# Patient Record
Sex: MALE | Race: WHITE | NOT HISPANIC OR LATINO | Employment: OTHER | URBAN - NONMETROPOLITAN AREA
[De-identification: names, ages, dates, MRNs, and addresses within clinical notes are randomized per-mention and may not be internally consistent; named-entity substitution may affect disease eponyms.]

---

## 2023-04-17 DIAGNOSIS — J45.902 MILD ASTHMA WITH STATUS ASTHMATICUS, UNSPECIFIED WHETHER PERSISTENT (HHS-HCC): ICD-10-CM

## 2023-04-17 RX ORDER — TOPIRAMATE 50 MG/1
50 TABLET, FILM COATED ORAL 2 TIMES DAILY
COMMUNITY

## 2023-04-17 RX ORDER — NYSTATIN 100000 [USP'U]/ML
SUSPENSION ORAL
COMMUNITY
Start: 2022-06-16 | End: 2023-11-09 | Stop reason: ALTCHOICE

## 2023-04-17 RX ORDER — ASPIRIN 81 MG
1 TABLET, DELAYED RELEASE (ENTERIC COATED) ORAL DAILY
COMMUNITY
Start: 2019-10-30 | End: 2023-11-09 | Stop reason: ALTCHOICE

## 2023-04-17 RX ORDER — OMEPRAZOLE 40 MG/1
40 CAPSULE, DELAYED RELEASE ORAL DAILY
COMMUNITY
End: 2023-05-03 | Stop reason: SDUPTHER

## 2023-04-17 RX ORDER — TIOTROPIUM BROMIDE 18 UG/1
CAPSULE ORAL; RESPIRATORY (INHALATION)
COMMUNITY
Start: 2022-02-22 | End: 2023-11-09 | Stop reason: ALTCHOICE

## 2023-04-17 RX ORDER — TADALAFIL 5 MG/1
1 TABLET ORAL DAILY
COMMUNITY
Start: 2023-02-25 | End: 2024-03-13 | Stop reason: ALTCHOICE

## 2023-04-17 RX ORDER — TRAZODONE HYDROCHLORIDE 50 MG/1
1 TABLET ORAL NIGHTLY PRN
COMMUNITY
Start: 2021-03-25 | End: 2023-05-03 | Stop reason: SINTOL

## 2023-04-17 RX ORDER — BUDESONIDE AND FORMOTEROL FUMARATE DIHYDRATE 160; 4.5 UG/1; UG/1
2 AEROSOL RESPIRATORY (INHALATION)
Qty: 3 EACH | Refills: 0 | Status: SHIPPED | OUTPATIENT
Start: 2023-04-17 | End: 2023-07-14 | Stop reason: SDUPTHER

## 2023-04-17 RX ORDER — LOSARTAN POTASSIUM 50 MG/1
1 TABLET ORAL DAILY
COMMUNITY
Start: 2023-02-01 | End: 2023-08-09 | Stop reason: SDUPTHER

## 2023-04-17 RX ORDER — TAMSULOSIN HYDROCHLORIDE 0.4 MG/1
0.4 CAPSULE ORAL NIGHTLY
COMMUNITY

## 2023-04-17 RX ORDER — PRIMIDONE 50 MG/1
50 TABLET ORAL NIGHTLY
COMMUNITY
Start: 2023-02-26 | End: 2023-08-24 | Stop reason: SDUPTHER

## 2023-04-17 RX ORDER — BUDESONIDE AND FORMOTEROL FUMARATE DIHYDRATE 160; 4.5 UG/1; UG/1
2 AEROSOL RESPIRATORY (INHALATION)
COMMUNITY
End: 2023-04-17 | Stop reason: SDUPTHER

## 2023-04-25 DIAGNOSIS — Z00.00 HEALTHCARE MAINTENANCE: ICD-10-CM

## 2023-04-25 RX ORDER — FOLIC ACID 1 MG/1
1 TABLET ORAL DAILY
Qty: 7 TABLET | Refills: 0 | Status: SHIPPED | OUTPATIENT
Start: 2023-04-25 | End: 2023-05-08 | Stop reason: SDUPTHER

## 2023-04-25 RX ORDER — FOLIC ACID 1 MG/1
1 TABLET ORAL DAILY
COMMUNITY
End: 2023-04-25 | Stop reason: SDUPTHER

## 2023-05-02 PROBLEM — E78.00 HYPERCHOLESTEREMIA: Status: ACTIVE | Noted: 2023-05-02

## 2023-05-02 PROBLEM — N40.1 BPH WITH OBSTRUCTION/LOWER URINARY TRACT SYMPTOMS: Status: ACTIVE | Noted: 2023-05-02

## 2023-05-02 PROBLEM — K58.9 IBS (IRRITABLE BOWEL SYNDROME): Status: ACTIVE | Noted: 2023-05-02

## 2023-05-02 PROBLEM — N13.8 BPH WITH OBSTRUCTION/LOWER URINARY TRACT SYMPTOMS: Status: ACTIVE | Noted: 2023-05-02

## 2023-05-02 PROBLEM — I25.811 ATHEROSCLEROSIS OF NATIVE CORONARY ARTERY OF TRANSPLANTED HEART WITHOUT ANGINA PECTORIS: Status: ACTIVE | Noted: 2023-05-02

## 2023-05-02 PROBLEM — K21.9 GERD (GASTROESOPHAGEAL REFLUX DISEASE): Status: ACTIVE | Noted: 2023-05-02

## 2023-05-02 PROBLEM — F41.9 ANXIETY: Status: ACTIVE | Noted: 2023-05-02

## 2023-05-02 PROBLEM — E78.5 DYSLIPIDEMIA: Status: ACTIVE | Noted: 2023-05-02

## 2023-05-02 PROBLEM — R29.818 SUSPECTED SLEEP APNEA: Status: ACTIVE | Noted: 2023-05-02

## 2023-05-02 PROBLEM — G47.00 INSOMNIA: Status: ACTIVE | Noted: 2023-05-02

## 2023-05-02 PROBLEM — J44.9 COPD (CHRONIC OBSTRUCTIVE PULMONARY DISEASE) (MULTI): Status: ACTIVE | Noted: 2023-05-02

## 2023-05-02 PROBLEM — I25.10 ASCVD (ARTERIOSCLEROTIC CARDIOVASCULAR DISEASE): Status: ACTIVE | Noted: 2023-05-02

## 2023-05-02 RX ORDER — ACETAMINOPHEN 325 MG/1
TABLET ORAL EVERY 6 HOURS PRN
COMMUNITY
Start: 2020-12-18 | End: 2024-04-06 | Stop reason: HOSPADM

## 2023-05-02 RX ORDER — BUDESONIDE AND FORMOTEROL FUMARATE DIHYDRATE 160; 4.5 UG/1; UG/1
AEROSOL RESPIRATORY (INHALATION) 2 TIMES DAILY PRN
COMMUNITY
End: 2023-07-14 | Stop reason: SDUPTHER

## 2023-05-02 RX ORDER — IBUPROFEN 800 MG/1
1 TABLET ORAL
COMMUNITY
Start: 2022-08-02 | End: 2024-03-13 | Stop reason: ALTCHOICE

## 2023-05-02 RX ORDER — ATORVASTATIN CALCIUM 40 MG/1
40 TABLET, FILM COATED ORAL DAILY
COMMUNITY
End: 2023-05-03 | Stop reason: SDUPTHER

## 2023-05-02 RX ORDER — PNV NO.95/FERROUS FUM/FOLIC AC 28MG-0.8MG
1 TABLET ORAL DAILY
COMMUNITY
Start: 2021-03-11

## 2023-05-02 RX ORDER — GABAPENTIN 100 MG/1
1 CAPSULE ORAL
COMMUNITY
Start: 2022-11-16 | End: 2023-11-09 | Stop reason: ALTCHOICE

## 2023-05-02 RX ORDER — ALBUTEROL SULFATE 90 UG/1
AEROSOL, METERED RESPIRATORY (INHALATION)
COMMUNITY
End: 2023-05-03 | Stop reason: SDUPTHER

## 2023-05-02 RX ORDER — ASPIRIN 81 MG/1
1 TABLET ORAL DAILY
COMMUNITY
Start: 2020-12-01 | End: 2023-11-09 | Stop reason: ALTCHOICE

## 2023-05-02 RX ORDER — AMITRIPTYLINE HYDROCHLORIDE 25 MG/1
TABLET, FILM COATED ORAL
COMMUNITY
End: 2023-05-03 | Stop reason: SINTOL

## 2023-05-03 ENCOUNTER — OFFICE VISIT (OUTPATIENT)
Dept: PRIMARY CARE | Facility: CLINIC | Age: 63
End: 2023-05-03
Payer: MEDICAID

## 2023-05-03 VITALS
OXYGEN SATURATION: 99 % | WEIGHT: 230.8 LBS | HEART RATE: 72 BPM | SYSTOLIC BLOOD PRESSURE: 120 MMHG | BODY MASS INDEX: 29.63 KG/M2 | DIASTOLIC BLOOD PRESSURE: 88 MMHG | TEMPERATURE: 97.9 F

## 2023-05-03 DIAGNOSIS — F17.219 CIGARETTE NICOTINE DEPENDENCE WITH NICOTINE-INDUCED DISORDER: ICD-10-CM

## 2023-05-03 DIAGNOSIS — E78.5 DYSLIPIDEMIA: ICD-10-CM

## 2023-05-03 DIAGNOSIS — I25.811 ATHEROSCLEROSIS OF NATIVE CORONARY ARTERY OF TRANSPLANTED HEART WITHOUT ANGINA PECTORIS: Primary | ICD-10-CM

## 2023-05-03 DIAGNOSIS — K21.9 GASTROESOPHAGEAL REFLUX DISEASE WITHOUT ESOPHAGITIS: ICD-10-CM

## 2023-05-03 DIAGNOSIS — B02.8 HERPES ZOSTER WITH OTHER COMPLICATION: ICD-10-CM

## 2023-05-03 DIAGNOSIS — F41.9 ANXIETY: ICD-10-CM

## 2023-05-03 DIAGNOSIS — J43.9 PULMONARY EMPHYSEMA, UNSPECIFIED EMPHYSEMA TYPE (MULTI): ICD-10-CM

## 2023-05-03 PROCEDURE — 99406 BEHAV CHNG SMOKING 3-10 MIN: CPT | Performed by: INTERNAL MEDICINE

## 2023-05-03 PROCEDURE — 1160F RVW MEDS BY RX/DR IN RCRD: CPT | Performed by: INTERNAL MEDICINE

## 2023-05-03 PROCEDURE — 1159F MED LIST DOCD IN RCRD: CPT | Performed by: INTERNAL MEDICINE

## 2023-05-03 PROCEDURE — 99214 OFFICE O/P EST MOD 30 MIN: CPT | Performed by: INTERNAL MEDICINE

## 2023-05-03 RX ORDER — OMEPRAZOLE 40 MG/1
40 CAPSULE, DELAYED RELEASE ORAL DAILY
Qty: 90 CAPSULE | Refills: 1 | Status: SHIPPED | OUTPATIENT
Start: 2023-05-03 | End: 2023-10-26 | Stop reason: SDUPTHER

## 2023-05-03 RX ORDER — LOSARTAN POTASSIUM 50 MG/1
50 TABLET ORAL DAILY
Qty: 90 TABLET | Refills: 1 | Status: CANCELLED | OUTPATIENT
Start: 2023-05-03

## 2023-05-03 RX ORDER — ALBUTEROL SULFATE 90 UG/1
AEROSOL, METERED RESPIRATORY (INHALATION)
Qty: 18 G | Refills: 1 | Status: SHIPPED | OUTPATIENT
Start: 2023-05-03 | End: 2023-05-30 | Stop reason: SDUPTHER

## 2023-05-03 RX ORDER — ATORVASTATIN CALCIUM 40 MG/1
40 TABLET, FILM COATED ORAL DAILY
Qty: 90 TABLET | Refills: 1 | Status: SHIPPED | OUTPATIENT
Start: 2023-05-03 | End: 2023-10-26 | Stop reason: SDUPTHER

## 2023-05-03 RX ORDER — VALACYCLOVIR HYDROCHLORIDE 1 G/1
1000 TABLET, FILM COATED ORAL 3 TIMES DAILY
Qty: 21 TABLET | Refills: 0 | Status: SHIPPED | OUTPATIENT
Start: 2023-05-03 | End: 2023-05-10

## 2023-05-03 RX ORDER — TOPIRAMATE 50 MG/1
50 TABLET, FILM COATED ORAL 2 TIMES DAILY
Qty: 90 TABLET | Refills: 1 | Status: CANCELLED | OUTPATIENT
Start: 2023-05-03

## 2023-05-03 ASSESSMENT — ENCOUNTER SYMPTOMS
WHEEZING: 0
HEADACHES: 0
LOSS OF SENSATION IN FEET: 0
DIFFICULTY URINATING: 0
BLOOD IN STOOL: 0
ARTHRALGIAS: 0
DEPRESSION: 0
UNEXPECTED WEIGHT CHANGE: 0
DIZZINESS: 0
FATIGUE: 0
OCCASIONAL FEELINGS OF UNSTEADINESS: 0
SINUS PAIN: 0
ABDOMINAL PAIN: 0
SORE THROAT: 0
BACK PAIN: 1
PALPITATIONS: 0
DIARRHEA: 0
BRUISES/BLEEDS EASILY: 0
FEVER: 0
COUGH: 0

## 2023-05-03 ASSESSMENT — PATIENT HEALTH QUESTIONNAIRE - PHQ9
2. FEELING DOWN, DEPRESSED OR HOPELESS: NOT AT ALL
SUM OF ALL RESPONSES TO PHQ9 QUESTIONS 1 AND 2: 0
1. LITTLE INTEREST OR PLEASURE IN DOING THINGS: NOT AT ALL

## 2023-05-03 NOTE — PROGRESS NOTES
"Subjective   Patient ID: Julio Carranza is a 63 y.o. male who presents for Annual Exam (Dizzy light headed this morning. Left arm feels like cramps under arm started last night/Stumbling left leg).    - Left shoulder rash and pain for the last 3 days physical exam underlying new onset shingles  Patient counseled about multivitamins daily dressing with Neosporin cream  - Add valacyclovir 3 times a day for 1 week  - For neuropathy continue with gabapentin  - Dizziness patient need to discontinue amitriptyline and trazodone  - Depression and anxiety continue all other medications  - COPD continue with current inhaler counseled on smoking cessation  - Nicotine dependency  \"I spent 3 minutes counseling patient about need for smoking/tobacco cessation and how I can support efforts when patient is ready to quit.  Discussed nicotine replacement therapy, Varenicline, Bupropion, hypnosis, support groups, and accupunture as potential options.  Patient currently has no signs or symptoms of tobacco related disease.\".  - Dyslipidemia continue low-carb diet  - Coronary artery disease compensated  - Hypertension continue with losartan  Follow-up 3 months           Review of Systems   Constitutional:  Negative for fatigue, fever and unexpected weight change.   HENT:  Negative for congestion, ear discharge, ear pain, mouth sores, sinus pain and sore throat.    Eyes:  Negative for visual disturbance.   Respiratory:  Negative for cough and wheezing.    Cardiovascular:  Negative for chest pain, palpitations and leg swelling.   Gastrointestinal:  Negative for abdominal pain, blood in stool and diarrhea.   Genitourinary:  Negative for difficulty urinating.   Musculoskeletal:  Positive for back pain. Negative for arthralgias.   Skin:  Negative for rash.   Neurological:  Negative for dizziness and headaches.   Hematological:  Does not bruise/bleed easily.   Psychiatric/Behavioral:  Negative for behavioral problems.    All other systems " reviewed and are negative.      Objective   Lab Results   Component Value Date    HGBA1C 5.2 06/18/2022      /88   Pulse 72   Temp 36.6 °C (97.9 °F)   Wt 105 kg (230 lb 12.8 oz)   SpO2 99%   BMI 29.63 kg/m²   This SmartLink has not been configured with any valid records.     Lab Results   Component Value Date    WBC 7.9 06/29/2022    HGB 15.2 06/29/2022    HCT 44.7 06/29/2022     06/29/2022    CHOL 136 06/30/2022    TRIG 79 06/30/2022    HDL 40.3 06/30/2022    ALT 16 06/29/2022    AST 21 06/29/2022     06/30/2022    K 3.8 06/30/2022     (H) 06/30/2022    CREATININE 0.92 06/30/2022    BUN 13 06/30/2022    CO2 25 06/30/2022    TSH 1.04 06/18/2022    INR 1.0 06/29/2022    HGBA1C 5.2 06/18/2022     par   Physical Exam  Vitals and nursing note reviewed.   Constitutional:       Appearance: Normal appearance.   HENT:      Head: Normocephalic.      Nose: Nose normal.   Eyes:      Conjunctiva/sclera: Conjunctivae normal.      Pupils: Pupils are equal, round, and reactive to light.   Cardiovascular:      Rate and Rhythm: Regular rhythm.   Pulmonary:      Effort: Pulmonary effort is normal.      Breath sounds: Normal breath sounds.   Abdominal:      General: Abdomen is flat.      Palpations: Abdomen is soft.   Musculoskeletal:      Cervical back: Neck supple.   Skin:     General: Skin is warm.      Findings: Lesion (and see rash on the left upper back consistent with shingles) present.   Neurological:      General: No focal deficit present.      Mental Status: He is oriented to person, place, and time.   Psychiatric:         Mood and Affect: Mood normal.         Assessment/Plan   Julio was seen today for annual exam.  Diagnoses and all orders for this visit:  Atherosclerosis of native coronary artery of transplanted heart without angina pectoris (Primary)  Anxiety  Pulmonary emphysema, unspecified emphysema type (CMS/HCC)  -     albuterol 90 mcg/actuation inhaler; inhale 1 to 2 puffs by mouth  "every 4 to 6 hours as needed  Herpes zoster with other complication  -     valACYclovir (Valtrex) 1 gram tablet; Take 1 tablet (1,000 mg) by mouth 3 times a day for 7 days.  Gastroesophageal reflux disease without esophagitis  -     omeprazole (PriLOSEC) 40 mg DR capsule; Take 1 capsule (40 mg) by mouth once daily.  Dyslipidemia  -     atorvastatin (Lipitor) 40 mg tablet; Take 1 tablet (40 mg) by mouth once daily.  Other orders  -     Follow Up In Primary Care; Future   - Left shoulder rash and pain for the last 3 days physical exam underlying new onset shingles  Patient counseled about multivitamins daily dressing with Neosporin cream  - Add valacyclovir 3 times a day for 1 week  - For neuropathy continue with gabapentin  - Dizziness patient need to discontinue amitriptyline and trazodone  - Depression and anxiety continue all other medications  - COPD continue with current inhaler counseled on smoking cessation  - Nicotine dependency  \"I spent 3 minutes counseling patient about need for smoking/tobacco cessation and how I can support efforts when patient is ready to quit.  Discussed nicotine replacement therapy, Varenicline, Bupropion, hypnosis, support groups, and accupunture as potential options.  Patient currently has no signs or symptoms of tobacco related disease.\".  - Dyslipidemia continue low-carb diet  - Coronary artery disease compensated  - Hypertension continue with losartan  Follow-up 3 months  "

## 2023-05-08 DIAGNOSIS — Z00.00 HEALTHCARE MAINTENANCE: ICD-10-CM

## 2023-05-08 RX ORDER — FOLIC ACID 1 MG/1
1 TABLET ORAL DAILY
Qty: 30 TABLET | Refills: 2 | Status: SHIPPED | OUTPATIENT
Start: 2023-05-08 | End: 2023-11-09 | Stop reason: SDUPTHER

## 2023-05-30 DIAGNOSIS — J43.9 PULMONARY EMPHYSEMA, UNSPECIFIED EMPHYSEMA TYPE (MULTI): ICD-10-CM

## 2023-05-30 RX ORDER — ALBUTEROL SULFATE 90 UG/1
AEROSOL, METERED RESPIRATORY (INHALATION)
Qty: 18 G | Refills: 1 | Status: SHIPPED | OUTPATIENT
Start: 2023-05-30 | End: 2023-06-30 | Stop reason: SDUPTHER

## 2023-06-30 DIAGNOSIS — J43.9 PULMONARY EMPHYSEMA, UNSPECIFIED EMPHYSEMA TYPE (MULTI): ICD-10-CM

## 2023-07-03 RX ORDER — ALBUTEROL SULFATE 90 UG/1
AEROSOL, METERED RESPIRATORY (INHALATION)
Qty: 18 G | Refills: 1 | Status: SHIPPED | OUTPATIENT
Start: 2023-07-03 | End: 2023-07-31 | Stop reason: SDUPTHER

## 2023-07-14 DIAGNOSIS — J45.902 MILD ASTHMA WITH STATUS ASTHMATICUS, UNSPECIFIED WHETHER PERSISTENT (HHS-HCC): Primary | ICD-10-CM

## 2023-07-15 RX ORDER — BUDESONIDE AND FORMOTEROL FUMARATE DIHYDRATE 160; 4.5 UG/1; UG/1
2 AEROSOL RESPIRATORY (INHALATION)
Qty: 3 EACH | Refills: 0 | Status: SHIPPED | OUTPATIENT
Start: 2023-07-15 | End: 2023-10-11 | Stop reason: SDUPTHER

## 2023-07-24 ENCOUNTER — HOSPITAL ENCOUNTER (OUTPATIENT)
Dept: DATA CONVERSION | Facility: HOSPITAL | Age: 63
End: 2023-07-24
Attending: RADIOLOGY
Payer: MEDICARE

## 2023-07-24 DIAGNOSIS — M25.561 PAIN IN RIGHT KNEE: ICD-10-CM

## 2023-07-24 DIAGNOSIS — M71.21 SYNOVIAL CYST OF POPLITEAL SPACE (BAKER), RIGHT KNEE: ICD-10-CM

## 2023-07-31 DIAGNOSIS — J43.9 PULMONARY EMPHYSEMA, UNSPECIFIED EMPHYSEMA TYPE (MULTI): ICD-10-CM

## 2023-07-31 RX ORDER — ALBUTEROL SULFATE 90 UG/1
AEROSOL, METERED RESPIRATORY (INHALATION)
Qty: 18 G | Refills: 1 | Status: SHIPPED | OUTPATIENT
Start: 2023-07-31 | End: 2023-08-30 | Stop reason: SDUPTHER

## 2023-08-09 ENCOUNTER — OFFICE VISIT (OUTPATIENT)
Dept: PRIMARY CARE | Facility: CLINIC | Age: 63
End: 2023-08-09
Payer: MEDICARE

## 2023-08-09 VITALS
TEMPERATURE: 97.1 F | SYSTOLIC BLOOD PRESSURE: 166 MMHG | HEIGHT: 71 IN | DIASTOLIC BLOOD PRESSURE: 100 MMHG | HEART RATE: 68 BPM | OXYGEN SATURATION: 96 % | BODY MASS INDEX: 32.14 KG/M2 | WEIGHT: 229.6 LBS

## 2023-08-09 DIAGNOSIS — R53.83 OTHER FATIGUE: ICD-10-CM

## 2023-08-09 DIAGNOSIS — Z13.89 SCREENING FOR MULTIPLE CONDITIONS: ICD-10-CM

## 2023-08-09 DIAGNOSIS — Z00.00 ROUTINE GENERAL MEDICAL EXAMINATION AT HEALTH CARE FACILITY: Primary | ICD-10-CM

## 2023-08-09 DIAGNOSIS — F17.219 CIGARETTE NICOTINE DEPENDENCE WITH NICOTINE-INDUCED DISORDER: ICD-10-CM

## 2023-08-09 DIAGNOSIS — E53.8 VITAMIN B12 DEFICIENCY: ICD-10-CM

## 2023-08-09 DIAGNOSIS — M71.21 SYNOVIAL CYST OF RIGHT POPLITEAL SPACE: ICD-10-CM

## 2023-08-09 DIAGNOSIS — E55.9 VITAMIN D DEFICIENCY, UNSPECIFIED: ICD-10-CM

## 2023-08-09 DIAGNOSIS — Z12.5 SCREENING FOR PROSTATE CANCER: ICD-10-CM

## 2023-08-09 DIAGNOSIS — I10 HYPERTENSION, UNSPECIFIED TYPE: ICD-10-CM

## 2023-08-09 DIAGNOSIS — Z79.899 HIGH RISK MEDICATION USE: ICD-10-CM

## 2023-08-09 DIAGNOSIS — I25.10 ASCVD (ARTERIOSCLEROTIC CARDIOVASCULAR DISEASE): ICD-10-CM

## 2023-08-09 DIAGNOSIS — Z13.6 SCREENING FOR CARDIOVASCULAR CONDITION: ICD-10-CM

## 2023-08-09 DIAGNOSIS — E78.00 HYPERCHOLESTEREMIA: ICD-10-CM

## 2023-08-09 DIAGNOSIS — L73.9 FOLLICULITIS: ICD-10-CM

## 2023-08-09 DIAGNOSIS — E78.5 DYSLIPIDEMIA: ICD-10-CM

## 2023-08-09 DIAGNOSIS — Z11.59 NEED FOR HEPATITIS C SCREENING TEST: ICD-10-CM

## 2023-08-09 PROCEDURE — 1170F FXNL STATUS ASSESSED: CPT | Performed by: INTERNAL MEDICINE

## 2023-08-09 PROCEDURE — 3077F SYST BP >= 140 MM HG: CPT | Performed by: INTERNAL MEDICINE

## 2023-08-09 PROCEDURE — 99214 OFFICE O/P EST MOD 30 MIN: CPT | Performed by: INTERNAL MEDICINE

## 2023-08-09 PROCEDURE — 3080F DIAST BP >= 90 MM HG: CPT | Performed by: INTERNAL MEDICINE

## 2023-08-09 PROCEDURE — G0402 INITIAL PREVENTIVE EXAM: HCPCS | Performed by: INTERNAL MEDICINE

## 2023-08-09 PROCEDURE — 1160F RVW MEDS BY RX/DR IN RCRD: CPT | Performed by: INTERNAL MEDICINE

## 2023-08-09 PROCEDURE — G0296 VISIT TO DETERM LDCT ELIG: HCPCS | Performed by: INTERNAL MEDICINE

## 2023-08-09 PROCEDURE — 99406 BEHAV CHNG SMOKING 3-10 MIN: CPT | Performed by: INTERNAL MEDICINE

## 2023-08-09 PROCEDURE — 1159F MED LIST DOCD IN RCRD: CPT | Performed by: INTERNAL MEDICINE

## 2023-08-09 PROCEDURE — G0403 EKG FOR INITIAL PREVENT EXAM: HCPCS | Performed by: INTERNAL MEDICINE

## 2023-08-09 RX ORDER — PREDNISONE 50 MG/1
50 TABLET ORAL NIGHTLY
COMMUNITY
Start: 2023-07-14 | End: 2024-03-13 | Stop reason: ALTCHOICE

## 2023-08-09 RX ORDER — DOXYCYCLINE 100 MG/1
100 CAPSULE ORAL 2 TIMES DAILY
Qty: 14 CAPSULE | Refills: 0 | Status: SHIPPED | OUTPATIENT
Start: 2023-08-09 | End: 2023-08-23

## 2023-08-09 RX ORDER — LOSARTAN POTASSIUM 50 MG/1
50 TABLET ORAL DAILY
Qty: 90 TABLET | Refills: 0 | Status: SHIPPED | OUTPATIENT
Start: 2023-08-09 | End: 2023-08-09

## 2023-08-09 RX ORDER — HYDROCODONE BITARTRATE AND ACETAMINOPHEN 5; 325 MG/1; MG/1
TABLET ORAL
COMMUNITY
Start: 2023-07-14 | End: 2024-03-13 | Stop reason: ALTCHOICE

## 2023-08-09 RX ORDER — LOSARTAN POTASSIUM 100 MG/1
100 TABLET ORAL DAILY
Qty: 90 TABLET | Refills: 3 | Status: SHIPPED | OUTPATIENT
Start: 2023-08-09 | End: 2023-11-09 | Stop reason: SDUPTHER

## 2023-08-09 ASSESSMENT — ACTIVITIES OF DAILY LIVING (ADL)
BATHING: INDEPENDENT
TAKING_MEDICATION: INDEPENDENT
DOING_HOUSEWORK: INDEPENDENT
GROCERY_SHOPPING: INDEPENDENT
DRESSING: INDEPENDENT
MANAGING_FINANCES: INDEPENDENT

## 2023-08-09 ASSESSMENT — ENCOUNTER SYMPTOMS
LOSS OF SENSATION IN FEET: 1
PALPITATIONS: 0
HEADACHES: 0
SINUS PAIN: 0
DIARRHEA: 0
FEVER: 0
OCCASIONAL FEELINGS OF UNSTEADINESS: 1
WHEEZING: 0
ARTHRALGIAS: 0
DIFFICULTY URINATING: 0
BLOOD IN STOOL: 0
UNEXPECTED WEIGHT CHANGE: 0
SORE THROAT: 0
JOINT SWELLING: 1
DIZZINESS: 0
FATIGUE: 0
COUGH: 0
ABDOMINAL PAIN: 0
BRUISES/BLEEDS EASILY: 0

## 2023-08-09 ASSESSMENT — PATIENT HEALTH QUESTIONNAIRE - PHQ9: 1. LITTLE INTEREST OR PLEASURE IN DOING THINGS: NEARLY EVERY DAY

## 2023-08-09 NOTE — PROGRESS NOTES
"Subjective   Reason for Visit: Julio Carranza is an 63 y.o. male here for a Medicare Wellness visit.     Past Medical, Surgical, and Family History reviewed and updated in chart.    Reviewed all medications by prescribing practitioner or clinical pharmacist (such as prescriptions, OTCs, herbal therapies and supplements) and documented in the medical record.     Annual preventive visit, welcome to Medicare  - Vaccinations  - Patient needs Shingrix vaccine patient had shingles about 3 months ago healed and now patient agreed  -Screening colonoscopy will be obtained in 2025  -Nicotine dependency  \"I spent more 10 minutes counseling patient about need for smoking/tobacco cessation and how I can support efforts when patient is ready to quit.  Discussed nicotine replacement therapy, Varenicline, Bupropion, hypnosis, support groups, and accupunture as potential options.  Patient currently has no signs or symptoms of tobacco related disease.\".  -Screening for lung cardiac cancer  I discussed smoking history, patient meets criteria for lung cancer screening with low-dose CT scan. By using shared decision making we determined that patient would benefit from that screening including a discussion of benefits and harms of screening, follow-up diagnostic testing, over diagnosis, false positive  rate and total radiation exposure  I counseled the patient about the  importance of adhering to annual lung cancer low-dose CT screening, the impact of comorbidities and his  ability or willingness to undergo diagnosis and treatment if abnormality discovered. I provided patient an order for low-dose CT lung cancer screening.    Medicare screening reviewed with patient    Follow-up  -Recurrent axillary folliculitis not improving counseled about prevention need to start on doxycycline twice a day for 14 days follow-up if no improvement  -Right knee swelling diagnosed with Baker's cyst received steroid injection without improvement refer " patient to orthopedic surgery Dr. Rouse follow-up results closely  - Hypertension uncontrolled needs a goal blood pressure 120/80  Need to change losartan to 100 mg daily new prescription provided  - Dyslipidemia continue low-carb diet  - Coronary artery disease compensated continue with aspirin daily counseled about nicotine cessation  - Hypertension continue with losartan  Follow-up 3 months      ER Follow-up  Associated symptoms include joint swelling (Right knee swelling) and a rash (Axillary recurrent boils). Pertinent negatives include no abdominal pain, arthralgias, chest pain, congestion, coughing, fatigue, fever, headaches or sore throat.   Med Refill  Associated symptoms include joint swelling (Right knee swelling) and a rash (Axillary recurrent boils). Pertinent negatives include no abdominal pain, arthralgias, chest pain, congestion, coughing, fatigue, fever, headaches or sore throat.       Patient Care Team:  Mendez Coronel MD as PCP - General  Mendez Coronel MD as PCP - [facility] Medicaid PCP     Review of Systems   Constitutional:  Negative for fatigue, fever and unexpected weight change.   HENT:  Negative for congestion, ear discharge, ear pain, mouth sores, sinus pain and sore throat.    Eyes:  Negative for visual disturbance.   Respiratory:  Negative for cough and wheezing.    Cardiovascular:  Negative for chest pain, palpitations and leg swelling.   Gastrointestinal:  Negative for abdominal pain, blood in stool and diarrhea.   Genitourinary:  Negative for difficulty urinating.   Musculoskeletal:  Positive for joint swelling (Right knee swelling). Negative for arthralgias.   Skin:  Positive for rash (Axillary recurrent boils).   Neurological:  Negative for dizziness and headaches.   Hematological:  Does not bruise/bleed easily.   Psychiatric/Behavioral:  Negative for behavioral problems.    All other systems reviewed and are negative.      Objective   Vitals:  BP (!) 166/100   Pulse 68   Temp  "36.2 °C (97.1 °F)   Ht 1.803 m (5' 11\")   Wt 104 kg (229 lb 9.6 oz)   SpO2 96%   BMI 32.02 kg/m²     Lab Results   Component Value Date    WBC 7.9 06/29/2022    HGB 15.2 06/29/2022    HCT 44.7 06/29/2022     06/29/2022    CHOL 136 06/30/2022    TRIG 79 06/30/2022    HDL 40.3 06/30/2022    ALT 16 06/29/2022    AST 21 06/29/2022     06/30/2022    K 3.8 06/30/2022     (H) 06/30/2022    CREATININE 0.92 06/30/2022    BUN 13 06/30/2022    CO2 25 06/30/2022    TSH 1.04 06/18/2022    INR 1.0 06/29/2022    HGBA1C 5.2 06/18/2022     par   Physical Exam  Vitals and nursing note reviewed.   Constitutional:       Appearance: Normal appearance.   HENT:      Head: Normocephalic.      Nose: Nose normal.   Eyes:      Conjunctiva/sclera: Conjunctivae normal.      Pupils: Pupils are equal, round, and reactive to light.   Cardiovascular:      Rate and Rhythm: Regular rhythm.   Pulmonary:      Effort: Pulmonary effort is normal.      Breath sounds: Normal breath sounds. No rales.   Chest:      Chest wall: Tenderness present.   Abdominal:      General: Abdomen is flat.      Palpations: Abdomen is soft.   Musculoskeletal:         General: Deformity (Right knee swelling and Baker's cyst) present.      Cervical back: Neck supple.   Skin:     General: Skin is warm.      Findings: Rash (Recurrent axillary folliculitis) present.   Neurological:      General: No focal deficit present.      Mental Status: He is oriented to person, place, and time.      Gait: Gait normal.      Deep Tendon Reflexes: Reflexes normal.   Psychiatric:         Mood and Affect: Mood normal.       Assessment/Plan   Problem List Items Addressed This Visit       ASCVD (arteriosclerotic cardiovascular disease)    Dyslipidemia    Hypercholesteremia    Relevant Orders    Lipid Panel     Other Visit Diagnoses       Routine general medical examination at health care facility    -  Primary    Relevant Medications    zoster vaccine-recombinant adjuvanted " "(Shingrix) 50 mcg/0.5 mL vaccine    Screening for multiple conditions        Screening for cardiovascular condition        Relevant Orders    ECG 12 lead    Need for hepatitis C screening test        Relevant Orders    Hepatitis C antibody    High risk medication use        Relevant Orders    CBC and Auto Differential    Hypertension, unspecified type        Relevant Medications    losartan (Cozaar) 100 mg tablet    Other Relevant Orders    Comprehensive Metabolic Panel    Other fatigue        Relevant Orders    TSH with reflex to Free T4 if abnormal    Screening for prostate cancer        Relevant Orders    Prostate Specific Antigen, Screen    Vitamin B12 deficiency        Relevant Orders    Vitamin B12    Vitamin D deficiency, unspecified        Relevant Orders    Vitamin D, Total    Synovial cyst of right popliteal space        Relevant Orders    Referral to Orthopaedic Surgery    Folliculitis        Relevant Medications    doxycycline (Vibramycin) 100 mg capsule    Cigarette nicotine dependence with nicotine-induced disorder        Relevant Orders    CT lung screening low dose        Annual preventive visit, welcome to Medicare  - Vaccinations  - Patient needs Shingrix vaccine patient had shingles about 3 months ago healed and now patient agreed  -Screening colonoscopy will be obtained in 2025  -Nicotine dependency  \"I spent more 10 minutes counseling patient about need for smoking/tobacco cessation and how I can support efforts when patient is ready to quit.  Discussed nicotine replacement therapy, Varenicline, Bupropion, hypnosis, support groups, and accupunture as potential options.  Patient currently has no signs or symptoms of tobacco related disease.\".  -Screening for lung cardiac cancer  I discussed smoking history, patient meets criteria for lung cancer screening with low-dose CT scan. By using shared decision making we determined that patient would benefit from that screening including a discussion of " benefits and harms of screening, follow-up diagnostic testing, over diagnosis, false positive  rate and total radiation exposure  I counseled the patient about the  importance of adhering to annual lung cancer low-dose CT screening, the impact of comorbidities and his  ability or willingness to undergo diagnosis and treatment if abnormality discovered. I provided patient an order for low-dose CT lung cancer screening.    Medicare screening reviewed with patient    Follow-up  -Recurrent axillary folliculitis not improving counseled about prevention need to start on doxycycline twice a day for 14 days follow-up if no improvement  -Right knee swelling diagnosed with Baker's cyst received steroid injection without improvement refer patient to orthopedic surgery Dr. Rouse follow-up results closely  - Hypertension uncontrolled needs a goal blood pressure 120/80  Need to change losartan to 100 mg daily new prescription provided  - Dyslipidemia continue low-carb diet  - Coronary artery disease compensated continue with aspirin daily counseled about nicotine cessation  - Hypertension continue with losartan  Follow-up 3 months

## 2023-08-16 ENCOUNTER — LAB (OUTPATIENT)
Dept: LAB | Facility: LAB | Age: 63
End: 2023-08-16
Payer: MEDICARE

## 2023-08-16 DIAGNOSIS — E78.00 HYPERCHOLESTEREMIA: ICD-10-CM

## 2023-08-16 DIAGNOSIS — I10 HYPERTENSION, UNSPECIFIED TYPE: ICD-10-CM

## 2023-08-16 DIAGNOSIS — E55.9 VITAMIN D DEFICIENCY, UNSPECIFIED: ICD-10-CM

## 2023-08-16 DIAGNOSIS — Z11.59 NEED FOR HEPATITIS C SCREENING TEST: ICD-10-CM

## 2023-08-16 DIAGNOSIS — Z12.5 SCREENING FOR PROSTATE CANCER: ICD-10-CM

## 2023-08-16 DIAGNOSIS — E53.8 VITAMIN B12 DEFICIENCY: ICD-10-CM

## 2023-08-16 DIAGNOSIS — R53.83 OTHER FATIGUE: ICD-10-CM

## 2023-08-16 DIAGNOSIS — Z79.899 HIGH RISK MEDICATION USE: ICD-10-CM

## 2023-08-16 LAB
ALANINE AMINOTRANSFERASE (SGPT) (U/L) IN SER/PLAS: 12 U/L (ref 10–52)
ALBUMIN (G/DL) IN SER/PLAS: 4 G/DL (ref 3.4–5)
ALKALINE PHOSPHATASE (U/L) IN SER/PLAS: 121 U/L (ref 33–136)
ANION GAP IN SER/PLAS: 14 MMOL/L (ref 10–20)
ASPARTATE AMINOTRANSFERASE (SGOT) (U/L) IN SER/PLAS: 17 U/L (ref 9–39)
BASOPHILS (10*3/UL) IN BLOOD BY AUTOMATED COUNT: 0.04 X10E9/L (ref 0–0.1)
BASOPHILS/100 LEUKOCYTES IN BLOOD BY AUTOMATED COUNT: 0.8 % (ref 0–2)
BILIRUBIN TOTAL (MG/DL) IN SER/PLAS: 0.4 MG/DL (ref 0–1.2)
CALCIDIOL (25 OH VITAMIN D3) (NG/ML) IN SER/PLAS: 35 NG/ML
CALCIUM (MG/DL) IN SER/PLAS: 8.7 MG/DL (ref 8.6–10.3)
CARBON DIOXIDE, TOTAL (MMOL/L) IN SER/PLAS: 26 MMOL/L (ref 21–32)
CHLORIDE (MMOL/L) IN SER/PLAS: 105 MMOL/L (ref 98–107)
CHOLESTEROL (MG/DL) IN SER/PLAS: 158 MG/DL (ref 0–199)
CHOLESTEROL IN HDL (MG/DL) IN SER/PLAS: 39.2 MG/DL
CHOLESTEROL/HDL RATIO: 4
COBALAMIN (VITAMIN B12) (PG/ML) IN SER/PLAS: 304 PG/ML (ref 211–911)
CREATININE (MG/DL) IN SER/PLAS: 1.06 MG/DL (ref 0.5–1.3)
EOSINOPHILS (10*3/UL) IN BLOOD BY AUTOMATED COUNT: 0.39 X10E9/L (ref 0–0.7)
EOSINOPHILS/100 LEUKOCYTES IN BLOOD BY AUTOMATED COUNT: 8.1 % (ref 0–6)
ERYTHROCYTE DISTRIBUTION WIDTH (RATIO) BY AUTOMATED COUNT: 12.8 % (ref 11.5–14.5)
ERYTHROCYTE MEAN CORPUSCULAR HEMOGLOBIN CONCENTRATION (G/DL) BY AUTOMATED: 33 G/DL (ref 32–36)
ERYTHROCYTE MEAN CORPUSCULAR VOLUME (FL) BY AUTOMATED COUNT: 90 FL (ref 80–100)
ERYTHROCYTES (10*6/UL) IN BLOOD BY AUTOMATED COUNT: 4.49 X10E12/L (ref 4.5–5.9)
GFR MALE: 78 ML/MIN/1.73M2
GLUCOSE (MG/DL) IN SER/PLAS: 91 MG/DL (ref 74–99)
HEMATOCRIT (%) IN BLOOD BY AUTOMATED COUNT: 40.3 % (ref 41–52)
HEMOGLOBIN (G/DL) IN BLOOD: 13.3 G/DL (ref 13.5–17.5)
HEPATITIS C VIRUS AB PRESENCE IN SERUM: NONREACTIVE
IMMATURE GRANULOCYTES/100 LEUKOCYTES IN BLOOD BY AUTOMATED COUNT: 0.4 % (ref 0–0.9)
LDL: 99 MG/DL (ref 0–99)
LEUKOCYTES (10*3/UL) IN BLOOD BY AUTOMATED COUNT: 4.8 X10E9/L (ref 4.4–11.3)
LYMPHOCYTES (10*3/UL) IN BLOOD BY AUTOMATED COUNT: 0.99 X10E9/L (ref 1.2–4.8)
LYMPHOCYTES/100 LEUKOCYTES IN BLOOD BY AUTOMATED COUNT: 20.7 % (ref 13–44)
MONOCYTES (10*3/UL) IN BLOOD BY AUTOMATED COUNT: 0.57 X10E9/L (ref 0.1–1)
MONOCYTES/100 LEUKOCYTES IN BLOOD BY AUTOMATED COUNT: 11.9 % (ref 2–10)
NEUTROPHILS (10*3/UL) IN BLOOD BY AUTOMATED COUNT: 2.78 X10E9/L (ref 1.2–7.7)
NEUTROPHILS/100 LEUKOCYTES IN BLOOD BY AUTOMATED COUNT: 58.1 % (ref 40–80)
PLATELETS (10*3/UL) IN BLOOD AUTOMATED COUNT: 236 X10E9/L (ref 150–450)
POTASSIUM (MMOL/L) IN SER/PLAS: 4 MMOL/L (ref 3.5–5.3)
PROSTATE SPECIFIC ANTIGEN,SCREEN: 2.14 NG/ML (ref 0–4)
PROTEIN TOTAL: 6.4 G/DL (ref 6.4–8.2)
SODIUM (MMOL/L) IN SER/PLAS: 141 MMOL/L (ref 136–145)
THYROTROPIN (MIU/L) IN SER/PLAS BY DETECTION LIMIT <= 0.05 MIU/L: 0.73 MIU/L (ref 0.44–3.98)
TRIGLYCERIDE (MG/DL) IN SER/PLAS: 97 MG/DL (ref 0–149)
UREA NITROGEN (MG/DL) IN SER/PLAS: 9 MG/DL (ref 6–23)
VLDL: 19 MG/DL (ref 0–40)

## 2023-08-16 PROCEDURE — 82306 VITAMIN D 25 HYDROXY: CPT

## 2023-08-16 PROCEDURE — 36415 COLL VENOUS BLD VENIPUNCTURE: CPT

## 2023-08-16 PROCEDURE — 84443 ASSAY THYROID STIM HORMONE: CPT

## 2023-08-16 PROCEDURE — 80053 COMPREHEN METABOLIC PANEL: CPT

## 2023-08-16 PROCEDURE — 85025 COMPLETE CBC W/AUTO DIFF WBC: CPT

## 2023-08-16 PROCEDURE — G0103 PSA SCREENING: HCPCS

## 2023-08-16 PROCEDURE — 82607 VITAMIN B-12: CPT

## 2023-08-16 PROCEDURE — 80061 LIPID PANEL: CPT

## 2023-08-16 PROCEDURE — 86803 HEPATITIS C AB TEST: CPT

## 2023-08-24 DIAGNOSIS — G47.00 INSOMNIA, UNSPECIFIED TYPE: ICD-10-CM

## 2023-08-24 RX ORDER — PRIMIDONE 50 MG/1
50 TABLET ORAL NIGHTLY
Qty: 90 TABLET | Refills: 1 | Status: SHIPPED | OUTPATIENT
Start: 2023-08-24 | End: 2023-11-09 | Stop reason: SDUPTHER

## 2023-08-30 DIAGNOSIS — J43.9 PULMONARY EMPHYSEMA, UNSPECIFIED EMPHYSEMA TYPE (MULTI): ICD-10-CM

## 2023-08-30 RX ORDER — ALBUTEROL SULFATE 90 UG/1
AEROSOL, METERED RESPIRATORY (INHALATION)
Qty: 18 G | Refills: 1 | Status: SHIPPED | OUTPATIENT
Start: 2023-08-30 | End: 2023-09-27 | Stop reason: SDUPTHER

## 2023-09-27 DIAGNOSIS — J43.9 PULMONARY EMPHYSEMA, UNSPECIFIED EMPHYSEMA TYPE (MULTI): ICD-10-CM

## 2023-09-27 RX ORDER — ALBUTEROL SULFATE 90 UG/1
AEROSOL, METERED RESPIRATORY (INHALATION)
Qty: 18 G | Refills: 0 | Status: SHIPPED | OUTPATIENT
Start: 2023-09-27 | End: 2023-10-12 | Stop reason: SDUPTHER

## 2023-10-04 RX ORDER — HYALURONATE SODIUM 20 MG/2 ML
20 SYRINGE (ML) INTRAARTICULAR ONCE
Qty: 2 ML | Refills: 0 | Status: CANCELLED | OUTPATIENT
Start: 2023-10-04 | End: 2023-10-04

## 2023-10-05 ENCOUNTER — ANCILLARY PROCEDURE (OUTPATIENT)
Dept: RADIOLOGY | Facility: CLINIC | Age: 63
End: 2023-10-05
Payer: MEDICARE

## 2023-10-05 ENCOUNTER — CLINICAL SUPPORT (OUTPATIENT)
Dept: ORTHOPEDIC SURGERY | Facility: CLINIC | Age: 63
End: 2023-10-05
Payer: MEDICARE

## 2023-10-05 VITALS — BODY MASS INDEX: 30.13 KG/M2 | WEIGHT: 216 LBS

## 2023-10-05 DIAGNOSIS — M25.552 LEFT HIP PAIN: ICD-10-CM

## 2023-10-05 DIAGNOSIS — M17.11 OSTEOARTHRITIS OF RIGHT KNEE, UNSPECIFIED OSTEOARTHRITIS TYPE: ICD-10-CM

## 2023-10-05 DIAGNOSIS — M17.11 ARTHRITIS OF RIGHT KNEE: Primary | ICD-10-CM

## 2023-10-05 PROCEDURE — 73502 X-RAY EXAM HIP UNI 2-3 VIEWS: CPT | Mod: LEFT SIDE | Performed by: RADIOLOGY

## 2023-10-05 PROCEDURE — 73502 X-RAY EXAM HIP UNI 2-3 VIEWS: CPT | Mod: LT,FY

## 2023-10-05 PROCEDURE — 99024 POSTOP FOLLOW-UP VISIT: CPT | Performed by: ORTHOPAEDIC SURGERY

## 2023-10-05 PROCEDURE — 20610 DRAIN/INJ JOINT/BURSA W/O US: CPT | Performed by: ORTHOPAEDIC SURGERY

## 2023-10-05 NOTE — PROGRESS NOTES
Subjective    Patient ID: Julio Carranza is a 63 y.o. male.    Chief Complaint: Second Euflexxa shot right knee Pain of the Right Knee (RT KNEE EUFLEXXA )     Last Surgery: No surgery found  Last Surgery Date: No surgery found    HPI for shot did not help    Objective   Ortho Exam unchanged    Image Results:      Assessment/Plan second shot was given we will continue ice at I will see him next week for the next he also discussed his hip which was hurting him we will get x-rays on the way out we will get this left hip next visit  Injection was performed for osteoarthritis of right knee. The risks were discussed with the patient. The area over the anteriolateral knee was prepped with betadine solution and sprayed with ethyl chloride topically. A 22-gauge was used to inject 2ml of Euflexxa into the joint space. Bandaid was applied to the area. Patient tolerated the procedure well with no complications. Instructions were given for post procedure care.    Encounter Diagnoses:  Arthritis of right knee    Osteoarthritis of right knee, unspecified osteoarthritis type    Left hip pain    Orders Placed This Encounter    XR hip left 2 or 3 views    DISCONTD: sodium hyaluronate (Hyalgan) injection 20 mg    sodium hyaluronate (Euflexxa) injection 20 mg     No follow-ups on file.

## 2023-10-11 DIAGNOSIS — J45.902 MILD ASTHMA WITH STATUS ASTHMATICUS, UNSPECIFIED WHETHER PERSISTENT (HHS-HCC): ICD-10-CM

## 2023-10-11 RX ORDER — BUDESONIDE AND FORMOTEROL FUMARATE DIHYDRATE 160; 4.5 UG/1; UG/1
2 AEROSOL RESPIRATORY (INHALATION)
Qty: 30.6 EACH | Refills: 0 | Status: SHIPPED | OUTPATIENT
Start: 2023-10-11 | End: 2023-11-09 | Stop reason: SDUPTHER

## 2023-10-12 ENCOUNTER — OFFICE VISIT (OUTPATIENT)
Dept: ORTHOPEDIC SURGERY | Facility: CLINIC | Age: 63
End: 2023-10-12
Payer: MEDICARE

## 2023-10-12 VITALS — BODY MASS INDEX: 29.29 KG/M2 | HEIGHT: 73 IN | WEIGHT: 221 LBS

## 2023-10-12 DIAGNOSIS — M71.21 SYNOVIAL CYST OF RIGHT POPLITEAL SPACE: ICD-10-CM

## 2023-10-12 DIAGNOSIS — J43.9 PULMONARY EMPHYSEMA, UNSPECIFIED EMPHYSEMA TYPE (MULTI): ICD-10-CM

## 2023-10-12 DIAGNOSIS — M17.11 OSTEOARTHRITIS OF RIGHT KNEE, UNSPECIFIED OSTEOARTHRITIS TYPE: Primary | ICD-10-CM

## 2023-10-12 DIAGNOSIS — M25.552 LEFT HIP PAIN: ICD-10-CM

## 2023-10-12 PROCEDURE — 1159F MED LIST DOCD IN RCRD: CPT | Performed by: ORTHOPAEDIC SURGERY

## 2023-10-12 PROCEDURE — 99213 OFFICE O/P EST LOW 20 MIN: CPT | Performed by: ORTHOPAEDIC SURGERY

## 2023-10-12 PROCEDURE — 1160F RVW MEDS BY RX/DR IN RCRD: CPT | Performed by: ORTHOPAEDIC SURGERY

## 2023-10-12 PROCEDURE — 20610 DRAIN/INJ JOINT/BURSA W/O US: CPT | Performed by: ORTHOPAEDIC SURGERY

## 2023-10-12 PROCEDURE — 1125F AMNT PAIN NOTED PAIN PRSNT: CPT | Performed by: ORTHOPAEDIC SURGERY

## 2023-10-12 RX ORDER — ALBUTEROL SULFATE 90 UG/1
AEROSOL, METERED RESPIRATORY (INHALATION)
Qty: 18 G | Refills: 2 | Status: SHIPPED | OUTPATIENT
Start: 2023-10-12 | End: 2023-11-09 | Stop reason: SDUPTHER

## 2023-10-12 ASSESSMENT — PAIN - FUNCTIONAL ASSESSMENT: PAIN_FUNCTIONAL_ASSESSMENT: 0-10

## 2023-10-12 ASSESSMENT — PAIN SCALES - GENERAL: PAINLEVEL_OUTOF10: 5 - MODERATE PAIN

## 2023-10-12 ASSESSMENT — ENCOUNTER SYMPTOMS: KNEE SWELLING: 1

## 2023-10-12 NOTE — PROGRESS NOTES
Subjective    Patient ID: Julio Carranza is a 63 y.o. male.    Chief Complaint: He is in for his third Euflexxa injection of his right knee and left hip pain Injections of the Right Knee (Euflexxa #1)     Last Surgery: No surgery found  Last Surgery Date: No surgery found    Right Knee      injections may be helping in the the left hip however it is still causing him significant amount of pain keeps him awake at night pain is mainly in the groin getting up and down out of chairs makes it worse    Objective   Ortho Exam the knee is unchanged the hip shows limited range of motion with pain on internal rotation and flexion neurovascular intact skins intact no bursal pain pain is mainly in the groin    Image Results:  XR hip left 2 or 3 views  Narrative: Interpreted By:  Harshad Gtz,   STUDY:  XR HIP LEFT 2 OR 3 VIEWS; ;  10/5/2023 1:40 pm      INDICATION:  Signs/Symptoms:LT HIP PAIN.      COMPARISON:  None.      ACCESSION NUMBER(S):  [id number]      ORDERING CLINICIAN:  TAN MONSALVE      FINDINGS:  Left hip, three views      There is focal sclerosis in the femoral head bilaterally, left  greater than right. There is mild collapse of the left femoral head  as well. There is mild degenerative changes in the hips with  osteophytosis. Postsurgical change in the lumbar spine. No fracture  seen.      Impression: Evidence of avascular necrosis with mild collapse of the left femoral  head. Probable avascular necrosis of the right femoral head as well.  Mild secondary osteoarthritis          MACRO:  None      Signed by: Harshad Gtz 10/6/2023 5:57 PM  Dictation workstation:   [id number]      Assessment/Plan we will see how the knee does over the next 6 weeks from the Euflexxa injections for the left hip the x-rays we got last time does show some sclerosis will be some avascular necrosis we will Loretta do is when he tried getting him an injection into the hip to see what it does but does not help we have to discuss  hip replacements  L Inj/Asp: R knee on 10/12/2023 2:03 PM  Indications: pain  Details: 22 G needle, anterolateral approach  Medications: 20 mg sodium hyaluronate 10 mg/mL(mw 2.4 -3.6 million) (1 cc of Toradol 30mg/1mL was added)  Outcome: tolerated well, no immediate complications  Procedure, treatment alternatives, risks and benefits explained, specific risks discussed. Immediately prior to procedure a time out was called to verify the correct patient, procedure, equipment, support staff and site/side marked as required. Patient was prepped and draped in the usual sterile fashion.          Encounter Diagnoses:  Osteoarthritis of right knee, unspecified osteoarthritis type    Synovial cyst of right popliteal space    Left hip pain    No orders of the defined types were placed in this encounter.    No follow-ups on file.

## 2023-10-18 ENCOUNTER — APPOINTMENT (OUTPATIENT)
Dept: RADIOLOGY | Facility: HOSPITAL | Age: 63
End: 2023-10-18
Payer: MEDICARE

## 2023-10-18 ENCOUNTER — HOSPITAL ENCOUNTER (OUTPATIENT)
Dept: RADIOLOGY | Facility: HOSPITAL | Age: 63
Discharge: HOME | End: 2023-10-18
Payer: MEDICARE

## 2023-10-18 ENCOUNTER — HOSPITAL ENCOUNTER (EMERGENCY)
Facility: HOSPITAL | Age: 63
Discharge: HOME | End: 2023-10-18
Payer: MEDICARE

## 2023-10-18 VITALS
DIASTOLIC BLOOD PRESSURE: 87 MMHG | HEART RATE: 84 BPM | TEMPERATURE: 97 F | BODY MASS INDEX: 28.23 KG/M2 | WEIGHT: 220 LBS | OXYGEN SATURATION: 96 % | SYSTOLIC BLOOD PRESSURE: 147 MMHG | RESPIRATION RATE: 16 BRPM | HEIGHT: 74 IN

## 2023-10-18 DIAGNOSIS — L03.113 CELLULITIS OF HAND, RIGHT: Primary | ICD-10-CM

## 2023-10-18 DIAGNOSIS — M25.552 LEFT HIP PAIN: ICD-10-CM

## 2023-10-18 LAB
ALBUMIN SERPL BCP-MCNC: 4.3 G/DL (ref 3.4–5)
ALP SERPL-CCNC: 113 U/L (ref 33–136)
ALT SERPL W P-5'-P-CCNC: 12 U/L (ref 10–52)
ANION GAP SERPL CALC-SCNC: 16 MMOL/L (ref 10–20)
AST SERPL W P-5'-P-CCNC: 16 U/L (ref 9–39)
BASOPHILS # BLD AUTO: 0.01 X10*3/UL (ref 0–0.1)
BASOPHILS NFR BLD AUTO: 0.1 %
BILIRUB SERPL-MCNC: 0.6 MG/DL (ref 0–1.2)
BUN SERPL-MCNC: 7 MG/DL (ref 6–23)
CALCIUM SERPL-MCNC: 9.4 MG/DL (ref 8.6–10.3)
CHLORIDE SERPL-SCNC: 103 MMOL/L (ref 98–107)
CO2 SERPL-SCNC: 24 MMOL/L (ref 21–32)
CREAT SERPL-MCNC: 0.84 MG/DL (ref 0.5–1.3)
EOSINOPHIL # BLD AUTO: 0 X10*3/UL (ref 0–0.7)
EOSINOPHIL NFR BLD AUTO: 0 %
ERYTHROCYTE [DISTWIDTH] IN BLOOD BY AUTOMATED COUNT: 14.7 % (ref 11.5–14.5)
GFR SERPL CREATININE-BSD FRML MDRD: >90 ML/MIN/1.73M*2
GLUCOSE SERPL-MCNC: 152 MG/DL (ref 74–99)
HCT VFR BLD AUTO: 44.2 % (ref 41–52)
HGB BLD-MCNC: 14.8 G/DL (ref 13.5–17.5)
IMM GRANULOCYTES # BLD AUTO: 0.04 X10*3/UL (ref 0–0.7)
IMM GRANULOCYTES NFR BLD AUTO: 0.5 % (ref 0–0.9)
LYMPHOCYTES # BLD AUTO: 0.44 X10*3/UL (ref 1.2–4.8)
LYMPHOCYTES NFR BLD AUTO: 5.8 %
MCH RBC QN AUTO: 30 PG (ref 26–34)
MCHC RBC AUTO-ENTMCNC: 33.5 G/DL (ref 32–36)
MCV RBC AUTO: 90 FL (ref 80–100)
MONOCYTES # BLD AUTO: 0.07 X10*3/UL (ref 0.1–1)
MONOCYTES NFR BLD AUTO: 0.9 %
NEUTROPHILS # BLD AUTO: 7.07 X10*3/UL (ref 1.2–7.7)
NEUTROPHILS NFR BLD AUTO: 92.7 %
NRBC BLD-RTO: 0 /100 WBCS (ref 0–0)
PLATELET # BLD AUTO: 248 X10*3/UL (ref 150–450)
PMV BLD AUTO: 10.3 FL (ref 7.5–11.5)
POTASSIUM SERPL-SCNC: 4.7 MMOL/L (ref 3.5–5.3)
PROT SERPL-MCNC: 7.7 G/DL (ref 6.4–8.2)
RBC # BLD AUTO: 4.93 X10*6/UL (ref 4.5–5.9)
SODIUM SERPL-SCNC: 138 MMOL/L (ref 136–145)
WBC # BLD AUTO: 7.6 X10*3/UL (ref 4.4–11.3)

## 2023-10-18 PROCEDURE — 73130 X-RAY EXAM OF HAND: CPT | Mod: RT,FY

## 2023-10-18 PROCEDURE — 99283 EMERGENCY DEPT VISIT LOW MDM: CPT | Mod: 25

## 2023-10-18 PROCEDURE — 36415 COLL VENOUS BLD VENIPUNCTURE: CPT

## 2023-10-18 PROCEDURE — 2500000004 HC RX 250 GENERAL PHARMACY W/ HCPCS (ALT 636 FOR OP/ED): Mod: SE

## 2023-10-18 PROCEDURE — 77002 NEEDLE LOCALIZATION BY XRAY: CPT | Mod: LT

## 2023-10-18 PROCEDURE — 2550000001 HC RX 255 CONTRASTS: Performed by: ORTHOPAEDIC SURGERY

## 2023-10-18 PROCEDURE — 2500000001 HC RX 250 WO HCPCS SELF ADMINISTERED DRUGS (ALT 637 FOR MEDICARE OP): Mod: SE

## 2023-10-18 PROCEDURE — 85025 COMPLETE CBC W/AUTO DIFF WBC: CPT

## 2023-10-18 PROCEDURE — 2500000005 HC RX 250 GENERAL PHARMACY W/O HCPCS: Performed by: ORTHOPAEDIC SURGERY

## 2023-10-18 PROCEDURE — 73130 X-RAY EXAM OF HAND: CPT | Mod: RIGHT SIDE | Performed by: RADIOLOGY

## 2023-10-18 PROCEDURE — 2500000004 HC RX 250 GENERAL PHARMACY W/ HCPCS (ALT 636 FOR OP/ED): Performed by: ORTHOPAEDIC SURGERY

## 2023-10-18 PROCEDURE — 96372 THER/PROPH/DIAG INJ SC/IM: CPT | Performed by: ORTHOPAEDIC SURGERY

## 2023-10-18 PROCEDURE — 80053 COMPREHEN METABOLIC PANEL: CPT

## 2023-10-18 RX ORDER — METHYLPREDNISOLONE ACETATE 40 MG/ML
40 INJECTION, SUSPENSION INTRA-ARTICULAR; INTRALESIONAL; INTRAMUSCULAR; SOFT TISSUE ONCE
Status: COMPLETED | OUTPATIENT
Start: 2023-10-18 | End: 2023-10-18

## 2023-10-18 RX ORDER — IBUPROFEN 600 MG/1
600 TABLET ORAL ONCE
Status: COMPLETED | OUTPATIENT
Start: 2023-10-18 | End: 2023-10-18

## 2023-10-18 RX ORDER — SULFAMETHOXAZOLE AND TRIMETHOPRIM 800; 160 MG/1; MG/1
1 TABLET ORAL EVERY 12 HOURS
Qty: 14 TABLET | Refills: 0 | Status: SHIPPED | OUTPATIENT
Start: 2023-10-18 | End: 2023-10-25

## 2023-10-18 RX ORDER — LIDOCAINE HYDROCHLORIDE 10 MG/ML
5 INJECTION, SOLUTION EPIDURAL; INFILTRATION; INTRACAUDAL; PERINEURAL ONCE
Status: COMPLETED | OUTPATIENT
Start: 2023-10-18 | End: 2023-10-18

## 2023-10-18 RX ORDER — SULFAMETHOXAZOLE AND TRIMETHOPRIM 800; 160 MG/1; MG/1
1 TABLET ORAL ONCE
Status: COMPLETED | OUTPATIENT
Start: 2023-10-18 | End: 2023-10-18

## 2023-10-18 RX ADMIN — METHYLPREDNISOLONE ACETATE 40 MG: 40 INJECTION, SUSPENSION INTRA-ARTICULAR; INTRALESIONAL; INTRAMUSCULAR; INTRASYNOVIAL; SOFT TISSUE at 11:48

## 2023-10-18 RX ADMIN — SULFAMETHOXAZOLE AND TRIMETHOPRIM 1 TABLET: 800; 160 TABLET ORAL at 19:22

## 2023-10-18 RX ADMIN — LIDOCAINE HYDROCHLORIDE ANHYDROUS 50 MG: 10 INJECTION, SOLUTION INFILTRATION at 11:49

## 2023-10-18 RX ADMIN — IOHEXOL 10 ML: 240 INJECTION, SOLUTION INTRATHECAL; INTRAVASCULAR; INTRAVENOUS; ORAL at 11:46

## 2023-10-18 RX ADMIN — IBUPROFEN 600 MG: 600 TABLET, FILM COATED ORAL at 18:24

## 2023-10-18 ASSESSMENT — COLUMBIA-SUICIDE SEVERITY RATING SCALE - C-SSRS
6. HAVE YOU EVER DONE ANYTHING, STARTED TO DO ANYTHING, OR PREPARED TO DO ANYTHING TO END YOUR LIFE?: NO
2. HAVE YOU ACTUALLY HAD ANY THOUGHTS OF KILLING YOURSELF?: NO
1. IN THE PAST MONTH, HAVE YOU WISHED YOU WERE DEAD OR WISHED YOU COULD GO TO SLEEP AND NOT WAKE UP?: NO

## 2023-10-18 ASSESSMENT — PAIN SCALES - GENERAL
PAINLEVEL_OUTOF10: 6
PAINLEVEL_OUTOF10: 4

## 2023-10-18 ASSESSMENT — PAIN - FUNCTIONAL ASSESSMENT: PAIN_FUNCTIONAL_ASSESSMENT: 0-10

## 2023-10-18 ASSESSMENT — PAIN DESCRIPTION - LOCATION: LOCATION: HAND

## 2023-10-18 NOTE — ED PROVIDER NOTES
Limitations to history: None  Independent Historians: Family  External Records Reviewed: HIE, OARRS, outpatient notes, inpatient notes, paper charts if needed    History of Present Illness:  Patient is a 63-year-old male presents to ED chief complaint of a right hand wound.  Patient reports that he believes he may have been stung or bitten by an insect.  Patient reports he has been itching his hand, and believes he has irritated his wound.  Patient reports he noticed the past couple days.  Patient reports he is not a diabetic, he does not take blood thinners.  Patient also reports that he had a hip injection this morning, and was not sure if his injection would impact his wound.  Patient denies any fevers, chills, nausea, vomiting, diarrhea.  Patient is alert and oriented x3 upon examination, in no apparent distress. Patient reports he is right-hand dominant.  Denies HA, C/P, SOB, ABD pain, Nausea, Vomiting, Diarrhea, Weakness, Dizziness, Fever, Chills.    PMFSH:   As per HPI, otherwise nurses notes reviewed in EMR    Physical Exam:  Appearance: Alert, oriented x3, supine on exam table with head elevated, cooperative, in no acute distress. Well nourished & well hydrated.      Skin: Approximate 1 cm noted on the dorsal portion of patient's right hand, near the proximal end of the first digit.  Area is erythematous, no obvious visible center noted on wound.  Redness is localized to the dorsal portion of the right hand.  No linear streaking, no fluctuance within the wound observed.  Wound is slightly indurated, no drainage observed. Intact, dry skin, no rash, petechiae or purpura.     Eyes: PERRLA, EOMs intact, Conjunctiva pink with no redness or exudates. No scleral icterus.     Ears: Hearing grossly intact.      Nose: Nares patent, no epistaxis.     Mouth: Dentition without concerning abnormalities. no obstruction of posterior pharynx.     Neck: Supple, without meningismus. Trachea at midline.     Pulmonary: Clear  bilaterally with good chest wall excursion. No rales, rhonchi or wheezing. No accessory muscle use or stridor. Talking in full sentences.     Cardiac: Normal S1, S2 without murmur, rub, gallop or extrasystole.     Abdomen: Soft, nontender to light and deep palpation to all quadrants, normoactive bowel sounds.  No palpable organomegaly.  No rebound or guarding.     Genitourinary: Physical exam deferred.     Musculoskeletal: Normal range of motion observed of the right hand. Appropriate two-point discrimination noted. Radial Pulses present and equal bilaterally. Normal gait. Full range of motion to all extremities. Rest of the exam reveals no pain on palpation, instability, or deformity. Pulses full and equal. No cyanosis or clubbing. capillary refill <2 seconds to all examined digits.     Neurological:  Cranial nerves II through XII are grossly intact, normal sensation, no weakness, no focal findings identified.      Psychiatric: Appropriate mood and affect.          Labs Reviewed   CBC WITH AUTO DIFFERENTIAL - Abnormal       Result Value    WBC 7.6      nRBC 0.0      RBC 4.93      Hemoglobin 14.8      Hematocrit 44.2      MCV 90      MCH 30.0      MCHC 33.5      RDW 14.7 (*)     Platelets 248      MPV 10.3      Neutrophils % 92.7      Immature Granulocytes %, Automated 0.5      Lymphocytes % 5.8      Monocytes % 0.9      Eosinophils % 0.0      Basophils % 0.1      Neutrophils Absolute 7.07      Immature Granulocytes Absolute, Automated 0.04      Lymphocytes Absolute 0.44 (*)     Monocytes Absolute 0.07 (*)     Eosinophils Absolute 0.00      Basophils Absolute 0.01     COMPREHENSIVE METABOLIC PANEL - Abnormal    Glucose 152 (*)     Sodium 138      Potassium 4.7      Chloride 103      Bicarbonate 24      Anion Gap 16      Urea Nitrogen 7      Creatinine 0.84      eGFR >90      Calcium 9.4      Albumin 4.3      Alkaline Phosphatase 113      Total Protein 7.7      AST 16      Bilirubin, Total 0.6      ALT 12        XR  hand right 3+ views   Final Result   No acute fracture. Mild soft tissue swelling.        Degenerative changes as described above.        MACRO:   None        Signed by: Chiquita Marcos 10/18/2023 6:28 PM   Dictation workstation:   [id number]             Repeat Evaluation below    Summary:  Medical Decision Making:   Patient presented as described in HPI. Patient case including ROS, PE, and treatment and plan discussed with ED attending if attached as cosigner. Due to patients presentation orders completed include as documented. Patient evaluated for complaints of a wound check, possible insect bite.  Lab work was obtained which unremarkable.  X-ray imaging of the right hand revealed no acute fracture, mild soft tissue swelling noted.  Patient will be given first dosage of Bactrim while in ED, patient will be sent home with a prescription for Bactrim.  Patient given strict return precautions that if wound drains purulent drainage, pain worsens, or redness extends he needs to return to ED immediately.  Patient also aware that if he develops fever and/or chills he needs to return to the ED.  Patient reports he is scheduled appointment with Dr. Pelaez all next week, patient advised to keep his appointment as scheduled.  Patient aware of all Case findings, aware of plan of care.  Patient was advised to follow up with PCP or recommended provider in 2-3 days for another evaluation and exam. I advised patient/guardian to return or go to closest emergency room immediately if symptoms change, get worse, new symptoms develop prior to follow up. If there is no improvement in symptoms in the next 24 hours they are advised to return for further evaluation and exam. I also explained the plan and treatment course. Patient/guardian is in agreement with plan, treatment course, and follow up and states verbally that they will comply.    Tests/Medications/Escalations of Care considered but not given:    Patient care discussed with:  N/A  Social Determinants affecting care: N/A    Final diagnosis and disposition as documented in impression    Homegoing. I discussed the differential; results and discharge plan with the patient and/or family/friend/caregiver if present.  I emphasized the importance of follow-up with the physician I referred them to in the timeframe recommended.  I explained reasons for the patient to return to the Emergency Department. They agreed that if they feel their condition is worsening or if they have any other concern they should call 911 immediately for further assistance. I gave the patient an opportunity to ask all questions they had and answered all of them accordingly. They understand return precautions and discharge instructions. The patient and/or family/friend/caregiver expressed understanding verbally and that they would comply.       Disposition:  Discharge         This note has been transcribed using voice recognition and may contain grammatical errors, misplaced words, incorrect words, incorrect phrases or other errors.     Samantha Bain, APRN-CNP  10/18/23 1918

## 2023-10-19 ENCOUNTER — PATIENT OUTREACH (OUTPATIENT)
Dept: CARE COORDINATION | Facility: CLINIC | Age: 63
End: 2023-10-19
Payer: MEDICARE

## 2023-10-19 NOTE — PROGRESS NOTES
10/18/2023 ED Visit to [facility]  Dx: Right hand cellulitis  Discharged on Bactrim DS    Outreach call to patient to support a smooth transition of care from recent admission.  Left voicemail message for patient with my contact information.    Adia Selby RN/BENJAMIN  [phone]

## 2023-10-26 DIAGNOSIS — E78.5 DYSLIPIDEMIA: ICD-10-CM

## 2023-10-26 DIAGNOSIS — K21.9 GASTROESOPHAGEAL REFLUX DISEASE WITHOUT ESOPHAGITIS: ICD-10-CM

## 2023-10-26 RX ORDER — OMEPRAZOLE 40 MG/1
40 CAPSULE, DELAYED RELEASE ORAL DAILY
Qty: 30 CAPSULE | Refills: 0 | Status: SHIPPED | OUTPATIENT
Start: 2023-10-26 | End: 2023-11-09 | Stop reason: SDUPTHER

## 2023-10-26 RX ORDER — ATORVASTATIN CALCIUM 40 MG/1
40 TABLET, FILM COATED ORAL DAILY
Qty: 30 TABLET | Refills: 0 | Status: SHIPPED | OUTPATIENT
Start: 2023-10-26 | End: 2023-11-09 | Stop reason: SDUPTHER

## 2023-11-09 ENCOUNTER — OFFICE VISIT (OUTPATIENT)
Dept: PRIMARY CARE | Facility: CLINIC | Age: 63
End: 2023-11-09
Payer: MEDICARE

## 2023-11-09 VITALS
BODY MASS INDEX: 28.55 KG/M2 | SYSTOLIC BLOOD PRESSURE: 122 MMHG | OXYGEN SATURATION: 97 % | TEMPERATURE: 97 F | DIASTOLIC BLOOD PRESSURE: 80 MMHG | HEART RATE: 80 BPM | WEIGHT: 222.4 LBS

## 2023-11-09 DIAGNOSIS — F17.219 CIGARETTE NICOTINE DEPENDENCE WITH NICOTINE-INDUCED DISORDER: ICD-10-CM

## 2023-11-09 DIAGNOSIS — G47.00 INSOMNIA, UNSPECIFIED TYPE: ICD-10-CM

## 2023-11-09 DIAGNOSIS — Z00.00 HEALTHCARE MAINTENANCE: ICD-10-CM

## 2023-11-09 DIAGNOSIS — I10 HYPERTENSION, UNSPECIFIED TYPE: ICD-10-CM

## 2023-11-09 DIAGNOSIS — L73.2 HIDRADENITIS SUPPURATIVA OF LEFT AXILLA: ICD-10-CM

## 2023-11-09 DIAGNOSIS — K21.9 GASTROESOPHAGEAL REFLUX DISEASE WITHOUT ESOPHAGITIS: ICD-10-CM

## 2023-11-09 DIAGNOSIS — J45.902 MILD ASTHMA WITH STATUS ASTHMATICUS, UNSPECIFIED WHETHER PERSISTENT (HHS-HCC): ICD-10-CM

## 2023-11-09 DIAGNOSIS — K42.0 UMBILICAL HERNIA WITH OBSTRUCTION, WITHOUT GANGRENE: Primary | ICD-10-CM

## 2023-11-09 DIAGNOSIS — J43.9 PULMONARY EMPHYSEMA, UNSPECIFIED EMPHYSEMA TYPE (MULTI): ICD-10-CM

## 2023-11-09 DIAGNOSIS — E78.5 DYSLIPIDEMIA: ICD-10-CM

## 2023-11-09 PROCEDURE — 1159F MED LIST DOCD IN RCRD: CPT | Performed by: INTERNAL MEDICINE

## 2023-11-09 PROCEDURE — 1160F RVW MEDS BY RX/DR IN RCRD: CPT | Performed by: INTERNAL MEDICINE

## 2023-11-09 PROCEDURE — 99214 OFFICE O/P EST MOD 30 MIN: CPT | Performed by: INTERNAL MEDICINE

## 2023-11-09 PROCEDURE — 3074F SYST BP LT 130 MM HG: CPT | Performed by: INTERNAL MEDICINE

## 2023-11-09 PROCEDURE — 1125F AMNT PAIN NOTED PAIN PRSNT: CPT | Performed by: INTERNAL MEDICINE

## 2023-11-09 PROCEDURE — 99406 BEHAV CHNG SMOKING 3-10 MIN: CPT | Performed by: INTERNAL MEDICINE

## 2023-11-09 PROCEDURE — 3079F DIAST BP 80-89 MM HG: CPT | Performed by: INTERNAL MEDICINE

## 2023-11-09 RX ORDER — BUDESONIDE AND FORMOTEROL FUMARATE DIHYDRATE 160; 4.5 UG/1; UG/1
2 AEROSOL RESPIRATORY (INHALATION)
Qty: 30.6 G | Refills: 1 | Status: SHIPPED | OUTPATIENT
Start: 2023-11-09 | End: 2024-02-12 | Stop reason: ALTCHOICE

## 2023-11-09 RX ORDER — PRIMIDONE 50 MG/1
50 TABLET ORAL NIGHTLY
Qty: 90 TABLET | Refills: 1 | Status: SHIPPED | OUTPATIENT
Start: 2023-11-09 | End: 2024-02-12 | Stop reason: SDUPTHER

## 2023-11-09 RX ORDER — BUDESONIDE AND FORMOTEROL FUMARATE DIHYDRATE 160; 4.5 UG/1; UG/1
2 AEROSOL RESPIRATORY (INHALATION)
COMMUNITY
End: 2023-11-09 | Stop reason: SDUPTHER

## 2023-11-09 RX ORDER — ATORVASTATIN CALCIUM 40 MG/1
40 TABLET, FILM COATED ORAL DAILY
Qty: 90 TABLET | Refills: 1 | Status: SHIPPED | OUTPATIENT
Start: 2023-11-09 | End: 2023-11-27 | Stop reason: SDUPTHER

## 2023-11-09 RX ORDER — LOSARTAN POTASSIUM 100 MG/1
100 TABLET ORAL DAILY
Qty: 90 TABLET | Refills: 1 | Status: SHIPPED | OUTPATIENT
Start: 2023-11-09 | End: 2024-02-12 | Stop reason: SDUPTHER

## 2023-11-09 RX ORDER — DOXYCYCLINE 100 MG/1
100 CAPSULE ORAL 2 TIMES DAILY
Qty: 20 CAPSULE | Refills: 0 | Status: SHIPPED | OUTPATIENT
Start: 2023-11-09 | End: 2023-11-19

## 2023-11-09 RX ORDER — OMEPRAZOLE 40 MG/1
40 CAPSULE, DELAYED RELEASE ORAL DAILY
Qty: 90 CAPSULE | Refills: 1 | Status: SHIPPED | OUTPATIENT
Start: 2023-11-09 | End: 2023-11-27 | Stop reason: SDUPTHER

## 2023-11-09 RX ORDER — ALBUTEROL SULFATE 90 UG/1
AEROSOL, METERED RESPIRATORY (INHALATION)
Qty: 18 G | Refills: 5 | Status: SHIPPED | OUTPATIENT
Start: 2023-11-09 | End: 2024-02-12 | Stop reason: SDUPTHER

## 2023-11-09 RX ORDER — FOLIC ACID 1 MG/1
1 TABLET ORAL DAILY
Qty: 90 TABLET | Refills: 1 | Status: SHIPPED | OUTPATIENT
Start: 2023-11-09 | End: 2024-02-12 | Stop reason: SDUPTHER

## 2023-11-09 RX ORDER — BUDESONIDE AND FORMOTEROL FUMARATE DIHYDRATE 160; 4.5 UG/1; UG/1
2 AEROSOL RESPIRATORY (INHALATION)
Qty: 30.6 EACH | Refills: 1 | Status: SHIPPED | OUTPATIENT
Start: 2023-11-09 | End: 2023-11-09 | Stop reason: CLARIF

## 2023-11-09 ASSESSMENT — ENCOUNTER SYMPTOMS
ABDOMINAL DISTENTION: 1
DIARRHEA: 0
BLOOD IN STOOL: 0
HEADACHES: 0
BACK PAIN: 1
SINUS PAIN: 0
WHEEZING: 0
BRUISES/BLEEDS EASILY: 0
PALPITATIONS: 0
ARTHRALGIAS: 0
DIZZINESS: 0
UNEXPECTED WEIGHT CHANGE: 0
FATIGUE: 0
ABDOMINAL PAIN: 1
SORE THROAT: 0
COUGH: 0
FEVER: 0
DIFFICULTY URINATING: 0

## 2023-11-09 NOTE — PROGRESS NOTES
"Patient was identified as a fall risk. Risk prevention instructions provided.Subjective   Patient ID: Julio Carranza is a 63 y.o. male who presents for Hypothyroidism, GERD, COPD, ER Follow-up (Possible Spider bite on 10/18, still swollen), Med Refill, and Back Pain (Surgery referral, previous surgeon has left).    -Recurrent axillary folliculitis  Hidradenitis continue with doxycycline follow-up if no improvement  -Large umbilical hernia obstruction no gangrene patient counseled about smoking cessation weight loss  Refer patient to general surgery for further recommendation  -Lung CT low-dose showed lung nodule repeat in 1 year repeat in September 2024  - Nicotine dependency counseled about smoking cessation  \"I spent 3 minutes counseling patient about need for smoking/tobacco cessation and how I can support efforts when patient is ready to quit.  Discussed nicotine replacement therapy, Varenicline, Bupropion, hypnosis, support groups, and accupunture as potential options.  Patient currently has no signs or symptoms of tobacco related disease.\".  -Chronic right knee arthritis seen by Dr. Rouse continues injection as needed as recommended  - Hypertension, goal blood pressure 120/80, controlled continue with losartan 100 mg doing much better  - Dyslipidemia continue low-carb diet  - Coronary artery disease compensated continue with aspirin daily counseled about nicotine cessation  Follow-up 3 months      GERD  He complains of abdominal pain. He reports no chest pain, no coughing, no sore throat or no wheezing. Pertinent negatives include no fatigue.   ER Follow-up  Associated symptoms include abdominal pain. Pertinent negatives include no arthralgias, chest pain, congestion, coughing, fatigue, fever, headaches, rash or sore throat.   Med Refill  Associated symptoms include abdominal pain. Pertinent negatives include no arthralgias, chest pain, congestion, coughing, fatigue, fever, headaches, rash or sore throat. "   Back Pain  Associated symptoms include abdominal pain. Pertinent negatives include no chest pain, fever or headaches.          Review of Systems   Constitutional:  Negative for fatigue, fever and unexpected weight change.   HENT:  Negative for congestion, ear discharge, ear pain, mouth sores, sinus pain and sore throat.    Eyes:  Negative for visual disturbance.   Respiratory:  Negative for cough and wheezing.    Cardiovascular:  Negative for chest pain, palpitations and leg swelling.   Gastrointestinal:  Positive for abdominal distention and abdominal pain. Negative for blood in stool and diarrhea.   Genitourinary:  Negative for difficulty urinating.   Musculoskeletal:  Positive for back pain. Negative for arthralgias.   Skin:  Negative for rash.   Neurological:  Negative for dizziness and headaches.   Hematological:  Does not bruise/bleed easily.   Psychiatric/Behavioral:  Negative for behavioral problems.    All other systems reviewed and are negative.      Objective   Lab Results   Component Value Date    HGBA1C 5.2 06/18/2022      /80   Pulse 80   Temp 36.1 °C (97 °F)   Wt 101 kg (222 lb 6.4 oz)   SpO2 97%   BMI 28.55 kg/m²   Lab Results   Component Value Date    WBC 7.6 10/18/2023    HGB 14.8 10/18/2023    HCT 44.2 10/18/2023     10/18/2023    CHOL 158 08/16/2023    TRIG 97 08/16/2023    HDL 39.2 (A) 08/16/2023    ALT 12 10/18/2023    AST 16 10/18/2023     10/18/2023    K 4.7 10/18/2023     10/18/2023    CREATININE 0.84 10/18/2023    BUN 7 10/18/2023    CO2 24 10/18/2023    TSH 0.73 08/16/2023    PSA 2.1 12/09/2022    INR 1.0 01/18/2023    HGBA1C 5.2 06/18/2022     par   Physical Exam  Vitals and nursing note reviewed.   Constitutional:       Appearance: Normal appearance.   HENT:      Head: Normocephalic.      Nose: Nose normal.   Eyes:      Conjunctiva/sclera: Conjunctivae normal.      Pupils: Pupils are equal, round, and reactive to light.   Cardiovascular:      Rate and  Rhythm: Regular rhythm.   Pulmonary:      Effort: Pulmonary effort is normal.      Breath sounds: Normal breath sounds.   Abdominal:      General: Abdomen is flat.      Palpations: Abdomen is soft.      Hernia: A hernia (umbilical hernia not reducible) is present.   Musculoskeletal:      Cervical back: Neck supple.   Skin:     General: Skin is warm.   Neurological:      General: No focal deficit present.      Mental Status: He is oriented to person, place, and time.   Psychiatric:         Mood and Affect: Mood normal.         Assessment/Plan   Julio was seen today for hypothyroidism, gerd, copd, er follow-up, med refill and back pain.  Diagnoses and all orders for this visit:  Umbilical hernia with obstruction, without gangrene (Primary)  -     Referral to General Surgery; Future  Hidradenitis suppurativa of left axilla  -     doxycycline (Vibramycin) 100 mg capsule; Take 1 capsule (100 mg) by mouth 2 times a day for 10 days. Take with at least 8 ounces (large glass) of water, do not lie down for 30 minutes after  Pulmonary emphysema, unspecified emphysema type (CMS/HCC)  -     albuterol 90 mcg/actuation inhaler; inhale 1 to 2 puffs by mouth every 4 to 6 hours as needed  Dyslipidemia  -     atorvastatin (Lipitor) 40 mg tablet; Take 1 tablet (40 mg) by mouth once daily.  Mild asthma with status asthmaticus, unspecified whether persistent  -     budesonide-formoteroL (Symbicort) 160-4.5 mcg/actuation inhaler; Inhale 2 puffs 2 times a day. Rinse mouth with water after use to reduce aftertaste and incidence of candidiasis. Do not swallow.  Healthcare maintenance  -     folic acid (Folvite) 1 mg tablet; Take 1 tablet (1 mg) by mouth once daily.  Hypertension, unspecified type  -     losartan (Cozaar) 100 mg tablet; Take 1 tablet (100 mg) by mouth once daily.  Gastroesophageal reflux disease without esophagitis  -     omeprazole (PriLOSEC) 40 mg DR capsule; Take 1 capsule (40 mg) by mouth once daily.  Insomnia,  "unspecified type  -     primidone (Mysoline) 50 mg tablet; Take 1 tablet (50 mg) by mouth once daily at bedtime.  Cigarette nicotine dependence with nicotine-induced disorder  Other orders  -     Follow Up In Primary Care - Established; Future   -Recurrent axillary folliculitis  Hidradenitis continue with doxycycline follow-up if no improvement  -Large umbilical hernia obstruction no gangrene patient counseled about smoking cessation weight loss  Refer patient to general surgery for further recommendation  -Lung CT low-dose showed lung nodule repeat in 1 year repeat in September 2024  - Nicotine dependency counseled about smoking cessation  \"I spent 3 minutes counseling patient about need for smoking/tobacco cessation and how I can support efforts when patient is ready to quit.  Discussed nicotine replacement therapy, Varenicline, Bupropion, hypnosis, support groups, and accupunture as potential options.  Patient currently has no signs or symptoms of tobacco related disease.\".  -Chronic right knee arthritis seen by Dr. Rouse continues injection as needed as recommended  - Hypertension, goal blood pressure 120/80, controlled continue with losartan 100 mg doing much better  - Dyslipidemia continue low-carb diet  - Coronary artery disease compensated continue with aspirin daily counseled about nicotine cessation  Follow-up 3 months  -Recurrent axillary folliculitis  Hidradenitis continue with doxycycline follow-up if no improvement  -Large umbilical hernia obstruction no gangrene patient counseled about smoking cessation weight loss  Refer patient to general surgery for further recommendation  -Lung CT low-dose showed lung nodule repeat in 1 year repeat in September 2024  - Nicotine dependency counseled about smoking cessation  \"I spent 3 minutes counseling patient about need for smoking/tobacco cessation and how I can support efforts when patient is ready to quit.  Discussed nicotine replacement therapy, Varenicline, " "Bupropion, hypnosis, support groups, and accupunture as potential options.  Patient currently has no signs or symptoms of tobacco related disease.\".  -Chronic right knee arthritis seen by Dr. Rouse continues injection as needed as recommended  - Hypertension, goal blood pressure 120/80, controlled continue with losartan 100 mg doing much better  - Dyslipidemia continue low-carb diet  - Coronary artery disease compensated continue with aspirin daily counseled about nicotine cessation  Follow-up 3 months    "

## 2023-11-09 NOTE — PATIENT INSTRUCTIONS

## 2023-11-21 ENCOUNTER — APPOINTMENT (OUTPATIENT)
Dept: ORTHOPEDIC SURGERY | Facility: CLINIC | Age: 63
End: 2023-11-21
Payer: MEDICARE

## 2023-11-27 DIAGNOSIS — K21.9 GASTROESOPHAGEAL REFLUX DISEASE WITHOUT ESOPHAGITIS: ICD-10-CM

## 2023-11-27 DIAGNOSIS — E78.5 DYSLIPIDEMIA: ICD-10-CM

## 2023-11-28 RX ORDER — OMEPRAZOLE 40 MG/1
40 CAPSULE, DELAYED RELEASE ORAL DAILY
Qty: 90 CAPSULE | Refills: 0 | Status: SHIPPED | OUTPATIENT
Start: 2023-11-28 | End: 2024-02-12 | Stop reason: SDUPTHER

## 2023-11-28 RX ORDER — ATORVASTATIN CALCIUM 40 MG/1
40 TABLET, FILM COATED ORAL DAILY
Qty: 90 TABLET | Refills: 0 | Status: SHIPPED | OUTPATIENT
Start: 2023-11-28 | End: 2024-02-12 | Stop reason: SDUPTHER

## 2023-12-01 ENCOUNTER — OFFICE VISIT (OUTPATIENT)
Dept: ORTHOPEDIC SURGERY | Facility: CLINIC | Age: 63
End: 2023-12-01
Payer: MEDICARE

## 2023-12-01 DIAGNOSIS — M17.11 ARTHRITIS OF RIGHT KNEE: Primary | ICD-10-CM

## 2023-12-01 PROCEDURE — 1125F AMNT PAIN NOTED PAIN PRSNT: CPT | Performed by: ORTHOPAEDIC SURGERY

## 2023-12-01 PROCEDURE — 99213 OFFICE O/P EST LOW 20 MIN: CPT | Performed by: ORTHOPAEDIC SURGERY

## 2023-12-01 PROCEDURE — 20610 DRAIN/INJ JOINT/BURSA W/O US: CPT | Performed by: ORTHOPAEDIC SURGERY

## 2023-12-01 PROCEDURE — 1159F MED LIST DOCD IN RCRD: CPT | Performed by: ORTHOPAEDIC SURGERY

## 2023-12-01 PROCEDURE — 1160F RVW MEDS BY RX/DR IN RCRD: CPT | Performed by: ORTHOPAEDIC SURGERY

## 2023-12-01 RX ORDER — LIDOCAINE HYDROCHLORIDE 20 MG/ML
2 INJECTION, SOLUTION INFILTRATION; PERINEURAL
Status: COMPLETED | OUTPATIENT
Start: 2023-12-01 | End: 2023-12-01

## 2023-12-01 RX ORDER — KETOROLAC TROMETHAMINE 30 MG/ML
30 INJECTION, SOLUTION INTRAMUSCULAR; INTRAVENOUS
Status: COMPLETED | OUTPATIENT
Start: 2023-12-01 | End: 2023-12-01

## 2023-12-01 RX ORDER — TRIAMCINOLONE ACETONIDE 40 MG/ML
80 INJECTION, SUSPENSION INTRA-ARTICULAR; INTRAMUSCULAR
Status: COMPLETED | OUTPATIENT
Start: 2023-12-01 | End: 2023-12-01

## 2023-12-01 RX ADMIN — LIDOCAINE HYDROCHLORIDE 2 ML: 20 INJECTION, SOLUTION INFILTRATION; PERINEURAL at 10:22

## 2023-12-01 RX ADMIN — TRIAMCINOLONE ACETONIDE 80 MG: 40 INJECTION, SUSPENSION INTRA-ARTICULAR; INTRAMUSCULAR at 10:22

## 2023-12-01 RX ADMIN — KETOROLAC TROMETHAMINE 30 MG: 30 INJECTION, SOLUTION INTRAMUSCULAR; INTRAVENOUS at 10:22

## 2023-12-01 NOTE — PROGRESS NOTES
Subjective    Patient ID: Julio Carranza is a 63 y.o. male.    Chief Complaint: Follow-up of the Right Knee     Last Surgery: No surgery found  Last Surgery Date: No surgery found    HPI he is in for his right knee is 6 weeks status post Euflexxa it really has not helped so we will try another cortisone shot his left hip is acting up again in 6 weeks has had his hip injection for his AVN    Objective   Ortho Exam right knee shows range of motion of 0 to 110 degrees reasonable stability neurovascular intact skins intact mild effusion his hip shows significant pain to motion    Image Results:  FL guided aspiration or injection large joint left  Narrative: Interpreted By:  Ismael Cotton,   STUDY:  FL GUIDED ASPIRATION OR INJECTION LARGE JOINT LEFT; 10/18/2023 12:26  pm      INDICATION:  Signs/Symptoms:LEFT HIP PAIN.      COMPARISON:  None      ACCESSION NUMBER(S):  [id number]      ORDERING CLINICIAN:  TAN MONSALVE      FINDINGS:  Total fluoroscopy time: 18, images: 1, Reference Air Kerma: 3.1 mGy      Intraoperative fluoroscopy obtained for localization purposes. Please  refer to operative report for more details.      Impression: Please refer to operative report for more details as the images were  obtained for documentation purposes.      Signed by: Ismael Cotton 10/21/2023 9:01 AM  Dictation workstation:   [id number]      Assessment/Plan he has avascular necrosis throughout the whole femur the MRI of his knee show significant medial degeneration I do not think he is a scope candidate so we will get a go ahead and inject the right knee hopefully will act as a booster of the left hip we may have to reinject him about 6 weeks but I did discuss with him the fact that he is going to need replacements, he is going to think about this    L Inj/Asp: R knee on 12/1/2023 10:22 AM  Indications: pain  Details: 22 G needle, anterolateral approach  Medications: 80 mg triamcinolone acetonide 40 mg/mL; 2 mL lidocaine 20  mg/mL (2 %); 30 mg ketorolac 30 mg/mL (1 mL) (1 cc of Toradol 30mg/1mL was added)  Outcome: tolerated well, no immediate complications  Procedure, treatment alternatives, risks and benefits explained, specific risks discussed. Immediately prior to procedure a time out was called to verify the correct patient, procedure, equipment, support staff and site/side marked as required. Patient was prepped and draped in the usual sterile fashion.          Encounter Diagnoses:  Arthritis of right knee    No orders of the defined types were placed in this encounter.    No follow-ups on file.

## 2024-01-24 ENCOUNTER — HOSPITAL ENCOUNTER (OUTPATIENT)
Dept: RADIOLOGY | Facility: CLINIC | Age: 64
Discharge: HOME | End: 2024-01-24
Payer: MEDICARE

## 2024-01-24 ENCOUNTER — OFFICE VISIT (OUTPATIENT)
Dept: ORTHOPEDIC SURGERY | Facility: CLINIC | Age: 64
End: 2024-01-24
Payer: MEDICARE

## 2024-01-24 DIAGNOSIS — M25.552 PAIN OF LEFT HIP: ICD-10-CM

## 2024-01-24 DIAGNOSIS — M25.552 PAIN OF LEFT HIP: Primary | ICD-10-CM

## 2024-01-24 PROCEDURE — 1125F AMNT PAIN NOTED PAIN PRSNT: CPT | Performed by: ORTHOPAEDIC SURGERY

## 2024-01-24 PROCEDURE — 73502 X-RAY EXAM HIP UNI 2-3 VIEWS: CPT | Mod: LT

## 2024-01-24 PROCEDURE — 1157F ADVNC CARE PLAN IN RCRD: CPT | Performed by: ORTHOPAEDIC SURGERY

## 2024-01-24 PROCEDURE — 99215 OFFICE O/P EST HI 40 MIN: CPT | Performed by: ORTHOPAEDIC SURGERY

## 2024-01-24 PROCEDURE — 1159F MED LIST DOCD IN RCRD: CPT | Performed by: ORTHOPAEDIC SURGERY

## 2024-01-24 PROCEDURE — 1160F RVW MEDS BY RX/DR IN RCRD: CPT | Performed by: ORTHOPAEDIC SURGERY

## 2024-01-24 RX ORDER — ACETAMINOPHEN 325 MG/1
975 TABLET ORAL ONCE
Status: CANCELLED | OUTPATIENT
Start: 2024-01-24 | End: 2024-01-24

## 2024-01-24 RX ORDER — SODIUM CHLORIDE 9 MG/ML
100 INJECTION, SOLUTION INTRAVENOUS CONTINUOUS
Status: CANCELLED | OUTPATIENT
Start: 2024-01-24

## 2024-01-24 RX ORDER — CELECOXIB 50 MG/1
400 CAPSULE ORAL ONCE
Status: CANCELLED | OUTPATIENT
Start: 2024-01-24 | End: 2024-01-24

## 2024-01-24 NOTE — LETTER
January 24, 2024     Mendez Coronel MD  [address] [address]    Patient: Julio Carranza   YOB: 1960   Date of Visit: 1/24/2024       Dear Dr. Mendez Coronel MD:    Thank you for referring Julio Carranza to me for evaluation. Below are my notes for this consultation.  If you have questions, please do not hesitate to call me. I look forward to following your patient along with you.       Sincerely,     Danny Ricci MD      CC: Mauricio Rouse MD  ______________________________________________________________________________________    This is a consultation from Dr. Mendez Coronel MD for   Chief Complaint   Patient presents with   • Right Knee - Pain   • Left Hip - Pain       This is a 63 y.o. male who presents for for evaluation of right knee pain and left hip pain.  Patient states he had both issues for a long time, these are both chronic issues.  Regarding his right knee, planes of sharp pain over the medial aspect of the knee worse with walking proving with rest.  He had injections in the past but they have not been very helpful.  No numbness or tingling no fevers no chills no shooting pain down the leg.  Never had surgery on the knee.  Regarding his hip, the hip is much worse problem.  He complains of a deep sharp stabbing pain in the groin and the anterior hip radiating to the anterior thigh.  Makes it very difficult for him to get around.  He had extensive trial of nonsurgical management of the left hip including use of anti-inflammatories physical therapy activity modification cortisone injections.  Despite that he has severe and worsening pain with severe exacerbation of his pain in the last several weeks.  It significantly affecting his quality of life and activities of daily living    Physical Exam    There has been no interval change in this patient's past medical, surgical, medications, allergies, family history or social history since the most recent visit  to a provider within our department. 14 point review of systems was performed, reviewed, and negative except for pertinent positives documented in the history of present illness.     Constitutional: well developed, well nourished male in no acute distress  Psychiatric: normal mood, appropriate affect  Eyes: sclera anicteric  HENT: normocephalic/atraumatic  CV: regular rate and rhythm   Respiratory: non labored breathing  Integumentary: no rash  Neurological: moves all extremities    Right knee exam: skin intact no lacerations or abrations.  1+ effusion.  Tender medial joint line. negative log roll negative patellar grind. ROM 0-120. stable to varus and valgus stress at 0 and 30 degrees. negative lachman negative posterior drawer negative denny. 5/5 ehl/fhl/gs/ta. silt s/s/sp/dp/t. 2+ dp/pt    Left hip exam: skin normal, no abrasions, wounds, or lacerations. nttp over greater trochanter. negative log roll, negative mannie's test. flexion to 90 degrees without pain.  Severe pain with flexion abduction and external rotation, reproduction of severe hip and groin pain with flexion adduction and internal rotation. neurovascularly intact distally        Xrays were ordered by me, they were reviewed and independently interpreted by me today, they show on the right knee mild degenerative changes and MRI shows some insufficiency in the medial tibial plateau and degenerative changes.  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    Regarding his right knee, we will plan continue nonsurgical management.  I had an in depth discussion with the patient regarding treatment options for arthritis and their relative risks and benefits. We reviewed surgical and nonsurgical option for treatment. Treatments include anti  inflammatory medications, physical therapy, weight loss, activity modification, use of assistive devices, injection therapies. We discussed current prescriptions and risks and benefits of continuation of prescription medication as apporpriate. We discussed that arthritis is often progressive over time, an in end stage arthritis surgical interventions can be considered, including arthroplasty. All questions were answered and the patient voiced their understanding.    Regarding his left hip, patient elected to proceed with left total hip.    I had an extensive discussion with the patient regarding her condition and possible treatment options. Nonsurgical treatment for left hip arthritis includes activity modification, weight loss, use of a cane or other assistive device, pain medications and nonsteroidal anti-inflammatory medications, joint injections, and physical therapy. The patient has attempted non-surgical treatment for this condition for greater than 6 months and it has failed. We discussed the risks benefits and alternatives of total hip arthroplasty. Benefits of joint replacement include: Relief of pain, improvement of function, and improved quality of life. Alternatives include observation and watchful waiting, and the nonsurgical modalities noted above.         We discussed the risks of complications as well as the risks of morbidity and mortality related to surgical treatment with total joint replacement. We reviewed the fact that total joint replacement is major elective surgery with significant associated surgical and procedural risk factors as detailed below.   Risks include: Pain, blood loss, damage to nearby anatomical structures including but not limited to nerves or blood vessels muscles tendons and bone, failure surgery to ameliorate symptoms, persistence of pain surrounding the affected joint, mechanical failure of the prosthesis including but not limited to loosening of the prosthesis from bone  ,breakage of the prosthesis and dislocation of the prosthesis, change in length or appearance of a limb, infection possibly necessitating further surgery, removal of the prosthesis, or limb amputation, the need for additional surgery for other reasons, blood clots, amputation, and death. No guarantees were implied or given.  All questions were answered and the patient voiced their understanding.     One specific issue for this patient is smoking. Smoking increases the risk of complications including wound healing and infection. We discussed that reducing smoking will mitigate these risks and that smoking cessation is recommended. We discussed strategies for smoking cessation and that the patient should discuss with her primary care physician strategies for additional assistance with smoking cessation. The patient is aware of the risks and would still like to proceed.        A complete set of surgical instructions was given to the patient. The patient was educated regarding preoperative nutrition, preparation of their home for postoperative rehabilitation, choosing a care partner, medical and dental clearance, the cessation of medications that can cause bleeding or presenting to risk for infection, chlorhexidine bath, nasal swab and decontamination, post operative follow up, and need for medical equipment. Patient was also educated regarding the possibility of same day surgery and related criteria and protocols. The patient will attend our joint class further education, and thereafter they will return for a preoperative visit. A presurgery education booklet was also given to the patient.     surgical plan: Left total hip  implants: Tomeka  approach: Direct anterior  DVT ppx aspirin  drugs to stop none  allergy to abx none  post operative abx: ancef +/- vanc (per protocol)  special clearance needed none    BMI Readings from Last 1 Encounters:   11/09/23 28.55 kg/m²      Lab Results   Component Value Date     "CREATININE 0.84 10/18/2023     Tobacco Use: High Risk (12/1/2023)    Patient History    • Smoking Tobacco Use: Every Day    • Smokeless Tobacco Use: Never    • Passive Exposure: Not on file      Computed MELD 3.0 unavailable. Necessary lab results were not found in the last year.  Computed MELD-Na unavailable. Necessary lab results were not found in the last year.       Lab Results   Component Value Date    HGBA1C 5.2 06/18/2022     No results found for: \"STAPHMRSASCR\"  "

## 2024-01-24 NOTE — PROGRESS NOTES
This is a consultation from Dr. Mendez Coronel MD for   Chief Complaint   Patient presents with    Right Knee - Pain    Left Hip - Pain       This is a 63 y.o. male who presents for for evaluation of right knee pain and left hip pain.  Patient states he had both issues for a long time, these are both chronic issues.  Regarding his right knee, planes of sharp pain over the medial aspect of the knee worse with walking proving with rest.  He had injections in the past but they have not been very helpful.  No numbness or tingling no fevers no chills no shooting pain down the leg.  Never had surgery on the knee.  Regarding his hip, the hip is much worse problem.  He complains of a deep sharp stabbing pain in the groin and the anterior hip radiating to the anterior thigh.  Makes it very difficult for him to get around.  He had extensive trial of nonsurgical management of the left hip including use of anti-inflammatories physical therapy activity modification cortisone injections.  Despite that he has severe and worsening pain with severe exacerbation of his pain in the last several weeks.  It significantly affecting his quality of life and activities of daily living    Physical Exam    There has been no interval change in this patient's past medical, surgical, medications, allergies, family history or social history since the most recent visit to a provider within our department. 14 point review of systems was performed, reviewed, and negative except for pertinent positives documented in the history of present illness.     Constitutional: well developed, well nourished male in no acute distress  Psychiatric: normal mood, appropriate affect  Eyes: sclera anicteric  HENT: normocephalic/atraumatic  CV: regular rate and rhythm   Respiratory: non labored breathing  Integumentary: no rash  Neurological: moves all extremities    Right knee exam: skin intact no lacerations or abrations.  1+ effusion.  Tender medial joint line.  negative log roll negative patellar grind. ROM 0-120. stable to varus and valgus stress at 0 and 30 degrees. negative lachman negative posterior drawer negative denny. 5/5 ehl/fhl/gs/ta. silt s/s/sp/dp/t. 2+ dp/pt    Left hip exam: skin normal, no abrasions, wounds, or lacerations. nttp over greater trochanter. negative log roll, negative mannie's test. flexion to 90 degrees without pain.  Severe pain with flexion abduction and external rotation, reproduction of severe hip and groin pain with flexion adduction and internal rotation. neurovascularly intact distally        Xrays were ordered by me, they were reviewed and independently interpreted by me today, they show on the right knee mild degenerative changes and MRI shows some insufficiency in the medial tibial plateau and degenerative changes.  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    Regarding his right knee, we will plan continue nonsurgical management.  I had an in depth discussion with the patient regarding treatment options for arthritis and their relative risks and benefits. We reviewed surgical and nonsurgical option for treatment. Treatments include anti inflammatory medications, physical therapy, weight loss, activity modification, use of assistive devices, injection therapies. We discussed current prescriptions and risks and benefits of continuation of prescription medication as apporpriate. We discussed that arthritis is often progressive over time, an in end stage arthritis surgical interventions can be considered, including arthroplasty. All questions were answered and the patient voiced their understanding.    Regarding his left hip, patient elected to proceed with left total hip.    I had an extensive discussion with the patient  regarding her condition and possible treatment options. Nonsurgical treatment for left hip arthritis includes activity modification, weight loss, use of a cane or other assistive device, pain medications and nonsteroidal anti-inflammatory medications, joint injections, and physical therapy. The patient has attempted non-surgical treatment for this condition for greater than 6 months and it has failed. We discussed the risks benefits and alternatives of total hip arthroplasty. Benefits of joint replacement include: Relief of pain, improvement of function, and improved quality of life. Alternatives include observation and watchful waiting, and the nonsurgical modalities noted above.         We discussed the risks of complications as well as the risks of morbidity and mortality related to surgical treatment with total joint replacement. We reviewed the fact that total joint replacement is major elective surgery with significant associated surgical and procedural risk factors as detailed below.   Risks include: Pain, blood loss, damage to nearby anatomical structures including but not limited to nerves or blood vessels muscles tendons and bone, failure surgery to ameliorate symptoms, persistence of pain surrounding the affected joint, mechanical failure of the prosthesis including but not limited to loosening of the prosthesis from bone ,breakage of the prosthesis and dislocation of the prosthesis, change in length or appearance of a limb, infection possibly necessitating further surgery, removal of the prosthesis, or limb amputation, the need for additional surgery for other reasons, blood clots, amputation, and death. No guarantees were implied or given.  All questions were answered and the patient voiced their understanding.     One specific issue for this patient is smoking. Smoking increases the risk of complications including wound healing and infection. We discussed that reducing smoking will mitigate these risks  "and that smoking cessation is recommended. We discussed strategies for smoking cessation and that the patient should discuss with her primary care physician strategies for additional assistance with smoking cessation. The patient is aware of the risks and would still like to proceed.        A complete set of surgical instructions was given to the patient. The patient was educated regarding preoperative nutrition, preparation of their home for postoperative rehabilitation, choosing a care partner, medical and dental clearance, the cessation of medications that can cause bleeding or presenting to risk for infection, chlorhexidine bath, nasal swab and decontamination, post operative follow up, and need for medical equipment. Patient was also educated regarding the possibility of same day surgery and related criteria and protocols. The patient will attend our joint class further education, and thereafter they will return for a preoperative visit. A presurgery education booklet was also given to the patient.     surgical plan: Left total hip  implants: Tomeka  approach: Direct anterior  DVT ppx aspirin  drugs to stop none  allergy to abx none  post operative abx: ancef +/- vanc (per protocol)  special clearance needed none    BMI Readings from Last 1 Encounters:   11/09/23 28.55 kg/m²      Lab Results   Component Value Date    CREATININE 0.84 10/18/2023     Tobacco Use: High Risk (12/1/2023)    Patient History     Smoking Tobacco Use: Every Day     Smokeless Tobacco Use: Never     Passive Exposure: Not on file      Computed MELD 3.0 unavailable. Necessary lab results were not found in the last year.  Computed MELD-Na unavailable. Necessary lab results were not found in the last year.       Lab Results   Component Value Date    HGBA1C 5.2 06/18/2022     No results found for: \"STAPHMRSASCR\"  "

## 2024-01-25 ENCOUNTER — PREP FOR PROCEDURE (OUTPATIENT)
Dept: ORTHOPEDIC SURGERY | Facility: CLINIC | Age: 64
End: 2024-01-25
Payer: MEDICARE

## 2024-01-25 PROBLEM — M25.552 PAIN OF LEFT HIP: Status: ACTIVE | Noted: 2024-01-24

## 2024-01-25 NOTE — PATIENT INSTRUCTIONS

## 2024-02-12 ENCOUNTER — OFFICE VISIT (OUTPATIENT)
Dept: PRIMARY CARE | Facility: CLINIC | Age: 64
End: 2024-02-12
Payer: MEDICARE

## 2024-02-12 VITALS
SYSTOLIC BLOOD PRESSURE: 118 MMHG | HEART RATE: 83 BPM | BODY MASS INDEX: 29.4 KG/M2 | TEMPERATURE: 97 F | OXYGEN SATURATION: 98 % | WEIGHT: 229 LBS | DIASTOLIC BLOOD PRESSURE: 82 MMHG

## 2024-02-12 DIAGNOSIS — I10 HYPERTENSION, UNSPECIFIED TYPE: ICD-10-CM

## 2024-02-12 DIAGNOSIS — Z00.00 HEALTHCARE MAINTENANCE: ICD-10-CM

## 2024-02-12 DIAGNOSIS — F17.219 CIGARETTE NICOTINE DEPENDENCE WITH NICOTINE-INDUCED DISORDER: ICD-10-CM

## 2024-02-12 DIAGNOSIS — E78.00 HYPERCHOLESTEREMIA: ICD-10-CM

## 2024-02-12 DIAGNOSIS — E78.5 DYSLIPIDEMIA: ICD-10-CM

## 2024-02-12 DIAGNOSIS — K21.9 GASTROESOPHAGEAL REFLUX DISEASE WITHOUT ESOPHAGITIS: ICD-10-CM

## 2024-02-12 DIAGNOSIS — G47.00 INSOMNIA, UNSPECIFIED TYPE: ICD-10-CM

## 2024-02-12 DIAGNOSIS — I25.10 ASCVD (ARTERIOSCLEROTIC CARDIOVASCULAR DISEASE): Primary | ICD-10-CM

## 2024-02-12 DIAGNOSIS — J43.9 PULMONARY EMPHYSEMA, UNSPECIFIED EMPHYSEMA TYPE (MULTI): ICD-10-CM

## 2024-02-12 DIAGNOSIS — G83.10 PARESIS OF SINGLE LOWER EXTREMITY (MULTI): ICD-10-CM

## 2024-02-12 PROCEDURE — 1157F ADVNC CARE PLAN IN RCRD: CPT | Performed by: INTERNAL MEDICINE

## 2024-02-12 PROCEDURE — 3079F DIAST BP 80-89 MM HG: CPT | Performed by: INTERNAL MEDICINE

## 2024-02-12 PROCEDURE — 1125F AMNT PAIN NOTED PAIN PRSNT: CPT | Performed by: INTERNAL MEDICINE

## 2024-02-12 PROCEDURE — 3074F SYST BP LT 130 MM HG: CPT | Performed by: INTERNAL MEDICINE

## 2024-02-12 PROCEDURE — 1159F MED LIST DOCD IN RCRD: CPT | Performed by: INTERNAL MEDICINE

## 2024-02-12 PROCEDURE — 99214 OFFICE O/P EST MOD 30 MIN: CPT | Performed by: INTERNAL MEDICINE

## 2024-02-12 PROCEDURE — 1160F RVW MEDS BY RX/DR IN RCRD: CPT | Performed by: INTERNAL MEDICINE

## 2024-02-12 RX ORDER — ALBUTEROL SULFATE 90 UG/1
AEROSOL, METERED RESPIRATORY (INHALATION)
Qty: 18 G | Refills: 5 | Status: SHIPPED | OUTPATIENT
Start: 2024-02-12

## 2024-02-12 RX ORDER — ATORVASTATIN CALCIUM 40 MG/1
40 TABLET, FILM COATED ORAL DAILY
Qty: 90 TABLET | Refills: 1 | Status: SHIPPED | OUTPATIENT
Start: 2024-02-12 | End: 2024-05-13 | Stop reason: SDUPTHER

## 2024-02-12 RX ORDER — OMEPRAZOLE 40 MG/1
40 CAPSULE, DELAYED RELEASE ORAL DAILY
Qty: 90 CAPSULE | Refills: 1 | Status: SHIPPED | OUTPATIENT
Start: 2024-02-12 | End: 2024-05-13 | Stop reason: SDUPTHER

## 2024-02-12 RX ORDER — PRIMIDONE 50 MG/1
50 TABLET ORAL NIGHTLY
Qty: 90 TABLET | Refills: 1 | Status: SHIPPED | OUTPATIENT
Start: 2024-02-12

## 2024-02-12 RX ORDER — LOSARTAN POTASSIUM 100 MG/1
100 TABLET ORAL DAILY
Qty: 90 TABLET | Refills: 1 | Status: SHIPPED | OUTPATIENT
Start: 2024-02-12 | End: 2024-05-28 | Stop reason: SDUPTHER

## 2024-02-12 RX ORDER — FOLIC ACID 1 MG/1
1 TABLET ORAL DAILY
Qty: 90 TABLET | Refills: 1 | Status: SHIPPED | OUTPATIENT
Start: 2024-02-12

## 2024-02-12 ASSESSMENT — ENCOUNTER SYMPTOMS
BLOOD IN STOOL: 0
HYPERTENSION: 1
PALPITATIONS: 0
DIZZINESS: 0
BRUISES/BLEEDS EASILY: 0
ABDOMINAL PAIN: 0
DIARRHEA: 0
JOINT SWELLING: 1
INSOMNIA: 1
COUGH: 0
SINUS PAIN: 0
FEVER: 0
DIFFICULTY URINATING: 0
FATIGUE: 0
UNEXPECTED WEIGHT CHANGE: 0
ARTHRALGIAS: 1
SORE THROAT: 0
WHEEZING: 0
HEADACHES: 0

## 2024-02-12 NOTE — PATIENT INSTRUCTIONS

## 2024-02-12 NOTE — PROGRESS NOTES
"Subjective   Patient ID: Julio Carranza is a 63 y.o. male who presents for pulmonary emphysema, GERD, Hypertension, Hyperlipidemia, Insomnia, and Med Refill.    -Recently seen by orthopedic surgery patient scheduled for left hip arthroplasty on April 1 follow-up results closely  - Chronic knee pain continue monitoring follow-up orthopedic in 6 months as recommended  - Hidradenitis continue with doxycycline follow-up if no improvement  -Large umbilical hernia obstruction no gangrene patient counseled about smoking cessation weight loss  Patient will hold on any surgical eval until hip replacement  -Lung CT low-dose showed lung nodule repeat in 1 year repeat in September 2024  - Nicotine dependency counseled about smoking cessation  \"I spent 3 minutes counseling patient about need for smoking/tobacco cessation and how I can support efforts when patient is ready to quit.  Discussed nicotine replacement therapy, Varenicline, Bupropion, hypnosis, support groups, and accupunture as potential options.  Patient currently has no signs or symptoms of tobacco related disease.\".  -Hypertension improving continue with current medication- Dyslipidemia continue low-carb diet  - Coronary artery disease compensated continue with aspirin daily counseled about nicotine cessation  Follow-up 3 months            GERD  He reports no abdominal pain, no chest pain, no coughing, no sore throat or no wheezing. Pertinent negatives include no fatigue.   Hypertension  Pertinent negatives include no chest pain, headaches or palpitations.   Hyperlipidemia  Pertinent negatives include no chest pain.   Insomnia  Associated symptoms include arthralgias and joint swelling. Pertinent negatives include no abdominal pain, chest pain, congestion, coughing, fatigue, fever, headaches, rash or sore throat.   Med Refill  Associated symptoms include arthralgias and joint swelling. Pertinent negatives include no abdominal pain, chest pain, congestion, " coughing, fatigue, fever, headaches, rash or sore throat.          Review of Systems   Constitutional:  Negative for fatigue, fever and unexpected weight change.   HENT:  Negative for congestion, ear discharge, ear pain, mouth sores, sinus pain and sore throat.    Eyes:  Negative for visual disturbance.   Respiratory:  Negative for cough and wheezing.    Cardiovascular:  Negative for chest pain, palpitations and leg swelling.   Gastrointestinal:  Negative for abdominal pain, blood in stool and diarrhea.   Genitourinary:  Negative for difficulty urinating.   Musculoskeletal:  Positive for arthralgias and joint swelling.   Skin:  Negative for rash.   Neurological:  Negative for dizziness and headaches.   Hematological:  Does not bruise/bleed easily.   Psychiatric/Behavioral:  Negative for behavioral problems. The patient has insomnia.    All other systems reviewed and are negative.      Objective   Lab Results   Component Value Date    HGBA1C 5.2 06/18/2022      /82   Pulse 83   Temp 36.1 °C (97 °F)   Wt 104 kg (229 lb)   SpO2 98%   BMI 29.40 kg/m²     Physical Exam  Vitals and nursing note reviewed.   Constitutional:       Appearance: Normal appearance.   HENT:      Head: Normocephalic.      Nose: Nose normal.   Eyes:      Conjunctiva/sclera: Conjunctivae normal.      Pupils: Pupils are equal, round, and reactive to light.   Cardiovascular:      Rate and Rhythm: Regular rhythm.   Pulmonary:      Effort: Pulmonary effort is normal.      Breath sounds: Normal breath sounds.   Abdominal:      General: Abdomen is flat.      Palpations: Abdomen is soft.   Musculoskeletal:         General: Tenderness (Right knee swelling) present.      Cervical back: Neck supple.   Skin:     General: Skin is warm.   Neurological:      General: No focal deficit present.      Mental Status: He is oriented to person, place, and time.   Psychiatric:         Mood and Affect: Mood normal.         Assessment/Plan   Julio was seen  "today for pulmonary emphysema, gerd, hypertension, hyperlipidemia, insomnia and med refill.  Diagnoses and all orders for this visit:  ASCVD (arteriosclerotic cardiovascular disease) (Primary)  Pulmonary emphysema, unspecified emphysema type (CMS/HCC)  -     albuterol 90 mcg/actuation inhaler; inhale 1 to 2 puffs by mouth every 4 to 6 hours as needed  Dyslipidemia  -     atorvastatin (Lipitor) 40 mg tablet; Take 1 tablet (40 mg) by mouth once daily.  Healthcare maintenance  -     folic acid (Folvite) 1 mg tablet; Take 1 tablet (1 mg) by mouth once daily.  Hypertension, unspecified type  -     losartan (Cozaar) 100 mg tablet; Take 1 tablet (100 mg) by mouth once daily.  Gastroesophageal reflux disease without esophagitis  -     omeprazole (PriLOSEC) 40 mg DR capsule; Take 1 capsule (40 mg) by mouth once daily.  Insomnia, unspecified type  -     primidone (Mysoline) 50 mg tablet; Take 1 tablet (50 mg) by mouth once daily at bedtime.  Hypercholesteremia  Cigarette nicotine dependence with nicotine-induced disorder  Other orders  -     Follow Up In Primary Care - Established  -     Follow Up In Primary Care - Established; Future   Recently seen by orthopedic surgery patient scheduled for left hip arthroplasty on April 1 follow-up results closely  - Chronic knee pain continue monitoring follow-up orthopedic in 6 months as recommended  - Hidradenitis continue with doxycycline follow-up if no improvement  -Large umbilical hernia obstruction no gangrene patient counseled about smoking cessation weight loss  Patient will hold on any surgical eval until hip replacement  -Lung CT low-dose showed lung nodule repeat in 1 year repeat in September 2024  - Nicotine dependency counseled about smoking cessation  \"I spent 3 minutes counseling patient about need for smoking/tobacco cessation and how I can support efforts when patient is ready to quit.  Discussed nicotine replacement therapy, Varenicline, Bupropion, hypnosis, support " "groups, and accupunture as potential options.  Patient currently has no signs or symptoms of tobacco related disease.\".  -Hypertension improving continue with current medication- Dyslipidemia continue low-carb diet  - Coronary artery disease compensated continue with aspirin daily counseled about nicotine cessation  Follow-up 3 months        Patient was identified as a fall risk. Risk prevention instructions provided.Patient was identified as a fall risk. Risk prevention instructions provided.  "

## 2024-03-13 ENCOUNTER — PRE-ADMISSION TESTING (OUTPATIENT)
Dept: PREADMISSION TESTING | Facility: HOSPITAL | Age: 64
End: 2024-03-13
Payer: MEDICARE

## 2024-03-13 VITALS
HEIGHT: 74 IN | BODY MASS INDEX: 30.1 KG/M2 | WEIGHT: 234.57 LBS | RESPIRATION RATE: 18 BRPM | TEMPERATURE: 97.9 F | SYSTOLIC BLOOD PRESSURE: 131 MMHG | OXYGEN SATURATION: 94 % | DIASTOLIC BLOOD PRESSURE: 92 MMHG | HEART RATE: 74 BPM

## 2024-03-13 DIAGNOSIS — Z01.818 PREOP EXAMINATION: Primary | ICD-10-CM

## 2024-03-13 DIAGNOSIS — M25.552 PAIN OF LEFT HIP: ICD-10-CM

## 2024-03-13 LAB
ALBUMIN SERPL BCP-MCNC: 4.3 G/DL (ref 3.4–5)
ALP SERPL-CCNC: 95 U/L (ref 33–136)
ALT SERPL W P-5'-P-CCNC: 13 U/L (ref 10–52)
ANION GAP SERPL CALC-SCNC: 13 MMOL/L (ref 10–20)
APPEARANCE UR: CLEAR
AST SERPL W P-5'-P-CCNC: 16 U/L (ref 9–39)
BASOPHILS # BLD AUTO: 0.07 X10*3/UL (ref 0–0.1)
BASOPHILS NFR BLD AUTO: 1 %
BILIRUB SERPL-MCNC: 0.5 MG/DL (ref 0–1.2)
BILIRUB UR STRIP.AUTO-MCNC: NEGATIVE MG/DL
BUN SERPL-MCNC: 9 MG/DL (ref 6–23)
CALCIUM SERPL-MCNC: 8.7 MG/DL (ref 8.6–10.3)
CHLORIDE SERPL-SCNC: 105 MMOL/L (ref 98–107)
CO2 SERPL-SCNC: 25 MMOL/L (ref 21–32)
COLOR UR: YELLOW
CREAT SERPL-MCNC: 1 MG/DL (ref 0.5–1.3)
EGFRCR SERPLBLD CKD-EPI 2021: 84 ML/MIN/1.73M*2
EOSINOPHIL # BLD AUTO: 0 X10*3/UL (ref 0–0.7)
EOSINOPHIL NFR BLD AUTO: 0 %
ERYTHROCYTE [DISTWIDTH] IN BLOOD BY AUTOMATED COUNT: 12.7 % (ref 11.5–14.5)
GLUCOSE SERPL-MCNC: 91 MG/DL (ref 74–99)
GLUCOSE UR STRIP.AUTO-MCNC: NEGATIVE MG/DL
HCT VFR BLD AUTO: 44.7 % (ref 41–52)
HGB BLD-MCNC: 14.9 G/DL (ref 13.5–17.5)
HOLD SPECIMEN: NORMAL
IMM GRANULOCYTES # BLD AUTO: 0.04 X10*3/UL (ref 0–0.7)
IMM GRANULOCYTES NFR BLD AUTO: 0.6 % (ref 0–0.9)
KETONES UR STRIP.AUTO-MCNC: NEGATIVE MG/DL
LEUKOCYTE ESTERASE UR QL STRIP.AUTO: NEGATIVE
LYMPHOCYTES # BLD AUTO: 1.39 X10*3/UL (ref 1.2–4.8)
LYMPHOCYTES NFR BLD AUTO: 20 %
MCH RBC QN AUTO: 30.3 PG (ref 26–34)
MCHC RBC AUTO-ENTMCNC: 33.3 G/DL (ref 32–36)
MCV RBC AUTO: 91 FL (ref 80–100)
MONOCYTES # BLD AUTO: 0.67 X10*3/UL (ref 0.1–1)
MONOCYTES NFR BLD AUTO: 9.6 %
NEUTROPHILS # BLD AUTO: 4.78 X10*3/UL (ref 1.2–7.7)
NEUTROPHILS NFR BLD AUTO: 68.8 %
NITRITE UR QL STRIP.AUTO: NEGATIVE
NRBC BLD-RTO: 0 /100 WBCS (ref 0–0)
PH UR STRIP.AUTO: 6 [PH]
PLATELET # BLD AUTO: 238 X10*3/UL (ref 150–450)
POTASSIUM SERPL-SCNC: 4.2 MMOL/L (ref 3.5–5.3)
PROT SERPL-MCNC: 6.6 G/DL (ref 6.4–8.2)
PROT UR STRIP.AUTO-MCNC: NEGATIVE MG/DL
RBC # BLD AUTO: 4.92 X10*6/UL (ref 4.5–5.9)
RBC # UR STRIP.AUTO: NEGATIVE /UL
SODIUM SERPL-SCNC: 139 MMOL/L (ref 136–145)
SP GR UR STRIP.AUTO: 1.01
UROBILINOGEN UR STRIP.AUTO-MCNC: <2 MG/DL
WBC # BLD AUTO: 7 X10*3/UL (ref 4.4–11.3)

## 2024-03-13 PROCEDURE — 87081 CULTURE SCREEN ONLY: CPT | Mod: GEALAB

## 2024-03-13 PROCEDURE — 93005 ELECTROCARDIOGRAM TRACING: CPT

## 2024-03-13 PROCEDURE — 93010 ELECTROCARDIOGRAM REPORT: CPT | Performed by: INTERNAL MEDICINE

## 2024-03-13 PROCEDURE — 85025 COMPLETE CBC W/AUTO DIFF WBC: CPT

## 2024-03-13 PROCEDURE — 81003 URINALYSIS AUTO W/O SCOPE: CPT

## 2024-03-13 PROCEDURE — 80053 COMPREHEN METABOLIC PANEL: CPT

## 2024-03-13 PROCEDURE — 99214 OFFICE O/P EST MOD 30 MIN: CPT | Performed by: NURSE PRACTITIONER

## 2024-03-13 PROCEDURE — 36415 COLL VENOUS BLD VENIPUNCTURE: CPT

## 2024-03-13 RX ORDER — CHLORHEXIDINE GLUCONATE ORAL RINSE 1.2 MG/ML
15 SOLUTION DENTAL 2 TIMES DAILY
Qty: 120 ML | Refills: 0 | Status: SHIPPED | OUTPATIENT
Start: 2024-03-13 | End: 2024-03-14

## 2024-03-13 ASSESSMENT — DUKE ACTIVITY SCORE INDEX (DASI)
CAN YOU TAKE CARE OF YOURSELF (EAT, DRESS, BATHE, OR USE TOILET): YES
CAN YOU HAVE SEXUAL RELATIONS: YES
CAN YOU PARTICIPATE IN STRENOUS SPORTS LIKE SWIMMING, SINGLES TENNIS, FOOTBALL, BASKETBALL, OR SKIING: NO
CAN YOU RUN A SHORT DISTANCE: NO
CAN YOU PARTICIPATE IN MODERATE RECREATIONAL ACTIVITIES LIKE GOLF, BOWLING, DANCING, DOUBLES TENNIS OR THROWING A BASEBALL OR FOOTBALL: NO
CAN YOU DO HEAVY WORK AROUND THE HOUSE LIKE SCRUBBING FLOORS OR LIFTING AND MOVING HEAVY FURNITURE: NO
CAN YOU WALK INDOORS, SUCH AS AROUND YOUR HOUSE: YES
DASI METS SCORE: 5
CAN YOU CLIMB A FLIGHT OF STAIRS OR WALK UP A HILL: NO
CAN YOU WALK A BLOCK OR TWO ON LEVEL GROUND: YES
TOTAL_SCORE: 18.7
CAN YOU DO LIGHT WORK AROUND THE HOUSE LIKE DUSTING OR WASHING DISHES: YES
CAN YOU DO MODERATE WORK AROUND THE HOUSE LIKE VACUUMING, SWEEPING FLOORS OR CARRYING GROCERIES: YES
CAN YOU DO YARD WORK LIKE RAKING LEAVES, WEEDING OR PUSHING A MOWER: NO

## 2024-03-13 ASSESSMENT — ENCOUNTER SYMPTOMS
EYES NEGATIVE: 1
WHEEZING: 1
ARTHRALGIAS: 1
CARDIOVASCULAR NEGATIVE: 1
CONSTITUTIONAL NEGATIVE: 1
LIMITED RANGE OF MOTION: 1
NECK NEGATIVE: 1
GASTROINTESTINAL NEGATIVE: 1
NEUROLOGICAL NEGATIVE: 1
ENDOCRINE NEGATIVE: 1

## 2024-03-13 ASSESSMENT — CHADS2 SCORE
DIABETES: NO
CHF: NO
PRIOR STROKE OR TIA OR THROMBOEMBOLISM: NO
HYPERTENSION: YES
AGE GREATER THAN OR EQUAL TO 75: NO
CHADS2 SCORE: 1

## 2024-03-13 ASSESSMENT — LIFESTYLE VARIABLES: SMOKING_STATUS: SMOKER

## 2024-03-13 ASSESSMENT — PAIN - FUNCTIONAL ASSESSMENT: PAIN_FUNCTIONAL_ASSESSMENT: 0-10

## 2024-03-13 ASSESSMENT — PAIN SCALES - GENERAL: PAINLEVEL_OUTOF10: 6

## 2024-03-13 NOTE — PREPROCEDURE INSTRUCTIONS
Medication List            Accurate as of March 13, 2024 10:45 AM. Always use your most recent med list.                acetaminophen 325 mg tablet  Commonly known as: Tylenol  Medication Adjustments for Surgery: Take morning of surgery with sip of water, no other fluids  Notes to patient: If needed     albuterol 90 mcg/actuation inhaler  inhale 1 to 2 puffs by mouth every 4 to 6 hours as needed  Medication Adjustments for Surgery: Take morning of surgery with sip of water, no other fluids  Notes to patient: Bring to hospital day of surgery.     atorvastatin 40 mg tablet  Commonly known as: Lipitor  Take 1 tablet (40 mg) by mouth once daily.  Medication Adjustments for Surgery: Take morning of surgery with sip of water, no other fluids     chlorhexidine 0.12 % solution  Commonly known as: Peridex  Use 15 mL in the mouth or throat 2 times a day for 1 day.     cyanocobalamin 100 mcg tablet  Commonly known as: Vitamin B-12  Medication Adjustments for Surgery: Stop 7 days before surgery     folic acid 1 mg tablet  Commonly known as: Folvite  Take 1 tablet (1 mg) by mouth once daily.  Medication Adjustments for Surgery: Stop 7 days before surgery     losartan 100 mg tablet  Commonly known as: Cozaar  Take 1 tablet (100 mg) by mouth once daily.  Medication Adjustments for Surgery: Other (Comment)  Notes to patient: Do not take day of surgery     multivitamin capsule  Medication Adjustments for Surgery: Stop 7 days before surgery     omeprazole 40 mg DR capsule  Commonly known as: PriLOSEC  Take 1 capsule (40 mg) by mouth once daily.  Medication Adjustments for Surgery: Take morning of surgery with sip of water, no other fluids     primidone 50 mg tablet  Commonly known as: Mysoline  Take 1 tablet (50 mg) by mouth once daily at bedtime.     tamsulosin 0.4 mg 24 hr capsule  Commonly known as: Flomax  Medication Adjustments for Surgery: Take morning of surgery with sip of water, no other fluids     topiramate 50 mg  tablet  Commonly known as: Topamax  Medication Adjustments for Surgery: Take morning of surgery with sip of water, no other fluids            SURGERY PRE-OPERATIVE INSTRUCTIONS    *You will receive a phone call the day before your procedure  after 2pm, (or the Friday before your surgery if scheduled on a Monday.) Generally the hospital will be calling you with this information after that time.    *You are not to eat after midnight the night before the surgery. You may have 8oz of a clear liquid up until 2 hours prior to arriving to the hospital. The exception is with medications you were instructed to take day of surgery.    *You may take tylenol for pain/discomfort as needed.     *Stop taking all aspirin products, ibuprofen (motrin/advil), naproxen (aleve/naprosyn) for one week prior to surgery.    *Stop taking all vitamins and supplements one week prior to surgery.     *You should not have alcoholic beverages for 24 hours before surgery.     *You should not smoke 24 hours prior to surgery.     *To help prevent surgical infections bathe/shower with Dial soap the evening before surgery.    *You can wear deodorant but no lotion, powder, or perfume/cologne. You should remove all make-up and nail polish at home.    *If you wear glasses, please bring a case for the glasses with you.    *You will be asked to remove dentures and contacts.     *Please leave all valuables at home.    *You should wear loose, comfortable clothing that will accommodate bandages and/or casts.    *You should notify your doctor of any change in your condition (fever, cold, rash, etc). Surgery may need to be re-scheduled until a time you are in better health.    *A responsible adult is required to accompany you to and from the hospital if you are receiving anesthesia or a sedative. Patients are not permitted to drive for 24 hours after anesthesia.     *You can use the [facility] parking if you wish.     *If you have any further questions please call  [facility] [phone].    CHG BODY WASH INSTRUCTIONS    *Begin using your CHG soap five days prior to your scheduled surgery.  Allow the CHG soap to sit on skin for 3 minutes. Do not wash with regular soap after you have used the CHG soap. Pat yourself dry with a clean, fresh towel.    *Wash your face with normal soap and water. Apply the CHG solution to a clean, wet washcloth. Firmly lather your entire body from the neck down. Do not use on your face.     *Do not apply powders, deodorants, or lotions after using CHG wash.    *Dress in clean, freshly laundered night clothes.    *Be sure to sleep with clean, freshly laundered sheets.     *Be aware CHG wash may cause stains on fabrics. Rinse your washcloth and other linens that come in contact with CHG completely. Use non-chlorine detergents to launder items used.     *The morning of surgery is the fifth day, repeat the CHG wash and wear fresh laundered clothes.     *If you have any questions about the CHG soap, call [phone].       MOUTHRINSE INSTRUCTIONS    *CHG oral rinse is used to kill a bacteria in the mouth known as Staphylococcus aureus. This reduces the risks of surgical site infections.     *Using dental rinse: use the CHG oral rinse after you brush your teeth the night before and the morning of the surgery. Follow all directions on your prescription label.     *Use 1 capful (15ml), swish and gargle for at least 30 seconds. Do not swallow. Spit rinse out.     *Do not rinse mouth with water, eat or drink after using CHG mouth rinse.     *Possible side effects: CHG rinse will stick to plaque on teeth. Brush and floss just before use. Teeth brushing will help to avoid staining of plaque during use.    *Any questions, please call [phone].                      NPO Instructions:        Additional Instructions:

## 2024-03-13 NOTE — H&P (VIEW-ONLY)
CPM/PAT Evaluation       Name: Julio Carranza (Julio Carranza)  /Age: 1960/63 y.o.     In-Person       Chief Complaint: Left hip osteoarthritis     HPI  63 year old male with history of left hip osteoarthritis scheduled for left total hip arthroplasty on 24.  Rates pain 6/10.  Has failed conservative treatment including PT, medications, and injections.      Presents to CPM today for perioperative risk stratification.      No broken, loose, chipped, cracked teeth -partials uppers and lowers   No hearing aids, wears glasses  No issues with anesthesia in the past  No known bleeding or clotting disorders    Past Medical History:   Diagnosis Date    CAD (coronary artery disease)     Emphysema lung (CMS/HCC)     Fall     tripped in shower  no injury    GERD (gastroesophageal reflux disease)     Hidradenitis     History of blood transfusion     after back surgery  no reaction    HLD (hyperlipidemia)     HTN (hypertension)     Local infection of the skin and subcutaneous tissue, unspecified 2015    Skin infection, bacterial    Lung nodule     OA (osteoarthritis)     Other cervical disc displacement, unspecified cervical region 2019    Cervical disc herniation    Personal history of other diseases of the digestive system 2021    History of chronic constipation    Personal history of other diseases of the respiratory system 2014    History of chronic obstructive lung disease    Personal history of other diseases of the respiratory system 2017    History of acute bronchitis    Personal history of other diseases of the respiratory system 07/15/2016    History of acute bronchitis    Psychophysiologic insomnia 2021    Chronic insomnia    Umbilical hernia     Unspecified cataract     Cataract of left eye    Use of cane as ambulatory aid     Wears dentures     full upper and lower    Wears glasses        Past Surgical History:   Procedure Laterality Date    APPENDECTOMY  2014     Appendectomy    BACK SURGERY  12/03/2014    Lower Back Surgery    FL GUIDED ASPIRATION OR INJECTION LARGE JOINT LEFT Left 10/18/2023    FL GUIDED ASPIRATION OR INJECTION LARGE JOINT LEFT 10/18/2023 [facility]    OTHER SURGICAL HISTORY Left 11/06/2019    Cataract surgery       Patient Sexual activity questions deferred to the physician.    No family history on file.    Allergies   Allergen Reactions    Etodolac Palpitations    Penicillins Unknown     since a child    Escitalopram Oxalate Palpitations     long qt    Pollen Extracts Other       Prior to Admission medications    Medication Sig Start Date End Date Taking? Authorizing Provider   acetaminophen (Tylenol) 325 mg tablet Take by mouth every 6 hours if needed. 12/18/20  Yes Historical Provider, MD   albuterol 90 mcg/actuation inhaler inhale 1 to 2 puffs by mouth every 4 to 6 hours as needed 2/12/24  Yes Mendez Coronel MD   atorvastatin (Lipitor) 40 mg tablet Take 1 tablet (40 mg) by mouth once daily.  Patient taking differently: Take 1 tablet (40 mg) by mouth once daily at bedtime. 2/12/24  Yes Mendez Coronel MD   cyanocobalamin (Vitamin B-12) 100 mcg tablet Take 1 tablet (100 mcg) by mouth once daily. 3/11/21  Yes Historical Provider, MD   folic acid (Folvite) 1 mg tablet Take 1 tablet (1 mg) by mouth once daily. 2/12/24  Yes Mendez Coronel MD   losartan (Cozaar) 100 mg tablet Take 1 tablet (100 mg) by mouth once daily. 2/12/24  Yes Mendez Coronel MD   multivitamin capsule Take 1 capsule by mouth once daily.   Yes Historical Provider, MD   omeprazole (PriLOSEC) 40 mg DR capsule Take 1 capsule (40 mg) by mouth once daily.  Patient taking differently: Take 1 capsule (40 mg) by mouth once daily at bedtime. 2/12/24  Yes Mendez Coronel MD   primidone (Mysoline) 50 mg tablet Take 1 tablet (50 mg) by mouth once daily at bedtime. 2/12/24  Yes Mendez Coronel MD   tamsulosin (Flomax) 0.4 mg 24 hr capsule Take 1 capsule (0.4 mg) by mouth once daily  at bedtime.   Yes Historical Provider, MD   topiramate (Topamax) 50 mg tablet Take 1 tablet (50 mg) by mouth 2 times a day.   Yes Historical Provider, MD   chlorhexidine (Peridex) 0.12 % solution Use 15 mL in the mouth or throat 2 times a day for 1 day. 3/13/24 3/14/24  Nohemy Mendoza, APRN-CNP   HYDROcodone-acetaminophen (Norco) 5-325 mg tablet  7/14/23 3/13/24  Historical Provider, MD   ibuprofen 800 mg tablet Take 1 tablet (800 mg) by mouth every 6 hours during the day. 8/2/22 3/13/24  Historical Provider, MD   predniSONE (Deltasone) 50 mg tablet Take 1 tablet (50 mg) by mouth once daily at bedtime. 7/14/23 3/13/24  Historical Provider, MD   tadalafil (Cialis) 5 mg tablet Take 1 tablet (5 mg) by mouth once daily. 2/25/23 3/13/24  Historical Provider, MD LEÓN ROS:   Constitutional:   neg    Neuro/Psych:   neg    Eyes:   neg    Ears:   neg    Nose:   neg    Mouth:   neg    Throat:   neg    Neck:   neg    Cardio:   neg    Respiratory:    wheezing  Endocrine:   neg    GI:   neg    :   neg    Musculoskeletal:    arthralgias   decreased ROM  Hematologic:   neg    Skin:  neg        Physical Exam  Vitals reviewed.   Constitutional:       Appearance: Normal appearance. He is normal weight.   HENT:      Head: Normocephalic and atraumatic.      Mouth/Throat:      Mouth: Mucous membranes are moist.      Pharynx: Oropharynx is clear.   Eyes:      Extraocular Movements: Extraocular movements intact.      Conjunctiva/sclera: Conjunctivae normal.      Pupils: Pupils are equal, round, and reactive to light.      Comments: glasses   Neck:      Vascular: No carotid bruit.   Cardiovascular:      Rate and Rhythm: Normal rate and regular rhythm.      Pulses: Normal pulses.      Heart sounds: Normal heart sounds.   Pulmonary:      Effort: Pulmonary effort is normal.      Breath sounds: Wheezing present.   Abdominal:      General: Bowel sounds are normal.      Palpations: Abdomen is soft.   Musculoskeletal:         General:  Tenderness present.      Cervical back: Neck supple.      Right lower leg: No edema.      Left lower leg: No edema.   Skin:     General: Skin is warm and dry.      Capillary Refill: Capillary refill takes less than 2 seconds.   Neurological:      General: No focal deficit present.      Mental Status: He is alert and oriented to person, place, and time. Mental status is at baseline.   Psychiatric:         Mood and Affect: Mood normal.         Behavior: Behavior normal.          PAT AIRWAY:   Airway:     Mallampati::  II    Neck ROM::  Full      Visit Vitals  BP (!) 131/92   Pulse 74   Temp 36.6 °C (97.9 °F)   Resp 18       DASI Risk Score      Flowsheet Row Most Recent Value   DASI SCORE 18.7   METS Score (Will be calculated only when all the questions are answered) 5          Caprini DVT Assessment      Flowsheet Row Most Recent Value   DVT Score 13   Current Status COPD, Major surgery planned, including arthroscopic and laproscopic (1-2 hours), Elective major lower extremity arthroplasty   History COPD, Prior major surgery   Age 60-75 years   BMI 30 or less          Modified Frailty Index    No data to display       CHADS2 Stroke Risk  Current as of 14 minutes ago        N/A 3 - 100%: High Risk   2 - 3%: Medium Risk   0 - 2%: Low Risk     Last Change: N/A          This score determines the patient's risk of having a stroke if the patient has atrial fibrillation.        This score is not applicable to this patient. Components are not calculated.          Revised Cardiac Risk Index      Flowsheet Row Most Recent Value   Revised Cardiac Risk Calculator 0          Apfel Simplified Score      Flowsheet Row Most Recent Value   Apfel Simplified Score Calculator 1          Risk Analysis Index Results This Encounter    No data found in the last 1 encounters.       Stop Bang Score      Flowsheet Row Most Recent Value   Do you snore loudly? 1   Do you often feel tired or fatigued after your sleep? 0   Has anyone ever observed  you stop breathing in your sleep? 0   Do you have or are you being treated for high blood pressure? 1   Recent BMI (Calculated) 28.2   Is BMI greater than 35 kg/m2? 0=No   Age older than 50 years old? 1=Yes   Is your neck circumference greater than 17 inches (Male) or 16 inches (Female)? 0   Gender - Male 1=Yes   STOP-BANG Total Score 4            Assessment and Plan:     Musculoskeletal:   63 year old male with history of left hip osteoarthritis scheduled for left total hip arthroplasty on 4/1/24.      PMH:    Hypertension:  controlled on Losartan  Hyperlipidemia:  controlled on Lipitor, follows with PCP  Emphysema:  albuterol as needed, does not see pulmonology  GERD:  controlled on Omeprazole  BPH:  stable on Flomax, had urolift with Dr. Saxena   Insomnia/Tremor:  patient is on Primidone, verified that it is for insomnia     H/O DVT: NO    Sleep apnea: NO    H/O transfusions: YES after prior back surgery (2014)  Accepts blood products: YES    H/O issues with anesthesia:  NO    EKG: NSR, nonspecific ST (unchanged from previous EKG 2023), rate 76, no ectopy     Clearances:  none    Patient verbalized understanding of pre-op instructions given in ROMELIA Mendoza, APRN-CNP

## 2024-03-15 LAB — STAPHYLOCOCCUS SPEC CULT: NORMAL

## 2024-03-25 ENCOUNTER — ANESTHESIA EVENT (OUTPATIENT)
Dept: OPERATING ROOM | Facility: HOSPITAL | Age: 64
DRG: 981 | End: 2024-03-25
Payer: MEDICARE

## 2024-03-27 ENCOUNTER — OFFICE VISIT (OUTPATIENT)
Dept: ORTHOPEDIC SURGERY | Facility: CLINIC | Age: 64
End: 2024-03-27
Payer: MEDICARE

## 2024-03-27 ENCOUNTER — HOSPITAL ENCOUNTER (OUTPATIENT)
Dept: RADIOLOGY | Facility: HOSPITAL | Age: 64
Discharge: HOME | End: 2024-03-27
Payer: MEDICARE

## 2024-03-27 DIAGNOSIS — M17.11 ARTHRITIS OF RIGHT KNEE: ICD-10-CM

## 2024-03-27 DIAGNOSIS — M25.552 PAIN OF LEFT HIP: ICD-10-CM

## 2024-03-27 DIAGNOSIS — M25.552 PAIN OF LEFT HIP: Primary | ICD-10-CM

## 2024-03-27 PROCEDURE — 72100 X-RAY EXAM L-S SPINE 2/3 VWS: CPT

## 2024-03-27 PROCEDURE — 20610 DRAIN/INJ JOINT/BURSA W/O US: CPT | Performed by: ORTHOPAEDIC SURGERY

## 2024-03-27 PROCEDURE — 1157F ADVNC CARE PLAN IN RCRD: CPT | Performed by: ORTHOPAEDIC SURGERY

## 2024-03-27 PROCEDURE — 99214 OFFICE O/P EST MOD 30 MIN: CPT | Performed by: ORTHOPAEDIC SURGERY

## 2024-03-27 PROCEDURE — 1159F MED LIST DOCD IN RCRD: CPT | Performed by: ORTHOPAEDIC SURGERY

## 2024-03-27 PROCEDURE — 1160F RVW MEDS BY RX/DR IN RCRD: CPT | Performed by: ORTHOPAEDIC SURGERY

## 2024-03-27 RX ORDER — TRIAMCINOLONE ACETONIDE 40 MG/ML
1 INJECTION, SUSPENSION INTRA-ARTICULAR; INTRAMUSCULAR
Status: COMPLETED | OUTPATIENT
Start: 2024-03-27 | End: 2024-03-27

## 2024-03-27 RX ADMIN — TRIAMCINOLONE ACETONIDE 1 ML: 40 INJECTION, SUSPENSION INTRA-ARTICULAR; INTRAMUSCULAR at 12:45

## 2024-03-27 NOTE — H&P (VIEW-ONLY)
Chief Complaint   Patient presents with    Left Hip - Follow-up     PRE OP LT TRINITY   Right knee pain      This is a 63 y.o. year old male who presents for preoperative visit for his left total hip.  Patient has severe degenerative disease of the affected joint. The patient complains of severe pain in the area, the pain is gradually getting worse. She has failed extensive nonsurgical treatment including anti-inflammatories physical therapy use of assistive devices activity modification cortisone injections. Despite this interventions the patient is worsening pain which impacts her quality of life and activities of daily living and they would like to proceed with joint replacement.    Patient also complained of increased pain in his right knee, he has right knee arthritis and has had injections in the past.  Is been exacerbated last few weeks.  Sharp pain over the medial and lateral knee worse with walking    Physical Exam    There has been no interval change in this patient's past medical, surgical, medications, allergies, family history or social history since the most recent visit to a provider within our department. 14 point review of systems was performed, reviewed, and negative except for pertinent positives documented in the history of present illness.     Constitutional: well developed, well nourished male in no acute distress  Psychiatric: normal mood, appropriate affect  Eyes: sclera anicteric  HENT: normocephalic/atraumatic  CV: regular rate and rhythm   Respiratory: non labored breathing  Integumentary: no rash  Neurological: moves all extremities    Pre-Admission Testing on 03/13/2024   Component Date Value Ref Range Status    WBC 03/13/2024 7.0  4.4 - 11.3 x10*3/uL Final    nRBC 03/13/2024 0.0  0.0 - 0.0 /100 WBCs Final    RBC 03/13/2024 4.92  4.50 - 5.90 x10*6/uL Final    Hemoglobin 03/13/2024 14.9  13.5 - 17.5 g/dL Final    Hematocrit 03/13/2024 44.7  41.0 - 52.0 % Final    MCV 03/13/2024 91  80 - 100  fL Final    MCH 03/13/2024 30.3  26.0 - 34.0 pg Final    MCHC 03/13/2024 33.3  32.0 - 36.0 g/dL Final    RDW 03/13/2024 12.7  11.5 - 14.5 % Final    Platelets 03/13/2024 238  150 - 450 x10*3/uL Final    Neutrophils % 03/13/2024 68.8  40.0 - 80.0 % Final    Immature Granulocytes %, Automated 03/13/2024 0.6  0.0 - 0.9 % Final    Immature Granulocyte Count (IG) includes promyelocytes, myelocytes and metamyelocytes but does not include bands. Percent differential counts (%) should be interpreted in the context of the absolute cell counts (cells/UL).    Lymphocytes % 03/13/2024 20.0  13.0 - 44.0 % Final    Monocytes % 03/13/2024 9.6  2.0 - 10.0 % Final    Eosinophils % 03/13/2024 0.0  0.0 - 6.0 % Final    Basophils % 03/13/2024 1.0  0.0 - 2.0 % Final    Neutrophils Absolute 03/13/2024 4.78  1.20 - 7.70 x10*3/uL Final    Percent differential counts (%) should be interpreted in the context of the absolute cell counts (cells/uL).    Immature Granulocytes Absolute, Au* 03/13/2024 0.04  0.00 - 0.70 x10*3/uL Final    Lymphocytes Absolute 03/13/2024 1.39  1.20 - 4.80 x10*3/uL Final    Monocytes Absolute 03/13/2024 0.67  0.10 - 1.00 x10*3/uL Final    Eosinophils Absolute 03/13/2024 0.00  0.00 - 0.70 x10*3/uL Final    Basophils Absolute 03/13/2024 0.07  0.00 - 0.10 x10*3/uL Final    Glucose 03/13/2024 91  74 - 99 mg/dL Final    Sodium 03/13/2024 139  136 - 145 mmol/L Final    Potassium 03/13/2024 4.2  3.5 - 5.3 mmol/L Final    Chloride 03/13/2024 105  98 - 107 mmol/L Final    Bicarbonate 03/13/2024 25  21 - 32 mmol/L Final    Anion Gap 03/13/2024 13  10 - 20 mmol/L Final    Urea Nitrogen 03/13/2024 9  6 - 23 mg/dL Final    Creatinine 03/13/2024 1.00  0.50 - 1.30 mg/dL Final    eGFR 03/13/2024 84  >60 mL/min/1.73m*2 Final    Calculations of estimated GFR are performed using the 2021 CKD-EPI Study Refit equation without the race variable for the IDMS-Traceable creatinine  methods.  https://jasn.asnjournals.org/content/early/2021/09/22/ASN.7951417169    Calcium 03/13/2024 8.7  8.6 - 10.3 mg/dL Final    Albumin 03/13/2024 4.3  3.4 - 5.0 g/dL Final    Alkaline Phosphatase 03/13/2024 95  33 - 136 U/L Final    Total Protein 03/13/2024 6.6  6.4 - 8.2 g/dL Final    AST 03/13/2024 16  9 - 39 U/L Final    Bilirubin, Total 03/13/2024 0.5  0.0 - 1.2 mg/dL Final    ALT 03/13/2024 13  10 - 52 U/L Final    Patients treated with Sulfasalazine may generate falsely decreased results for ALT.    Staph/MRSA Screen Culture 03/13/2024 No Staphylococcus aureus isolated   Final    Color, Urine 03/13/2024 Yellow  Straw, Yellow Final    Appearance, Urine 03/13/2024 Clear  Clear Final    Specific Gravity, Urine 03/13/2024 1.008  1.005 - 1.035 Final    pH, Urine 03/13/2024 6.0  5.0, 5.5, 6.0, 6.5, 7.0, 7.5, 8.0 Final    Protein, Urine 03/13/2024 NEGATIVE  NEGATIVE mg/dL Final    Glucose, Urine 03/13/2024 NEGATIVE  NEGATIVE mg/dL Final    Blood, Urine 03/13/2024 NEGATIVE  NEGATIVE Final    Ketones, Urine 03/13/2024 NEGATIVE  NEGATIVE mg/dL Final    Bilirubin, Urine 03/13/2024 NEGATIVE  NEGATIVE Final    Urobilinogen, Urine 03/13/2024 <2.0  <2.0 mg/dL Final    Nitrite, Urine 03/13/2024 NEGATIVE  NEGATIVE Final    Leukocyte Esterase, Urine 03/13/2024 NEGATIVE  NEGATIVE Final    Extra Tube 03/13/2024 Hold for add-ons.   Final    Auto resulted.    Ventricular Rate 03/13/2024 76  BPM Preliminary    Atrial Rate 03/13/2024 76  BPM Preliminary    LA Interval 03/13/2024 178  ms Preliminary    QRS Duration 03/13/2024 92  ms Preliminary    QT Interval 03/13/2024 412  ms Preliminary    QTC Calculation(Bazett) 03/13/2024 463  ms Preliminary    P Axis 03/13/2024 29  degrees Preliminary    R Axis 03/13/2024 7  degrees Preliminary    T Axis 03/13/2024 28  degrees Preliminary    QRS Count 03/13/2024 12  beats Preliminary    Q Onset 03/13/2024 225  ms Preliminary    P Onset 03/13/2024 136  ms Preliminary    P Offset 03/13/2024  185  ms Preliminary    T Offset 03/13/2024 431  ms Preliminary    QTC Fredericia 03/13/2024 445  ms Preliminary         Left hip exam: skin normal, no abrasions, wounds, or lacerations. nttp over greater trochanter. negative log roll, negative mannie's test. flexion to 90 degrees without pain. no pain with flexion abduction and external rotation, severe hip pain pain with flexion adduction and internal rotation. neurovascularly intact distally    Right knee exam: skin intact no lacerations or abrations.  1+ effusion.  Tender medial and lateral joint line. negative log roll negative patellar grind. ROM 0-120. stable to varus and valgus stress at 0 and 30 degrees. negative lachman negative posterior drawer negative denny. 5/5 ehl/fhl/gs/ta. silt s/s/sp/dp/t. 2+ dp/pt    L Inj/Asp: R knee on 3/27/2024 12:45 PM  Indications: pain and joint swelling  Details: 22 G needle, anterolateral approach  Medications: 1 mL triamcinolone acetonide 40 mg/mL    Discussion:  I discussed the conservative treatment options for knee osteoarthritis including but not limited to physical therapy, oral NSAIDS, activity and lifestyle modification, and corticosteroid injections. Pt has elected to undergo a cortisone injection today. I have explained the risk and benefits of an injection including the possibility of joint infection, bleeding, damage to cartilage, allergic reaction. Patient verbalized understanding and gave verbal consent wishes to proceed with a intra-articular cortisone injection for their knee.    Procedure:  After discussing the risk and benefits of the procedure, we proceeded with an intra-articular right knee injection. We discussed the risks and benefits and potential morbidity related to the treatment, and to the prescription medication administered in the injection    With the patient's informed verbal consent, the right knee was prepped in standard sterile fashion with Chlorhexidine. The skin was then anesthetized  with ethyl chloride spray and cleaned again with Chlorhexidine. The knee was then apirated/injected with a prefilled 20-gauge syringe of 40 mg Kenalog + 4 ml Lidocaine using the lateral approach without complications.  The patient tolerated this well and felt immediate initial relief of symptoms. A bandaid was applied and the patient ambulated out of the clinic on ther own accord without difficulty. Patient was instructed to avoid physical activity for 24-48 hours to prevent the knees from swelling and may ice the knees as tolerated. Patient should contact the office if any signs of of infection appear: redness, fever, chills, drainage, swelling or warmth to the knees.  Pt understands that the injections can be repeated no sooner than 3 months.    Procedure, treatment alternatives, risks and benefits explained, specific risks discussed. Consent was given by the patient. Immediately prior to procedure a time out was called to verify the correct patient, procedure, equipment, support staff and site/side marked as required. Patient was prepped and draped in the usual sterile fashion.             Preoperative labs reviewed, no findings which would preclude surgery    Impression plan: This is a 63 y.o. yo malewith severe end-stage degenerative disease of the left hip that has failed nonoperative management.  Once again I discussed with the patient in detail the risks benefits and alternatives of total joint replacement. For the full details of that discussion see my previous note. The patient has obtained appropriate medical and dental clearance, and her labs have been reviewed. We will plan to proceed with surgery.    He also is right knee arthritis, injection given today will continue to treat nonsurgically for now.    BMI Readings from Last 1 Encounters:   03/13/24 30.12 kg/m²     Lab Results   Component Value Date    CREATININE 1.00 03/13/2024     Tobacco Use: High Risk (3/13/2024)    Patient History     Smoking  Tobacco Use: Every Day     Smokeless Tobacco Use: Never     Passive Exposure: Not on file      Computed MELD 3.0 unavailable. Necessary lab results were not found in the last year.  Computed MELD-Na unavailable. Necessary lab results were not found in the last year.       Lab Results   Component Value Date    HGBA1C 5.2 06/18/2022     Lab Results   Component Value Date    STAPHMRSASCR No Staphylococcus aureus isolated 03/13/2024

## 2024-03-27 NOTE — PROGRESS NOTES
Chief Complaint   Patient presents with    Left Hip - Follow-up     PRE OP LT TRINITY   Right knee pain      This is a 63 y.o. year old male who presents for preoperative visit for his left total hip.  Patient has severe degenerative disease of the affected joint. The patient complains of severe pain in the area, the pain is gradually getting worse. She has failed extensive nonsurgical treatment including anti-inflammatories physical therapy use of assistive devices activity modification cortisone injections. Despite this interventions the patient is worsening pain which impacts her quality of life and activities of daily living and they would like to proceed with joint replacement.    Patient also complained of increased pain in his right knee, he has right knee arthritis and has had injections in the past.  Is been exacerbated last few weeks.  Sharp pain over the medial and lateral knee worse with walking    Physical Exam    There has been no interval change in this patient's past medical, surgical, medications, allergies, family history or social history since the most recent visit to a provider within our department. 14 point review of systems was performed, reviewed, and negative except for pertinent positives documented in the history of present illness.     Constitutional: well developed, well nourished male in no acute distress  Psychiatric: normal mood, appropriate affect  Eyes: sclera anicteric  HENT: normocephalic/atraumatic  CV: regular rate and rhythm   Respiratory: non labored breathing  Integumentary: no rash  Neurological: moves all extremities    Pre-Admission Testing on 03/13/2024   Component Date Value Ref Range Status    WBC 03/13/2024 7.0  4.4 - 11.3 x10*3/uL Final    nRBC 03/13/2024 0.0  0.0 - 0.0 /100 WBCs Final    RBC 03/13/2024 4.92  4.50 - 5.90 x10*6/uL Final    Hemoglobin 03/13/2024 14.9  13.5 - 17.5 g/dL Final    Hematocrit 03/13/2024 44.7  41.0 - 52.0 % Final    MCV 03/13/2024 91  80 - 100  fL Final    MCH 03/13/2024 30.3  26.0 - 34.0 pg Final    MCHC 03/13/2024 33.3  32.0 - 36.0 g/dL Final    RDW 03/13/2024 12.7  11.5 - 14.5 % Final    Platelets 03/13/2024 238  150 - 450 x10*3/uL Final    Neutrophils % 03/13/2024 68.8  40.0 - 80.0 % Final    Immature Granulocytes %, Automated 03/13/2024 0.6  0.0 - 0.9 % Final    Immature Granulocyte Count (IG) includes promyelocytes, myelocytes and metamyelocytes but does not include bands. Percent differential counts (%) should be interpreted in the context of the absolute cell counts (cells/UL).    Lymphocytes % 03/13/2024 20.0  13.0 - 44.0 % Final    Monocytes % 03/13/2024 9.6  2.0 - 10.0 % Final    Eosinophils % 03/13/2024 0.0  0.0 - 6.0 % Final    Basophils % 03/13/2024 1.0  0.0 - 2.0 % Final    Neutrophils Absolute 03/13/2024 4.78  1.20 - 7.70 x10*3/uL Final    Percent differential counts (%) should be interpreted in the context of the absolute cell counts (cells/uL).    Immature Granulocytes Absolute, Au* 03/13/2024 0.04  0.00 - 0.70 x10*3/uL Final    Lymphocytes Absolute 03/13/2024 1.39  1.20 - 4.80 x10*3/uL Final    Monocytes Absolute 03/13/2024 0.67  0.10 - 1.00 x10*3/uL Final    Eosinophils Absolute 03/13/2024 0.00  0.00 - 0.70 x10*3/uL Final    Basophils Absolute 03/13/2024 0.07  0.00 - 0.10 x10*3/uL Final    Glucose 03/13/2024 91  74 - 99 mg/dL Final    Sodium 03/13/2024 139  136 - 145 mmol/L Final    Potassium 03/13/2024 4.2  3.5 - 5.3 mmol/L Final    Chloride 03/13/2024 105  98 - 107 mmol/L Final    Bicarbonate 03/13/2024 25  21 - 32 mmol/L Final    Anion Gap 03/13/2024 13  10 - 20 mmol/L Final    Urea Nitrogen 03/13/2024 9  6 - 23 mg/dL Final    Creatinine 03/13/2024 1.00  0.50 - 1.30 mg/dL Final    eGFR 03/13/2024 84  >60 mL/min/1.73m*2 Final    Calculations of estimated GFR are performed using the 2021 CKD-EPI Study Refit equation without the race variable for the IDMS-Traceable creatinine  methods.  https://jasn.asnjournals.org/content/early/2021/09/22/ASN.9460351696    Calcium 03/13/2024 8.7  8.6 - 10.3 mg/dL Final    Albumin 03/13/2024 4.3  3.4 - 5.0 g/dL Final    Alkaline Phosphatase 03/13/2024 95  33 - 136 U/L Final    Total Protein 03/13/2024 6.6  6.4 - 8.2 g/dL Final    AST 03/13/2024 16  9 - 39 U/L Final    Bilirubin, Total 03/13/2024 0.5  0.0 - 1.2 mg/dL Final    ALT 03/13/2024 13  10 - 52 U/L Final    Patients treated with Sulfasalazine may generate falsely decreased results for ALT.    Staph/MRSA Screen Culture 03/13/2024 No Staphylococcus aureus isolated   Final    Color, Urine 03/13/2024 Yellow  Straw, Yellow Final    Appearance, Urine 03/13/2024 Clear  Clear Final    Specific Gravity, Urine 03/13/2024 1.008  1.005 - 1.035 Final    pH, Urine 03/13/2024 6.0  5.0, 5.5, 6.0, 6.5, 7.0, 7.5, 8.0 Final    Protein, Urine 03/13/2024 NEGATIVE  NEGATIVE mg/dL Final    Glucose, Urine 03/13/2024 NEGATIVE  NEGATIVE mg/dL Final    Blood, Urine 03/13/2024 NEGATIVE  NEGATIVE Final    Ketones, Urine 03/13/2024 NEGATIVE  NEGATIVE mg/dL Final    Bilirubin, Urine 03/13/2024 NEGATIVE  NEGATIVE Final    Urobilinogen, Urine 03/13/2024 <2.0  <2.0 mg/dL Final    Nitrite, Urine 03/13/2024 NEGATIVE  NEGATIVE Final    Leukocyte Esterase, Urine 03/13/2024 NEGATIVE  NEGATIVE Final    Extra Tube 03/13/2024 Hold for add-ons.   Final    Auto resulted.    Ventricular Rate 03/13/2024 76  BPM Preliminary    Atrial Rate 03/13/2024 76  BPM Preliminary    W[place] Interval 03/13/2024 178  ms Preliminary    QRS Duration 03/13/2024 92  ms Preliminary    QT Interval 03/13/2024 412  ms Preliminary    QTC Calculation(Bazett) 03/13/2024 463  ms Preliminary    P Axis 03/13/2024 29  degrees Preliminary    R Axis 03/13/2024 7  degrees Preliminary    T Axis 03/13/2024 28  degrees Preliminary    QRS Count 03/13/2024 12  beats Preliminary    Q Onset 03/13/2024 225  ms Preliminary    P Onset 03/13/2024 136  ms Preliminary    P Offset 03/13/2024  185  ms Preliminary    T Offset 03/13/2024 431  ms Preliminary    QTC Fredericia 03/13/2024 445  ms Preliminary         Left hip exam: skin normal, no abrasions, wounds, or lacerations. nttp over greater trochanter. negative log roll, negative mannie's test. flexion to 90 degrees without pain. no pain with flexion abduction and external rotation, severe hip pain pain with flexion adduction and internal rotation. neurovascularly intact distally    Right knee exam: skin intact no lacerations or abrations.  1+ effusion.  Tender medial and lateral joint line. negative log roll negative patellar grind. ROM 0-120. stable to varus and valgus stress at 0 and 30 degrees. negative lachman negative posterior drawer negative denny. 5/5 ehl/fhl/gs/ta. silt s/s/sp/dp/t. 2+ dp/pt    L Inj/Asp: R knee on 3/27/2024 12:45 PM  Indications: pain and joint swelling  Details: 22 G needle, anterolateral approach  Medications: 1 mL triamcinolone acetonide 40 mg/mL    Discussion:  I discussed the conservative treatment options for knee osteoarthritis including but not limited to physical therapy, oral NSAIDS, activity and lifestyle modification, and corticosteroid injections. Pt has elected to undergo a cortisone injection today. I have explained the risk and benefits of an injection including the possibility of joint infection, bleeding, damage to cartilage, allergic reaction. Patient verbalized understanding and gave verbal consent wishes to proceed with a intra-articular cortisone injection for their knee.    Procedure:  After discussing the risk and benefits of the procedure, we proceeded with an intra-articular right knee injection. We discussed the risks and benefits and potential morbidity related to the treatment, and to the prescription medication administered in the injection    With the patient's informed verbal consent, the right knee was prepped in standard sterile fashion with Chlorhexidine. The skin was then anesthetized  with ethyl chloride spray and cleaned again with Chlorhexidine. The knee was then apirated/injected with a prefilled 20-gauge syringe of 40 mg Kenalog + 4 ml Lidocaine using the lateral approach without complications.  The patient tolerated this well and felt immediate initial relief of symptoms. A bandaid was applied and the patient ambulated out of the clinic on ther own accord without difficulty. Patient was instructed to avoid physical activity for 24-48 hours to prevent the knees from swelling and may ice the knees as tolerated. Patient should contact the office if any signs of of infection appear: redness, fever, chills, drainage, swelling or warmth to the knees.  Pt understands that the injections can be repeated no sooner than 3 months.    Procedure, treatment alternatives, risks and benefits explained, specific risks discussed. Consent was given by the patient. Immediately prior to procedure a time out was called to verify the correct patient, procedure, equipment, support staff and site/side marked as required. Patient was prepped and draped in the usual sterile fashion.             Preoperative labs reviewed, no findings which would preclude surgery    Impression plan: This is a 63 y.o. yo malewith severe end-stage degenerative disease of the left hip that has failed nonoperative management.  Once again I discussed with the patient in detail the risks benefits and alternatives of total joint replacement. For the full details of that discussion see my previous note. The patient has obtained appropriate medical and dental clearance, and her labs have been reviewed. We will plan to proceed with surgery.    He also is right knee arthritis, injection given today will continue to treat nonsurgically for now.    BMI Readings from Last 1 Encounters:   03/13/24 30.12 kg/m²     Lab Results   Component Value Date    CREATININE 1.00 03/13/2024     Tobacco Use: High Risk (3/13/2024)    Patient History     Smoking  Tobacco Use: Every Day     Smokeless Tobacco Use: Never     Passive Exposure: Not on file      Computed MELD 3.0 unavailable. Necessary lab results were not found in the last year.  Computed MELD-Na unavailable. Necessary lab results were not found in the last year.       Lab Results   Component Value Date    HGBA1C 5.2 06/18/2022     Lab Results   Component Value Date    STAPHMRSASCR No Staphylococcus aureus isolated 03/13/2024

## 2024-03-29 ENCOUNTER — TELEPHONE (OUTPATIENT)
Dept: INPATIENT UNIT | Facility: HOSPITAL | Age: 64
End: 2024-03-29
Payer: MEDICARE

## 2024-03-29 RX ORDER — TRANEXAMIC ACID 100 MG/ML
1000 INJECTION, SOLUTION INTRAVENOUS ONCE
Status: CANCELLED | OUTPATIENT
Start: 2024-03-29 | End: 2024-03-29

## 2024-03-29 RX ORDER — NAPROXEN SODIUM 220 MG/1
81 TABLET, FILM COATED ORAL 2 TIMES DAILY
Qty: 56 TABLET | Refills: 0 | Status: SHIPPED | OUTPATIENT
Start: 2024-03-29 | End: 2024-04-05 | Stop reason: HOSPADM

## 2024-03-29 RX ORDER — OXYCODONE AND ACETAMINOPHEN 5; 325 MG/1; MG/1
1 TABLET ORAL EVERY 4 HOURS PRN
Qty: 36 TABLET | Refills: 0 | Status: SHIPPED | OUTPATIENT
Start: 2024-03-29 | End: 2024-04-06 | Stop reason: SDUPTHER

## 2024-03-29 RX ORDER — DOCUSATE SODIUM 100 MG/1
100 CAPSULE, LIQUID FILLED ORAL 2 TIMES DAILY
Qty: 20 CAPSULE | Refills: 0 | Status: SHIPPED | OUTPATIENT
Start: 2024-03-29 | End: 2024-04-08

## 2024-03-29 RX ORDER — CEFADROXIL 500 MG/1
500 CAPSULE ORAL 2 TIMES DAILY
Qty: 14 CAPSULE | Refills: 0 | Status: SHIPPED | OUTPATIENT
Start: 2024-03-29 | End: 2024-04-05 | Stop reason: HOSPADM

## 2024-03-29 NOTE — DISCHARGE INSTRUCTIONS
TOTAL HIP AND KNEE REPLACEMENT DISCHARGE INSTRUCTIONS  Danny Ricci MD      DRIVING & TRAVEL AFTER SURGERY   Patients should anticipate waiting at least 4-6 weeks before traveling long distances after surgery.  You will need to stop to walk around ever 1 hour during your travel to help with blood clot prevention.  Please call the office or your joint nurse to discuss prior to post-surgical travel.  Patients may not drive until cleared by the joint nurse or the office.    DENTAL PROCEDURES & CLEANINGS  You must wait a minimum of 3 months for elective dental appointments, including routine cleanings or dental work including bridges, crowns, extractions, etc..  For any dental visit - cleaning or dental procedures - patients must take an antibiotic 1 hour before the appointment.  Antibiotics are a lifelong need before dental appointments.  The antibiotic prescribed will be based on each patient's allergies.    WOUND CARE  If you experience continued drainage or bleeding, you may cover with abdominal pads (purchase at local drug store).  Knee replacements should wrap with an ace wrap.  Do not remove surgical dressing, it will be removed when you arrive in clinic for follow up visit.   Mesh dressing under the bandage will remain in place until it peels off on its own.  If it is loose, you may gently remove.  Do not remove if pulling causes resistance against the incision.      PAIN, SWELLING, BRUISING & CLICKING  Pain and swelling are a natural part of your recovery which is considered normal fur up to a year after surgery.  Symptoms may be treated with movement, ice, compression stockings, elevating your leg, and by following the pain medication regimen as prescribed.  Bruising is normal for several weeks after surgery and will run down the leg over time.  You may ice areas that are tender to help with discomfort.  You are required to wear the provided compression stockings, every day, for 4 weeks following surgery.   Remove the stockings at night and place them back on in the morning.  Pain and swelling may temporarily increase with an increase in activity or exercise.  Use ice after activity.  Audible clicking with movement or exercises is considered normal following joint replacement.  You may also feel decreased sensation or numbness near the incision site.  These are usually normal and may or may not fade over time.     PERSONAL HYGIENE  You may shower upon discharging from the hospital.  Soap and water is permitted to run over the surgical dressing, steri-strips and incision.  Do not scrub directly over these items.  DO NOT soak your incision in a bath, hot tub, pool or pond/lake for a minimum of 8 weeks following your surgery.  DO NOT use lotions, creams, ointments on your wound for a minimum of 6 weeks following your surgery. At that time you may use vitamin E to assist with softening of your incision.      RESTARTING HOME ROUTINE - DIET & MEDICATIONS  Post-operative constipation can result due to a combination of inactivity, anesthesia and pain medication. To help prevent this, you should increase your water and fiber intake. Physical activity such as walking will also help stimulate the bowels.   You may resume your normal diet when you discharge home.  Choose foods that help promote good bowel habits and prevent constipation, such as foods high in fiber.  You may restart your home medications the following day after your surgery UNLESS you have been given alternate instructions.  Follow the instructions given to you on your hospital discharge instructions for more information regarding your home medications.      IN-HOME PHYSICAL THERAPY & OUTPATIENT PHYSICAL THERAPY  In-home physical therapy will start 1-2 days after you get home from the hospital.    The home care agency will call within the first 24-48 hours to set up their first visit.  Please do not call your care team to inquire during this timeframe.  Continue  the exercises you were given in the hospital until you have been seen by in-home therapy.  Make sure to provide a phone number with the ability for the home care staff to leave a message if you do not answer your phone.    Outpatient physical therapy following knee replacement surgery should begin 2-3 weeks after surgery.  You should call to schedule this appointment ASAP if not already scheduled before surgery.  Waiting until you are ready for outpatient physical therapy will cause a delay in your care.  You may choose any outpatient physical therapy location.  Call the office for an order if needed.    EMERGENCIES - WHEN TO CONTACT THE SURGEON'S OFFICE IMMEDIATELY  Fever >101 with chills that has been present for at least 48 hours.   Excessive bleeding from incision that will not slow down. A small amount of drainage is normal and expected.  Once pressure is applied and the area is covered, do not continue to check the area regularly.  This will remove pressure and bleeding will continue.  Leave in place for 4-6 hours.  Signs of infection of incision-excessive drainage that is soaking through your dressing (especially if it is pus-like), redness that is spreading out from the edges of your incision, or increased warmth around the area.  Excruciating pain for which the pain medication, taken as instructed, is not helping.  Severe calf pain.  Go directly to the emergency room or call 911, if you are experiencing chest pain or difficulty breathing.    ICE & COLD THERAPY INSTRUCTIONS    To assist with pain control and post-op swelling, you should be using ice regularly throughout recovery, especially for the first 6 weeks, regardless of the cold therapy method you use.      Always make sure there is a layer of protection between the cold pad and your skin.    If you are using ICE PACKS or GEL PACKS, you will need to alternate 20 minutes on, 20 minutes off twice per hour.    If you are using an ICE MACHINE, please  follow the provided ice machine instructions.  These devices differ from ice or ice packs whereas the mechanism circulates water through tubing and a pad to provide longer periods of cold therapy to the desired site.  You can use your cold devices around the clock for optimal comfort.  We recommend using cold therapy after working with therapy or completing exercises on your own.  There is no set schedule in which you must follow while using cold therapy.  Below are a few points to remember when using a cold therapy device:    You do not need to need to use the 20 on, 20 off method.  Detach the pad from the cooler and ambulate at least once every hour.  You can check your skin under the pad at this time.  You may wear the cold therapy device during periods of sleep including overnight.  If you wake up during the night, you can check the skin at this time.  You do not need to wake up specifically to perform skin checks.  Empty the cooler and pad when device is not in use.  Follow 's instructions for cleaning your cold therapy device.      DISCHARGE MEDICATIONS    PAIN MEDICATION    __X__ Oxycodone-acetaminophen  Oxycodone-acetaminophen has been prescribed for post-operative pain control.    These medications may only be refilled if neededONCE every 7 days for a period of up to 6 weeks following surgery.  After 6 weeks, you will transition to acetaminophen and over -the- counter anti-inflammatories such as Ibuprofen, Advil or Aleve in conjunction with ICE/COLD THERAPY.   Side effects may be constipation and nausea, vomiting, sleepiness, dizziness, lightheadedness, headache, blurred vision, dry mouth sweating, itching (if you have itching, over-the -counter Benadryl can be used as needed).  You may NOT operate a motor vehicle while taking these medications or have been cleared by your care team.    Please call the office for a refill request.  The office staff and orthopedic nurses cannot refill  medications; messages should be left directly through the office.  Please do not call multiple times or call other members of the care team for medication needs, this will cause the refill to take longer.    Per State and Institutional policy, pain medications can only be refilled every 7 days for up to six weeks following surgery.   .    ___X____ Naproxen has been prescribed as an adjunct anti-inflammatory to assist in pain control.    Take one 500 mg tablet twice a day for 4 weeks.  You will not receive refills on this medication.   Side effects may include nausea.  May not be prescribed if you are on a more potent blood thinner than aspirin or have chronic kidney disease.    BLOOD THINNER    __X__ Blood Thinner   A blood thinner has been prescribed to prevent blood clots in your leg or lungs. Take as prescribed on the bottle for 4 weeks. You will not receive a refill on this medication.      STOOL SOFTENERS    __X__ Colace (Docusate Sodium)   Take this medication to help with constipation while using the Oxycodone for pain control.  You will not receive a refill on this medication.    Antibiotics    __X__ Cefadroxil or doxycycline  May be prescribed if needed for prophylaxis against infection   Take 7 day course of antibiotics until they are all gone  May not be prescribed if you do not meet criteria for elevated infection risk after surgery          You will not receive refills on the following medications:    Naproxen  Colace  Blood Thinner

## 2024-03-31 ENCOUNTER — HOME HEALTH ADMISSION (OUTPATIENT)
Dept: HOME HEALTH SERVICES | Facility: HOME HEALTH | Age: 64
End: 2024-03-31
Payer: MEDICARE

## 2024-04-01 ENCOUNTER — APPOINTMENT (OUTPATIENT)
Dept: RADIOLOGY | Facility: HOSPITAL | Age: 64
DRG: 981 | End: 2024-04-01
Payer: MEDICARE

## 2024-04-01 ENCOUNTER — HOSPITAL ENCOUNTER (INPATIENT)
Facility: HOSPITAL | Age: 64
LOS: 3 days | Discharge: NURSING, REHAB OR LONG-TERM CARE | DRG: 981 | End: 2024-04-06
Attending: ORTHOPAEDIC SURGERY | Admitting: ORTHOPAEDIC SURGERY
Payer: MEDICARE

## 2024-04-01 ENCOUNTER — ANESTHESIA (OUTPATIENT)
Dept: OPERATING ROOM | Facility: HOSPITAL | Age: 64
DRG: 981 | End: 2024-04-01
Payer: MEDICARE

## 2024-04-01 DIAGNOSIS — M16.12 OSTEOARTHRITIS OF LEFT HIP, UNSPECIFIED OSTEOARTHRITIS TYPE: ICD-10-CM

## 2024-04-01 DIAGNOSIS — I26.99 ACUTE PULMONARY EMBOLISM, UNSPECIFIED PULMONARY EMBOLISM TYPE, UNSPECIFIED WHETHER ACUTE COR PULMONALE PRESENT (MULTI): ICD-10-CM

## 2024-04-01 DIAGNOSIS — M16.12 PRIMARY OSTEOARTHRITIS OF LEFT HIP: ICD-10-CM

## 2024-04-01 DIAGNOSIS — I25.10 ASCVD (ARTERIOSCLEROTIC CARDIOVASCULAR DISEASE): ICD-10-CM

## 2024-04-01 DIAGNOSIS — J43.9 PULMONARY EMPHYSEMA, UNSPECIFIED EMPHYSEMA TYPE (MULTI): ICD-10-CM

## 2024-04-01 DIAGNOSIS — I26.99 OTHER ACUTE PULMONARY EMBOLISM WITHOUT ACUTE COR PULMONALE (MULTI): ICD-10-CM

## 2024-04-01 DIAGNOSIS — M25.552 PAIN OF LEFT HIP: Primary | ICD-10-CM

## 2024-04-01 PROCEDURE — 2500000004 HC RX 250 GENERAL PHARMACY W/ HCPCS (ALT 636 FOR OP/ED): Performed by: NURSE PRACTITIONER

## 2024-04-01 PROCEDURE — A9999 DME SUPPLY OR ACCESSORY, NOS: HCPCS | Performed by: ORTHOPAEDIC SURGERY

## 2024-04-01 PROCEDURE — 2500000005 HC RX 250 GENERAL PHARMACY W/O HCPCS: Performed by: NURSE ANESTHETIST, CERTIFIED REGISTERED

## 2024-04-01 PROCEDURE — 2780000003 HC OR 278 NO HCPCS: Performed by: ORTHOPAEDIC SURGERY

## 2024-04-01 PROCEDURE — 2500000005 HC RX 250 GENERAL PHARMACY W/O HCPCS: Performed by: NURSE PRACTITIONER

## 2024-04-01 PROCEDURE — 76000 FLUOROSCOPY <1 HR PHYS/QHP: CPT

## 2024-04-01 PROCEDURE — C1776 JOINT DEVICE (IMPLANTABLE): HCPCS | Performed by: ORTHOPAEDIC SURGERY

## 2024-04-01 PROCEDURE — 2500000002 HC RX 250 W HCPCS SELF ADMINISTERED DRUGS (ALT 637 FOR MEDICARE OP, ALT 636 FOR OP/ED): Mod: MUE | Performed by: NURSE PRACTITIONER

## 2024-04-01 PROCEDURE — 7100000001 HC RECOVERY ROOM TIME - INITIAL BASE CHARGE: Performed by: ORTHOPAEDIC SURGERY

## 2024-04-01 PROCEDURE — 88311 DECALCIFY TISSUE: CPT | Performed by: STUDENT IN AN ORGANIZED HEALTH CARE EDUCATION/TRAINING PROGRAM

## 2024-04-01 PROCEDURE — 72170 X-RAY EXAM OF PELVIS: CPT

## 2024-04-01 PROCEDURE — 7100000002 HC RECOVERY ROOM TIME - EACH INCREMENTAL 1 MINUTE: Performed by: ORTHOPAEDIC SURGERY

## 2024-04-01 PROCEDURE — 2500000004 HC RX 250 GENERAL PHARMACY W/ HCPCS (ALT 636 FOR OP/ED): Performed by: ORTHOPAEDIC SURGERY

## 2024-04-01 PROCEDURE — 3700000002 HC GENERAL ANESTHESIA TIME - EACH INCREMENTAL 1 MINUTE: Performed by: ORTHOPAEDIC SURGERY

## 2024-04-01 PROCEDURE — A6213 FOAM DRG >16<=48 SQ IN W/BDR: HCPCS | Performed by: ORTHOPAEDIC SURGERY

## 2024-04-01 PROCEDURE — 2500000004 HC RX 250 GENERAL PHARMACY W/ HCPCS (ALT 636 FOR OP/ED): Performed by: NURSE ANESTHETIST, CERTIFIED REGISTERED

## 2024-04-01 PROCEDURE — 0SRB04Z REPLACEMENT OF LEFT HIP JOINT WITH CERAMIC ON POLYETHYLENE SYNTHETIC SUBSTITUTE, OPEN APPROACH: ICD-10-PCS | Performed by: ORTHOPAEDIC SURGERY

## 2024-04-01 PROCEDURE — 2500000001 HC RX 250 WO HCPCS SELF ADMINISTERED DRUGS (ALT 637 FOR MEDICARE OP): Performed by: NURSE PRACTITIONER

## 2024-04-01 PROCEDURE — 88304 TISSUE EXAM BY PATHOLOGIST: CPT | Performed by: STUDENT IN AN ORGANIZED HEALTH CARE EDUCATION/TRAINING PROGRAM

## 2024-04-01 PROCEDURE — 3600000017 HC OR TIME - EACH INCREMENTAL 1 MINUTE - PROCEDURE LEVEL SIX: Performed by: ORTHOPAEDIC SURGERY

## 2024-04-01 PROCEDURE — G0378 HOSPITAL OBSERVATION PER HR: HCPCS

## 2024-04-01 PROCEDURE — 99221 1ST HOSP IP/OBS SF/LOW 40: CPT | Performed by: FAMILY MEDICINE

## 2024-04-01 PROCEDURE — 72170 X-RAY EXAM OF PELVIS: CPT | Performed by: RADIOLOGY

## 2024-04-01 PROCEDURE — A27130 PR TOTAL HIP ARTHROPLASTY: Performed by: NURSE ANESTHETIST, CERTIFIED REGISTERED

## 2024-04-01 PROCEDURE — 3700000001 HC GENERAL ANESTHESIA TIME - INITIAL BASE CHARGE: Performed by: ORTHOPAEDIC SURGERY

## 2024-04-01 PROCEDURE — 3600000018 HC OR TIME - INITIAL BASE CHARGE - PROCEDURE LEVEL SIX: Performed by: ORTHOPAEDIC SURGERY

## 2024-04-01 PROCEDURE — 88304 TISSUE EXAM BY PATHOLOGIST: CPT | Mod: TC,GEALAB | Performed by: ORTHOPAEDIC SURGERY

## 2024-04-01 PROCEDURE — 2720000007 HC OR 272 NO HCPCS: Performed by: ORTHOPAEDIC SURGERY

## 2024-04-01 PROCEDURE — 2500000004 HC RX 250 GENERAL PHARMACY W/ HCPCS (ALT 636 FOR OP/ED): Performed by: ANESTHESIOLOGY

## 2024-04-01 PROCEDURE — 27130 TOTAL HIP ARTHROPLASTY: CPT | Performed by: ORTHOPAEDIC SURGERY

## 2024-04-01 PROCEDURE — 7100000011 HC EXTENDED STAY RECOVERY HOURLY - NURSING UNIT

## 2024-04-01 DEVICE — IMPLANTABLE DEVICE: Type: IMPLANTABLE DEVICE | Site: HIP | Status: FUNCTIONAL

## 2024-04-01 DEVICE — SHELL, TRIDENT II, CLUSTERHOLE, 56F: Type: IMPLANTABLE DEVICE | Site: HIP | Status: FUNCTIONAL

## 2024-04-01 RX ORDER — TOPIRAMATE 25 MG/1
50 TABLET ORAL 2 TIMES DAILY
Status: DISCONTINUED | OUTPATIENT
Start: 2024-04-02 | End: 2024-04-06 | Stop reason: HOSPADM

## 2024-04-01 RX ORDER — LIDOCAINE HYDROCHLORIDE 10 MG/ML
INJECTION, SOLUTION EPIDURAL; INFILTRATION; INTRACAUDAL; PERINEURAL AS NEEDED
Status: DISCONTINUED | OUTPATIENT
Start: 2024-04-01 | End: 2024-04-01

## 2024-04-01 RX ORDER — SODIUM CHLORIDE, SODIUM LACTATE, POTASSIUM CHLORIDE, CALCIUM CHLORIDE 600; 310; 30; 20 MG/100ML; MG/100ML; MG/100ML; MG/100ML
100 INJECTION, SOLUTION INTRAVENOUS CONTINUOUS
Status: DISCONTINUED | OUTPATIENT
Start: 2024-04-01 | End: 2024-04-01 | Stop reason: HOSPADM

## 2024-04-01 RX ORDER — HYDRALAZINE HYDROCHLORIDE 20 MG/ML
INJECTION INTRAMUSCULAR; INTRAVENOUS AS NEEDED
Status: DISCONTINUED | OUTPATIENT
Start: 2024-04-01 | End: 2024-04-01

## 2024-04-01 RX ORDER — OXYCODONE HYDROCHLORIDE 5 MG/1
5 TABLET ORAL EVERY 4 HOURS PRN
Status: DISCONTINUED | OUTPATIENT
Start: 2024-04-01 | End: 2024-04-02

## 2024-04-01 RX ORDER — POLYETHYLENE GLYCOL 3350 17 G/17G
17 POWDER, FOR SOLUTION ORAL DAILY
Status: DISCONTINUED | OUTPATIENT
Start: 2024-04-01 | End: 2024-04-05

## 2024-04-01 RX ORDER — ONDANSETRON HYDROCHLORIDE 2 MG/ML
4 INJECTION, SOLUTION INTRAVENOUS ONCE AS NEEDED
Status: COMPLETED | OUTPATIENT
Start: 2024-04-01 | End: 2024-04-01

## 2024-04-01 RX ORDER — OXYCODONE HYDROCHLORIDE 10 MG/1
10 TABLET ORAL EVERY 4 HOURS PRN
Status: DISCONTINUED | OUTPATIENT
Start: 2024-04-01 | End: 2024-04-02

## 2024-04-01 RX ORDER — ATORVASTATIN CALCIUM 40 MG/1
40 TABLET, FILM COATED ORAL NIGHTLY
Status: DISCONTINUED | OUTPATIENT
Start: 2024-04-01 | End: 2024-04-06 | Stop reason: HOSPADM

## 2024-04-01 RX ORDER — ROCURONIUM BROMIDE 10 MG/ML
INJECTION, SOLUTION INTRAVENOUS AS NEEDED
Status: DISCONTINUED | OUTPATIENT
Start: 2024-04-01 | End: 2024-04-01

## 2024-04-01 RX ORDER — SODIUM CHLORIDE, SODIUM LACTATE, POTASSIUM CHLORIDE, CALCIUM CHLORIDE 600; 310; 30; 20 MG/100ML; MG/100ML; MG/100ML; MG/100ML
100 INJECTION, SOLUTION INTRAVENOUS CONTINUOUS
Status: DISCONTINUED | OUTPATIENT
Start: 2024-04-01 | End: 2024-04-05

## 2024-04-01 RX ORDER — CEFAZOLIN SODIUM 2 G/100ML
2 INJECTION, SOLUTION INTRAVENOUS EVERY 8 HOURS
Status: COMPLETED | OUTPATIENT
Start: 2024-04-01 | End: 2024-04-02

## 2024-04-01 RX ORDER — TAMSULOSIN HYDROCHLORIDE 0.4 MG/1
0.4 CAPSULE ORAL NIGHTLY
Status: DISCONTINUED | OUTPATIENT
Start: 2024-04-01 | End: 2024-04-06 | Stop reason: HOSPADM

## 2024-04-01 RX ORDER — DROPERIDOL 2.5 MG/ML
0.62 INJECTION, SOLUTION INTRAMUSCULAR; INTRAVENOUS ONCE AS NEEDED
Status: DISCONTINUED | OUTPATIENT
Start: 2024-04-01 | End: 2024-04-01 | Stop reason: HOSPADM

## 2024-04-01 RX ORDER — MIDAZOLAM HYDROCHLORIDE 1 MG/ML
INJECTION INTRAMUSCULAR; INTRAVENOUS AS NEEDED
Status: DISCONTINUED | OUTPATIENT
Start: 2024-04-01 | End: 2024-04-01

## 2024-04-01 RX ORDER — LOSARTAN POTASSIUM 50 MG/1
100 TABLET ORAL DAILY
Status: DISCONTINUED | OUTPATIENT
Start: 2024-04-02 | End: 2024-04-06 | Stop reason: HOSPADM

## 2024-04-01 RX ORDER — TRANEXAMIC ACID 100 MG/ML
INJECTION, SOLUTION INTRAVENOUS AS NEEDED
Status: DISCONTINUED | OUTPATIENT
Start: 2024-04-01 | End: 2024-04-01

## 2024-04-01 RX ORDER — SODIUM CHLORIDE, SODIUM LACTATE, POTASSIUM CHLORIDE, CALCIUM CHLORIDE 600; 310; 30; 20 MG/100ML; MG/100ML; MG/100ML; MG/100ML
100 INJECTION, SOLUTION INTRAVENOUS CONTINUOUS
Status: ACTIVE | OUTPATIENT
Start: 2024-04-01 | End: 2024-04-02

## 2024-04-01 RX ORDER — PRIMIDONE 50 MG/1
50 TABLET ORAL NIGHTLY
Status: DISCONTINUED | OUTPATIENT
Start: 2024-04-01 | End: 2024-04-06 | Stop reason: HOSPADM

## 2024-04-01 RX ORDER — ACETAMINOPHEN 325 MG/1
975 TABLET ORAL ONCE
Status: COMPLETED | OUTPATIENT
Start: 2024-04-01 | End: 2024-04-01

## 2024-04-01 RX ORDER — CEFAZOLIN 1 G/1
INJECTION, POWDER, FOR SOLUTION INTRAVENOUS AS NEEDED
Status: DISCONTINUED | OUTPATIENT
Start: 2024-04-01 | End: 2024-04-01

## 2024-04-01 RX ORDER — OXYCODONE HYDROCHLORIDE 5 MG/1
5 TABLET ORAL EVERY 4 HOURS PRN
Status: DISCONTINUED | OUTPATIENT
Start: 2024-04-01 | End: 2024-04-01 | Stop reason: HOSPADM

## 2024-04-01 RX ORDER — ONDANSETRON HYDROCHLORIDE 2 MG/ML
INJECTION, SOLUTION INTRAVENOUS AS NEEDED
Status: DISCONTINUED | OUTPATIENT
Start: 2024-04-01 | End: 2024-04-01

## 2024-04-01 RX ORDER — ONDANSETRON 4 MG/1
4 TABLET, FILM COATED ORAL EVERY 8 HOURS PRN
Status: DISCONTINUED | OUTPATIENT
Start: 2024-04-01 | End: 2024-04-04

## 2024-04-01 RX ORDER — ASPIRIN 81 MG/1
81 TABLET ORAL 2 TIMES DAILY
Status: DISCONTINUED | OUTPATIENT
Start: 2024-04-02 | End: 2024-04-06 | Stop reason: HOSPADM

## 2024-04-01 RX ORDER — METOPROLOL TARTRATE 1 MG/ML
INJECTION, SOLUTION INTRAVENOUS AS NEEDED
Status: DISCONTINUED | OUTPATIENT
Start: 2024-04-01 | End: 2024-04-01

## 2024-04-01 RX ORDER — PROPOFOL 10 MG/ML
INJECTION, EMULSION INTRAVENOUS AS NEEDED
Status: DISCONTINUED | OUTPATIENT
Start: 2024-04-01 | End: 2024-04-01

## 2024-04-01 RX ORDER — FENTANYL CITRATE 50 UG/ML
INJECTION, SOLUTION INTRAMUSCULAR; INTRAVENOUS AS NEEDED
Status: DISCONTINUED | OUTPATIENT
Start: 2024-04-01 | End: 2024-04-01

## 2024-04-01 RX ORDER — DOCUSATE SODIUM 100 MG/1
100 CAPSULE, LIQUID FILLED ORAL 2 TIMES DAILY
Status: DISCONTINUED | OUTPATIENT
Start: 2024-04-01 | End: 2024-04-06 | Stop reason: HOSPADM

## 2024-04-01 RX ORDER — PROPOFOL 10 MG/ML
INJECTION, EMULSION INTRAVENOUS CONTINUOUS PRN
Status: DISCONTINUED | OUTPATIENT
Start: 2024-04-01 | End: 2024-04-01

## 2024-04-01 RX ORDER — SODIUM CHLORIDE 9 MG/ML
100 INJECTION, SOLUTION INTRAVENOUS CONTINUOUS
Status: DISCONTINUED | OUTPATIENT
Start: 2024-04-01 | End: 2024-04-03

## 2024-04-01 RX ORDER — PANTOPRAZOLE SODIUM 40 MG/1
40 TABLET, DELAYED RELEASE ORAL NIGHTLY
Status: DISCONTINUED | OUTPATIENT
Start: 2024-04-01 | End: 2024-04-06 | Stop reason: HOSPADM

## 2024-04-01 RX ORDER — ONDANSETRON HYDROCHLORIDE 2 MG/ML
4 INJECTION, SOLUTION INTRAVENOUS EVERY 8 HOURS PRN
Status: DISCONTINUED | OUTPATIENT
Start: 2024-04-01 | End: 2024-04-04

## 2024-04-01 RX ORDER — ACETAMINOPHEN 325 MG/1
650 TABLET ORAL EVERY 6 HOURS SCHEDULED
Status: DISCONTINUED | OUTPATIENT
Start: 2024-04-01 | End: 2024-04-06 | Stop reason: HOSPADM

## 2024-04-01 RX ADMIN — PANTOPRAZOLE SODIUM 40 MG: 40 TABLET, DELAYED RELEASE ORAL at 21:02

## 2024-04-01 RX ADMIN — TRANEXAMIC ACID 1000 MG: 100 INJECTION, SOLUTION INTRAVENOUS at 07:55

## 2024-04-01 RX ADMIN — SODIUM CHLORIDE, POTASSIUM CHLORIDE, SODIUM LACTATE AND CALCIUM CHLORIDE 100 ML/HR: 600; 310; 30; 20 INJECTION, SOLUTION INTRAVENOUS at 12:54

## 2024-04-01 RX ADMIN — CEFAZOLIN SODIUM 2 G: 2 INJECTION, SOLUTION INTRAVENOUS at 16:22

## 2024-04-01 RX ADMIN — LIDOCAINE HYDROCHLORIDE 5 ML: 10 INJECTION, SOLUTION EPIDURAL; INFILTRATION; INTRACAUDAL; PERINEURAL at 07:40

## 2024-04-01 RX ADMIN — MIDAZOLAM HYDROCHLORIDE 2 MG: 1 INJECTION, SOLUTION INTRAMUSCULAR; INTRAVENOUS at 07:27

## 2024-04-01 RX ADMIN — PROPOFOL 40 MCG/KG/MIN: 10 INJECTION, EMULSION INTRAVENOUS at 07:50

## 2024-04-01 RX ADMIN — FENTANYL CITRATE 100 MCG: 50 INJECTION, SOLUTION INTRAMUSCULAR; INTRAVENOUS at 09:31

## 2024-04-01 RX ADMIN — ACETAMINOPHEN 650 MG: 325 TABLET ORAL at 18:03

## 2024-04-01 RX ADMIN — OXYCODONE HYDROCHLORIDE 10 MG: 10 TABLET ORAL at 11:05

## 2024-04-01 RX ADMIN — OXYCODONE HYDROCHLORIDE 10 MG: 10 TABLET ORAL at 21:02

## 2024-04-01 RX ADMIN — HYDROMORPHONE HYDROCHLORIDE 0.5 MG: 1 INJECTION, SOLUTION INTRAMUSCULAR; INTRAVENOUS; SUBCUTANEOUS at 09:41

## 2024-04-01 RX ADMIN — PRIMIDONE 50 MG: 50 TABLET ORAL at 21:03

## 2024-04-01 RX ADMIN — POLYETHYLENE GLYCOL 3350 17 G: 17 POWDER, FOR SOLUTION ORAL at 21:02

## 2024-04-01 RX ADMIN — METOPROLOL TARTRATE 2.5 MG: 5 INJECTION INTRAVENOUS at 08:29

## 2024-04-01 RX ADMIN — HYDROMORPHONE HYDROCHLORIDE 0.5 MG: 1 INJECTION, SOLUTION INTRAMUSCULAR; INTRAVENOUS; SUBCUTANEOUS at 09:52

## 2024-04-01 RX ADMIN — ACETAMINOPHEN 975 MG: 325 TABLET ORAL at 07:05

## 2024-04-01 RX ADMIN — HYDRALAZINE HYDROCHLORIDE 5 MG: 20 INJECTION INTRAMUSCULAR; INTRAVENOUS at 09:01

## 2024-04-01 RX ADMIN — HYDRALAZINE HYDROCHLORIDE 5 MG: 20 INJECTION INTRAMUSCULAR; INTRAVENOUS at 08:29

## 2024-04-01 RX ADMIN — SODIUM CHLORIDE, POTASSIUM CHLORIDE, SODIUM LACTATE AND CALCIUM CHLORIDE 100 ML/HR: 600; 310; 30; 20 INJECTION, SOLUTION INTRAVENOUS at 21:03

## 2024-04-01 RX ADMIN — OXYCODONE HYDROCHLORIDE 10 MG: 10 TABLET ORAL at 15:36

## 2024-04-01 RX ADMIN — METHYLPREDNISOLONE SODIUM SUCCINATE 125 MG: 125 INJECTION, POWDER, FOR SOLUTION INTRAMUSCULAR; INTRAVENOUS at 08:27

## 2024-04-01 RX ADMIN — ROCURONIUM BROMIDE 30 MG: 10 INJECTION, SOLUTION INTRAVENOUS at 08:19

## 2024-04-01 RX ADMIN — FENTANYL CITRATE 50 MCG: 50 INJECTION, SOLUTION INTRAMUSCULAR; INTRAVENOUS at 08:11

## 2024-04-01 RX ADMIN — FENTANYL CITRATE 100 MCG: 50 INJECTION, SOLUTION INTRAMUSCULAR; INTRAVENOUS at 07:40

## 2024-04-01 RX ADMIN — CEFAZOLIN 2 G: 330 INJECTION, POWDER, FOR SOLUTION INTRAMUSCULAR; INTRAVENOUS at 07:50

## 2024-04-01 RX ADMIN — POVIDONE-IODINE 1 APPLICATION: 5 SOLUTION TOPICAL at 07:05

## 2024-04-01 RX ADMIN — DOCUSATE SODIUM 100 MG: 100 CAPSULE, LIQUID FILLED ORAL at 21:03

## 2024-04-01 RX ADMIN — ONDANSETRON 4 MG: 2 INJECTION INTRAMUSCULAR; INTRAVENOUS at 15:39

## 2024-04-01 RX ADMIN — METOPROLOL TARTRATE 2.5 MG: 5 INJECTION INTRAVENOUS at 08:21

## 2024-04-01 RX ADMIN — FENTANYL CITRATE 50 MCG: 50 INJECTION, SOLUTION INTRAMUSCULAR; INTRAVENOUS at 08:06

## 2024-04-01 RX ADMIN — SODIUM CHLORIDE, POTASSIUM CHLORIDE, SODIUM LACTATE AND CALCIUM CHLORIDE 100 ML/HR: 600; 310; 30; 20 INJECTION, SOLUTION INTRAVENOUS at 07:04

## 2024-04-01 RX ADMIN — HYDROMORPHONE HYDROCHLORIDE 0.2 MG: 1 INJECTION, SOLUTION INTRAMUSCULAR; INTRAVENOUS; SUBCUTANEOUS at 12:51

## 2024-04-01 RX ADMIN — ATORVASTATIN CALCIUM 40 MG: 40 TABLET, FILM COATED ORAL at 21:03

## 2024-04-01 RX ADMIN — SUGAMMADEX 200 MG: 100 INJECTION, SOLUTION INTRAVENOUS at 09:28

## 2024-04-01 RX ADMIN — TRANEXAMIC ACID 1000 MG: 100 INJECTION, SOLUTION INTRAVENOUS at 09:09

## 2024-04-01 RX ADMIN — ROCURONIUM BROMIDE 50 MG: 10 INJECTION, SOLUTION INTRAVENOUS at 07:40

## 2024-04-01 RX ADMIN — ONDANSETRON 4 MG: 2 INJECTION INTRAMUSCULAR; INTRAVENOUS at 09:41

## 2024-04-01 RX ADMIN — PROMETHAZINE HYDROCHLORIDE 6.25 MG: 25 INJECTION INTRAMUSCULAR; INTRAVENOUS at 10:10

## 2024-04-01 RX ADMIN — ONDANSETRON 4 MG: 2 INJECTION INTRAMUSCULAR; INTRAVENOUS at 08:42

## 2024-04-01 RX ADMIN — TAMSULOSIN HYDROCHLORIDE 0.4 MG: 0.4 CAPSULE ORAL at 21:03

## 2024-04-01 RX ADMIN — PROPOFOL 186.9 MG: 10 INJECTION, EMULSION INTRAVENOUS at 07:40

## 2024-04-01 SDOH — SOCIAL STABILITY: SOCIAL INSECURITY: WERE YOU ABLE TO COMPLETE ALL THE BEHAVIORAL HEALTH SCREENINGS?: YES

## 2024-04-01 SDOH — SOCIAL STABILITY: SOCIAL INSECURITY: DOES ANYONE TRY TO KEEP YOU FROM HAVING/CONTACTING OTHER FRIENDS OR DOING THINGS OUTSIDE YOUR HOME?: NO

## 2024-04-01 SDOH — SOCIAL STABILITY: SOCIAL INSECURITY: DO YOU FEEL ANYONE HAS EXPLOITED OR TAKEN ADVANTAGE OF YOU FINANCIALLY OR OF YOUR PERSONAL PROPERTY?: NO

## 2024-04-01 SDOH — HEALTH STABILITY: MENTAL HEALTH: CURRENT SMOKER: 1

## 2024-04-01 SDOH — SOCIAL STABILITY: SOCIAL INSECURITY: HAVE YOU HAD THOUGHTS OF HARMING ANYONE ELSE?: NO

## 2024-04-01 SDOH — SOCIAL STABILITY: SOCIAL INSECURITY: ABUSE: ADULT

## 2024-04-01 SDOH — SOCIAL STABILITY: SOCIAL INSECURITY: ARE THERE ANY APPARENT SIGNS OF INJURIES/BEHAVIORS THAT COULD BE RELATED TO ABUSE/NEGLECT?: NO

## 2024-04-01 SDOH — SOCIAL STABILITY: SOCIAL INSECURITY: HAS ANYONE EVER THREATENED TO HURT YOUR FAMILY OR YOUR PETS?: NO

## 2024-04-01 SDOH — SOCIAL STABILITY: SOCIAL INSECURITY: DO YOU FEEL UNSAFE GOING BACK TO THE PLACE WHERE YOU ARE LIVING?: NO

## 2024-04-01 SDOH — SOCIAL STABILITY: SOCIAL INSECURITY: ARE YOU OR HAVE YOU BEEN THREATENED OR ABUSED PHYSICALLY, EMOTIONALLY, OR SEXUALLY BY ANYONE?: NO

## 2024-04-01 ASSESSMENT — PAIN - FUNCTIONAL ASSESSMENT
PAIN_FUNCTIONAL_ASSESSMENT: 0-10

## 2024-04-01 ASSESSMENT — ACTIVITIES OF DAILY LIVING (ADL)
BATHING: INDEPENDENT
WALKS IN HOME: INDEPENDENT
HEARING - RIGHT EAR: FUNCTIONAL
TOILETING: INDEPENDENT
GROOMING: INDEPENDENT
HEARING - LEFT EAR: FUNCTIONAL
JUDGMENT_ADEQUATE_SAFELY_COMPLETE_DAILY_ACTIVITIES: YES
ADEQUATE_TO_COMPLETE_ADL: YES
FEEDING YOURSELF: INDEPENDENT
LACK_OF_TRANSPORTATION: NO
PATIENT'S MEMORY ADEQUATE TO SAFELY COMPLETE DAILY ACTIVITIES?: YES
DRESSING YOURSELF: INDEPENDENT

## 2024-04-01 ASSESSMENT — COGNITIVE AND FUNCTIONAL STATUS - GENERAL
MOVING FROM LYING ON BACK TO SITTING ON SIDE OF FLAT BED WITH BEDRAILS: A LITTLE
WALKING IN HOSPITAL ROOM: A LITTLE
STANDING UP FROM CHAIR USING ARMS: A LITTLE
MOBILITY SCORE: 24
MOVING TO AND FROM BED TO CHAIR: A LITTLE
TOILETING: A LITTLE
DAILY ACTIVITIY SCORE: 22
CLIMB 3 TO 5 STEPS WITH RAILING: A LOT
DAILY ACTIVITIY SCORE: 24
MOVING TO AND FROM BED TO CHAIR: A LITTLE
STANDING UP FROM CHAIR USING ARMS: A LITTLE
MOBILITY SCORE: 17
CLIMB 3 TO 5 STEPS WITH RAILING: A LOT
TURNING FROM BACK TO SIDE WHILE IN FLAT BAD: A LITTLE
WALKING IN HOSPITAL ROOM: A LITTLE
DRESSING REGULAR LOWER BODY CLOTHING: A LITTLE
DAILY ACTIVITIY SCORE: 22
PATIENT BASELINE BEDBOUND: NO
TOILETING: A LITTLE
DRESSING REGULAR LOWER BODY CLOTHING: A LITTLE
MOBILITY SCORE: 19

## 2024-04-01 ASSESSMENT — PAIN SCALES - GENERAL
PAINLEVEL_OUTOF10: 5 - MODERATE PAIN
PAINLEVEL_OUTOF10: 4
PAINLEVEL_OUTOF10: 8
PAINLEVEL_OUTOF10: 7
PAINLEVEL_OUTOF10: 7
PAINLEVEL_OUTOF10: 6
PAINLEVEL_OUTOF10: 7
PAINLEVEL_OUTOF10: 5 - MODERATE PAIN
PAINLEVEL_OUTOF10: 7
PAINLEVEL_OUTOF10: 10 - WORST POSSIBLE PAIN
PAINLEVEL_OUTOF10: 7

## 2024-04-01 ASSESSMENT — LIFESTYLE VARIABLES
HOW OFTEN DO YOU HAVE A DRINK CONTAINING ALCOHOL: NEVER
AUDIT-C TOTAL SCORE: 0
SKIP TO QUESTIONS 9-10: 1
HOW MANY STANDARD DRINKS CONTAINING ALCOHOL DO YOU HAVE ON A TYPICAL DAY: PATIENT DOES NOT DRINK
AUDIT-C TOTAL SCORE: 0
HOW OFTEN DO YOU HAVE 6 OR MORE DRINKS ON ONE OCCASION: NEVER

## 2024-04-01 ASSESSMENT — PATIENT HEALTH QUESTIONNAIRE - PHQ9
1. LITTLE INTEREST OR PLEASURE IN DOING THINGS: NOT AT ALL
2. FEELING DOWN, DEPRESSED OR HOPELESS: NOT AT ALL
SUM OF ALL RESPONSES TO PHQ9 QUESTIONS 1 & 2: 0

## 2024-04-01 ASSESSMENT — PAIN DESCRIPTION - LOCATION
LOCATION: HIP
LOCATION: HIP

## 2024-04-01 ASSESSMENT — COLUMBIA-SUICIDE SEVERITY RATING SCALE - C-SSRS
1. IN THE PAST MONTH, HAVE YOU WISHED YOU WERE DEAD OR WISHED YOU COULD GO TO SLEEP AND NOT WAKE UP?: NO
2. HAVE YOU ACTUALLY HAD ANY THOUGHTS OF KILLING YOURSELF?: NO
6. HAVE YOU EVER DONE ANYTHING, STARTED TO DO ANYTHING, OR PREPARED TO DO ANYTHING TO END YOUR LIFE?: NO

## 2024-04-01 ASSESSMENT — PAIN DESCRIPTION - ORIENTATION: ORIENTATION: LEFT

## 2024-04-01 NOTE — CARE PLAN
The patient's goals for the shift include  pain control.      The clinical goals for the shift include  pain control.

## 2024-04-01 NOTE — CONSULTS
Consults    Reason For Consult  Medical management    History Of Present Illness  Julio Carranza is a 63 y.o. male with history of coronary artery disease hypertension, hyperlipidemia and GERD is status post left TRINITY..  Patient has a longstanding history of left hip pain that failed conservative treatment.  During the s 3 only 50 cc of blood loss was noted and no complications were reported.  Is also reported the patient tolerated the procedure well.     Past Medical History  He has a past medical history of CAD (coronary artery disease), Emphysema lung (CMS/HCC), Fall, GERD (gastroesophageal reflux disease), Hidradenitis, History of blood transfusion, HLD (hyperlipidemia), HTN (hypertension), Local infection of the skin and subcutaneous tissue, unspecified (12/01/2015), Lung nodule, OA (osteoarthritis), Other cervical disc displacement, unspecified cervical region (02/28/2019), Personal history of other diseases of the digestive system (03/25/2021), Personal history of other diseases of the respiratory system (12/03/2014), Personal history of other diseases of the respiratory system (07/14/2017), Personal history of other diseases of the respiratory system (07/15/2016), Psychophysiologic insomnia (03/25/2021), Umbilical hernia, Unspecified cataract, Use of cane as ambulatory aid, Wears dentures, and Wears glasses.    Surgical History  He has a past surgical history that includes Back surgery (12/03/2014); Appendectomy (12/03/2014); Other surgical history (Left, 11/06/2019); and FL guided aspiration or injection large joint left (Left, 10/18/2023).     Social History  He reports that he has been smoking cigarettes. He has been smoking an average of .25 packs per day. He has never used smokeless tobacco. He reports current alcohol use. He reports current drug use. Drug: Marijuana.    Family History  No family history on file.     Allergies  Etodolac, Penicillins, Escitalopram oxalate, and Pollen extracts    Review  of Systems  Patient reports and left hip , review of systems otherwise is negative     Physical Exam  Gen: lying comfortably in bed, not in acute distress  HEENT: atraumatic, normocephalic  Pulm: normal respiratory effort, clear to auscultation b/l  Cardiac: RRR, no murmurs noted, normal S1/S2  GI: Soft, nontender, BS+  MSK: Surgical incision is dressed over left hip  Extremities: no LE edema, cyanosis  Neuro: AOX3, CN II-XII grossly intact, equal b/l strength, no loss in sensation   Psych: calm and appropriate for situation        Last Recorded Vitals  /86   Pulse 80   Temp 35.9 °C (96.6 °F)   Resp 16   Wt 103 kg (227 lb 1.2 oz)   SpO2 95%          Assessment/Plan   Julio Carranza is a 63 y.o. male with history of coronary artery disease hypertension, hyperlipidemia and GERD is status post left TRINITY..    Osteoarthritis of the left hip status post TRINITY  Continue to follow along with the primary team  50 cc of blood loss was recorded  Continue perioperative antibiotics  Home medications ordered  Continue Oxy 5/10 for pain and Dilaudid as needed for breakthrough pain  Has Zofran as needed for nausea    Coronary artery disease  Her medications of aspirin, Lipitor, losartan have been continued    Hypertension  Home medications continued  Monitor vitals    Essential tremor  Primidone continued    Headaches  Patient reports he takes Topamax as needed will hold    DVT prophylaxis  Aspirin 81 mg twice daily per primary team          Codey Saxena DO

## 2024-04-01 NOTE — BRIEF OP NOTE
Date: 2024  OR Location: [facility] OR    Name: Julio Carranza, : 1960, Age: 63 y.o., MRN: [id number], Sex: male    Diagnosis  Pre-op Diagnosis     * Pain of left hip [M25.552] Post-op Diagnosis     * Pain of left hip [M25.552]     Procedures  ARTHROPLASTY TOTAL HIP ANTERIOR APPROACH  79547 - VA ARTHRP ACETBLR/PROX FEM PROSTC AGRFT/ALGRFT      Surgeons      * Danny Ricci - Primary    Resident/Fellow/Other Assistant:  Surgeon(s) and Role:    Procedure Summary  Anesthesia: Spinal  ASA: III  Anesthesia Staff: CRNA: REBEKAH Gonzalez-CRNA  Estimated Blood Loss: 50mL  Intra-op Medications:   Administrations occurring from 0730 to 0930 on 24:   Medication Name Total Dose   EPINEPHrine (Adrenalin) 0.2 mL, ketorolac (Toradol) 30 mg, morphine PF (Duramorph) 5 mg, ropivacaine (Naropin) 30 mL in sodium chloride 0.9% 20 mL syringe 56.2 mL              Anesthesia Record               Intraprocedure I/O Totals          Intake    Tranexamic Acid 0.00 mL    The total shown is the total volume documented since Anesthesia Start was filed.    Propofol Drip 0.00 mL    The total shown is the total volume documented since Anesthesia Start was filed.    lactated Ringer's infusion 1000.00 mL    Total Intake 1000 mL       Output    Est. Blood Loss 250 mL    Total Output 250 mL       Net    Net Volume 750 mL          Specimen:   ID Type Source Tests Collected by Time   1 : LEFT FEMORAL HEAD Tissue FEMORAL HEAD LEFT SURGICAL PATHOLOGY EXAM Danny Ricci MD 2024 0838        Staff:   Circulator: Gilles Tabor RN  Relief Circulator: Tere Rea RN  Scrub Person: Joey Love; Hailey Smith RN  RNFA: Akhil Tejada RN          Findings: osteoarthritis    Complications:  None; patient tolerated the procedure well.     Disposition: PACU - hemodynamically stable.  Condition: stable  Specimens Collected:   ID Type Source Tests Collected by Time   1 : LEFT FEMORAL HEAD Tissue FEMORAL HEAD LEFT SURGICAL  PATHOLOGY EXAM Danny Ricci MD 4/1/2024 0838     Eric Diehl MD

## 2024-04-01 NOTE — PROGRESS NOTES
04/01/24 1419   Discharge Planning   Living Arrangements Alone   Support Systems Friends/neighbors   Assistance Needed A&Ox3, independent with ADLs, uses a cane or rollator; does not drive (has friends that drive him); room air at baseline   Type of Residence Private residence   Number of Stairs to Enter Residence 5   Number of Stairs Within Residence 0   Do you have animals or pets at home? No   Home or Post Acute Services Post acute facilities (Rehab/SNF/etc)   Type of Post Acute Facility Services Skilled nursing   Patient expects to be discharged to: TBD, PT/OT evals pending   Does the patient need discharge transport arranged? Yes   RoundTrip coordination needed? Yes   Has discharge transport been arranged? No        04/02/24 1332   Discharge Planning   Patient expects to be discharged to: PT evaluated the patient, AMPAC 20, recommending low, patient agreeable to [facility], [place]  MD NIRAV to send referral        04/02/24 1336   Discharge Planning   Patient expects to be discharged to: PT evaluated the patient, AMPAC 20, recommending low, patient agreeable to [facility], [place]  MD NIRAV sent referral

## 2024-04-01 NOTE — ANESTHESIA PREPROCEDURE EVALUATION
Patient: Julio Carranza    Procedure Information       Date/Time: 04/01/24 0730    Procedure: ARTHROPLASTY TOTAL HIP ANTERIOR APPROACH (Left: Hip)    Location: [facility] / [facility]    Surgeons: Danny Ricci MD     Vitals:    04/01/24 0616   BP: (!) 160/98   Pulse: 82   Resp: 16   Temp: 36.4 °C (97.5 °F)   SpO2: 97%       Past Surgical History:   Procedure Laterality Date   • APPENDECTOMY  12/03/2014    Appendectomy   • BACK SURGERY  12/03/2014    Lower Back Surgery   • FL GUIDED ASPIRATION OR INJECTION LARGE JOINT LEFT Left 10/18/2023    FL GUIDED ASPIRATION OR INJECTION LARGE JOINT LEFT 10/18/2023 [facility]   • OTHER SURGICAL HISTORY Left 11/06/2019    Cataract surgery     Past Medical History:   Diagnosis Date   • CAD (coronary artery disease)    • Emphysema lung (CMS/HCC)    • Fall     tripped in shower  no injury   • GERD (gastroesophageal reflux disease)    • Hidradenitis    • History of blood transfusion     after back surgery  no reaction   • HLD (hyperlipidemia)    • HTN (hypertension)    • Local infection of the skin and subcutaneous tissue, unspecified 12/01/2015    Skin infection, bacterial   • Lung nodule    • OA (osteoarthritis)    • Other cervical disc displacement, unspecified cervical region 02/28/2019    Cervical disc herniation   • Personal history of other diseases of the digestive system 03/25/2021    History of chronic constipation   • Personal history of other diseases of the respiratory system 12/03/2014    History of chronic obstructive lung disease   • Personal history of other diseases of the respiratory system 07/14/2017    History of acute bronchitis   • Personal history of other diseases of the respiratory system 07/15/2016    History of acute bronchitis   • Psychophysiologic insomnia 03/25/2021    Chronic insomnia   • Umbilical hernia    • Unspecified cataract     Cataract of left eye   • Use of cane as ambulatory aid    • Wears dentures     full upper and lower   • Wears  glasses        Current Facility-Administered Medications:   •  lactated Ringer's infusion, 100 mL/hr, intravenous, Continuous, Marshall Sood MD, Last Rate: 100 mL/hr at 04/01/24 0704, 100 mL/hr at 04/01/24 0704  •  sodium chloride 0.9% infusion, 100 mL/hr, intravenous, Continuous, Danny Ricci MD  Prior to Admission medications    Medication Sig Start Date End Date Taking? Authorizing Provider   acetaminophen (Tylenol) 325 mg tablet Take by mouth every 6 hours if needed. 12/18/20  Yes Historical Provider, MD   albuterol 90 mcg/actuation inhaler inhale 1 to 2 puffs by mouth every 4 to 6 hours as needed 2/12/24  Yes Mendez Coronel MD   atorvastatin (Lipitor) 40 mg tablet Take 1 tablet (40 mg) by mouth once daily.  Patient taking differently: Take 1 tablet (40 mg) by mouth once daily at bedtime. 2/12/24  Yes Mendez Coronel MD   cyanocobalamin (Vitamin B-12) 100 mcg tablet Take 1 tablet (100 mcg) by mouth once daily. 3/11/21  Yes Historical Provider, MD   folic acid (Folvite) 1 mg tablet Take 1 tablet (1 mg) by mouth once daily. 2/12/24  Yes Mendez Coronel MD   losartan (Cozaar) 100 mg tablet Take 1 tablet (100 mg) by mouth once daily. 2/12/24  Yes Mendez Coronel MD   multivitamin capsule Take 1 capsule by mouth once daily.   Yes Historical Provider, MD   omeprazole (PriLOSEC) 40 mg DR capsule Take 1 capsule (40 mg) by mouth once daily.  Patient taking differently: Take 1 capsule (40 mg) by mouth once daily at bedtime. 2/12/24  Yes Mendez Coronel MD   primidone (Mysoline) 50 mg tablet Take 1 tablet (50 mg) by mouth once daily at bedtime. 2/12/24  Yes Mendez Coronel MD   tamsulosin (Flomax) 0.4 mg 24 hr capsule Take 1 capsule (0.4 mg) by mouth once daily at bedtime.   Yes Historical Provider, MD   topiramate (Topamax) 50 mg tablet Take 1 tablet (50 mg) by mouth 2 times a day.   Yes Historical Provider, MD   aspirin 81 mg chewable tablet Chew 1 tablet (81 mg) 2 times a day for 28 days. 3/29/24  4/26/24  Nohemy CORONEL Reji APRN-CNP   cefadroxil (Duricef) 500 mg capsule Take 1 capsule (500 mg) by mouth 2 times a day for 7 days. 3/29/24 4/5/24  Nohemy CORONEL Reji, APRN-CNP   docusate sodium (Colace) 100 mg capsule Take 1 capsule (100 mg) by mouth 2 times a day for 10 days. 3/29/24 4/8/24  Nohemy CORONEL Reji APRN-CNP   oxyCODONE-acetaminophen (Percocet) 5-325 mg tablet Take 1 tablet by mouth every 4 hours if needed for severe pain (7 - 10) for up to 6 days. 3/29/24 4/4/24  Nohemy R Reji APRN-CNP     Allergies   Allergen Reactions   • Etodolac Palpitations   • Penicillins Unknown     since a child   • Escitalopram Oxalate Palpitations     long qt   • Pollen Extracts Other     Social History     Tobacco Use   • Smoking status: Every Day     Packs/day: .25     Types: Cigarettes   • Smokeless tobacco: Never   Substance Use Topics   • Alcohol use: Yes     Comment: occasionally         Chemistry    Lab Results   Component Value Date/Time     03/13/2024 1102    K 4.2 03/13/2024 1102     03/13/2024 1102    CO2 25 03/13/2024 1102    BUN 9 03/13/2024 1102    CREATININE 1.00 03/13/2024 1102    Lab Results   Component Value Date/Time    CALCIUM 8.7 03/13/2024 1102    ALKPHOS 95 03/13/2024 1102    AST 16 03/13/2024 1102    ALT 13 03/13/2024 1102    BILITOT 0.5 03/13/2024 1102          Lab Results   Component Value Date/Time    WBC 7.0 03/13/2024 1102    HGB 14.9 03/13/2024 1102    HCT 44.7 03/13/2024 1102     03/13/2024 1102     Lab Results   Component Value Date/Time    PROTIME 10.4 01/18/2023 1553    INR 1.0 01/18/2023 1553     Encounter Date: 03/13/24   ECG 12 lead   Result Value    Ventricular Rate 76    Atrial Rate 76    ND Interval 178    QRS Duration 92    QT Interval 412    QTC Calculation(Bazett) 463    P Axis 29    R Axis 7    T Axis 28    QRS Count 12    Q Onset 225    P Onset 136    P Offset 185    T Offset 431    QTC Fredericia 445    Narrative    Normal sinus rhythm  Nonspecific ST  abnormality  Abnormal ECG  When compared with ECG of 29-JUN-2022 06:23,  No significant change was found     No results found for this or any previous visit from the past 1095 days.        Relevant Problems   Cardiac   (+) Atherosclerosis of native coronary artery of transplanted heart without angina pectoris   (+) Hypercholesteremia      Pulmonary   (+) COPD (chronic obstructive pulmonary disease) (CMS/HCC)      Neuro   (+) Anxiety      GI   (+) GERD (gastroesophageal reflux disease)   (+) IBS (irritable bowel syndrome)      /Renal   (+) BPH with obstruction/lower urinary tract symptoms      Musculoskeletal   (+) Osteoarthritis of right knee       Clinical information reviewed:   Tobacco  Allergies  Meds   Med Hx  Surg Hx   Fam Hx  Soc Hx        NPO Detail:  NPO/Void Status  Carbohydrate Drink Given Prior to Surgery? : N  Date of Last Liquid: 03/31/24  Time of Last Liquid: 2300  Date of Last Solid: 03/31/24  Time of Last Solid: 2300  Last Intake Type: Clear fluids  Time of Last Void: 0600         Physical Exam    Airway  Mallampati: I  TM distance: >3 FB  Neck ROM: full     Cardiovascular - normal exam     Dental    Pulmonary   (+) wheezes     Abdominal - normal exam       Other findings: Marcus  wheezing > on left :  Duoneb      Anesthesia Plan    History of general anesthesia?: yes  History of complications of general anesthesia?: no    ASA 3     general     The patient is a current smoker.    intravenous induction   Postoperative administration of opioids is intended.  Trial extubation is planned.  Anesthetic plan and risks discussed with patient.  Use of blood products discussed with patient who.    Plan discussed with CRNA and attending.

## 2024-04-01 NOTE — ANESTHESIA PROCEDURE NOTES
Airway  Date/Time: 4/1/2024 7:43 AM  Urgency: elective    Airway not difficult    Staffing  Performed: CRNA   Authorized by: LESTER Gonzalez    Performed by: LESTER Gonzalez  Patient location during procedure: OR    Indications and Patient Condition  Indications for airway management: anesthesia  Spontaneous Ventilation: absent  Sedation level: deep  Preoxygenated: yes  Patient position: sniffing  Mask difficulty assessment: 1 - vent by mask    Final Airway Details  Final airway type: endotracheal airway      Successful airway: ETT  Cuffed: yes   Successful intubation technique: video laryngoscopy  Blade size: #4  ETT size (mm): 8.0  Cormack-Lehane Classification: grade IIa - partial view of glottis  Placement verified by: chest auscultation and capnometry   Measured from: lips  ETT to lips (cm): 22  Number of attempts at approach: 1

## 2024-04-01 NOTE — OP NOTE
ARTHROPLASTY TOTAL HIP ANTERIOR APPROACH (L) Operative Note     Date: 2024  OR Location: [facility] OR    Name: Julio Carranza, : 1960, Age: 63 y.o., MRN: [id number], Sex: male    Diagnosis  Pre-op Diagnosis     * Pain of left hip [M25.552] Post-op Diagnosis     * Pain of left hip [M25.552]     Procedures  ARTHROPLASTY TOTAL HIP ANTERIOR APPROACH  27706 - [place] ARTHRP ACETBLR/PROX FEM PROSTC AGRFT/ALGRFT      Surgeons      * Danny Ricci - Primary    Resident/Fellow/Other Assistant:  Surgeon(s) and Role:    Procedure Summary  Anesthesia: Spinal  ASA: III  Anesthesia Staff: CRNA: REBEKAH Gonzalez-CRNA  Estimated Blood Loss: 250 mL  Intra-op Medications:   Administrations occurring from 0730 to 0930 on 24:   Medication Name Total Dose   EPINEPHrine (Adrenalin) 0.2 mL, ketorolac (Toradol) 30 mg, morphine PF (Duramorph) 5 mg, ropivacaine (Naropin) 30 mL in sodium chloride 0.9% 20 mL syringe 56.2 mL              Anesthesia Record               Intraprocedure I/O Totals          Intake    Tranexamic Acid 0.00 mL    The total shown is the total volume documented since Anesthesia Start was filed.    Propofol Drip 0.00 mL    The total shown is the total volume documented since Anesthesia Start was filed.    lactated Ringer's infusion 1000.00 mL    Total Intake 1000 mL       Output    Est. Blood Loss 250 mL    Total Output 250 mL       Net    Net Volume 750 mL          Specimen:   ID Type Source Tests Collected by Time   1 : LEFT FEMORAL HEAD Tissue FEMORAL HEAD LEFT SURGICAL PATHOLOGY EXAM Danny Ricci MD 2024 0838        Staff:   Circulator: Gilles Tabor RN  Relief Circulator: Tere Rea RN  Scrub Person: Joey Love; Hailey Smith RN  RNFA: Akhil Tejada RN         Drains and/or Catheters: * None in log *    Tourniquet Times:         Implants:  Implants       Type Name Action Serial No.      Joint SHELL, TRIDENT II, CLUSTERHOLE, 56F - [id number] Implanted      Joint LINER,  MDM COCR, 46MM F - [id number] Implanted      Joint INSERT, MDM X3 RESTORATION, 46OD 28ID X 8.9MM - [id number] Implanted      Joint STEM, FEMUR 127D SZ 9 ACCOLADE II - [id number] Implanted      Joint HEAD, FEMUR V40 28MM 0MM BIOLOX DELTA - [id number] Implanted               Findings: Severe DJD    Indications: Julio Carranza is an 63 y.o. male who is having surgery for Pain of left hip [M25.552].     The patient was seen in the preoperative area. The risks, benefits, complications, treatment options, non-operative alternatives, expected recovery and outcomes were discussed with the patient. The possibilities of reaction to medication, pulmonary aspiration, injury to surrounding structures, bleeding, recurrent infection, the need for additional procedures, failure to diagnose a condition, and creating a complication requiring transfusion or operation were discussed with the patient. The patient concurred with the proposed plan, giving informed consent.  The site of surgery was properly noted/marked if necessary per policy. The patient has been actively warmed in preoperative area. Preoperative antibiotics have been ordered and given within 1 hours of incision. Venous thrombosis prophylaxis have been ordered including bilateral sequential compression devices and chemical prophylaxis    Procedure Details: Statement of medical necessity:    This is a 63 y.o. male with severe degenerative disease of the hip that has failed non operative management. the risks, benefits and alternatives of total hip arthroplasty were discussed with the patient and they signed informed consent.      DESCRIPTION OF PROCEDURE:  The patient was brought to the operative room, and anesthesia was initiated by the anesthesia team. Appropriate preoperative antibiotics were given. The patient was then secured to the [place] table in the standard fashion. The patient was prepped and draped in the usual sterile fashion, a time out was performed, the patient  was identified by name and medical record number and the laterality and site of surgery were confirmed by all present.  Standard direct anterior approach to the hip was made. Hemostasis was obtained with Bovie electrocautery. Anterior and superior capsulectomy was performed in the usual fashion. Femoral neck osteotomy was performed in accordance with the preoperative plan, and femoral head extracted from the acetabulum without difficulty. There was noted to be loss of articular cartilage from the femoral head and acetabulum as well as osteophyte formation on both the femoral and acetabular sides.     Periacetabular retractors were placed, with excellent exposure of the acetabulum being obtained. Remnants of the acetabular labrum were excised. The medial wall of the acetabulum was developed with a reamer 5 mm smaller than the preoperative plan. Reaming then continued circumferentially. The final reamer was line to line with the the definitive implant size. Good quality bone was noted. After irrigation, the acetabular component was inserted with the appropriate degree of abduction and anteversion based upon anatomic landmarks. Appropriate positioning was confirmed with an AP image of the pelvis and AP of the operative hip using C-arm. After terminal impaction, the highly cross-linked polyethylene liner was inserted into the acetabular component and locking mechanism engaged in the usual fashion.    Attention was then directed to the femoral side. The femoral elevating hook was inserted. Further soft tissue release was performed to allow anterior translation of the proximal femur. This included release of the obturator internus tendon. The obturator externus was preserved. After adequate mobilization of the femur, the medullary canal was developed with a curved curette followed by a reverse cutting rasp. The proximal femur is then prepared using sequentially larger broaches up to the definitive implant size. The final  stanislaw was both rotationally and longitudinally stable. The hip was reduced and flouro was used to assess the implant. There was excellent fit and fill of the implant and the leg lengths looked appropriate comparing the lesser tuberosities to the ischium on both sides. Stability was then tested; with 60 degrees of external rotation of the hip and full extension the implant was stable.     Trial implant was removed from the femur. The definitive implant was inserted. The trunnion of the femoral component was then cleaned and dried, followed by impaction of the definitive prosthetic femoral head. The hip was reduced with normal findings again noted.    The wound was irrigated with irrisept solution followed by normal saline. The pericapsular soft tissues were injected with ropivicaine, epinephrine, toradol,  and duramorph. The myofascia was closed using interrupted #1 polysorb, then #2 quill, followed by skin closure using inverted 2-0 poly, 3-0 v-lock in the subcuticular layer, and perineo dressing.  Mepilex AG silver impregnated dressing was then placed. Patient was awoken from anesthesia by the anesthesia team and brought to the pacu in stable condition    Post operative plan: patient may bear weight as tolerated, anterior hip precautions, antibiotics and dvt prophylaxis per protocol, standard post operative supportive care     Statement of staff presence: I was present during all key and critical portions of the procedure and immediately available during closure.        Complications:  None; patient tolerated the procedure well.    Disposition: PACU - hemodynamically stable.  Condition: stable         Additional Details: none    Attending Attestation: I was present and scrubbed for the key portions of the procedure.    Danny Ricci  Phone Number: [phone]

## 2024-04-02 ENCOUNTER — DOCUMENTATION (OUTPATIENT)
Dept: HOME HEALTH SERVICES | Facility: HOME HEALTH | Age: 64
End: 2024-04-02
Payer: MEDICARE

## 2024-04-02 LAB
ANION GAP SERPL CALC-SCNC: 10 MMOL/L (ref 10–20)
BUN SERPL-MCNC: 14 MG/DL (ref 6–23)
CALCIUM SERPL-MCNC: 8.1 MG/DL (ref 8.6–10.3)
CHLORIDE SERPL-SCNC: 105 MMOL/L (ref 98–107)
CO2 SERPL-SCNC: 26 MMOL/L (ref 21–32)
CREAT SERPL-MCNC: 1.08 MG/DL (ref 0.5–1.3)
EGFRCR SERPLBLD CKD-EPI 2021: 76 ML/MIN/1.73M*2
ERYTHROCYTE [DISTWIDTH] IN BLOOD BY AUTOMATED COUNT: 13 % (ref 11.5–14.5)
GLUCOSE SERPL-MCNC: 135 MG/DL (ref 74–99)
HCT VFR BLD AUTO: 37.9 % (ref 41–52)
HGB BLD-MCNC: 12.8 G/DL (ref 13.5–17.5)
MCH RBC QN AUTO: 30.8 PG (ref 26–34)
MCHC RBC AUTO-ENTMCNC: 33.8 G/DL (ref 32–36)
MCV RBC AUTO: 91 FL (ref 80–100)
NRBC BLD-RTO: 0 /100 WBCS (ref 0–0)
PLATELET # BLD AUTO: 213 X10*3/UL (ref 150–450)
POTASSIUM SERPL-SCNC: 3.8 MMOL/L (ref 3.5–5.3)
RBC # BLD AUTO: 4.15 X10*6/UL (ref 4.5–5.9)
SODIUM SERPL-SCNC: 137 MMOL/L (ref 136–145)
WBC # BLD AUTO: 12.2 X10*3/UL (ref 4.4–11.3)

## 2024-04-02 PROCEDURE — 36415 COLL VENOUS BLD VENIPUNCTURE: CPT | Performed by: NURSE PRACTITIONER

## 2024-04-02 PROCEDURE — 97116 GAIT TRAINING THERAPY: CPT | Mod: GP

## 2024-04-02 PROCEDURE — 85027 COMPLETE CBC AUTOMATED: CPT | Performed by: NURSE PRACTITIONER

## 2024-04-02 PROCEDURE — 97110 THERAPEUTIC EXERCISES: CPT | Mod: GP

## 2024-04-02 PROCEDURE — 7100000011 HC EXTENDED STAY RECOVERY HOURLY - NURSING UNIT

## 2024-04-02 PROCEDURE — 2500000001 HC RX 250 WO HCPCS SELF ADMINISTERED DRUGS (ALT 637 FOR MEDICARE OP): Performed by: NURSE PRACTITIONER

## 2024-04-02 PROCEDURE — 80048 BASIC METABOLIC PNL TOTAL CA: CPT | Performed by: NURSE PRACTITIONER

## 2024-04-02 PROCEDURE — 97161 PT EVAL LOW COMPLEX 20 MIN: CPT | Mod: GP

## 2024-04-02 PROCEDURE — 2500000002 HC RX 250 W HCPCS SELF ADMINISTERED DRUGS (ALT 637 FOR MEDICARE OP, ALT 636 FOR OP/ED): Mod: MUE | Performed by: NURSE PRACTITIONER

## 2024-04-02 PROCEDURE — 99231 SBSQ HOSP IP/OBS SF/LOW 25: CPT | Performed by: NURSE PRACTITIONER

## 2024-04-02 PROCEDURE — 2500000004 HC RX 250 GENERAL PHARMACY W/ HCPCS (ALT 636 FOR OP/ED): Performed by: NURSE PRACTITIONER

## 2024-04-02 RX ORDER — HYDROCODONE BITARTRATE AND ACETAMINOPHEN 5; 325 MG/1; MG/1
2 TABLET ORAL EVERY 6 HOURS PRN
Status: DISCONTINUED | OUTPATIENT
Start: 2024-04-02 | End: 2024-04-06 | Stop reason: HOSPADM

## 2024-04-02 RX ORDER — ONDANSETRON HYDROCHLORIDE 2 MG/ML
4 INJECTION, SOLUTION INTRAVENOUS ONCE
Status: COMPLETED | OUTPATIENT
Start: 2024-04-02 | End: 2024-04-02

## 2024-04-02 RX ORDER — SENNOSIDES 8.6 MG/1
1 TABLET ORAL NIGHTLY
Status: DISCONTINUED | OUTPATIENT
Start: 2024-04-02 | End: 2024-04-06 | Stop reason: HOSPADM

## 2024-04-02 RX ORDER — HYDROCODONE BITARTRATE AND ACETAMINOPHEN 5; 325 MG/1; MG/1
1 TABLET ORAL EVERY 6 HOURS PRN
Status: DISCONTINUED | OUTPATIENT
Start: 2024-04-02 | End: 2024-04-06 | Stop reason: HOSPADM

## 2024-04-02 RX ADMIN — DOCUSATE SODIUM 100 MG: 100 CAPSULE, LIQUID FILLED ORAL at 08:34

## 2024-04-02 RX ADMIN — ATORVASTATIN CALCIUM 40 MG: 40 TABLET, FILM COATED ORAL at 20:50

## 2024-04-02 RX ADMIN — ONDANSETRON 4 MG: 2 INJECTION INTRAMUSCULAR; INTRAVENOUS at 23:53

## 2024-04-02 RX ADMIN — CEFAZOLIN SODIUM 2 G: 2 INJECTION, SOLUTION INTRAVENOUS at 00:27

## 2024-04-02 RX ADMIN — HYDROCODONE BITARTRATE AND ACETAMINOPHEN 2 TABLET: 5; 325 TABLET ORAL at 12:40

## 2024-04-02 RX ADMIN — PANTOPRAZOLE SODIUM 40 MG: 40 TABLET, DELAYED RELEASE ORAL at 20:50

## 2024-04-02 RX ADMIN — OXYCODONE HYDROCHLORIDE 10 MG: 10 TABLET ORAL at 08:34

## 2024-04-02 RX ADMIN — TAMSULOSIN HYDROCHLORIDE 0.4 MG: 0.4 CAPSULE ORAL at 20:50

## 2024-04-02 RX ADMIN — ONDANSETRON 4 MG: 2 INJECTION INTRAMUSCULAR; INTRAVENOUS at 17:35

## 2024-04-02 RX ADMIN — ONDANSETRON 4 MG: 2 INJECTION INTRAMUSCULAR; INTRAVENOUS at 03:26

## 2024-04-02 RX ADMIN — ASPIRIN 81 MG: 81 TABLET, COATED ORAL at 08:34

## 2024-04-02 RX ADMIN — ACETAMINOPHEN 650 MG: 325 TABLET ORAL at 00:27

## 2024-04-02 RX ADMIN — ACETAMINOPHEN 650 MG: 325 TABLET ORAL at 05:33

## 2024-04-02 RX ADMIN — HYDROCODONE BITARTRATE AND ACETAMINOPHEN 2 TABLET: 5; 325 TABLET ORAL at 18:28

## 2024-04-02 RX ADMIN — ONDANSETRON 4 MG: 2 INJECTION, SOLUTION INTRAMUSCULAR; INTRAVENOUS at 11:15

## 2024-04-02 RX ADMIN — PROMETHAZINE HYDROCHLORIDE 12.5 MG: 25 INJECTION INTRAMUSCULAR; INTRAVENOUS at 11:49

## 2024-04-02 RX ADMIN — OXYCODONE HYDROCHLORIDE 10 MG: 10 TABLET ORAL at 03:21

## 2024-04-02 RX ADMIN — HYDROCODONE BITARTRATE AND ACETAMINOPHEN 1 TABLET: 5; 325 TABLET ORAL at 23:53

## 2024-04-02 RX ADMIN — ONDANSETRON 4 MG: 2 INJECTION INTRAMUSCULAR; INTRAVENOUS at 09:40

## 2024-04-02 RX ADMIN — ASPIRIN 81 MG: 81 TABLET, COATED ORAL at 20:50

## 2024-04-02 RX ADMIN — PRIMIDONE 50 MG: 50 TABLET ORAL at 20:50

## 2024-04-02 RX ADMIN — LOSARTAN POTASSIUM 100 MG: 50 TABLET, FILM COATED ORAL at 08:35

## 2024-04-02 RX ADMIN — SENNOSIDES 8.6 MG: 8.6 TABLET, FILM COATED ORAL at 20:50

## 2024-04-02 RX ADMIN — ONDANSETRON 4 MG: 2 INJECTION INTRAMUSCULAR; INTRAVENOUS at 11:04

## 2024-04-02 RX ADMIN — DOCUSATE SODIUM 100 MG: 100 CAPSULE, LIQUID FILLED ORAL at 20:50

## 2024-04-02 RX ADMIN — SODIUM CHLORIDE, POTASSIUM CHLORIDE, SODIUM LACTATE AND CALCIUM CHLORIDE 100 ML/HR: 600; 310; 30; 20 INJECTION, SOLUTION INTRAVENOUS at 05:33

## 2024-04-02 ASSESSMENT — COGNITIVE AND FUNCTIONAL STATUS - GENERAL
DAILY ACTIVITIY SCORE: 22
MOVING TO AND FROM BED TO CHAIR: A LITTLE
CLIMB 3 TO 5 STEPS WITH RAILING: A LITTLE
WALKING IN HOSPITAL ROOM: A LITTLE
MOVING TO AND FROM BED TO CHAIR: A LITTLE
MOBILITY SCORE: 20
STANDING UP FROM CHAIR USING ARMS: A LITTLE
WALKING IN HOSPITAL ROOM: A LITTLE
STANDING UP FROM CHAIR USING ARMS: A LITTLE
MOBILITY SCORE: 20
TOILETING: A LITTLE
DRESSING REGULAR LOWER BODY CLOTHING: A LITTLE
CLIMB 3 TO 5 STEPS WITH RAILING: A LITTLE

## 2024-04-02 ASSESSMENT — PAIN DESCRIPTION - ORIENTATION: ORIENTATION: LEFT

## 2024-04-02 ASSESSMENT — PAIN SCALES - GENERAL
PAINLEVEL_OUTOF10: 0 - NO PAIN
PAINLEVEL_OUTOF10: 6
PAINLEVEL_OUTOF10: 7
PAINLEVEL_OUTOF10: 8
PAINLEVEL_OUTOF10: 6

## 2024-04-02 ASSESSMENT — PAIN - FUNCTIONAL ASSESSMENT
PAIN_FUNCTIONAL_ASSESSMENT: 0-10

## 2024-04-02 ASSESSMENT — ACTIVITIES OF DAILY LIVING (ADL): ADL_ASSISTANCE: INDEPENDENT

## 2024-04-02 ASSESSMENT — PAIN DESCRIPTION - LOCATION
LOCATION: HIP
LOCATION: HIP

## 2024-04-02 NOTE — PROGRESS NOTES
04/02/24 1538   Discharge Planning   Patient expects to be discharged to: patient unable to tolerating working with PT due to nausea; plan for patient to discharge on 4/3 after PT eval; plan remains home with new [facility] (MD sent referral)

## 2024-04-02 NOTE — PROGRESS NOTES
Physical Therapy                 Therapy Communication Note    Patient Name: Julio Carranza  MRN: [id number]  Today's Date: 4/2/2024     Discipline: Physical Therapy    Missed Visit Reason: Missed Visit Reason: Parent refused (Pt supine in bed, declined session at this time d/t peristent nausea. Per Ortho coodinator, pt anticipating overnight stay. No tx completed at 1435.)    Missed Time: Attempt

## 2024-04-02 NOTE — CARE PLAN
The patient's goals for the shift include pain control and sleep    The clinical goals for the shift include pain will be controlled <5 this shift

## 2024-04-02 NOTE — CARE PLAN
Uneventful night. Pt rested comfortably. Pain controlled with current regimen. Pt was on room air at beginning of shift. But has since been placed on 3L O2 d/t SOB and mild hypoxia. Pt has known history of COPD and cigarette smoking and he uses an albuterol inhaler at home. Pt continues to progress towards meeting goals. VSS. Will continue to monitor.

## 2024-04-02 NOTE — HH CARE COORDINATION
Home Care received a Referral for Physical Therapy. We have processed the referral for a Start of Care on 04/03.     If you have any questions or concerns, please feel free to contact us at [phone]. Follow the prompts, enter your five digit zip code, and you will be directed to your care team on [facility] 1.

## 2024-04-02 NOTE — PROGRESS NOTES
Physical Therapy    Physical Therapy Evaluation & Treatment    Patient Name: Julio Carranza  MRN: [id number]  Today's Date: 4/2/2024   Time Calculation  Start Time: 0921  Stop Time: 0959  Time Calculation (min): 38 min    Assessment/Plan   PT Assessment  PT Assessment Results: Decreased strength, Decreased endurance, Impaired balance, Decreased mobility  Rehab Prognosis: Good  Evaluation/Treatment Tolerance: Patient limited by fatigue  Medical Staff Made Aware: Yes  Barriers to Participation: Support of Caregivers  End of Session Communication: Bedside nurse (Ortho coodinator)  Assessment Comment: pt demonstrates decreased functional mobility, transfers, standing balance, gait stability/tolerance. Recommend 1x PT follow up, as pt symptomatic with mobility as assessed and unable to complete all functional tasks necessary for safety upon DC.  End of Session Patient Position: Up in chair (alarm off, no alarm per staff)   IP OR SWING BED PT PLAN  Inpatient or Swing Bed: Inpatient  PT Plan  Treatment/Interventions: Gait training, Stair training, Transfer training, Balance training, Strengthening  PT Plan: Skilled PT  PT Frequency: One time follow up visit  PT Discharge Recommendations: Low intensity level of continued care  Equipment Recommended upon Discharge: Wheeled walker  PT Recommended Transfer Status: Stand by assist  PT - OK to Discharge: Yes (per PT POC)      Subjective     General Visit Information:  General  Reason for Referral: 63 YOM Admitted with L hip OA s/p anterior TRINITY  Referred By: RIMA Mendoza  Past Medical History Relevant to Rehab: PMH: COPD, CAD, HTN, emphysema, IBS, anxiety  Prior to Session Communication: Bedside nurse (NP, ortho coordinator)  Patient Position Received: Up in chair  General Comment: pt sitting in chair upon arrival, anterior TRINITY precautions. 3 L O2, tremor noted at rest and with activity. Pt c/o nausea, Zofran administered. Intermittent dizziness, pallor, diaphoretic.  Home Living:  Home  Living  Type of Home: Apartment  Lives With: Alone  Home Adaptive Equipment:  (cane, rollator, grab bar)  Home Layout: One level  Home Access: Stairs to enter with rails  Entrance Stairs-Rails: Both  Entrance Stairs-Number of Steps: 4  Prior Level of Function:  Prior Function Per Pt/Caregiver Report  Level of [place]: Independent with ADLs and functional transfers, Independent with homemaking with ambulation  ADL Assistance: Independent  Homemaking Assistance: Independent  Ambulatory Assistance:  (indep with cane vs rollator)  Prior Function Comments: pt reports use of rollator first thing in AM, otherwise use of cane  Precautions:  Precautions  Medical Precautions: Fall precautions  Precautions Comment: O2 sats 88-95% on room air. NP requested session completed on room air to pt tolerance, as pt does not use O2 at baseline  Vital Signs:       Objective   Pain:  Pain Assessment  Pain Assessment: 0-10  Pain Score: 6  Pain Type: Acute pain  Pain Location: Hip  Pain Orientation: Left  Pain Interventions: Repositioned  Cognition:  Cognition  Overall Cognitive Status: Within Functional Limits    General Assessments:                            Strength  Strength Comments: R LE 4/5 with tremor noted. L knee 3/5 with pain during AROM  Strength  Strength Comments: R LE 4/5 with tremor noted. L knee 3/5 with pain during AROM                          Dynamic Standing Balance  Dynamic Standing-Balance Support: Bilateral upper extremity supported  Dynamic Standing-Balance:  (lateral weight shift R/L as pre-gait task)  Dynamic Standing-Comments: SBA  Functional Assessments:            Transfers  Transfer: Yes  Transfer 1  Transfer From 1: Sit to  Transfer to 1: Stand  Technique 1: Sit to stand, Stand to sit  Transfer Device 1: Walker  Transfer Level of Assistance 1: Close supervision  Trials/Comments 1: multiple transfers completed from varying surface heights    Ambulation/Gait Training  Ambulation/Gait Training Performed:  Yes  Ambulation/Gait Training 1  Surface 1: Level tile  Device 1: Rolling walker  Assistance 1: Close supervision  Quality of Gait 1:  (cues to initiate swing through and L knee flexion. Antalgic)  Comments/Distance (ft) 1: 100 feet    Stairs  Stairs:  (unable to initiate, pt symptomatic in standing.)      Treatments:  Additional mobility noted within this visit has been completed with education/modification and appropriate pt demo/teachback and documented as treatment necessary to initiate functional improvement.     x5: ankle pumps, quad sets, glute sets, heel slides, hip abd, SAQs, LAQs    Outcome Measures:  [facility] Basic Mobility  Turning from your back to your side while in a flat bed without using bedrails: None  Moving from lying on your back to sitting on the side of a flat bed without using bedrails: None  Moving to and from bed to chair (including a wheelchair): A little  Standing up from a chair using your arms (e.g. wheelchair or bedside chair): A little  To walk in hospital room: A little  Climbing 3-5 steps with railing: A little  Basic Mobility - Total Score: 20    Encounter Problems       Encounter Problems (Active)       Mobility       Pt will demo at least supervision with all functional mobility and activity as assessed in preparation for homegoing        Start:  04/02/24    Expected End:  04/02/24                   Education Documentation  Handouts, taught by Sunshine Oliveros PT at 4/2/2024 10:51 AM.  Learner: Patient  Readiness: Acceptance  Method: Explanation, Demonstration, Handout  Response: Verbalizes Understanding    Precautions, taught by Sunshine Oliveros PT at 4/2/2024 10:51 AM.  Learner: Patient  Readiness: Acceptance  Method: Explanation, Demonstration, Handout  Response: Verbalizes Understanding    Body Mechanics, taught by Sunshine Oliveros PT at 4/2/2024 10:51 AM.  Learner: Patient  Readiness: Acceptance  Method: Explanation, Demonstration, Handout  Response: Verbalizes  Understanding    Home Exercise Program, taught by Sunshine Oliveros, PT at 4/2/2024 10:51 AM.  Learner: Patient  Readiness: Acceptance  Method: Explanation, Demonstration, Handout  Response: Verbalizes Understanding    Mobility Training, taught by Sunshine Oliveros, PT at 4/2/2024 10:51 AM.  Learner: Patient  Readiness: Acceptance  Method: Explanation, Demonstration, Handout  Response: Verbalizes Understanding    Education Comments  No comments found.

## 2024-04-02 NOTE — DISCHARGE SUMMARY
Discharge Diagnosis  Pain of left hip    Issues Requiring Follow-Up  Post surgical follow up    Test Results Pending At Discharge  Pending Labs       Order Current Status    Surgical Pathology Exam In process            Hospital Course  63 year old who underwent a left hip arthroplasty.  No intraoperative complications and he was discharged to PACU in stable condition.  He was admitted to the floor overnight for pain control, NV checks, and continued PT.  Patient having increased pain with ambulation, pending PT evaluation.      Pertinent Physical Exam At Time of Discharge  Physical Exam  Vitals reviewed.   HENT:      Head: Normocephalic and atraumatic.   Eyes:      Extraocular Movements: Extraocular movements intact.      Conjunctiva/sclera: Conjunctivae normal.      Pupils: Pupils are equal, round, and reactive to light.   Cardiovascular:      Rate and Rhythm: Normal rate and regular rhythm.      Pulses: Normal pulses.   Pulmonary:      Effort: Pulmonary effort is normal.   Abdominal:      General: Bowel sounds are normal.      Palpations: Abdomen is soft.   Musculoskeletal:      Comments: Left hip dressing C/D/I, surrounding tissues soft and compressible, leg and foot warm and well perfused, SILT S/S/SPN/DPN distributions   Skin:     General: Skin is warm and dry.      Capillary Refill: Capillary refill takes less than 2 seconds.   Neurological:      General: No focal deficit present.      Mental Status: He is alert and oriented to person, place, and time.       Home Medications     Medication List      START taking these medications     aspirin 81 mg chewable tablet; Chew 1 tablet (81 mg) 2 times a day for   28 days.   cefadroxil 500 mg capsule; Commonly known as: Duricef; Take 1 capsule   (500 mg) by mouth 2 times a day for 7 days.   docusate sodium 100 mg capsule; Commonly known as: Colace; Take 1   capsule (100 mg) by mouth 2 times a day for 10 days.   oxyCODONE-acetaminophen 5-325 mg tablet; Commonly known  as: Percocet;   Take 1 tablet by mouth every 4 hours if needed for severe pain (7 - 10)   for up to 6 days.     CHANGE how you take these medications     atorvastatin 40 mg tablet; Commonly known as: Lipitor; Take 1 tablet (40   mg) by mouth once daily.; What changed: when to take this   omeprazole 40 mg DR capsule; Commonly known as: PriLOSEC; Take 1 capsule   (40 mg) by mouth once daily.; What changed: when to take this     CONTINUE taking these medications     albuterol 90 mcg/actuation inhaler; inhale 1 to 2 puffs by mouth every 4   to 6 hours as needed   cyanocobalamin 100 mcg tablet; Commonly known as: Vitamin B-12   folic acid 1 mg tablet; Commonly known as: Folvite; Take 1 tablet (1 mg)   by mouth once daily.   losartan 100 mg tablet; Commonly known as: Cozaar; Take 1 tablet (100   mg) by mouth once daily.   multivitamin capsule   primidone 50 mg tablet; Commonly known as: Mysoline; Take 1 tablet (50   mg) by mouth once daily at bedtime.   tamsulosin 0.4 mg 24 hr capsule; Commonly known as: Flomax   topiramate 50 mg tablet; Commonly known as: Topamax     STOP taking these medications     acetaminophen 325 mg tablet; Commonly known as: Tylenol       Outpatient Follow-Up  Future Appointments   Date Time Provider Department [place]   4/18/2024  9:15 AM Danny Ricci MD [id number] [facility]   5/13/2024  1:45 PM Mendez Coronel MD [id number] [facility]       Nohemy Mendoza, APRN-CNP

## 2024-04-03 ENCOUNTER — APPOINTMENT (OUTPATIENT)
Dept: RADIOLOGY | Facility: HOSPITAL | Age: 64
DRG: 981 | End: 2024-04-03
Payer: MEDICARE

## 2024-04-03 PROBLEM — M25.552 PAIN OF LEFT HIP: Status: ACTIVE | Noted: 2024-04-03

## 2024-04-03 LAB
ANION GAP BLDA CALCULATED.4IONS-SCNC: 10 MMO/L (ref 10–25)
ARTERIAL PATENCY WRIST A: POSITIVE
BASE EXCESS BLDA CALC-SCNC: 1.3 MMOL/L (ref -2–3)
BNP SERPL-MCNC: 35 PG/ML (ref 0–99)
BODY TEMPERATURE: ABNORMAL
CA-I BLDA-SCNC: 1.14 MMOL/L (ref 1.1–1.33)
CARDIAC TROPONIN I PNL SERPL HS: 5 NG/L (ref 0–20)
CHLORIDE BLDA-SCNC: 103 MMOL/L (ref 98–107)
D DIMER PPP FEU-MCNC: 1060 NG/ML FEU
GLUCOSE BLDA-MCNC: 126 MG/DL (ref 74–99)
HCO3 BLDA-SCNC: 25.9 MMOL/L (ref 22–26)
HCT VFR BLD EST: 40 % (ref 41–52)
HGB BLDA-MCNC: 13.4 G/DL (ref 13.5–17.5)
INHALED O2 CONCENTRATION: 24 %
LACTATE BLDA-SCNC: 0.9 MMOL/L (ref 0.4–2)
OXYHGB MFR BLDA: 91.7 % (ref 94–98)
PCO2 BLDA: 40 MM HG (ref 38–42)
PH BLDA: 7.42 PH (ref 7.38–7.42)
PO2 BLDA: 68 MM HG (ref 85–95)
POTASSIUM BLDA-SCNC: 3.6 MMOL/L (ref 3.5–5.3)
SAO2 % BLDA: 95 % (ref 94–100)
SODIUM BLDA-SCNC: 135 MMOL/L (ref 136–145)
SPECIMEN DRAWN FROM PATIENT: ABNORMAL
UFH PPP CHRO-ACNC: 0.7 IU/ML
UFH PPP CHRO-ACNC: 1.3 IU/ML

## 2024-04-03 PROCEDURE — 2500000002 HC RX 250 W HCPCS SELF ADMINISTERED DRUGS (ALT 637 FOR MEDICARE OP, ALT 636 FOR OP/ED): Performed by: NURSE PRACTITIONER

## 2024-04-03 PROCEDURE — 2550000001 HC RX 255 CONTRASTS: Performed by: ORTHOPAEDIC SURGERY

## 2024-04-03 PROCEDURE — 94760 N-INVAS EAR/PLS OXIMETRY 1: CPT

## 2024-04-03 PROCEDURE — 2500000001 HC RX 250 WO HCPCS SELF ADMINISTERED DRUGS (ALT 637 FOR MEDICARE OP): Performed by: NURSE PRACTITIONER

## 2024-04-03 PROCEDURE — 99232 SBSQ HOSP IP/OBS MODERATE 35: CPT | Performed by: NURSE PRACTITIONER

## 2024-04-03 PROCEDURE — 85379 FIBRIN DEGRADATION QUANT: CPT | Performed by: NURSE PRACTITIONER

## 2024-04-03 PROCEDURE — 85520 HEPARIN ASSAY: CPT | Performed by: NURSE PRACTITIONER

## 2024-04-03 PROCEDURE — 36600 WITHDRAWAL OF ARTERIAL BLOOD: CPT

## 2024-04-03 PROCEDURE — 84132 ASSAY OF SERUM POTASSIUM: CPT | Performed by: NURSE PRACTITIONER

## 2024-04-03 PROCEDURE — 71275 CT ANGIOGRAPHY CHEST: CPT | Performed by: RADIOLOGY

## 2024-04-03 PROCEDURE — 36415 COLL VENOUS BLD VENIPUNCTURE: CPT | Performed by: NURSE PRACTITIONER

## 2024-04-03 PROCEDURE — 97110 THERAPEUTIC EXERCISES: CPT | Mod: GP

## 2024-04-03 PROCEDURE — 71275 CT ANGIOGRAPHY CHEST: CPT

## 2024-04-03 PROCEDURE — 97165 OT EVAL LOW COMPLEX 30 MIN: CPT | Mod: GO

## 2024-04-03 PROCEDURE — 94664 DEMO&/EVAL PT USE INHALER: CPT

## 2024-04-03 PROCEDURE — 71046 X-RAY EXAM CHEST 2 VIEWS: CPT

## 2024-04-03 PROCEDURE — 83880 ASSAY OF NATRIURETIC PEPTIDE: CPT | Performed by: NURSE PRACTITIONER

## 2024-04-03 PROCEDURE — 2500000004 HC RX 250 GENERAL PHARMACY W/ HCPCS (ALT 636 FOR OP/ED): Performed by: NURSE PRACTITIONER

## 2024-04-03 PROCEDURE — 84484 ASSAY OF TROPONIN QUANT: CPT | Performed by: NURSE PRACTITIONER

## 2024-04-03 PROCEDURE — 2500000005 HC RX 250 GENERAL PHARMACY W/O HCPCS: Performed by: NURSE PRACTITIONER

## 2024-04-03 PROCEDURE — 1100000001 HC PRIVATE ROOM DAILY

## 2024-04-03 PROCEDURE — 71046 X-RAY EXAM CHEST 2 VIEWS: CPT | Performed by: RADIOLOGY

## 2024-04-03 PROCEDURE — 2500000002 HC RX 250 W HCPCS SELF ADMINISTERED DRUGS (ALT 637 FOR MEDICARE OP, ALT 636 FOR OP/ED): Performed by: ORTHOPAEDIC SURGERY

## 2024-04-03 PROCEDURE — 94640 AIRWAY INHALATION TREATMENT: CPT

## 2024-04-03 PROCEDURE — 97116 GAIT TRAINING THERAPY: CPT | Mod: GP

## 2024-04-03 RX ORDER — HEPARIN SODIUM 5000 [USP'U]/ML
80 INJECTION, SOLUTION INTRAVENOUS; SUBCUTANEOUS ONCE
Status: COMPLETED | OUTPATIENT
Start: 2024-04-03 | End: 2024-04-03

## 2024-04-03 RX ORDER — CEFADROXIL 500 MG/1
500 CAPSULE ORAL EVERY 12 HOURS SCHEDULED
Status: DISCONTINUED | OUTPATIENT
Start: 2024-04-03 | End: 2024-04-06 | Stop reason: HOSPADM

## 2024-04-03 RX ORDER — HEPARIN SODIUM 5000 [USP'U]/ML
3000-6000 INJECTION, SOLUTION INTRAVENOUS; SUBCUTANEOUS EVERY 4 HOURS PRN
Status: DISCONTINUED | OUTPATIENT
Start: 2024-04-03 | End: 2024-04-05

## 2024-04-03 RX ORDER — FLUTICASONE PROPIONATE 50 MCG
2 SPRAY, SUSPENSION (ML) NASAL 2 TIMES DAILY
Status: DISCONTINUED | OUTPATIENT
Start: 2024-04-03 | End: 2024-04-06 | Stop reason: HOSPADM

## 2024-04-03 RX ORDER — HEPARIN SODIUM 10000 [USP'U]/100ML
0-4500 INJECTION, SOLUTION INTRAVENOUS CONTINUOUS
Status: DISCONTINUED | OUTPATIENT
Start: 2024-04-03 | End: 2024-04-05

## 2024-04-03 RX ORDER — IPRATROPIUM BROMIDE AND ALBUTEROL SULFATE 2.5; .5 MG/3ML; MG/3ML
3 SOLUTION RESPIRATORY (INHALATION) EVERY 2 HOUR PRN
Status: DISCONTINUED | OUTPATIENT
Start: 2024-04-03 | End: 2024-04-06 | Stop reason: HOSPADM

## 2024-04-03 RX ORDER — IPRATROPIUM BROMIDE AND ALBUTEROL SULFATE 2.5; .5 MG/3ML; MG/3ML
3 SOLUTION RESPIRATORY (INHALATION) EVERY 6 HOURS PRN
Status: DISCONTINUED | OUTPATIENT
Start: 2024-04-03 | End: 2024-04-03

## 2024-04-03 RX ADMIN — HYDROCODONE BITARTRATE AND ACETAMINOPHEN 2 TABLET: 5; 325 TABLET ORAL at 06:28

## 2024-04-03 RX ADMIN — ASPIRIN 81 MG: 81 TABLET, COATED ORAL at 08:36

## 2024-04-03 RX ADMIN — ONDANSETRON 4 MG: 2 INJECTION INTRAMUSCULAR; INTRAVENOUS at 08:42

## 2024-04-03 RX ADMIN — HEPARIN SODIUM 8250 UNITS: 5000 INJECTION INTRAVENOUS; SUBCUTANEOUS at 16:00

## 2024-04-03 RX ADMIN — FLUTICASONE PROPIONATE 2 SPRAY: 50 SPRAY, METERED NASAL at 20:00

## 2024-04-03 RX ADMIN — Medication 1 L/MIN: at 10:30

## 2024-04-03 RX ADMIN — HYDROCODONE BITARTRATE AND ACETAMINOPHEN 2 TABLET: 5; 325 TABLET ORAL at 23:33

## 2024-04-03 RX ADMIN — LOSARTAN POTASSIUM 100 MG: 50 TABLET, FILM COATED ORAL at 08:36

## 2024-04-03 RX ADMIN — CEFADROXIL 500 MG: 500 CAPSULE ORAL at 20:00

## 2024-04-03 RX ADMIN — HYDROMORPHONE HYDROCHLORIDE 0.2 MG: 1 INJECTION, SOLUTION INTRAMUSCULAR; INTRAVENOUS; SUBCUTANEOUS at 04:04

## 2024-04-03 RX ADMIN — HYDROMORPHONE HYDROCHLORIDE 0.2 MG: 1 INJECTION, SOLUTION INTRAMUSCULAR; INTRAVENOUS; SUBCUTANEOUS at 14:42

## 2024-04-03 RX ADMIN — TAMSULOSIN HYDROCHLORIDE 0.4 MG: 0.4 CAPSULE ORAL at 20:00

## 2024-04-03 RX ADMIN — ASPIRIN 81 MG: 81 TABLET, COATED ORAL at 20:07

## 2024-04-03 RX ADMIN — CEFADROXIL 500 MG: 500 CAPSULE ORAL at 10:20

## 2024-04-03 RX ADMIN — IOHEXOL 69 ML: 350 INJECTION, SOLUTION INTRAVENOUS at 14:05

## 2024-04-03 RX ADMIN — HEPARIN SODIUM 1900 UNITS/HR: 10000 INJECTION, SOLUTION INTRAVENOUS at 15:56

## 2024-04-03 RX ADMIN — HYDROCODONE BITARTRATE AND ACETAMINOPHEN 2 TABLET: 5; 325 TABLET ORAL at 11:26

## 2024-04-03 RX ADMIN — HYDROCODONE BITARTRATE AND ACETAMINOPHEN 2 TABLET: 5; 325 TABLET ORAL at 17:33

## 2024-04-03 RX ADMIN — PANTOPRAZOLE SODIUM 40 MG: 40 TABLET, DELAYED RELEASE ORAL at 20:07

## 2024-04-03 RX ADMIN — DOCUSATE SODIUM 100 MG: 100 CAPSULE, LIQUID FILLED ORAL at 08:36

## 2024-04-03 RX ADMIN — POLYETHYLENE GLYCOL 3350 17 G: 17 POWDER, FOR SOLUTION ORAL at 08:36

## 2024-04-03 RX ADMIN — PROMETHAZINE HYDROCHLORIDE 12.5 MG: 25 INJECTION INTRAMUSCULAR; INTRAVENOUS at 15:54

## 2024-04-03 RX ADMIN — PRIMIDONE 50 MG: 50 TABLET ORAL at 20:00

## 2024-04-03 RX ADMIN — IPRATROPIUM BROMIDE AND ALBUTEROL SULFATE 3 ML: 2.5; .5 SOLUTION RESPIRATORY (INHALATION) at 04:27

## 2024-04-03 RX ADMIN — ATORVASTATIN CALCIUM 40 MG: 40 TABLET, FILM COATED ORAL at 20:00

## 2024-04-03 RX ADMIN — HYDROMORPHONE HYDROCHLORIDE 0.2 MG: 1 INJECTION, SOLUTION INTRAMUSCULAR; INTRAVENOUS; SUBCUTANEOUS at 19:56

## 2024-04-03 ASSESSMENT — PAIN - FUNCTIONAL ASSESSMENT
PAIN_FUNCTIONAL_ASSESSMENT: 0-10

## 2024-04-03 ASSESSMENT — COGNITIVE AND FUNCTIONAL STATUS - GENERAL
DRESSING REGULAR LOWER BODY CLOTHING: A LITTLE
DAILY ACTIVITIY SCORE: 22
STANDING UP FROM CHAIR USING ARMS: A LITTLE
HELP NEEDED FOR BATHING: A LITTLE
MOBILITY SCORE: 20
DRESSING REGULAR LOWER BODY CLOTHING: A LITTLE
CLIMB 3 TO 5 STEPS WITH RAILING: A LITTLE
WALKING IN HOSPITAL ROOM: A LITTLE
DAILY ACTIVITIY SCORE: 22
TOILETING: A LITTLE
WALKING IN HOSPITAL ROOM: A LITTLE
DRESSING REGULAR LOWER BODY CLOTHING: A LITTLE
DAILY ACTIVITIY SCORE: 22
MOVING TO AND FROM BED TO CHAIR: A LITTLE
MOBILITY SCORE: 20
MOVING TO AND FROM BED TO CHAIR: A LITTLE
CLIMB 3 TO 5 STEPS WITH RAILING: A LITTLE
CLIMB 3 TO 5 STEPS WITH RAILING: A LITTLE
MOBILITY SCORE: 20
WALKING IN HOSPITAL ROOM: A LITTLE
HELP NEEDED FOR BATHING: A LITTLE
STANDING UP FROM CHAIR USING ARMS: A LITTLE
MOVING TO AND FROM BED TO CHAIR: A LITTLE
STANDING UP FROM CHAIR USING ARMS: A LITTLE

## 2024-04-03 ASSESSMENT — PAIN DESCRIPTION - LOCATION
LOCATION: HIP
LOCATION: LEG
LOCATION: HIP
LOCATION: HIP
LOCATION: LEG

## 2024-04-03 ASSESSMENT — PAIN SCALES - GENERAL
PAINLEVEL_OUTOF10: 5 - MODERATE PAIN
PAINLEVEL_OUTOF10: 7
PAINLEVEL_OUTOF10: 7
PAINLEVEL_OUTOF10: 6
PAINLEVEL_OUTOF10: 9
PAINLEVEL_OUTOF10: 7
PAINLEVEL_OUTOF10: 7

## 2024-04-03 ASSESSMENT — PAIN DESCRIPTION - ORIENTATION
ORIENTATION: LEFT

## 2024-04-03 ASSESSMENT — ACTIVITIES OF DAILY LIVING (ADL): ADL_ASSISTANCE: INDEPENDENT

## 2024-04-03 NOTE — PROGRESS NOTES
Occupational Therapy                 Therapy Communication Note    Patient Name: Julio Carranza  MRN: [id number]  Today's Date: 4/3/2024     Discipline: Occupational Therapy    Missed Visit Reason: Missed Visit Reason: Other (Comment) (Pt currently off floor for x-ray. No OT eval)    Missed Time: Attempt    Time: 10:34

## 2024-04-03 NOTE — PROGRESS NOTES
Occupational Therapy    Evaluation    Patient Name: Julio Carranza  MRN: [id number]  Today's Date: 4/3/2024  Time Calculation  Start Time: 1501  Stop Time: 1514  Time Calculation (min): 13 min    Assessment  IP OT Assessment  OT Assessment: Pt has no further skilled acute OT needs  Prognosis: Excellent  Barriers to Discharge: None  Evaluation/Treatment Tolerance: Patient tolerated treatment well  Medical Staff Made Aware: Yes  End of Session Communication: Bedside nurse  End of Session Patient Position: Bed, 3 rail up, Alarm on  Plan:  No Skilled OT: No acute OT goals identified  OT Frequency: OT eval only  OT Discharge Recommendations: Low intensity level of continued care  Equipment Recommended upon Discharge: Wheeled walker (pt was issued one)  OT Recommended Transfer Status: Stand by assist  OT - OK to Discharge: Yes (per OT POC)    Subjective   Current Problem:  1. Pain of left hip  Surgical Pathology Exam    Surgical Pathology Exam      2. Primary osteoarthritis of left hip  aspirin 81 mg chewable tablet    cefadroxil (Duricef) 500 mg capsule    docusate sodium (Colace) 100 mg capsule    oxyCODONE-acetaminophen (Percocet) 5-325 mg tablet    Referral to Home Health    FL less than 1 hour    FL less than 1 hour      3. Osteoarthritis of left hip, unspecified osteoarthritis type  Wheeled Folding Walker        General:  General  Reason for Referral: Pt is a 64 y/o male who is POD #2 from a L TRINITY with anterior approach  Past Medical History Relevant to Rehab: PMH: COPD, CAD, HTN, emphysema, IBS, anxiety  Missed Visit: Yes  Missed Visit Reason: Other (Comment) (Pt currently off floor for x-ray. No OT eval)  Family/Caregiver Present: No  Prior to Session Communication: Bedside nurse  Patient Position Received: Bed, 3 rail up, Alarm on  General Comment: Pt pleasant and agreeable to OT eval. Pt a little nauseated at the end of the session and was SOB with mobility  Precautions:  LE Weight Bearing Status: Weight Bearing  as Tolerated  Medical Precautions: Fall precautions, Oxygen therapy device and L/min (1 L of oxygen)  Post-Surgical Precautions: Left hip precautions     Pain:  Pain Assessment  Pain Assessment: 0-10  Pain Score: 5 - Moderate pain  Pain Type: Acute pain, Surgical pain  Pain Location: Hip  Pain Orientation: Left  Pain Interventions: Cold applied, Elevated, Repositioned    Objective   Cognition:  Overall Cognitive Status: Within Functional Limits  Orientation Level: Oriented X4     Home Living:  Type of Home: Apartment  Lives With: Alone  Home Adaptive Equipment: Cane, Walker rolling or standard (pt was just issued a walker)  Home Layout: One level  Home Access: Stairs to enter with rails  Entrance Stairs-Rails: Both  Entrance Stairs-Number of Steps: 4  Bathroom Shower/Tub: Walk-in shower  Bathroom Equipment: Grab bars in shower   Prior Function:  Level of [place]: Independent with ADLs and functional transfers, Independent with homemaking with ambulation  ADL Assistance: Independent  Homemaking Assistance: Independent  Ambulatory Assistance: Independent  Prior Function Comments: pt reports use of rollator first thing in AM, otherwise use of cane. +driving     ADL:  LE Dressing Assistance: Modified independent (Device)  LE Dressing Deficit: Don/doff L sock (Pt educated and used reacher/sock aide for socks)  ADL Comments: Pt educated on recommendation of a shower chair for seated bathing. Pt educated on AE for LB dressing/bathing.  Activity Tolerance:  Endurance: Tolerates less than 10 min exercise, no significant change in vital signs  Bed Mobility/Transfers:   Bed Mobility  Bed Mobility: Yes  Bed Mobility 1  Bed Mobility 1: Supine to sitting, Sitting to supine  Level of Assistance 1: Contact guard  Bed Mobility Comments 1: for L LE    Transfers  Transfer: Yes  Transfer 1  Transfer From 1: Sit to, Stand to  Transfer to 1: Sit, Stand  Technique 1: Sit to stand, Stand to sit  Transfer Device 1: Walker  Transfer  Level of Assistance 1: Distant supervision    Functional Mobility:  Functional Mobility  Functional Mobility Performed: Yes  Functional Mobility 1  Surface 1: Level tile  Device 1: Rolling walker  Assistance 1: Close supervision  Comments 1: Pt ambulated around bed and back with FWW at supervision  Sitting Balance:  Static Sitting Balance  Static Sitting-Balance Support: Feet supported, Bilateral upper extremity supported  Static Sitting-Level of Assistance: Independent  Dynamic Sitting Balance  Dynamic Sitting-Balance Support: Feet supported, No upper extremity supported  Dynamic Sitting-Balance: Forward lean  Dynamic Sitting-Comments: independent  Standing Balance:  Static Standing Balance  Static Standing-Balance Support: Bilateral upper extremity supported  Static Standing-Level of Assistance: Independent  Dynamic Standing Balance  Dynamic Standing-Balance Support: Bilateral upper extremity supported  Dynamic Standing-Comments: supervision    Sensation:  Light Touch: No apparent deficits  Strength:  Strength Comments: grossly 3+/5 bilaterally     Extremities: RUE   RUE : Within Functional Limits and LUE   LUE: Within Functional Limits    Outcome Measures: [facility] Daily Activity  Putting on and taking off regular lower body clothing: A little  Bathing (including washing, rinsing, drying): A little  Putting on and taking off regular upper body clothing: None  Toileting, which includes using toilet, bedpan or urinal: None  Taking care of personal grooming such as brushing teeth: None  Eating Meals: None  Daily Activity - Total Score: 22      Education Documentation  Body Mechanics, taught by Johanna So OT at 4/3/2024  3:29 PM.  Learner: Patient  Readiness: Acceptance  Method: Explanation  Response: Verbalizes Understanding, Demonstrated Understanding  Comment: Educated on AE for LB dressing/bathing and DME recommendations    Precautions, taught by Johanna So OT at 4/3/2024  3:29 PM.  Learner:  Patient  Readiness: Acceptance  Method: Explanation  Response: Verbalizes Understanding, Demonstrated Understanding  Comment: Educated on AE for LB dressing/bathing and DME recommendations    ADL Training, taught by Johanna So OT at 4/3/2024  3:29 PM.  Learner: Patient  Readiness: Acceptance  Method: Explanation  Response: Verbalizes Understanding, Demonstrated Understanding  Comment: Educated on AE for LB dressing/bathing and DME recommendations    Education Comments  No comments found.

## 2024-04-03 NOTE — PROGRESS NOTES
Physical Therapy    Physical Therapy Treatment    Patient Name: Julio Carranza  MRN: [id number]  Today's Date: 4/3/2024  Time Calculation  Start Time: 0904  Stop Time: 0935  Time Calculation (min): 31 min       Assessment/Plan   PT Assessment  PT Assessment Results: Decreased strength, Decreased endurance, Decreased range of motion, Impaired balance, Decreased mobility  Rehab Prognosis: Good  Barriers to Discharge: support of caregiver, pt lives alone  Evaluation/Treatment Tolerance: Patient limited by fatigue  Medical Staff Made Aware: Yes  End of Session Communication: Bedside nurse  Assessment Comment: pt continues to demo post-op mobility deficits, strength, balance and gait stability. Recommend continued skilled PT to addres impairments.  End of Session Patient Position: Up in chair (alarm off, clear per staff)  PT Plan  Inpatient/Swing Bed or Outpatient: Inpatient  PT Plan  Treatment/Interventions: Bed mobility, Transfer training, Gait training, Stair training, Balance training, Strengthening  PT Plan: Skilled PT  PT Frequency:  (no further acute PT indicated in preparation for homegoing)  PT Discharge Recommendations: Low intensity level of continued care  Equipment Recommended upon Discharge: Wheeled walker  PT Recommended Transfer Status: Stand by assist  PT - OK to Discharge: Yes (per PT POC)      General Visit Information:   PT  Visit  PT Received On: 04/03/24  Response to Previous Treatment: Patient with no complaints from previous session.  General  Missed Visit: Yes  Missed Visit Reason: Parent refused (Pt supine in bed, declined session at this time d/t peristent nausea. Per Ortho coodinator, pt anticipating overnight stay. No tx completed at 1435.)    Subjective   Precautions:  Precautions  LE Weight Bearing Status: Weight Bearing as Tolerated  Medical Precautions: Fall precautions  Post-Surgical Precautions: Left hip precautions  Precautions Comment: O2 sats 81-82% upon EOB sitting on room air.  Session completed on 1 L O2, sats 91%+ with activity on supplemental O2. RN and NP updated at end of session.  Vital Signs:       Objective   Pain:  Pain Assessment  Pain Assessment: 0-10  Pain Score: 6  Pain Type: Surgical pain  Pain Location: Hip  Pain Orientation: Left  Cognition:  Cognition  Overall Cognitive Status: Within Functional Limits  Postural Control:  Dynamic Standing Balance  Dynamic Standing-Balance Support: Bilateral upper extremity supported  Dynamic Standing-Balance:  (alt R/L weight shift as pre-gait task, x2 trials <30 seconds each)  Dynamic Standing-Comments: supervision    Activity Tolerance:     Treatments:  Therapeutic Exercise  Therapeutic Exercise Performed: Yes  x5: ankle pumps, quad sets, glute sets, heel slides, hip abd, SAQs, LAQs      Bed Mobility  Bed Mobility: Yes  Bed Mobility 1  Bed Mobility 1: Supine to sitting  Level of Assistance 1: Modified independent  Bed Mobility Comments 1: HOB elevated, increased time to complete    Ambulation/Gait Training  Ambulation/Gait Training Performed: Yes  Ambulation/Gait Training 1  Surface 1: Level tile  Device 1: Rolling walker  Assistance 1: Distant supervision  Quality of Gait 1:  (cues for heel off/stike LLE)  Comments/Distance (ft) 1: 50 feet x1, 10  feet x2  Transfers  Transfer: Yes  Transfer 1  Transfer From 1: Sit to  Transfer to 1: Stand  Technique 1: Sit to stand, Stand to sit  Transfer Device 1: Walker  Transfer Level of Assistance 1: Distant supervision  Trials/Comments 1: cues for hand placement, multple transfers completed  Transfers 2  Transfer From 2: Car to  Technique 2: Sit pivot  Transfer Level of Assistance 2: Modified independent  Trials/Comments 2: cues for seat positioning and mechanics, increased time to complete    Stairs  Stairs: Yes  Stairs  Rails 1: Left  Device 1: Single point cane  Assistance 1: Close supervision  Comment/Number of Steps 1: cues for gait pattern and hand placement on rail to maintain safety.  Up/down 4 steps    Outcome Measures:  [facility] Basic Mobility  Turning from your back to your side while in a flat bed without using bedrails: None  Moving from lying on your back to sitting on the side of a flat bed without using bedrails: None  Moving to and from bed to chair (including a wheelchair): A little  Standing up from a chair using your arms (e.g. wheelchair or bedside chair): A little  To walk in hospital room: A little  Climbing 3-5 steps with railing: A little  Basic Mobility - Total Score: 20    Education Documentation  Handouts, taught by Sunshine Oliveros PT at 4/3/2024 10:09 AM.  Learner: Patient  Readiness: Acceptance  Method: Explanation, Demonstration, Handout, Teach-back  Response: Verbalizes Understanding    Precautions, taught by Sunshine Oliveros PT at 4/3/2024 10:09 AM.  Learner: Patient  Readiness: Acceptance  Method: Explanation, Demonstration, Handout, Teach-back  Response: Verbalizes Understanding    Body Mechanics, taught by Sunshine Oliveros PT at 4/3/2024 10:09 AM.  Learner: Patient  Readiness: Acceptance  Method: Explanation, Demonstration, Handout, Teach-back  Response: Verbalizes Understanding    Home Exercise Program, taught by Sunshine Oliveros PT at 4/3/2024 10:09 AM.  Learner: Patient  Readiness: Acceptance  Method: Explanation, Demonstration, Handout, Teach-back  Response: Verbalizes Understanding    Mobility Training, taught by Sunshine Oliveros PT at 4/3/2024 10:09 AM.  Learner: Patient  Readiness: Acceptance  Method: Explanation, Demonstration, Handout, Teach-back  Response: Verbalizes Understanding    Education Comments  No comments found.        OP EDUCATION:       Encounter Problems       Encounter Problems (Resolved)       Mobility       Pt will demo at least supervision with all functional mobility and activity as assessed in preparation for homegoing  (Met)       Start:  04/02/24    Expected End:  04/02/24    Resolved:  04/03/24

## 2024-04-03 NOTE — SIGNIFICANT EVENT
CTA of Chest demonstrated occlusion PE in left lower lobe and possible PE within the upper lobe. Results communicated via Radiologist who did not see evidence of right heart strain. Heparin gtt initiated (high intensity). Dr. Ricci notified. Will get Echocardiogram, AM labs and monitor closely. Will change primary service to medicine per discussion with orthopedics.  Patient updated at bedside on CT findings. Questions answered. Understanding verbalized.

## 2024-04-03 NOTE — PROGRESS NOTES
"Julio Carranza is a 63 y.o. male on day 0 of admission presenting with Pain of left hip.    Subjective   Still having some episodes of nausea this morning.  Has not vomited.  Rates left hip pain 6/10.  States Hydrocodone is better on his stomach than the Oxycodone. Denies numbness/tingling of LLE.    Objective     Physical Exam  Vitals reviewed.   HENT:      Head: Normocephalic and atraumatic.   Eyes:      Extraocular Movements: Extraocular movements intact.      Conjunctiva/sclera: Conjunctivae normal.      Pupils: Pupils are equal, round, and reactive to light.   Cardiovascular:      Rate and Rhythm: Normal rate and regular rhythm.      Pulses: Normal pulses.   Pulmonary:      Effort: Pulmonary effort is normal.   Abdominal:      Palpations: Abdomen is soft.   Musculoskeletal:      Comments: Left hip dressing C/D/I, surrounding tissues soft and compressible, leg and foot warm and well perfused, SILT S/S/SPN/DPN   Skin:     General: Skin is warm and dry.      Capillary Refill: Capillary refill takes less than 2 seconds.   Neurological:      General: No focal deficit present.      Mental Status: He is alert and oriented to person, place, and time.       Last Recorded Vitals  Blood pressure 114/76, pulse 87, temperature 36.5 °C (97.7 °F), temperature source Temporal, resp. rate 22, height 1.854 m (6' 1\"), weight 103 kg (227 lb 1.2 oz), SpO2 93 %.  Intake/Output last 3 Shifts:  I/O last 3 completed shifts:  In: 3405 (33.1 mL/kg) [P.O.:700; I.V.:2705 (26.3 mL/kg)]  Out: 1400 (13.6 mL/kg) [Urine:1150 (0.3 mL/kg/hr); Blood:250]  Weight: 103 kg     Relevant Results  XR pelvis 1-2 views    Result Date: 4/1/2024  Interpreted By:  Harshad Gtz, STUDY: XR PELVIS 1-2 VIEWS; ;  4/1/2024 9:50 am   INDICATION: Signs/Symptoms:post op left TRINITY.   COMPARISON: 01/24/2024   ACCESSION NUMBER(S): [id number]   ORDERING CLINICIAN: LORI COLE   FINDINGS: Left hip, two views   Left hip total hip arthroplasty in place without " periprosthetic fracture or lucency. Postsurgical changes in soft tissues       No immediate complication after left hip total arthroplasty     MACRO: None   Signed by: Harshad Gtz 4/1/2024 11:08 AM Dictation workstation:   [id number]    FL less than 1 hour    Result Date: 4/1/2024  These images are not reportable by radiology and will not be interpreted by  Radiologists.    XR lumbar spine 2-3 views    Result Date: 3/28/2024  Interpreted By:  Jarod Garrett, STUDY: XR LUMBAR SPINE 2-3 VIEWS   INDICATION: Signs/Symptoms:pre operative evaluation for spinal mobility.   COMPARISON: February 6, 2017   ACCESSION NUMBER(S): [id number]   ORDERING CLINICIAN: SUNITA GAUTAM   FINDINGS: Laminectomy with L2-S1 fusion. Alignment grossly normal with no significant change sitting or standing position.       Laminectomy with L2-S1 fusion. Alignment grossly normal with no significant change sitting or standing position.   Signed by: Jarod Garrett 3/28/2024 5:49 PM Dictation workstation:   [id number]    ECG 12 lead    Result Date: 3/13/2024  Normal sinus rhythm Nonspecific ST abnormality Abnormal ECG When compared with ECG of 29-JUN-2022 06:23, No significant change was found      Scheduled medications  [Held by provider] acetaminophen, 650 mg, oral, q6h ANDREI  aspirin, 81 mg, oral, BID  atorvastatin, 40 mg, oral, Nightly  docusate sodium, 100 mg, oral, BID  losartan, 100 mg, oral, Daily  pantoprazole, 40 mg, oral, Nightly  polyethylene glycol, 17 g, oral, Daily  primidone, 50 mg, oral, Nightly  sennosides, 1 tablet, oral, Nightly  tamsulosin, 0.4 mg, oral, Nightly  [Held by provider] topiramate, 50 mg, oral, BID      Continuous medications  lactated Ringer's, 100 mL/hr, Last Rate: Stopped (04/01/24 0940)  oxygen, 2 L/min  sodium chloride 0.9%, 100 mL/hr      PRN medications  PRN medications: HYDROcodone-acetaminophen, HYDROcodone-acetaminophen, HYDROmorphone, ipratropium-albuteroL, ondansetron **OR** ondansetron,  promethazine  No results found for this or any previous visit (from the past 24 hour(s)).      Assessment/Plan   Active Problems:  There are no active Hospital Problems.    63 year old male POD 2 from left hip arthroplasty  - WBAT RLE, pending PT evaluation  - continue Hydrocodone as needed for pain, Hydromorphone breakthrough  - regular diet with BR  - DVT prophylaxis x 4 weeks with aspirin 81 mg PO BID  - Duricef ordered as patient is still in house x 7 days  - pending PT for home versus SNF    Discussed with Dr. Ricci         I spent 30 minutes in the professional and overall care of this patient.      Nohemy Mendoza, APRN-CNP

## 2024-04-03 NOTE — PROGRESS NOTES
04/03/24 1018   Discharge Planning   Living Arrangements Alone   Support Systems Friends/neighbors   Assistance Needed Patient is A&Ox3, independent with ADL's and uses a cane or rollator for ambulation at baseline, doesn't drive (friends transports), room air at baseline   Type of Residence Private residence   Number of Stairs to Enter Residence 5   Number of Stairs Within Residence 0   Do you have animals or pets at home? No   Who is requesting discharge planning? Provider   Home or Post Acute Services In home services   Type of Home Care Services Home OT;Home PT   Patient expects to be discharged to: Home with new [facility]   Does the patient need discharge transport arranged? Yes   RoundTrip coordination needed? Yes   Has discharge transport been arranged? No     4/3/24 at 1021: Patient not medically ready for discharge at this time, requiring acute O2. PT recommending low intensity, patient agreeable to HHC and would like to utilize  HHC at discharge. PT delivered FWW to patient's bedside. TCC will follow.

## 2024-04-03 NOTE — PROGRESS NOTES
Patient: Julio Carranza  Room/bed: 149/149-A  Admitted on: 4/1/2024    Age: 63 y.o.   Gender: male  Code Status:  Full Code   Admitting Dx: Pain of left hip [M25.552]  Osteoarthritis of left hip, unspecified osteoarthritis type [M16.12]    MRN: [id number]  PCP: Mendez Coronel MD       Subjective   Seen and examined in his room this AM. Just returned from working in the gym with PT. Became very hypoxic with associated sweating and chest heaviness. Placed on 1 liter of oxygen and pox 90%. Does have some nausea but has not eaten much since surgery.      Objective    Physical Exam   Constitutional: A&O x 3; anxious; cooperative  Eyes: EOM's intact  HEENT: Normocephalic, Atraumatic. Oral mucosa moist.   Neck: Supple. No JVD, lymphadenopathy.   Lungs: CTAB with fair air movement. Respirations even and unlabored on room air.   Heart: RRR  Abdomen: Softly distended, non-tender,+BS  MS/Extremities: BENITEZ with LLE pain. No edema. Peripheral pulses intact bilaterally. Resting tremors of BUE.  Neuro: A&O x3; no focal deficits; gross motor and sensation intact.   Skin: Warm and dry. No rashes or lesions  Psych: Normal affect.      Temp:  [36.4 °C (97.5 °F)-36.9 °C (98.4 °F)] 36.5 °C (97.7 °F)  Heart Rate:  [80-87] 87  Resp:  [16-22] 22  BP: (114-132)/(74-80) 114/76    Vitals:    04/01/24 0616   Weight: 103 kg (227 lb 1.2 oz)     I/Os    Intake/Output Summary (Last 24 hours) at 4/3/2024 1052  Last data filed at 4/3/2024 0400  Gross per 24 hour   Intake 340 ml   Output 251 ml   Net 89 ml       Labs:   Results from last 72 hours   Lab Units 04/02/24  0524   SODIUM mmol/L 137   POTASSIUM mmol/L 3.8   CHLORIDE mmol/L 105   CO2 mmol/L 26   BUN mg/dL 14   CREATININE mg/dL 1.08   GLUCOSE mg/dL 135*   CALCIUM mg/dL 8.1*   ANION GAP mmol/L 10   EGFR mL/min/1.73m*2 76      Results from last 72 hours   Lab Units 04/02/24  0524   WBC AUTO x10*3/uL 12.2*   HEMOGLOBIN g/dL 12.8*   HEMATOCRIT % 37.9*   PLATELETS AUTO x10*3/uL 213      Lab  Results   Component Value Date    CALCIUM 8.1 (L) 04/02/2024    PHOS 2.5 10/13/2020      Lab Results   Component Value Date    CRP 2.5 (H) 07/14/2023        Micro/ID:   No results found for the last 90 days.    Images:  XR chest 2 views  Narrative: Interpreted By:  Harshad Gtz,   STUDY:  XR CHEST 2 VIEWS;  4/3/2024 10:32 am      INDICATION:  Signs/Symptoms:GARCIAS; hypoxia.      COMPARISON:  None.      ACCESSION NUMBER(S):  [id number]      ORDERING CLINICIAN:  MARCIA BATES      FINDINGS:                  CARDIOMEDIASTINAL SILHOUETTE:  Cardiomediastinal silhouette is normal in size and configuration.  There is mild vascular congestion.      LUNGS:  There is a small left pleural effusion. There is bibasilar patchy  airspace disease.      ABDOMEN:  No remarkable upper abdominal findings.      BONES:  No acute osseous changes.      Impression: 1.  Bibasilar patchy airspace disease with atelectasis and pneumonia  in the differential  2. Small left pleural effusion          MACRO:  None      Signed by: Harshad Gtz 4/3/2024 10:47 AM  Dictation workstation:   [id number]       Meds    Scheduled medications  [Held by provider] acetaminophen, 650 mg, oral, q6h ANDREI  aspirin, 81 mg, oral, BID  atorvastatin, 40 mg, oral, Nightly  cefadroxil, 500 mg, oral, q12h ANDREI  docusate sodium, 100 mg, oral, BID  fluticasone, 2 spray, Each Nostril, BID  losartan, 100 mg, oral, Daily  pantoprazole, 40 mg, oral, Nightly  polyethylene glycol, 17 g, oral, Daily  primidone, 50 mg, oral, Nightly  sennosides, 1 tablet, oral, Nightly  tamsulosin, 0.4 mg, oral, Nightly  [Held by provider] topiramate, 50 mg, oral, BID      Continuous medications  lactated Ringer's, 100 mL/hr, Last Rate: Stopped (04/01/24 0940)  oxygen, 2 L/min      PRN medications  PRN medications: HYDROcodone-acetaminophen, HYDROcodone-acetaminophen, HYDROmorphone, ipratropium-albuteroL, ondansetron **OR** ondansetron, promethazine     Assessment and Plan    63 y.o. male with  history of coronary artery disease hypertension, hyperlipidemia and GERD is status post left TRINITY.. Consulted for medical management.     Acute Hypoxic Respiratory Failure  -Postop hypoxia with associated GARCIAS  -H/O COPD; former smoker who quit one year ago.  -No wheezing or crackles on exam.   -Differentials include, but not limited to, are atelectasis, pulmonary edema, PE, sinusitis  -CXR as noted above  -ABG, BNP, D-Dimer, Troponin are pending  -Advised Pulmonary hygiene, IS use  -Added Fluticasone  -Will get CT Angio of Chest to r/o PE pending workup/lab results.      Left Hip Osteoarthritis  -S/P left TRINITY  by Dr. Ricci on 4/1  -Incisional care, antibiotics, activity restrictions per orthopedics surgery.  -PT/OT to follow. WBAT.   -Medicate for pain  -Maintain bowel regimen  -Advised IS use, mobilization.   -Monitor H&H for ABLA. Hgb stable.   -DVT prophylaxis per surgery: ASA BID  -Afebrile. No leukocytosis.     CV: CAD, HTN  -Home regimen resumed: Aspirin, Lipitor, Losartan   -Vitals stable.      Essential Tremor  -Primidone continued     Headaches  -Patient reports he takes Topamax as needed      DVT prophylaxis  -Aspirin 81 mg twice daily per primary team     Disposition  -Plan of care discussed with medicine attending, Dr. Velázquez, Orthopedic APRN.   -Discharge per attending. Medical clearance pending workup as noted above.       Natasha Davis, APRN-CNP

## 2024-04-04 ENCOUNTER — APPOINTMENT (OUTPATIENT)
Dept: CARDIOLOGY | Facility: HOSPITAL | Age: 64
DRG: 981 | End: 2024-04-04
Payer: MEDICARE

## 2024-04-04 LAB
ANION GAP SERPL CALC-SCNC: 9 MMOL/L (ref 10–20)
AORTIC VALVE MEAN GRADIENT: 5 MMHG
AORTIC VALVE PEAK VELOCITY: 1.51 M/S
AV PEAK GRADIENT: 9.1 MMHG
AVA (PEAK VEL): 2.72 CM2
AVA (VTI): 2.61 CM2
BUN SERPL-MCNC: 14 MG/DL (ref 6–23)
CALCIUM SERPL-MCNC: 8.1 MG/DL (ref 8.6–10.3)
CHLORIDE SERPL-SCNC: 104 MMOL/L (ref 98–107)
CO2 SERPL-SCNC: 28 MMOL/L (ref 21–32)
CREAT SERPL-MCNC: 0.75 MG/DL (ref 0.5–1.3)
EGFRCR SERPLBLD CKD-EPI 2021: >90 ML/MIN/1.73M*2
EJECTION FRACTION APICAL 4 CHAMBER: 72.4
ERYTHROCYTE [DISTWIDTH] IN BLOOD BY AUTOMATED COUNT: 12.9 % (ref 11.5–14.5)
GLUCOSE SERPL-MCNC: 102 MG/DL (ref 74–99)
HCT VFR BLD AUTO: 34 % (ref 41–52)
HGB BLD-MCNC: 11.2 G/DL (ref 13.5–17.5)
LEFT ATRIUM VOLUME AREA LENGTH INDEX BSA: 15.8 ML/M2
LEFT VENTRICULAR OUTFLOW TRACT DIAMETER: 2.2 CM
LV EJECTION FRACTION BIPLANE: 67 %
MCH RBC QN AUTO: 30.5 PG (ref 26–34)
MCHC RBC AUTO-ENTMCNC: 32.9 G/DL (ref 32–36)
MCV RBC AUTO: 93 FL (ref 80–100)
MITRAL VALVE E/A RATIO: 0.75
MITRAL VALVE E/E' RATIO: 11.5
NRBC BLD-RTO: 0 /100 WBCS (ref 0–0)
PLATELET # BLD AUTO: 178 X10*3/UL (ref 150–450)
POTASSIUM SERPL-SCNC: 3.7 MMOL/L (ref 3.5–5.3)
RBC # BLD AUTO: 3.67 X10*6/UL (ref 4.5–5.9)
RIGHT VENTRICLE FREE WALL PEAK S': 16.1 CM/S
RIGHT VENTRICLE PEAK SYSTOLIC PRESSURE: 24.4 MMHG
SODIUM SERPL-SCNC: 137 MMOL/L (ref 136–145)
TRICUSPID ANNULAR PLANE SYSTOLIC EXCURSION: 1.8 CM
UFH PPP CHRO-ACNC: 0.6 IU/ML
WBC # BLD AUTO: 6 X10*3/UL (ref 4.4–11.3)

## 2024-04-04 PROCEDURE — 85520 HEPARIN ASSAY: CPT | Performed by: NURSE PRACTITIONER

## 2024-04-04 PROCEDURE — 97110 THERAPEUTIC EXERCISES: CPT | Mod: GP

## 2024-04-04 PROCEDURE — 93306 TTE W/DOPPLER COMPLETE: CPT

## 2024-04-04 PROCEDURE — 97116 GAIT TRAINING THERAPY: CPT | Mod: GP

## 2024-04-04 PROCEDURE — 80048 BASIC METABOLIC PNL TOTAL CA: CPT | Performed by: NURSE PRACTITIONER

## 2024-04-04 PROCEDURE — 99232 SBSQ HOSP IP/OBS MODERATE 35: CPT | Performed by: NURSE PRACTITIONER

## 2024-04-04 PROCEDURE — 1100000001 HC PRIVATE ROOM DAILY

## 2024-04-04 PROCEDURE — 2500000002 HC RX 250 W HCPCS SELF ADMINISTERED DRUGS (ALT 637 FOR MEDICARE OP, ALT 636 FOR OP/ED): Performed by: NURSE PRACTITIONER

## 2024-04-04 PROCEDURE — 2500000004 HC RX 250 GENERAL PHARMACY W/ HCPCS (ALT 636 FOR OP/ED): Performed by: STUDENT IN AN ORGANIZED HEALTH CARE EDUCATION/TRAINING PROGRAM

## 2024-04-04 PROCEDURE — 2500000004 HC RX 250 GENERAL PHARMACY W/ HCPCS (ALT 636 FOR OP/ED): Performed by: NURSE PRACTITIONER

## 2024-04-04 PROCEDURE — 36415 COLL VENOUS BLD VENIPUNCTURE: CPT | Performed by: NURSE PRACTITIONER

## 2024-04-04 PROCEDURE — 85027 COMPLETE CBC AUTOMATED: CPT | Performed by: NURSE PRACTITIONER

## 2024-04-04 PROCEDURE — 2500000001 HC RX 250 WO HCPCS SELF ADMINISTERED DRUGS (ALT 637 FOR MEDICARE OP): Performed by: NURSE PRACTITIONER

## 2024-04-04 PROCEDURE — 93306 TTE W/DOPPLER COMPLETE: CPT | Performed by: INTERNAL MEDICINE

## 2024-04-04 RX ORDER — ONDANSETRON HYDROCHLORIDE 2 MG/ML
4 INJECTION, SOLUTION INTRAVENOUS EVERY 6 HOURS PRN
Status: DISCONTINUED | OUTPATIENT
Start: 2024-04-04 | End: 2024-04-06 | Stop reason: HOSPADM

## 2024-04-04 RX ORDER — ONDANSETRON 4 MG/1
4 TABLET, FILM COATED ORAL EVERY 6 HOURS PRN
Status: DISCONTINUED | OUTPATIENT
Start: 2024-04-04 | End: 2024-04-04

## 2024-04-04 RX ORDER — ONDANSETRON 4 MG/1
4 TABLET, FILM COATED ORAL EVERY 6 HOURS PRN
Status: DISCONTINUED | OUTPATIENT
Start: 2024-04-04 | End: 2024-04-06 | Stop reason: HOSPADM

## 2024-04-04 RX ORDER — ONDANSETRON HYDROCHLORIDE 2 MG/ML
4 INJECTION, SOLUTION INTRAVENOUS EVERY 8 HOURS PRN
Status: DISCONTINUED | OUTPATIENT
Start: 2024-04-04 | End: 2024-04-04

## 2024-04-04 RX ADMIN — HYDROMORPHONE HYDROCHLORIDE 0.2 MG: 1 INJECTION, SOLUTION INTRAMUSCULAR; INTRAVENOUS; SUBCUTANEOUS at 15:13

## 2024-04-04 RX ADMIN — PERFLUTREN 1 ML OF DILUTION: 6.52 INJECTION, SUSPENSION INTRAVENOUS at 11:25

## 2024-04-04 RX ADMIN — DOCUSATE SODIUM 100 MG: 100 CAPSULE, LIQUID FILLED ORAL at 08:32

## 2024-04-04 RX ADMIN — ASPIRIN 81 MG: 81 TABLET, COATED ORAL at 20:29

## 2024-04-04 RX ADMIN — ONDANSETRON 4 MG: 2 INJECTION INTRAMUSCULAR; INTRAVENOUS at 11:42

## 2024-04-04 RX ADMIN — HEPARIN SODIUM 16 UNITS/HR: 10000 INJECTION, SOLUTION INTRAVENOUS at 04:52

## 2024-04-04 RX ADMIN — HYDROCODONE BITARTRATE AND ACETAMINOPHEN 2 TABLET: 5; 325 TABLET ORAL at 19:20

## 2024-04-04 RX ADMIN — HYDROMORPHONE HYDROCHLORIDE 0.2 MG: 1 INJECTION, SOLUTION INTRAMUSCULAR; INTRAVENOUS; SUBCUTANEOUS at 20:22

## 2024-04-04 RX ADMIN — HYDROCODONE BITARTRATE AND ACETAMINOPHEN 2 TABLET: 5; 325 TABLET ORAL at 12:32

## 2024-04-04 RX ADMIN — ONDANSETRON 4 MG: 2 INJECTION INTRAMUSCULAR; INTRAVENOUS at 18:08

## 2024-04-04 RX ADMIN — SENNOSIDES 8.6 MG: 8.6 TABLET, FILM COATED ORAL at 20:28

## 2024-04-04 RX ADMIN — DOCUSATE SODIUM 100 MG: 100 CAPSULE, LIQUID FILLED ORAL at 20:28

## 2024-04-04 RX ADMIN — PRIMIDONE 50 MG: 50 TABLET ORAL at 20:31

## 2024-04-04 RX ADMIN — PANTOPRAZOLE SODIUM 40 MG: 40 TABLET, DELAYED RELEASE ORAL at 20:28

## 2024-04-04 RX ADMIN — LOSARTAN POTASSIUM 100 MG: 50 TABLET, FILM COATED ORAL at 08:32

## 2024-04-04 RX ADMIN — FLUTICASONE PROPIONATE 2 SPRAY: 50 SPRAY, METERED NASAL at 08:32

## 2024-04-04 RX ADMIN — ATORVASTATIN CALCIUM 40 MG: 40 TABLET, FILM COATED ORAL at 20:28

## 2024-04-04 RX ADMIN — CEFADROXIL 500 MG: 500 CAPSULE ORAL at 08:31

## 2024-04-04 RX ADMIN — ASPIRIN 81 MG: 81 TABLET, COATED ORAL at 08:31

## 2024-04-04 RX ADMIN — HYDROMORPHONE HYDROCHLORIDE 0.2 MG: 1 INJECTION, SOLUTION INTRAMUSCULAR; INTRAVENOUS; SUBCUTANEOUS at 00:36

## 2024-04-04 RX ADMIN — CEFADROXIL 500 MG: 500 CAPSULE ORAL at 20:31

## 2024-04-04 RX ADMIN — TAMSULOSIN HYDROCHLORIDE 0.4 MG: 0.4 CAPSULE ORAL at 20:29

## 2024-04-04 RX ADMIN — HYDROMORPHONE HYDROCHLORIDE 0.2 MG: 1 INJECTION, SOLUTION INTRAMUSCULAR; INTRAVENOUS; SUBCUTANEOUS at 08:33

## 2024-04-04 RX ADMIN — HYDROMORPHONE HYDROCHLORIDE 0.2 MG: 1 INJECTION, SOLUTION INTRAMUSCULAR; INTRAVENOUS; SUBCUTANEOUS at 04:45

## 2024-04-04 RX ADMIN — POLYETHYLENE GLYCOL 3350 17 G: 17 POWDER, FOR SOLUTION ORAL at 08:32

## 2024-04-04 RX ADMIN — HYDROMORPHONE HYDROCHLORIDE 0.2 MG: 1 INJECTION, SOLUTION INTRAMUSCULAR; INTRAVENOUS; SUBCUTANEOUS at 23:20

## 2024-04-04 RX ADMIN — HYDROCODONE BITARTRATE AND ACETAMINOPHEN 2 TABLET: 5; 325 TABLET ORAL at 06:06

## 2024-04-04 RX ADMIN — ONDANSETRON 4 MG: 2 INJECTION INTRAMUSCULAR; INTRAVENOUS at 04:55

## 2024-04-04 RX ADMIN — HEPARIN SODIUM 16 UNITS/HR: 10000 INJECTION, SOLUTION INTRAVENOUS at 21:07

## 2024-04-04 RX ADMIN — ONDANSETRON 4 MG: 2 INJECTION INTRAMUSCULAR; INTRAVENOUS at 23:59

## 2024-04-04 SDOH — SOCIAL STABILITY: SOCIAL INSECURITY: DOES ANYONE TRY TO KEEP YOU FROM HAVING/CONTACTING OTHER FRIENDS OR DOING THINGS OUTSIDE YOUR HOME?: NO

## 2024-04-04 SDOH — SOCIAL STABILITY: SOCIAL INSECURITY: DO YOU FEEL UNSAFE GOING BACK TO THE PLACE WHERE YOU ARE LIVING?: NO

## 2024-04-04 SDOH — SOCIAL STABILITY: SOCIAL INSECURITY: ARE YOU OR HAVE YOU BEEN THREATENED OR ABUSED PHYSICALLY, EMOTIONALLY, OR SEXUALLY BY ANYONE?: NO

## 2024-04-04 SDOH — SOCIAL STABILITY: SOCIAL INSECURITY: WERE YOU ABLE TO COMPLETE ALL THE BEHAVIORAL HEALTH SCREENINGS?: YES

## 2024-04-04 SDOH — SOCIAL STABILITY: SOCIAL INSECURITY: ABUSE: ADULT

## 2024-04-04 SDOH — SOCIAL STABILITY: SOCIAL INSECURITY: HAS ANYONE EVER THREATENED TO HURT YOUR FAMILY OR YOUR PETS?: NO

## 2024-04-04 SDOH — SOCIAL STABILITY: SOCIAL INSECURITY: DO YOU FEEL ANYONE HAS EXPLOITED OR TAKEN ADVANTAGE OF YOU FINANCIALLY OR OF YOUR PERSONAL PROPERTY?: NO

## 2024-04-04 SDOH — SOCIAL STABILITY: SOCIAL INSECURITY: ARE THERE ANY APPARENT SIGNS OF INJURIES/BEHAVIORS THAT COULD BE RELATED TO ABUSE/NEGLECT?: NO

## 2024-04-04 SDOH — SOCIAL STABILITY: SOCIAL INSECURITY: HAVE YOU HAD THOUGHTS OF HARMING ANYONE ELSE?: NO

## 2024-04-04 ASSESSMENT — COGNITIVE AND FUNCTIONAL STATUS - GENERAL
STANDING UP FROM CHAIR USING ARMS: A LITTLE
CLIMB 3 TO 5 STEPS WITH RAILING: A LOT
MOBILITY SCORE: 24
WALKING IN HOSPITAL ROOM: A LITTLE
DAILY ACTIVITIY SCORE: 24
STANDING UP FROM CHAIR USING ARMS: A LITTLE
CLIMB 3 TO 5 STEPS WITH RAILING: A LOT
DAILY ACTIVITIY SCORE: 24
MOVING TO AND FROM BED TO CHAIR: A LITTLE
MOBILITY SCORE: 19
WALKING IN HOSPITAL ROOM: A LITTLE
MOBILITY SCORE: 19
PATIENT BASELINE BEDBOUND: NO
MOVING TO AND FROM BED TO CHAIR: A LITTLE

## 2024-04-04 ASSESSMENT — PAIN SCALES - GENERAL
PAINLEVEL_OUTOF10: 7
PAIN_LEVEL: 0
PAINLEVEL_OUTOF10: 7
PAINLEVEL_OUTOF10: 8

## 2024-04-04 ASSESSMENT — PAIN DESCRIPTION - ORIENTATION
ORIENTATION: LEFT

## 2024-04-04 ASSESSMENT — LIFESTYLE VARIABLES
HOW MANY STANDARD DRINKS CONTAINING ALCOHOL DO YOU HAVE ON A TYPICAL DAY: PATIENT DOES NOT DRINK
HOW OFTEN DO YOU HAVE A DRINK CONTAINING ALCOHOL: NEVER
SKIP TO QUESTIONS 9-10: 1
AUDIT-C TOTAL SCORE: 0
AUDIT-C TOTAL SCORE: 0
PRESCIPTION_ABUSE_PAST_12_MONTHS: NO
SUBSTANCE_ABUSE_PAST_12_MONTHS: NO
HOW OFTEN DO YOU HAVE 6 OR MORE DRINKS ON ONE OCCASION: NEVER

## 2024-04-04 ASSESSMENT — ACTIVITIES OF DAILY LIVING (ADL)
PATIENT'S MEMORY ADEQUATE TO SAFELY COMPLETE DAILY ACTIVITIES?: YES
JUDGMENT_ADEQUATE_SAFELY_COMPLETE_DAILY_ACTIVITIES: YES
HEARING - LEFT EAR: FUNCTIONAL
FEEDING YOURSELF: INDEPENDENT
ADEQUATE_TO_COMPLETE_ADL: YES
BATHING: INDEPENDENT
HEARING - RIGHT EAR: FUNCTIONAL
LACK_OF_TRANSPORTATION: NO
TOILETING: INDEPENDENT
WALKS IN HOME: INDEPENDENT
DRESSING YOURSELF: INDEPENDENT
GROOMING: INDEPENDENT

## 2024-04-04 ASSESSMENT — PAIN - FUNCTIONAL ASSESSMENT
PAIN_FUNCTIONAL_ASSESSMENT: 0-10

## 2024-04-04 ASSESSMENT — PAIN DESCRIPTION - LOCATION
LOCATION: LEG
LOCATION: HIP
LOCATION: LEG
LOCATION: HIP
LOCATION: LEG

## 2024-04-04 ASSESSMENT — PATIENT HEALTH QUESTIONNAIRE - PHQ9
2. FEELING DOWN, DEPRESSED OR HOPELESS: NOT AT ALL
SUM OF ALL RESPONSES TO PHQ9 QUESTIONS 1 & 2: 0
1. LITTLE INTEREST OR PLEASURE IN DOING THINGS: NOT AT ALL

## 2024-04-04 NOTE — PROGRESS NOTES
Physical Therapy    Physical Therapy Treatment/Re-check with POC update    Patient Name: Julio Carranza  MRN: [id number]  Today's Date: 4/4/2024  Time Calculation  Start Time: 1515  Stop Time: 1545  Time Calculation (min): 30 min       Assessment/Plan   PT Assessment  PT Assessment Results: Decreased strength, Decreased endurance, Impaired balance, Decreased mobility  Rehab Prognosis: Good  Evaluation/Treatment Tolerance: Patient limited by fatigue  Medical Staff Made Aware: Yes  End of Session Communication: Bedside nurse (Ortho coordinator)  Assessment Comment: pt continues to demo decreased functional mobility, transfers, strength and ambulationtolerance post op. Course now complicated by acute PEs, pt with decresaed endurance and notable SOB with all activity as assessed. Re-check completed this date, new goals added and DC plan updated to reflect acute changes in function.  End of Session Patient Position: Bed, 3 rail up  PT Plan  Inpatient/Swing Bed or Outpatient: Inpatient  PT Plan  Treatment/Interventions: Bed mobility, Transfer training, Gait training, Balance training, Strengthening  PT Plan: Skilled PT  PT Frequency: 5 times per week  PT Discharge Recommendations: Moderate intensity level of continued care  Equipment Recommended upon Discharge: Wheeled walker  PT Recommended Transfer Status: Assist x1  PT - OK to Discharge: Yes (per PT POC)      General Visit Information:   PT  Visit  PT Received On: 04/04/24  Response to Previous Treatment: Patient with no complaints from previous session., Compliant with home exercise program  General  Reason for Referral: PT re-check completed this date d/t new PEs, IV heparin in place. WBAT LLE, Anterior TRINITY.  Missed Visit: Yes  Missed Visit Reason: Patient placed on medical hold (pt status discussed with NP at 0905: pt with acute PEs and on heparin drip at this time. Recommend holding PT re-check until pt therapeutic x24 hrs. Recommend bathroom privileges only until  that time per NP. Clear for PT session in afternoon.)  Patient Position Received: Bed, 3 rail up, Alarm off, not on at start of session  General Comment: cleared for session this PM, pt on IV heparin with therapeutic levels. 1 L O2 in place, WBAT LLE, anterior TRINITY precautions. PT re-check d/t DC complicated by new PEs. Fatigue noted with ambulation, multiple standing breaks needed to complete ambulation trial    Subjective   Precautions:  Precautions  LE Weight Bearing Status: Weight Bearing as Tolerated  Medical Precautions: Fall precautions  Post-Surgical Precautions: Left hip precautions  Precautions Comment: O2 sats 93-96%  Vital Signs:       Objective   Pain:  Pain Assessment  Pain Assessment: 0-10  Pain Score: 7  Pain Interventions: Medication (See MAR)  Cognition:     Postural Control:  Dynamic Standing Balance  Dynamic Standing-Balance Support: Bilateral upper extremity supported  Dynamic Standing-Balance:  (lateral weight shift R/L x30 seconds as pre-gait task)    Activity Tolerance:     Treatments:  Therapeutic Exercise  Therapeutic Exercise Performed: Yes  x10: ankle pumps, quad sets, glute sets, heel slides, hip abd, SAQs, LAQs      Bed Mobility  Bed Mobility: Yes  Bed Mobility 1  Bed Mobility 1: Supine to sitting, Sitting to supine  Level of Assistance 1: Modified independent    Ambulation/Gait Training  Ambulation/Gait Training Performed: Yes  Ambulation/Gait Training 1  Surface 1: Level tile  Device 1: Rolling walker  Assistance 1: Contact guard  Quality of Gait 1:  (cues for heel off and heel strik during swing phase)  Comments/Distance (ft) 1: 40 feet, standing break x2 with cues needed for breathing technique  Transfers  Transfer: Yes  Transfer 1  Transfer From 1: Sit to  Transfer to 1: Stand  Technique 1: Sit to stand, Stand to sit  Transfer Device 1: Walker  Transfer Level of Assistance 1: Distant supervision    Outcome Measures:  [facility] Basic Mobility  Turning from your back to your side while in  a flat bed without using bedrails: None  Moving from lying on your back to sitting on the side of a flat bed without using bedrails: None  Moving to and from bed to chair (including a wheelchair): A little  Standing up from a chair using your arms (e.g. wheelchair or bedside chair): A little  To walk in hospital room: A little  Climbing 3-5 steps with railing: A lot  Basic Mobility - Total Score: 19    Education Documentation  Handouts, taught by Sunshine Oliveros PT at 4/4/2024  4:10 PM.  Learner: Patient  Readiness: Acceptance  Method: Explanation  Response: Verbalizes Understanding    Precautions, taught by Sunshine Oliveros PT at 4/4/2024  4:10 PM.  Learner: Patient  Readiness: Acceptance  Method: Explanation  Response: Verbalizes Understanding    Body Mechanics, taught by Sunshine Oliveros PT at 4/4/2024  4:10 PM.  Learner: Patient  Readiness: Acceptance  Method: Explanation  Response: Verbalizes Understanding    Home Exercise Program, taught by Sunshine Oliveros PT at 4/4/2024  4:10 PM.  Learner: Patient  Readiness: Acceptance  Method: Explanation  Response: Verbalizes Understanding    Mobility Training, taught by Sunshine Oliveros PT at 4/4/2024  4:10 PM.  Learner: Patient  Readiness: Acceptance  Method: Explanation  Response: Verbalizes Understanding    Education Comments  No comments found.        OP EDUCATION:       Encounter Problems       Encounter Problems (Active)       Mobility       3x100 feet with/without use of DME and S/I assist  necessary to initiate return to PLOF        Start:  04/04/24    Expected End:  04/18/24            X15+ reps ther ex to increase general strength and improve functional independence         Start:  04/04/24    Expected End:  04/18/24               PT Transfers       Pt will complete all functional transfers with s/I necessary to initiate return to PLOF         Start:  04/04/24    Expected End:  04/18/24               Pain - Adult          Safety       pt will  tolerate 25+ minutes with 1 rest breaks and maintaining O2 sats >88% on baseline room air necessary for improved functional endurance         Start:  04/04/24    Expected End:  04/18/24                  Encounter Problems (Resolved)       Mobility       Pt will demo at least supervision with all functional mobility and activity as assessed in preparation for homegoing  (Met)       Start:  04/02/24    Expected End:  04/02/24    Resolved:  04/03/24

## 2024-04-04 NOTE — PROGRESS NOTES
Physical Therapy                 Therapy Communication Note    Patient Name: Julio Carranza  MRN: [id number]  Today's Date: 4/4/2024     Discipline: Physical Therapy    Missed Visit Reason: Missed Visit Reason: Patient placed on medical hold (pt status discussed with NP at 0905: pt with acute PEs and on heparin drip at this time. Recommend holding PT re-check until pt therapeutic x24 hrs. Recommend bathroom privileges only until that time per NP. Clear for PT session in afternoon.)    Missed Time: Attempt

## 2024-04-04 NOTE — PROGRESS NOTES
Patient: Julio Carranza  Room/bed: 149/149-A  Admitted on: 4/1/2024    Age: 64 y.o.   Gender: male  Code Status:  Full Code   Admitting Dx: Pain of left hip [M25.552]  Osteoarthritis of left hip, unspecified osteoarthritis type [M16.12]    MRN: [id number]  PCP: Mendez Coronel MD       Subjective   Seen and examined in his room this AM. Just returned from walking to BR and very short of breath. Feels heaviness in chest when short of breath. Mild improvement in past 24 hours. + BM.      Objective    Physical Exam   Constitutional: A&O x 3; anxious; cooperative  Eyes: EOM's intact  HEENT: Normocephalic, Atraumatic. Oral mucosa moist.   Neck: Supple. No JVD, lymphadenopathy.   Lungs: CTAB with fair air movement except for expiratory wheeze in upper lobes. Dyspnea with exertion. On 1 liter. .   Heart: RRR  Abdomen: Softly distended, non-tender,+BS  MS/Extremities: BENITEZ with LLE pain. No edema. Peripheral pulses intact bilaterally. Resting tremors of BUE.  Neuro: A&O x3; no focal deficits; gross motor and sensation intact.   Skin: Warm and dry. No rashes or lesions  Psych: Normal affect.      Temp:  [36.4 °C (97.6 °F)-36.5 °C (97.7 °F)] 36.4 °C (97.6 °F)  Heart Rate:  [72-85] 72  Resp:  [16-18] 16  BP: (107-121)/(72-78) 107/72    Vitals:    04/01/24 0616   Weight: 103 kg (227 lb 1.2 oz)     I/Os    Intake/Output Summary (Last 24 hours) at 4/4/2024 1334  Last data filed at 4/4/2024 0605  Gross per 24 hour   Intake 267.94 ml   Output 250 ml   Net 17.94 ml         Labs:   Results from last 72 hours   Lab Units 04/04/24  0441 04/02/24  0524   SODIUM mmol/L 137 137   POTASSIUM mmol/L 3.7 3.8   CHLORIDE mmol/L 104 105   CO2 mmol/L 28 26   BUN mg/dL 14 14   CREATININE mg/dL 0.75 1.08   GLUCOSE mg/dL 102* 135*   CALCIUM mg/dL 8.1* 8.1*   ANION GAP mmol/L 9* 10   EGFR mL/min/1.73m*2 >90 76        Results from last 72 hours   Lab Units 04/04/24  0441 04/02/24  0524   WBC AUTO x10*3/uL 6.0 12.2*   HEMOGLOBIN g/dL 11.2* 12.8*    HEMATOCRIT % 34.0* 37.9*   PLATELETS AUTO x10*3/uL 178 213        Lab Results   Component Value Date    CALCIUM 8.1 (L) 04/04/2024    PHOS 2.5 10/13/2020      Lab Results   Component Value Date    CRP 2.5 (H) 07/14/2023        Micro/ID:   No results found for the last 90 days.    Images:  CTA Chest:  mpression: 1. Occlusive subsegmental left lower lobe pulmonary embolism.  Possible additional subsegmental pulmonary embolism within the left  upper lobe.  2. RV LV ratio measures approximately 1.  3. Mild dependent right lower lobe subsegmental  atelectasis/consolidation. Small right pleural effusion. A component  of infection/inflammation is not excluded.     Meds    Scheduled medications  [Held by provider] acetaminophen, 650 mg, oral, q6h ANDREI  aspirin, 81 mg, oral, BID  atorvastatin, 40 mg, oral, Nightly  cefadroxil, 500 mg, oral, q12h ANDREI  docusate sodium, 100 mg, oral, BID  fluticasone, 2 spray, Each Nostril, BID  losartan, 100 mg, oral, Daily  pantoprazole, 40 mg, oral, Nightly  polyethylene glycol, 17 g, oral, Daily  primidone, 50 mg, oral, Nightly  sennosides, 1 tablet, oral, Nightly  tamsulosin, 0.4 mg, oral, Nightly  [Held by provider] topiramate, 50 mg, oral, BID      Continuous medications  heparin, 0-4,500 Units/hr, Last Rate: 16 Units/hr (04/04/24 0530)  lactated Ringer's, 100 mL/hr, Last Rate: Stopped (04/01/24 0940)  oxygen, 2 L/min, Last Rate: 1 L/min (04/03/24 1030)      PRN medications  PRN medications: heparin, HYDROcodone-acetaminophen, HYDROcodone-acetaminophen, HYDROmorphone, ipratropium-albuteroL, ondansetron **OR** ondansetron, promethazine     Assessment and Plan    63 y.o. male with history of coronary artery disease hypertension, hyperlipidemia and GERD is status post left TRINITY.. Consulted for medical management.     Acute Hypoxic Respiratory Failure  -Postop hypoxia with associated GARCIAS  -H/O COPD; former smoker who quit one year ago.  -CT Angio of chest confirmed acute PE  -Heparin gtt  initiated on 4/3  -Limit mobility x 24 hours   -Will transition to PO agent in AM. Await price check for Apixaban.   -Advised Pulmonary hygiene, IS use  -Added Fluticasone     Left Hip Osteoarthritis  -S/P left TRINITY  by Dr. Ricci on 4/1  -Incisional care, antibiotics, activity restrictions per orthopedics surgery.  -PT/OT to follow. WBAT.   -Medicate for pain  -Maintain bowel regimen  -Advised IS use, mobilization.   -Monitor H&H for ABLA. Hgb stable at 11.2.  -DVT prophylaxis as noted above.   -Afebrile. No leukocytosis.     CV: CAD, HTN  -Home regimen resumed: Aspirin, Lipitor, Losartan   -Vitals stable.   -Echocardiogram pending     Essential Tremor  -Primidone continued     Headaches  -Patient reports he takes Topamax as needed      DVT prophylaxis  -See above.     Fluids/Electrolytes/Nutrition  -Laboratory data reviewed.   -Electrolytes stable.   -No nutritional concerns at this time.       Disposition  -Plan of care discussed with medicine attending, Dr. Velázquez, Orthopedic APRN.   -Cleared for discharge by orthopedics. Tentative plan for discharge tomorrow. Await PT/OT recommendations      Natasha Davis, APRN-CNP

## 2024-04-04 NOTE — ANESTHESIA POSTPROCEDURE EVALUATION
Patient: Julio Carranza    Procedure Summary       Date: 04/01/24 Room / Location: [facility] OR 01 / [facility]    Anesthesia Start: 0723 Anesthesia Stop: 0940    Procedure: ARTHROPLASTY TOTAL HIP ANTERIOR APPROACH (Left: Hip) Diagnosis:       Pain of left hip      (Pain of left hip [M25.552])    Surgeons: Danny Ricci MD Responsible Provider: LESTER Gonzalez    Anesthesia Type: general ASA Status: 3            Anesthesia Type: general    Vitals Value Taken Time   /84 04/01/24 1036   Temp 36 °C (96.8 °F) 04/01/24 0933   Pulse 79 04/01/24 1036   Resp 11 04/01/24 1036   SpO2 93 % 04/01/24 1036       Anesthesia Post Evaluation    Patient location during evaluation: PACU  Patient participation: complete - patient participated  Level of consciousness: awake and alert  Pain score: 0  Pain management: adequate  Airway patency: patent  Cardiovascular status: acceptable  Respiratory status: acceptable and nasal cannula  Hydration status: acceptable  Postoperative Nausea and Vomiting: none        There were no known notable events for this encounter.

## 2024-04-04 NOTE — CARE PLAN
The patient's goals for the shift include    Problem: Pain - Adult  Goal: Verbalizes/displays adequate comfort level or baseline comfort level  Outcome: Progressing     Problem: Safety - Adult  Goal: Free from fall injury  Outcome: Progressing     Problem: Discharge Planning  Goal: Discharge to home or other facility with appropriate resources  Outcome: Progressing     Problem: Chronic Conditions and Co-morbidities  Goal: Patient's chronic conditions and co-morbidity symptoms are monitored and maintained or improved  Outcome: Progressing     Problem: Fall/Injury  Goal: Not fall by end of shift  Outcome: Progressing       The clinical goals for the shift include pain control    Getting vicodin and dil breakthrough.  Heparin infusing.

## 2024-04-04 NOTE — PROGRESS NOTES
"Julio Carranza is a 64 y.o. male on day 1 of admission presenting with Pain of left hip.    Subjective   Found to have pneumonia and PE yesterday after experiencing shortness of breath on exertion.  Started on Heparin drip.  Still having 7/10 pain in left hip.      Objective     Physical Exam  Vitals reviewed.   HENT:      Head: Normocephalic and atraumatic.   Eyes:      Extraocular Movements: Extraocular movements intact.      Conjunctiva/sclera: Conjunctivae normal.      Pupils: Pupils are equal, round, and reactive to light.   Cardiovascular:      Rate and Rhythm: Normal rate and regular rhythm.      Pulses: Normal pulses.   Pulmonary:      Effort: Pulmonary effort is normal.      Comments: On supplemental O2 NC   Abdominal:      Palpations: Abdomen is soft.   Musculoskeletal:      Comments: Left hip dressing C/D/I, surrounding tissues soft and compressible, leg and foot warm and well perfused, SILT S/S/SPN/DPN   Skin:     General: Skin is warm and dry.      Capillary Refill: Capillary refill takes less than 2 seconds.   Neurological:      General: No focal deficit present.      Mental Status: He is alert and oriented to person, place, and time.       Last Recorded Vitals  Blood pressure 107/72, pulse 72, temperature 36.4 °C (97.6 °F), resp. rate 16, height 1.854 m (6' 1\"), weight 103 kg (227 lb 1.2 oz), SpO2 94 %.  Intake/Output last 3 Shifts:  I/O last 3 completed shifts:  In: 267.9 (2.6 mL/kg) [P.O.:120; I.V.:97.4 (0.9 mL/kg); IV Piggyback:50.5]  Out: 251 (2.4 mL/kg) [Urine:250 (0.1 mL/kg/hr); Stool:1]  Weight: 103 kg     Relevant Results  XR pelvis 1-2 views    Result Date: 4/1/2024  Interpreted By:  Harshad Gtz, STUDY: XR PELVIS 1-2 VIEWS; ;  4/1/2024 9:50 am   INDICATION: Signs/Symptoms:post op left TRINITY.   COMPARISON: 01/24/2024   ACCESSION NUMBER(S): [id number]   ORDERING CLINICIAN: LORI COLE   FINDINGS: Left hip, two views   Left hip total hip arthroplasty in place without periprosthetic " fracture or lucency. Postsurgical changes in soft tissues       No immediate complication after left hip total arthroplasty     MACRO: None   Signed by: Harshad Gtz 4/1/2024 11:08 AM Dictation workstation:   [id number]    FL less than 1 hour    Result Date: 4/1/2024  These images are not reportable by radiology and will not be interpreted by  Radiologists.    XR lumbar spine 2-3 views    Result Date: 3/28/2024  Interpreted By:  Jarod Garrett, STUDY: XR LUMBAR SPINE 2-3 VIEWS   INDICATION: Signs/Symptoms:pre operative evaluation for spinal mobility.   COMPARISON: February 6, 2017   ACCESSION NUMBER(S): [id number]   ORDERING CLINICIAN: SUNITA GAUTAM   FINDINGS: Laminectomy with L2-S1 fusion. Alignment grossly normal with no significant change sitting or standing position.       Laminectomy with L2-S1 fusion. Alignment grossly normal with no significant change sitting or standing position.   Signed by: Jarod Garrett 3/28/2024 5:49 PM Dictation workstation:   [id number]    ECG 12 lead    Result Date: 3/13/2024  Normal sinus rhythm Nonspecific ST abnormality Abnormal ECG When compared with ECG of 29-JUN-2022 06:23, No significant change was found      Scheduled medications  [Held by provider] acetaminophen, 650 mg, oral, q6h ANDREI  aspirin, 81 mg, oral, BID  atorvastatin, 40 mg, oral, Nightly  cefadroxil, 500 mg, oral, q12h ANDREI  docusate sodium, 100 mg, oral, BID  fluticasone, 2 spray, Each Nostril, BID  losartan, 100 mg, oral, Daily  pantoprazole, 40 mg, oral, Nightly  polyethylene glycol, 17 g, oral, Daily  primidone, 50 mg, oral, Nightly  sennosides, 1 tablet, oral, Nightly  tamsulosin, 0.4 mg, oral, Nightly  [Held by provider] topiramate, 50 mg, oral, BID      Continuous medications  heparin, 0-4,500 Units/hr, Last Rate: 16 Units/hr (04/04/24 0530)  lactated Ringer's, 100 mL/hr, Last Rate: Stopped (04/01/24 0940)  oxygen, 2 L/min, Last Rate: 1 L/min (04/03/24 1030)      PRN medications  PRN medications:  heparin, HYDROcodone-acetaminophen, HYDROcodone-acetaminophen, HYDROmorphone, ipratropium-albuteroL, ondansetron **OR** ondansetron, promethazine  Results for orders placed or performed during the hospital encounter of 04/01/24 (from the past 24 hour(s))   Troponin I, High Sensitivity   Result Value Ref Range    Troponin I, High Sensitivity 5 0 - 20 ng/L   B-type natriuretic peptide   Result Value Ref Range    BNP 35 0 - 99 pg/mL   D-dimer, VTE Exclusion   Result Value Ref Range    D-Dimer, Quantitative VTE Exclusion 1,060 (H) <=500 ng/mL FEU   Blood Gas Arterial Full Panel   Result Value Ref Range    POCT pH, Arterial 7.42 7.38 - 7.42 pH    POCT pCO2, Arterial 40 38 - 42 mm Hg    POCT pO2, Arterial 68 (L) 85 - 95 mm Hg    POCT SO2, Arterial 95 94 - 100 %    POCT Oxy Hemoglobin, Arterial 91.7 (L) 94.0 - 98.0 %    POCT Hematocrit Calculated, Arterial 40.0 (L) 41.0 - 52.0 %    POCT Sodium, Arterial 135 (L) 136 - 145 mmol/L    POCT Potassium, Arterial 3.6 3.5 - 5.3 mmol/L    POCT Chloride, Arterial 103 98 - 107 mmol/L    POCT Ionized Calcium, Arterial 1.14 1.10 - 1.33 mmol/L    POCT Glucose, Arterial 126 (H) 74 - 99 mg/dL    POCT Lactate, Arterial 0.9 0.4 - 2.0 mmol/L    POCT Base Excess, Arterial 1.3 -2.0 - 3.0 mmol/L    POCT HCO3 Calculated, Arterial 25.9 22.0 - 26.0 mmol/L    POCT Hemoglobin, Arterial 13.4 (L) 13.5 - 17.5 g/dL    POCT Anion Gap, Arterial 10 10 - 25 mmo/L    Patient Temperature      FiO2 24 %    Site of Arterial Puncture Radial Left     Tex's Test Positive    Heparin Assay, UFH   Result Value Ref Range    Heparin Unfractionated 1.3 (HH) See Comment Below for Therapeutic Ranges IU/mL   Heparin Assay, UFH   Result Value Ref Range    Heparin Unfractionated 0.7 See Comment Below for Therapeutic Ranges IU/mL   Heparin Assay, UFH   Result Value Ref Range    Heparin Unfractionated 0.6 See Comment Below for Therapeutic Ranges IU/mL   CBC   Result Value Ref Range    WBC 6.0 4.4 - 11.3 x10*3/uL    nRBC 0.0  0.0 - 0.0 /100 WBCs    RBC 3.67 (L) 4.50 - 5.90 x10*6/uL    Hemoglobin 11.2 (L) 13.5 - 17.5 g/dL    Hematocrit 34.0 (L) 41.0 - 52.0 %    MCV 93 80 - 100 fL    MCH 30.5 26.0 - 34.0 pg    MCHC 32.9 32.0 - 36.0 g/dL    RDW 12.9 11.5 - 14.5 %    Platelets 178 150 - 450 x10*3/uL   Basic Metabolic Panel   Result Value Ref Range    Glucose 102 (H) 74 - 99 mg/dL    Sodium 137 136 - 145 mmol/L    Potassium 3.7 3.5 - 5.3 mmol/L    Chloride 104 98 - 107 mmol/L    Bicarbonate 28 21 - 32 mmol/L    Anion Gap 9 (L) 10 - 20 mmol/L    Urea Nitrogen 14 6 - 23 mg/dL    Creatinine 0.75 0.50 - 1.30 mg/dL    eGFR >90 >60 mL/min/1.73m*2    Calcium 8.1 (L) 8.6 - 10.3 mg/dL         Assessment/Plan   Active Problems:    Pain of left hip    63 year old male POD 3 from left hip arthroplasty  - WBAT RLE, pending PT evaluation  - continue Hydrocodone as needed for pain, Hydromorphone breakthrough  - regular diet with BR  - DVT prophylaxis x 4 weeks with aspirin 81 mg PO BID -> now on Heparin drip for PE  - Duricef ordered as patient is still in house x 7 days  - pending PT for home versus SNF  - care transferred to medicine and ortho will continue to follow for all orthopedic needs    Discussed with Dr. Ricci         I spent 30 minutes in the professional and overall care of this patient.    Nohemy Mendoza, APRN-CNP

## 2024-04-05 ENCOUNTER — HOME CARE VISIT (OUTPATIENT)
Dept: HOME HEALTH SERVICES | Facility: HOME HEALTH | Age: 64
End: 2024-04-05

## 2024-04-05 PROBLEM — M25.552 PAIN OF LEFT HIP: Status: RESOLVED | Noted: 2024-04-03 | Resolved: 2024-04-05

## 2024-04-05 LAB
ANION GAP SERPL CALC-SCNC: 10 MMOL/L (ref 10–20)
BUN SERPL-MCNC: 15 MG/DL (ref 6–23)
CALCIUM SERPL-MCNC: 8.2 MG/DL (ref 8.6–10.3)
CHLORIDE SERPL-SCNC: 101 MMOL/L (ref 98–107)
CO2 SERPL-SCNC: 29 MMOL/L (ref 21–32)
CREAT SERPL-MCNC: 0.8 MG/DL (ref 0.5–1.3)
EGFRCR SERPLBLD CKD-EPI 2021: >90 ML/MIN/1.73M*2
ERYTHROCYTE [DISTWIDTH] IN BLOOD BY AUTOMATED COUNT: 12.6 % (ref 11.5–14.5)
GLUCOSE SERPL-MCNC: 105 MG/DL (ref 74–99)
HCT VFR BLD AUTO: 35.1 % (ref 41–52)
HGB BLD-MCNC: 11.4 G/DL (ref 13.5–17.5)
MCH RBC QN AUTO: 30 PG (ref 26–34)
MCHC RBC AUTO-ENTMCNC: 32.5 G/DL (ref 32–36)
MCV RBC AUTO: 92 FL (ref 80–100)
NRBC BLD-RTO: 0 /100 WBCS (ref 0–0)
PLATELET # BLD AUTO: 219 X10*3/UL (ref 150–450)
POTASSIUM SERPL-SCNC: 3.5 MMOL/L (ref 3.5–5.3)
RBC # BLD AUTO: 3.8 X10*6/UL (ref 4.5–5.9)
SODIUM SERPL-SCNC: 136 MMOL/L (ref 136–145)
UFH PPP CHRO-ACNC: 0.7 IU/ML
WBC # BLD AUTO: 6.2 X10*3/UL (ref 4.4–11.3)

## 2024-04-05 PROCEDURE — 99232 SBSQ HOSP IP/OBS MODERATE 35: CPT | Performed by: NURSE PRACTITIONER

## 2024-04-05 PROCEDURE — 36415 COLL VENOUS BLD VENIPUNCTURE: CPT | Performed by: NURSE PRACTITIONER

## 2024-04-05 PROCEDURE — 2500000001 HC RX 250 WO HCPCS SELF ADMINISTERED DRUGS (ALT 637 FOR MEDICARE OP): Performed by: NURSE PRACTITIONER

## 2024-04-05 PROCEDURE — 94760 N-INVAS EAR/PLS OXIMETRY 1: CPT

## 2024-04-05 PROCEDURE — 2500000002 HC RX 250 W HCPCS SELF ADMINISTERED DRUGS (ALT 637 FOR MEDICARE OP, ALT 636 FOR OP/ED): Performed by: NURSE PRACTITIONER

## 2024-04-05 PROCEDURE — 85520 HEPARIN ASSAY: CPT | Performed by: NURSE PRACTITIONER

## 2024-04-05 PROCEDURE — 2500000004 HC RX 250 GENERAL PHARMACY W/ HCPCS (ALT 636 FOR OP/ED): Performed by: NURSE PRACTITIONER

## 2024-04-05 PROCEDURE — 97110 THERAPEUTIC EXERCISES: CPT | Mod: GP,CQ

## 2024-04-05 PROCEDURE — 1100000001 HC PRIVATE ROOM DAILY

## 2024-04-05 PROCEDURE — 97116 GAIT TRAINING THERAPY: CPT | Mod: GP,CQ

## 2024-04-05 PROCEDURE — 85027 COMPLETE CBC AUTOMATED: CPT | Performed by: INTERNAL MEDICINE

## 2024-04-05 PROCEDURE — 2500000002 HC RX 250 W HCPCS SELF ADMINISTERED DRUGS (ALT 637 FOR MEDICARE OP, ALT 636 FOR OP/ED): Performed by: ORTHOPAEDIC SURGERY

## 2024-04-05 PROCEDURE — 94640 AIRWAY INHALATION TREATMENT: CPT

## 2024-04-05 PROCEDURE — 2500000004 HC RX 250 GENERAL PHARMACY W/ HCPCS (ALT 636 FOR OP/ED): Performed by: INTERNAL MEDICINE

## 2024-04-05 PROCEDURE — 2500000004 HC RX 250 GENERAL PHARMACY W/ HCPCS (ALT 636 FOR OP/ED): Performed by: STUDENT IN AN ORGANIZED HEALTH CARE EDUCATION/TRAINING PROGRAM

## 2024-04-05 PROCEDURE — 80048 BASIC METABOLIC PNL TOTAL CA: CPT | Performed by: NURSE PRACTITIONER

## 2024-04-05 PROCEDURE — 97168 OT RE-EVAL EST PLAN CARE: CPT | Mod: GO

## 2024-04-05 RX ORDER — ONDANSETRON HYDROCHLORIDE 2 MG/ML
4 INJECTION, SOLUTION INTRAVENOUS ONCE
Status: COMPLETED | OUTPATIENT
Start: 2024-04-05 | End: 2024-04-05

## 2024-04-05 RX ORDER — FLUTICASONE PROPIONATE 50 MCG
2 SPRAY, SUSPENSION (ML) NASAL 2 TIMES DAILY
Qty: 16 G | Refills: 12
Start: 2024-04-05

## 2024-04-05 RX ORDER — LACTULOSE 10 G/15ML
20 SOLUTION ORAL ONCE
Status: COMPLETED | OUTPATIENT
Start: 2024-04-05 | End: 2024-04-05

## 2024-04-05 RX ORDER — MAGNESIUM HYDROXIDE 2400 MG/10ML
10 SUSPENSION ORAL ONCE
Status: COMPLETED | OUTPATIENT
Start: 2024-04-05 | End: 2024-04-05

## 2024-04-05 RX ADMIN — MAGNESIUM HYDROXIDE 10 ML: 2400 SUSPENSION ORAL at 11:51

## 2024-04-05 RX ADMIN — HYDROMORPHONE HYDROCHLORIDE 0.2 MG: 1 INJECTION, SOLUTION INTRAMUSCULAR; INTRAVENOUS; SUBCUTANEOUS at 10:12

## 2024-04-05 RX ADMIN — HYDROCODONE BITARTRATE AND ACETAMINOPHEN 1 TABLET: 5; 325 TABLET ORAL at 20:10

## 2024-04-05 RX ADMIN — ONDANSETRON 4 MG: 2 INJECTION INTRAMUSCULAR; INTRAVENOUS at 15:33

## 2024-04-05 RX ADMIN — LACTULOSE 20 G: 20 SOLUTION ORAL at 11:51

## 2024-04-05 RX ADMIN — LOSARTAN POTASSIUM 100 MG: 50 TABLET, FILM COATED ORAL at 08:19

## 2024-04-05 RX ADMIN — HYDROCODONE BITARTRATE AND ACETAMINOPHEN 2 TABLET: 5; 325 TABLET ORAL at 07:23

## 2024-04-05 RX ADMIN — TAMSULOSIN HYDROCHLORIDE 0.4 MG: 0.4 CAPSULE ORAL at 20:05

## 2024-04-05 RX ADMIN — HYDROMORPHONE HYDROCHLORIDE 0.2 MG: 1 INJECTION, SOLUTION INTRAMUSCULAR; INTRAVENOUS; SUBCUTANEOUS at 05:28

## 2024-04-05 RX ADMIN — ONDANSETRON 4 MG: 2 INJECTION INTRAMUSCULAR; INTRAVENOUS at 03:01

## 2024-04-05 RX ADMIN — SENNOSIDES 8.6 MG: 8.6 TABLET, FILM COATED ORAL at 20:05

## 2024-04-05 RX ADMIN — PANTOPRAZOLE SODIUM 40 MG: 40 TABLET, DELAYED RELEASE ORAL at 20:05

## 2024-04-05 RX ADMIN — CEFADROXIL 500 MG: 500 CAPSULE ORAL at 08:19

## 2024-04-05 RX ADMIN — DOCUSATE SODIUM 100 MG: 100 CAPSULE, LIQUID FILLED ORAL at 08:19

## 2024-04-05 RX ADMIN — HYDROCODONE BITARTRATE AND ACETAMINOPHEN 2 TABLET: 5; 325 TABLET ORAL at 01:21

## 2024-04-05 RX ADMIN — FLUTICASONE PROPIONATE 2 SPRAY: 50 SPRAY, METERED NASAL at 09:00

## 2024-04-05 RX ADMIN — ASPIRIN 81 MG: 81 TABLET, COATED ORAL at 08:19

## 2024-04-05 RX ADMIN — ATORVASTATIN CALCIUM 40 MG: 40 TABLET, FILM COATED ORAL at 20:05

## 2024-04-05 RX ADMIN — APIXABAN 5 MG: 5 TABLET, FILM COATED ORAL at 20:05

## 2024-04-05 RX ADMIN — IPRATROPIUM BROMIDE AND ALBUTEROL SULFATE 3 ML: 2.5; .5 SOLUTION RESPIRATORY (INHALATION) at 13:35

## 2024-04-05 RX ADMIN — PRIMIDONE 50 MG: 50 TABLET ORAL at 20:05

## 2024-04-05 RX ADMIN — CEFADROXIL 500 MG: 500 CAPSULE ORAL at 20:05

## 2024-04-05 RX ADMIN — ONDANSETRON 4 MG: 2 INJECTION INTRAMUSCULAR; INTRAVENOUS at 08:22

## 2024-04-05 RX ADMIN — ASPIRIN 81 MG: 81 TABLET, COATED ORAL at 20:05

## 2024-04-05 RX ADMIN — DOCUSATE SODIUM 100 MG: 100 CAPSULE, LIQUID FILLED ORAL at 20:05

## 2024-04-05 RX ADMIN — HYDROMORPHONE HYDROCHLORIDE 0.2 MG: 1 INJECTION, SOLUTION INTRAMUSCULAR; INTRAVENOUS; SUBCUTANEOUS at 02:26

## 2024-04-05 RX ADMIN — POLYETHYLENE GLYCOL 3350 17 G: 17 POWDER, FOR SOLUTION ORAL at 08:19

## 2024-04-05 RX ADMIN — FLUTICASONE PROPIONATE 2 SPRAY: 50 SPRAY, METERED NASAL at 20:06

## 2024-04-05 RX ADMIN — APIXABAN 5 MG: 5 TABLET, FILM COATED ORAL at 11:52

## 2024-04-05 RX ADMIN — HYDROCODONE BITARTRATE AND ACETAMINOPHEN 2 TABLET: 5; 325 TABLET ORAL at 13:19

## 2024-04-05 ASSESSMENT — COGNITIVE AND FUNCTIONAL STATUS - GENERAL
HELP NEEDED FOR BATHING: A LITTLE
TURNING FROM BACK TO SIDE WHILE IN FLAT BAD: A LITTLE
DRESSING REGULAR UPPER BODY CLOTHING: A LITTLE
MOVING FROM LYING ON BACK TO SITTING ON SIDE OF FLAT BED WITH BEDRAILS: A LITTLE
TOILETING: A LITTLE
PERSONAL GROOMING: A LITTLE
CLIMB 3 TO 5 STEPS WITH RAILING: A LOT
HELP NEEDED FOR BATHING: A LITTLE
TOILETING: A LITTLE
MOVING TO AND FROM BED TO CHAIR: A LITTLE
WALKING IN HOSPITAL ROOM: A LITTLE
DRESSING REGULAR UPPER BODY CLOTHING: A LITTLE
STANDING UP FROM CHAIR USING ARMS: A LITTLE
MOBILITY SCORE: 17
WALKING IN HOSPITAL ROOM: A LITTLE
PERSONAL GROOMING: A LITTLE
DRESSING REGULAR LOWER BODY CLOTHING: A LITTLE
DAILY ACTIVITIY SCORE: 18
DAILY ACTIVITIY SCORE: 18
MOVING TO AND FROM BED TO CHAIR: A LITTLE
DRESSING REGULAR LOWER BODY CLOTHING: A LOT
EATING MEALS: A LITTLE
STANDING UP FROM CHAIR USING ARMS: A LITTLE
CLIMB 3 TO 5 STEPS WITH RAILING: A LOT

## 2024-04-05 ASSESSMENT — PAIN - FUNCTIONAL ASSESSMENT
PAIN_FUNCTIONAL_ASSESSMENT: 0-10

## 2024-04-05 ASSESSMENT — PAIN SCALES - GENERAL
PAINLEVEL_OUTOF10: 5 - MODERATE PAIN
PAINLEVEL_OUTOF10: 7
PAINLEVEL_OUTOF10: 7
PAINLEVEL_OUTOF10: 6
PAINLEVEL_OUTOF10: 7
PAINLEVEL_OUTOF10: 8
PAINLEVEL_OUTOF10: 7
PAINLEVEL_OUTOF10: 5 - MODERATE PAIN
PAINLEVEL_OUTOF10: 7

## 2024-04-05 ASSESSMENT — PAIN DESCRIPTION - LOCATION
LOCATION: HIP
LOCATION: LEG
LOCATION: HIP
LOCATION: LEG

## 2024-04-05 ASSESSMENT — PAIN DESCRIPTION - ORIENTATION
ORIENTATION: LEFT

## 2024-04-05 ASSESSMENT — ACTIVITIES OF DAILY LIVING (ADL)
BATHING_ASSISTANCE: MINIMAL
ADL_ASSISTANCE: INDEPENDENT

## 2024-04-05 NOTE — PROGRESS NOTES
Physical Therapy    Physical Therapy Treatment    Patient Name: Julio Carranza  MRN: [id number]  Today's Date: 4/5/2024  Time Calculation  Start Time: 1225  Stop Time: 1249  Time Calculation (min): 24 min       Assessment/Plan   PT Assessment  PT Assessment Results: Decreased strength, Decreased endurance, Impaired balance, Decreased mobility  End of Session Communication: Bedside nurse  PT Plan  Inpatient/Swing Bed or Outpatient: Inpatient  PT Plan  Treatment/Interventions: Bed mobility, Transfer training, Gait training, Balance training, Strengthening  PT Plan: Skilled PT  PT Frequency: 5 times per week  PT Discharge Recommendations: Moderate intensity level of continued care  Equipment Recommended upon Discharge: Wheeled walker  PT Recommended Transfer Status: Assist x1  PT - OK to Discharge: Yes      General Visit Information:   PT  Visit  PT Received On: 04/05/24  General  Past Medical History Relevant to Rehab: PMH: COPD, CAD, HTN, emphysema, IBS, anxiety  Prior to Session Communication: Bedside nurse  Patient Position Received: Bed, 3 rail up, Alarm off, not on at start of session  General Comment: AXOX3    Subjective   Precautions:  Precautions  Post-Surgical Precautions:  (left Ant hip precautions)  Precautions Comment: patietn verb 1/3 precautiosn I. Re- instructed verbally and demo'd ant hip precautions.  Vital Signs:       Objective   Pain:  Pain Assessment  Pain Assessment: 0-10  Pain Score: 5 - Moderate pain  Pain Type:  (right hip and groin and thigh)  Cognition:  Cognition  Overall Cognitive Status: Within Functional Limits  Orientation Level: Oriented X4  Postural Control:     Extremity/Trunk Assessments:    Activity Tolerance:  Activity Tolerance  Endurance: Tolerates 10 - 20 min exercise with multiple rests  Treatments:  Therapeutic Exercise  Therapeutic Exercise Performed: Yes  Therapeutic Exercise Activity 1: long sitting qankle pumps. quad sets, heel slides, x10 reps bilat LE's sitting heel toe  aises, long arc quads and heel slides bilat LE's  x10 reps each. all for stregfntheing and ROM and  pain                   Bed Mobility  Bed Mobility: Yes  Bed Mobility 1  Bed Mobility 1: Supine to sitting  Level of Assistance 1: Contact guard, Minimal verbal cues    Ambulation/Gait Training  Ambulation/Gait Training Performed: Yes  Ambulation/Gait Training 1  Surface 1: Level tile  Device 1: Rolling walker  Assistance 1: Contact guard, Minimal tactile cues, Minimal verbal cues  Comments/Distance (ft) 1: 10ft x1  Transfers  Transfer: Yes  Transfer 1  Transfer to 1: Sit, Stand  Technique 1: Sit to stand, Stand to sit  Transfer Device 1: Walker  Transfer Level of Assistance 1: Contact guard, Minimal verbal cues                Outcome Measures:  [facility] Basic Mobility  Turning from your back to your side while in a flat bed without using bedrails: A little  Moving from lying on your back to sitting on the side of a flat bed without using bedrails: A little  Moving to and from bed to chair (including a wheelchair): A little  Standing up from a chair using your arms (e.g. wheelchair or bedside chair): A little  To walk in hospital room: A little  Climbing 3-5 steps with railing: A lot  Basic Mobility - Total Score: 17    Education Documentation  Handouts, taught by Jory Diaz PTA at 2024  2:37 PM.  Learner: Patient  Readiness: Acceptance  Method: Explanation  Response: Verbalizes Understanding    Precautions, taught by Jory Diaz PTA at 2024  2:37 PM.  Learner: Patient  Readiness: Acceptance  Method: Explanation  Response: Verbalizes Understanding    Body Mechanics, taught by Jory Diaz PTA at 2024  2:37 PM.  Learner: Patient  Readiness: Acceptance  Method: Explanation  Response: Verbalizes Understanding    Home Exercise Program, taught by Jory Diaz PTA at 2024  2:37 PM.  Learner: Patient  Readiness: Acceptance  Method: Explanation  Response: Verbalizes Understanding    Mobility  Training, taught by Jory Diaz PTA at 4/5/2024  2:37 PM.  Learner: Patient  Readiness: Acceptance  Method: Explanation  Response: Verbalizes Understanding    Education Comments  No comments found.        OP EDUCATION:       Encounter Problems       Encounter Problems (Active)       Mobility       3x100 feet with/without use of DME and S/I assist  necessary to initiate return to PLOF  (Progressing)       Start:  04/04/24    Expected End:  04/18/24            X15+ reps ther ex to increase general strength and improve functional independence   (Progressing)       Start:  04/04/24    Expected End:  04/18/24               PT Transfers       Pt will complete all functional transfers with s/I necessary to initiate return to PLOF   (Progressing)       Start:  04/04/24    Expected End:  04/18/24               Pain - Adult          Safety       pt will tolerate 25+ minutes with 1 rest breaks and maintaining O2 sats >88% on baseline room air necessary for improved functional endurance   (Progressing)       Start:  04/04/24    Expected End:  04/18/24                  Encounter Problems (Resolved)       Mobility       Pt will demo at least supervision with all functional mobility and activity as assessed in preparation for homegoing  (Met)       Start:  04/02/24    Expected End:  04/02/24    Resolved:  04/03/24

## 2024-04-05 NOTE — PROGRESS NOTES
Patient: Julio Carranza  Room/bed: 149/149-A  Admitted on: 4/1/2024    Age: 64 y.o.   Gender: male  Code Status:  Full Code   Admitting Dx: Pain of left hip [M25.552]  Osteoarthritis of left hip, unspecified osteoarthritis type [M16.12]    MRN: [id number]  PCP: Mendez Coronel MD       Subjective   Seen and examined in his room this AM. Feels better. Less chest heaviness or work of breathing. + small BM yesterday but abdomen remains distended.       Objective    Physical Exam   Constitutional: A&O x 3; mild anxiety; cooperative  Eyes: EOM's intact  HEENT: Normocephalic, Atraumatic. Oral mucosa moist.   Neck: Supple. No JVD, lymphadenopathy.   Lungs: CTAB with fair air movement. Respirations even and unlabored on 1 liter while at rest.    Heart: RRR  Abdomen: Softly distended, non-tender,+BS  MS/Extremities: BENITEZ with LLE pain. No edema. Peripheral pulses intact bilaterally. Resting tremors of BUE. Left hip anterior dressing is C/D/I. Surrounding edema and some dependent ecchymosis.   Neuro: A&O x3; no focal deficits; gross motor and sensation intact.   Skin: Warm and dry. No rashes or lesions  Psych: Normal affect.      Temp:  [36.4 °C (97.5 °F)] 36.4 °C (97.5 °F)  Heart Rate:  [73-90] 81  Resp:  [16-18] 18  BP: (101-117)/(72-77) 113/72    Vitals:    04/01/24 0616   Weight: 103 kg (227 lb 1.2 oz)     I/Os    Intake/Output Summary (Last 24 hours) at 4/5/2024 1506  Last data filed at 4/5/2024 0700  Gross per 24 hour   Intake 424.5 ml   Output 0 ml   Net 424.5 ml         Labs:   Results from last 72 hours   Lab Units 04/05/24  0539 04/04/24  0441   SODIUM mmol/L 136 137   POTASSIUM mmol/L 3.5 3.7   CHLORIDE mmol/L 101 104   CO2 mmol/L 29 28   BUN mg/dL 15 14   CREATININE mg/dL 0.80 0.75   GLUCOSE mg/dL 105* 102*   CALCIUM mg/dL 8.2* 8.1*   ANION GAP mmol/L 10 9*   EGFR mL/min/1.73m*2 >90 >90        Results from last 72 hours   Lab Units 04/05/24  0539 04/04/24  0441   WBC AUTO x10*3/uL 6.2 6.0   HEMOGLOBIN g/dL 11.4*  11.2*   HEMATOCRIT % 35.1* 34.0*   PLATELETS AUTO x10*3/uL 219 178        Lab Results   Component Value Date    CALCIUM 8.2 (L) 04/05/2024    PHOS 2.5 10/13/2020      Lab Results   Component Value Date    CRP 2.5 (H) 07/14/2023        Micro/ID:   No results found for the last 90 days.    Images:  CTA Chest:  mpression: 1. Occlusive subsegmental left lower lobe pulmonary embolism.  Possible additional subsegmental pulmonary embolism within the left  upper lobe.  2. RV LV ratio measures approximately 1.  3. Mild dependent right lower lobe subsegmental  atelectasis/consolidation. Small right pleural effusion. A component  of infection/inflammation is not excluded.     Echocardiogram:   CONCLUSIONS:   1. Left ventricular systolic function is normal with a 60-65% estimated ejection fraction.   2. Spectral Doppler shows an impaired relaxation pattern of left ventricular diastolic filling.   3. There is mild mitral and tricuspid regurgitation.   4. There is mild dilatation of the aortic root (4.0cm).    Meds    Scheduled medications  [Held by provider] acetaminophen, 650 mg, oral, q6h ANDREI  apixaban, 5 mg, oral, BID  aspirin, 81 mg, oral, BID  atorvastatin, 40 mg, oral, Nightly  cefadroxil, 500 mg, oral, q12h ANDREI  docusate sodium, 100 mg, oral, BID  fluticasone, 2 spray, Each Nostril, BID  losartan, 100 mg, oral, Daily  pantoprazole, 40 mg, oral, Nightly  primidone, 50 mg, oral, Nightly  sennosides, 1 tablet, oral, Nightly  tamsulosin, 0.4 mg, oral, Nightly  [Held by provider] topiramate, 50 mg, oral, BID      Continuous medications  oxygen, 2 L/min, Last Rate: 1 L/min (04/05/24 0931)      PRN medications  PRN medications: HYDROcodone-acetaminophen, HYDROcodone-acetaminophen, ipratropium-albuteroL, ondansetron **OR** ondansetron, promethazine     Assessment and Plan    63 y.o. male with history of coronary artery disease hypertension, hyperlipidemia and GERD is status post left TRINITY.. Consulted for medical management.      Acute Hypoxic Respiratory Failure  -Postop hypoxia with associated GARCIAS  -H/O COPD; former smoker who quit one year ago.  -CT Angio of chest confirmed acute PE  -Heparin gtt initiated on 4/3. Transitioned to Apixaban 5mg BID today.   -Advised Pulmonary hygiene, IS use  -Added Fluticasone  -Will need 3 month course of NOAC.     Left Hip Osteoarthritis  -S/P left TRINITY  by Dr. Ricci on 4/1  -Incisional care, antibiotics, activity restrictions per orthopedics surgery.  -PT/OT to follow. WBAT.   -Medicate for pain  -Maintain bowel regimen  -Advised IS use, mobilization.   -Monitor H&H for ABLA. Hgb stable at 11.4.  -DVT prophylaxis as noted above.   -Afebrile. No leukocytosis.     CV: CAD, HTN  -Home regimen resumed: Aspirin, Lipitor, Losartan   -Vitals stable.   -Echocardiogram: LVEF 60-65% with impaired relaxation of diastolic filling pattern.      Essential Tremor  -Primidone continued     Headaches  -Patient reports he takes Topamax as needed      DVT prophylaxis  -See above.     Fluids/Electrolytes/Nutrition  -Laboratory data reviewed.   -Electrolytes stable.   -No nutritional concerns at this time.       Disposition  -Plan of care discussed with medicine attending, Dr. Arora.   -Cleared for discharge by orthopedics.   -Medically cleared for discharge to SNF today pending precert.   -Medications reviewed and reconciled.   -Please see Discharge summary dated 4/2/24 for hospital course.     REBEKAH Chan-CNP

## 2024-04-05 NOTE — CARE PLAN
The patient's goals for the shift include    Problem: Pain  Goal: Takes deep breaths with improved pain control throughout the shift  Outcome: Progressing  Goal: Turns in bed with improved pain control throughout the shift  Outcome: Progressing  Goal: Walks with improved pain control throughout the shift  Outcome: Progressing  Goal: Performs ADL's with improved pain control throughout shift  Outcome: Progressing  Goal: Participates in PT with improved pain control throughout the shift  Outcome: Progressing  Goal: Free from opioid side effects throughout the shift  Outcome: Progressing  Goal: Free from acute confusion related to pain meds throughout the shift  Outcome: Progressing     Problem: Pain - Adult  Goal: Verbalizes/displays adequate comfort level or baseline comfort level  Outcome: Progressing     Problem: Safety - Adult  Goal: Free from fall injury  Outcome: Progressing     Problem: Discharge Planning  Goal: Discharge to home or other facility with appropriate resources  Outcome: Progressing     Problem: Chronic Conditions and Co-morbidities  Goal: Patient's chronic conditions and co-morbidity symptoms are monitored and maintained or improved  Outcome: Progressing     Problem: Fall/Injury  Goal: Not fall by end of shift  Outcome: Progressing  Goal: Be free from injury by end of the shift  Outcome: Progressing  Goal: Verbalize understanding of personal risk factors for fall in the hospital  Outcome: Progressing  Goal: Verbalize understanding of risk factor reduction measures to prevent injury from fall in the home  Outcome: Progressing  Goal: Use assistive devices by end of the shift  Outcome: Progressing  Goal: Pace activities to prevent fatigue by end of the shift  Outcome: Progressing       The clinical goals for the shift include pain control

## 2024-04-05 NOTE — PROGRESS NOTES
04/05/24 1009   Discharge Planning   Living Arrangements Alone   Support Systems Friends/neighbors   Assistance Needed Patient is A&Ox3, independent with ADL's and uses a cane or rollator for ambulation at baseline, doesn't drive (friends transports), room air at baseline   Type of Residence Private residence   Number of Stairs to Enter Residence 5   Number of Stairs Within Residence 0   Do you have animals or pets at home? No   Who is requesting discharge planning? Provider   Home or Post Acute Services Post acute facilities (Rehab/SNF/etc)   Type of Post Acute Facility Services Rehab;Skilled nursing   Patient expects to be discharged to: Spoke with patient at bedside. Per PT note yesterday patient is recommended for moderate intensity therapy with an AMPAC of 19. Explained to patient he is on the cusp of SNF vs HHC and inquired about what patient's preference was. Per patient he would like to transition to [facility] short term at time of DC before returning home. Referral sent to SNF via [facility]. Awaiting facility response on bed availability. Patient will require pre-cert to transition to SNF.   Does the patient need discharge transport arranged? Yes   RoundTrip coordination needed? Yes   Has discharge transport been arranged? No

## 2024-04-05 NOTE — PROGRESS NOTES
Occupational Therapy    Re-Evaluation    Patient Name: Julio Carranza  MRN: [id number]  Today's Date: 4/5/2024  Time Calculation  Start Time: 1302  Stop Time: 1317  Time Calculation (min): 15 min        Assessment:  OT Assessment: Pt presents with impaired ADL and mobility performance, will benefit from skilled OT services to return to PLOF  Evaluation/Treatment Tolerance: Patient tolerated treatment well  End of Session Communication: Bedside nurse, PCT/NA/CTA, Care Coordinator  End of Session Patient Position: Up in chair, Alarm off, not on at start of session (RN aware of pt alarm status)  OT Assessment Results: Decreased ADL status, Decreased endurance, Decreased functional mobility  Medical Staff Made Aware: Yes  Plan:  Treatment Interventions: Visual perceptual retraining, Functional transfer training, UE strengthening/ROM, Endurance training, Compensatory technique education  OT Frequency: 3 times per week  OT Discharge Recommendations: Moderate intensity level of continued care  OT - OK to Discharge: Yes (per OT POC)  Treatment Interventions: Visual perceptual retraining, Functional transfer training, UE strengthening/ROM, Endurance training, Compensatory technique education    Subjective     General:  General  Reason for Referral: OT re-evaluation completed today after finding of acute PE and reported change in pt'sfunctional abilities.  Family/Caregiver Present: No  Prior to Session Communication: Bedside nurse, Care Coordinator  Patient Position Received: Up in chair, Alarm off, not on at start of session  General Comment: Sitting in chair following recent ambulation to the bathroom and PT treatment. Declined OT session earlier in the day due to increased pain and recent administration  of pain meds. Reluctantly agreeable this afternoon  Precautions:  LE Weight Bearing Status: Weight Bearing as Tolerated  Medical Precautions: Fall precautions (O2, tele)    Pain:  Pain Assessment  Pain Assessment:  0-10  Pain Score: 6  Pain Location: Hip  Pain Orientation: Left    Objective   Cognition:  Overall Cognitive Status: Within Functional Limits           Home Living:  Type of Home: Apartment  Lives With: Alone  Home Adaptive Equipment: Cane, Walker rolling or standard  Home Layout: One level  Home Access: Stairs to enter with rails  Entrance Stairs-Rails: Both  Entrance Stairs-Number of Steps: 4  Bathroom Shower/Tub: Walk-in shower  Prior Function:  ADL Assistance: Independent  Homemaking Assistance: Independent  Ambulatory Assistance: Independent (cane vs rollator)    ADL:  Eating Assistance: Independent  Grooming Assistance: Independent (seated with setup)  Bathing Assistance: Minimal  UE Dressing Assistance: Modified independent (Device) (seated)  LE Dressing Assistance: Minimal  LE Dressing Deficit:  (Rain for seated activity, will likely require additional assistance- at least ModA for standing tasks)  Toileting Assistance with Device: Minimal    Bed Mobility/Transfers: Transfers  Transfer: Yes  Transfer 1  Trials/Comments 1: Partial sit<>stand achieved with CG, pt impulsively returns to sitting prior to reaching full stand and declines additional attempts      Functional Mobility:  Functional Mobility  Functional Mobility Performed:  (Pt declines due to increased pain and reported chest tightness- communicated with RN)     Sensation:  Light Touch: No apparent deficits  Strength:  Strength Comments: B UE functionally 3/5, light resisance tolerated with MMT due to reported mid chest discomfort    Extremities: RUE   RUE : Within Functional Limits and LUE   LUE: Within Functional Limits      Outcome Measures:[facility] Daily Activity  Putting on and taking off regular lower body clothing: A lot  Bathing (including washing, rinsing, drying): A little  Putting on and taking off regular upper body clothing: A little  Toileting, which includes using toilet, bedpan or urinal: A little  Taking care of personal grooming such  as brushing teeth: A little  Eating Meals: None  Daily Activity - Total Score: 18        Education Documentation  ADL Training, taught by Veronica Rea OT at 4/5/2024  1:30 PM.  Learner: Patient  Readiness: Acceptance  Method: Explanation, Demonstration  Response: Needs Reinforcement    Goals:  Encounter Problems       Encounter Problems (Active)       OT Goals       Pt will demo functional transfers to/ from EOB, chair and commode mod I and LRD (Progressing)       Start:  04/05/24    Expected End:  04/19/24            Pt will demo functional mobility necessary to complete ADL routine mod I and LRD (Progressing)       Start:  04/05/24    Expected End:  04/19/24            Pt will demo ADL routine and meaningful daily activities indep using modifications as needed  (Progressing)       Start:  04/05/24    Expected End:  04/19/24            Pt will demo and/or verbalize 2-3 energy conservation techniques to incorporate into functional mobility or ADL to improve performance and increase independence during this LOS.  (Progressing)       Start:  04/05/24    Expected End:  04/19/24            Pt will demo improved activity tolerance evidenced by participation in BADL and/or functional mobility x10 mins with </=1 rest break.   (Progressing)       Start:  04/05/24    Expected End:  04/19/24

## 2024-04-06 VITALS
RESPIRATION RATE: 16 BRPM | WEIGHT: 227.07 LBS | DIASTOLIC BLOOD PRESSURE: 75 MMHG | BODY MASS INDEX: 29.14 KG/M2 | TEMPERATURE: 97.7 F | HEIGHT: 74 IN | SYSTOLIC BLOOD PRESSURE: 115 MMHG | OXYGEN SATURATION: 90 % | HEART RATE: 74 BPM

## 2024-04-06 PROCEDURE — 2500000001 HC RX 250 WO HCPCS SELF ADMINISTERED DRUGS (ALT 637 FOR MEDICARE OP): Performed by: NURSE PRACTITIONER

## 2024-04-06 PROCEDURE — 97110 THERAPEUTIC EXERCISES: CPT | Mod: CQ,GP

## 2024-04-06 PROCEDURE — 97116 GAIT TRAINING THERAPY: CPT | Mod: CQ,GP

## 2024-04-06 PROCEDURE — 99231 SBSQ HOSP IP/OBS SF/LOW 25: CPT | Performed by: NURSE PRACTITIONER

## 2024-04-06 RX ORDER — LACTULOSE 10 G/15ML
20 SOLUTION ORAL DAILY
Status: DISCONTINUED | OUTPATIENT
Start: 2024-04-06 | End: 2024-04-06 | Stop reason: HOSPADM

## 2024-04-06 RX ORDER — ADHESIVE BANDAGE
30 BANDAGE TOPICAL DAILY PRN
Status: DISCONTINUED | OUTPATIENT
Start: 2024-04-06 | End: 2024-04-06

## 2024-04-06 RX ORDER — OXYCODONE AND ACETAMINOPHEN 5; 325 MG/1; MG/1
1 TABLET ORAL EVERY 4 HOURS PRN
Qty: 18 TABLET | Refills: 0 | Status: SHIPPED | OUTPATIENT
Start: 2024-04-06 | End: 2024-04-09

## 2024-04-06 RX ORDER — MAGNESIUM HYDROXIDE 2400 MG/10ML
10 SUSPENSION ORAL DAILY PRN
Status: DISCONTINUED | OUTPATIENT
Start: 2024-04-06 | End: 2024-04-06 | Stop reason: HOSPADM

## 2024-04-06 RX ADMIN — APIXABAN 5 MG: 5 TABLET, FILM COATED ORAL at 08:45

## 2024-04-06 RX ADMIN — LACTULOSE 20 G: 20 SOLUTION ORAL at 10:22

## 2024-04-06 RX ADMIN — FLUTICASONE PROPIONATE 2 SPRAY: 50 SPRAY, METERED NASAL at 08:46

## 2024-04-06 RX ADMIN — ASPIRIN 81 MG: 81 TABLET, COATED ORAL at 08:45

## 2024-04-06 RX ADMIN — DOCUSATE SODIUM 100 MG: 100 CAPSULE, LIQUID FILLED ORAL at 08:45

## 2024-04-06 RX ADMIN — HYDROCODONE BITARTRATE AND ACETAMINOPHEN 2 TABLET: 5; 325 TABLET ORAL at 14:55

## 2024-04-06 RX ADMIN — HYDROCODONE BITARTRATE AND ACETAMINOPHEN 1 TABLET: 5; 325 TABLET ORAL at 08:27

## 2024-04-06 RX ADMIN — HYDROCODONE BITARTRATE AND ACETAMINOPHEN 1 TABLET: 5; 325 TABLET ORAL at 02:35

## 2024-04-06 RX ADMIN — CEFADROXIL 500 MG: 500 CAPSULE ORAL at 08:45

## 2024-04-06 RX ADMIN — LOSARTAN POTASSIUM 100 MG: 50 TABLET, FILM COATED ORAL at 08:45

## 2024-04-06 ASSESSMENT — COGNITIVE AND FUNCTIONAL STATUS - GENERAL
CLIMB 3 TO 5 STEPS WITH RAILING: A LOT
MOVING TO AND FROM BED TO CHAIR: A LITTLE
WALKING IN HOSPITAL ROOM: A LITTLE
STANDING UP FROM CHAIR USING ARMS: A LITTLE
MOVING FROM LYING ON BACK TO SITTING ON SIDE OF FLAT BED WITH BEDRAILS: A LITTLE
MOBILITY SCORE: 17
MOVING TO AND FROM BED TO CHAIR: A LITTLE
MOBILITY SCORE: 19
CLIMB 3 TO 5 STEPS WITH RAILING: A LOT
TURNING FROM BACK TO SIDE WHILE IN FLAT BAD: A LITTLE
DAILY ACTIVITIY SCORE: 24
WALKING IN HOSPITAL ROOM: A LITTLE
STANDING UP FROM CHAIR USING ARMS: A LITTLE

## 2024-04-06 ASSESSMENT — PAIN SCALES - GENERAL
PAINLEVEL_OUTOF10: 7
PAINLEVEL_OUTOF10: 6
PAINLEVEL_OUTOF10: 4

## 2024-04-06 ASSESSMENT — PAIN - FUNCTIONAL ASSESSMENT
PAIN_FUNCTIONAL_ASSESSMENT: 0-10

## 2024-04-06 ASSESSMENT — PAIN DESCRIPTION - ORIENTATION
ORIENTATION: LEFT
ORIENTATION: LEFT

## 2024-04-06 ASSESSMENT — PAIN DESCRIPTION - LOCATION
LOCATION: HIP
LOCATION: HIP

## 2024-04-06 NOTE — PROGRESS NOTES
Patient: Julio Carranza  Room/bed: 149/149-A  Admitted on: 4/1/2024    Age: 64 y.o.   Gender: male  Code Status:  Full Code   Admitting Dx: Pain of left hip [M25.552]  Osteoarthritis of left hip, unspecified osteoarthritis type [M16.12]    MRN: [id number]  PCP: Mendez Coronel MD       Subjective   Seen and examined in his room this AM. Feels better. Less chest heaviness or work of breathing. Passing gas. No BM in past 24 hours.     Objective    Physical Exam   Constitutional: A&O x 3; mild anxiety; cooperative  Eyes: EOM's intact  HEENT: Normocephalic, Atraumatic. Oral mucosa moist.   Neck: Supple. No JVD, lymphadenopathy.   Lungs: CTAB with fair air movement. Respirations even and unlabored on room air while at rest.    Heart: RRR  Abdomen: Softly distended, non-tender,+BS  MS/Extremities: BENITEZ with LLE pain. No edema. Peripheral pulses intact bilaterally. Resting tremors of BUE. Left hip anterior dressing is C/D/I. Surrounding edema and some dependent ecchymosis.   Neuro: A&O x3; no focal deficits; gross motor and sensation intact.   Skin: Warm and dry. No rashes or lesions  Psych: Normal affect.      Temp:  [36 °C (96.8 °F)-37.2 °C (99 °F)] 36.5 °C (97.7 °F)  Heart Rate:  [74-81] 74  Resp:  [16-18] 16  BP: (111-120)/(70-77) 115/75    Vitals:    04/01/24 1105   Weight: 103 kg (227 lb 1.2 oz)     I/Os    Intake/Output Summary (Last 24 hours) at 4/6/2024 1041  Last data filed at 4/6/2024 0545  Gross per 24 hour   Intake 210 ml   Output --   Net 210 ml         Labs:   Results from last 72 hours   Lab Units 04/05/24  0539 04/04/24  0441   SODIUM mmol/L 136 137   POTASSIUM mmol/L 3.5 3.7   CHLORIDE mmol/L 101 104   CO2 mmol/L 29 28   BUN mg/dL 15 14   CREATININE mg/dL 0.80 0.75   GLUCOSE mg/dL 105* 102*   CALCIUM mg/dL 8.2* 8.1*   ANION GAP mmol/L 10 9*   EGFR mL/min/1.73m*2 >90 >90        Results from last 72 hours   Lab Units 04/05/24  0539 04/04/24  0441   WBC AUTO x10*3/uL 6.2 6.0   HEMOGLOBIN g/dL 11.4* 11.2*    HEMATOCRIT % 35.1* 34.0*   PLATELETS AUTO x10*3/uL 219 178        Lab Results   Component Value Date    CALCIUM 8.2 (L) 04/05/2024    PHOS 2.5 10/13/2020      Lab Results   Component Value Date    CRP 2.5 (H) 07/14/2023        Micro/ID:   No results found for the last 90 days.    Images:  CTA Chest:  mpression: 1. Occlusive subsegmental left lower lobe pulmonary embolism.  Possible additional subsegmental pulmonary embolism within the left  upper lobe.  2. RV LV ratio measures approximately 1.  3. Mild dependent right lower lobe subsegmental  atelectasis/consolidation. Small right pleural effusion. A component  of infection/inflammation is not excluded.     Echocardiogram:   CONCLUSIONS:   1. Left ventricular systolic function is normal with a 60-65% estimated ejection fraction.   2. Spectral Doppler shows an impaired relaxation pattern of left ventricular diastolic filling.   3. There is mild mitral and tricuspid regurgitation.   4. There is mild dilatation of the aortic root (4.0cm).    Meds    Scheduled medications  [Held by provider] acetaminophen, 650 mg, oral, q6h ANDREI  apixaban, 5 mg, oral, BID  aspirin, 81 mg, oral, BID  atorvastatin, 40 mg, oral, Nightly  cefadroxil, 500 mg, oral, q12h ANDREI  docusate sodium, 100 mg, oral, BID  fluticasone, 2 spray, Each Nostril, BID  lactulose, 20 g, oral, Daily  losartan, 100 mg, oral, Daily  pantoprazole, 40 mg, oral, Nightly  primidone, 50 mg, oral, Nightly  sennosides, 1 tablet, oral, Nightly  tamsulosin, 0.4 mg, oral, Nightly  [Held by provider] topiramate, 50 mg, oral, BID      Continuous medications  oxygen, 2 L/min, Last Rate: 1 L/min (04/05/24 0931)      PRN medications  PRN medications: HYDROcodone-acetaminophen, HYDROcodone-acetaminophen, ipratropium-albuteroL, magnesium hydroxide, ondansetron **OR** ondansetron, promethazine     Assessment and Plan    63 y.o. male with history of coronary artery disease hypertension, hyperlipidemia and GERD is status post left  TRINITY.. Consulted for medical management.     Acute Hypoxic Respiratory Failure - Improved.   -Postop hypoxia with associated GARCIAS  -H/O COPD; former smoker who quit one year ago.  -CT Angio of chest confirmed acute PE  -Heparin gtt initiated on 4/3. Transitioned to Apixaban 5mg BID.   -Advised Pulmonary hygiene, IS use  -Added Fluticasone  -Will need 3 month course of NOAC.     Left Hip Osteoarthritis  -S/P left TRINITY  by Dr. Ricci on 4/1  -Incisional care, antibiotics, activity restrictions per orthopedics surgery.  -PT/OT to follow. WBAT.   -Medicate for pain  -Maintain bowel regimen  -Advised IS use, mobilization.   -Monitor H&H for ABLA. Hgb stable at 9.4.  -DVT prophylaxis as noted above.   -Afebrile. No leukocytosis.     CV: CAD, HTN  -Home regimen resumed: Aspirin, Lipitor, Losartan   -Vitals stable.   -Echocardiogram: LVEF 60-65% with impaired relaxation of diastolic filling pattern.      Essential Tremor  -Primidone continued     Headaches  -Patient reports he takes Topamax as needed      DVT prophylaxis  -See above.     Fluids/Electrolytes/Nutrition  -Laboratory data reviewed.   -Electrolytes stable.   -No nutritional concerns at this time.       Disposition  -Plan of care discussed with medicine attending, Dr. Fuentes.   -Cleared for discharge by orthopedics.   -Medically cleared for discharge to SNF today pending precert.   -Medications reviewed and reconciled. Rx provided for Oxycodone at facility. OARRS reviewed.   -Please see Discharge summary dated 4/2/24 for hospital course.     REBEKAH Chan-CNP

## 2024-04-06 NOTE — PROGRESS NOTES
04/06/24 0800   Discharge Planning   Patient expects to be discharged to: Informed by discharge support center that pre-cert has returned for this patient to transition to [facility]. The Provider Certification Order requires cosignature. Attending provider notified of need for The Provider Certification Order cosignature. Once completed, transport will be arranged for patient to discharge to SNF.   Does the patient need discharge transport arranged? Yes   RoundTrip coordination needed? Yes   Has discharge transport been arranged? No   What day is the transport expected? 04/06/24

## 2024-04-06 NOTE — PROGRESS NOTES
04/06/24 1100   Discharge Planning   Patient expects to be discharged to: Patient and bedside nurse aware of transport time via [facility] stretcher: 12:30 PM confirmed time.   Does the patient need discharge transport arranged? Yes   RoundTrip coordination needed? Yes   Has discharge transport been arranged? Yes   What day is the transport expected? 04/06/24   What time is the transport expected? [id number]

## 2024-04-06 NOTE — PROGRESS NOTES
Physical Therapy    Physical Therapy Treatment    Patient Name: Julio Carranza  MRN: [id number]  Today's Date: 4/6/2024  Time Calculation  Start Time: 0900  Stop Time: 0931  Time Calculation (min): 31 min       Assessment/Plan   PT Assessment  PT Assessment Results: Decreased strength, Decreased endurance, Impaired balance, Decreased mobility  Rehab Prognosis: Good  End of Session Communication: Bedside nurse  PT Plan  Inpatient/Swing Bed or Outpatient: Inpatient  PT Plan  Treatment/Interventions: Bed mobility, Transfer training, Gait training, Balance training, Strengthening  PT Plan: Skilled PT  PT Frequency: 5 times per week  PT Discharge Recommendations: Moderate intensity level of continued care  Equipment Recommended upon Discharge: Wheeled walker  PT Recommended Transfer Status: Assist x1  PT - OK to Discharge: Yes      General Visit Information:   PT  Visit  PT Received On: 04/06/24  General  Past Medical History Relevant to Rehab: PMH: COPD, CAD, HTN, emphysema, IBS, anxiety  Family/Caregiver Present: No  Prior to Session Communication: Bedside nurse  Patient Position Received: Bed, 2 rail up, Alarm on  General Comment: Pt in bed upon arrival, verified name and agreeable to therapy. He wishes to use the restroom and then agreeable to exercises.    Subjective   Precautions:     Vital Signs:  Vital Signs  SpO2: 90 % (monitored throughout session, Lowest on 0L o2 90%. 90%-98% throughout session.)    Objective   Pain:  Pain Assessment  Pain Assessment: 0-10  Pain Score:  (Pain not rated this date. Patient verbalizes pain in hip.)  Pain Type: Surgical pain  Pain Location: Hip  Pain Orientation: Left  Cognition:     Postural Control:  Static Standing Balance  Static Standing-Balance Support: No upper extremity supported  Static Standing-Level of Assistance: Contact guard  Static Standing-Comment/Number of Minutes: 30 second trials x3 with occasional UE support as needed  Extremity/Trunk Assessments:  Activity  Tolerance:     Treatments:  Therapeutic Exercise  Therapeutic Exercise Performed: Yes  Therapeutic Exercise Activity 1: Seated in chair HR/TR, heel slides with towel under foot, LAQ with towel roll under distal thigh, glue squeezes x15 each                             Bed Mobility  Bed Mobility: Yes  Bed Mobility 1  Bed Mobility 1: Supine to sitting  Level of Assistance 1: Minimum assistance (of LLE for guidance)  Bed Mobility Comments 1: verbal cues required for technique improvement.    Ambulation/Gait Training  Ambulation/Gait Training Performed: Yes  Ambulation/Gait Training 1  Surface 1: Level tile  Device 1: Rolling walker  Assistance 1: Contact guard, Minimal tactile cues, Minimal verbal cues  Quality of Gait 1: Decreased step length, Shuffling gait (d/t pain)  Comments/Distance (ft) 1: 10'x2  Transfers  Transfer: Yes  Transfer 1  Transfer From 1: Stand to, Toilet to, Sit to  Transfer to 1: Toilet, Chair without arms  Technique 1: Sit to stand, Stand to sit  Transfer Device 1: Walker  Transfer Level of Assistance 1: Contact guard, Minimal verbal cues  Trials/Comments 1: VC for LLE management with pain and ensuring hip precautions remain in place                     Outcome Measures:  [facility] Basic Mobility  Turning from your back to your side while in a flat bed without using bedrails: A little  Moving from lying on your back to sitting on the side of a flat bed without using bedrails: A little  Moving to and from bed to chair (including a wheelchair): A little  Standing up from a chair using your arms (e.g. wheelchair or bedside chair): A little  To walk in hospital room: A little  Climbing 3-5 steps with railing: A lot  Basic Mobility - Total Score: 17    Education Documentation  Handouts, taught by Meggan Patrick PTA at 4/6/2024 10:17 AM.  Learner: Patient  Readiness: Acceptance  Method: Explanation  Response: Verbalizes Understanding    Precautions, taught by Meggan Patrick PTA at 4/6/2024 10:17 AM.  Learner:  Patient  Readiness: Acceptance  Method: Explanation  Response: Verbalizes Understanding    Body Mechanics, taught by Meggan Patrick PTA at 4/6/2024 10:17 AM.  Learner: Patient  Readiness: Acceptance  Method: Explanation  Response: Verbalizes Understanding    Home Exercise Program, taught by Meggan Patrick PTA at 4/6/2024 10:17 AM.  Learner: Patient  Readiness: Acceptance  Method: Explanation  Response: Verbalizes Understanding    Mobility Training, taught by Meggan Patrick PTA at 4/6/2024 10:17 AM.  Learner: Patient  Readiness: Acceptance  Method: Explanation  Response: Verbalizes Understanding    Education Comments  No comments found.        OP EDUCATION:       Encounter Problems       Encounter Problems (Active)       Mobility       3x100 feet with/without use of DME and S/I assist  necessary to initiate return to PLOF  (Progressing)       Start:  04/04/24    Expected End:  04/18/24            X15+ reps ther ex to increase general strength and improve functional independence   (Met)       Start:  04/04/24    Expected End:  04/18/24    Resolved:  04/06/24            PT Transfers       Pt will complete all functional transfers with s/I necessary to initiate return to PLOF   (Progressing)       Start:  04/04/24    Expected End:  04/18/24               Pain - Adult          Safety       pt will tolerate 25+ minutes with 1 rest breaks and maintaining O2 sats >88% on baseline room air necessary for improved functional endurance   (Progressing)       Start:  04/04/24    Expected End:  04/18/24                  Encounter Problems (Resolved)       Mobility       Pt will demo at least supervision with all functional mobility and activity as assessed in preparation for homegoing  (Met)       Start:  04/02/24    Expected End:  04/02/24    Resolved:  04/03/24

## 2024-04-06 NOTE — CARE PLAN
Problem: Pain  Goal: Takes deep breaths with improved pain control throughout the shift  Outcome: Progressing  Goal: Turns in bed with improved pain control throughout the shift  Outcome: Progressing  Goal: Walks with improved pain control throughout the shift  Outcome: Progressing  Goal: Performs ADL's with improved pain control throughout shift  Outcome: Progressing  Goal: Participates in PT with improved pain control throughout the shift  Outcome: Progressing  Goal: Free from opioid side effects throughout the shift  Outcome: Progressing  Goal: Free from acute confusion related to pain meds throughout the shift  Outcome: Progressing     Problem: Pain - Adult  Goal: Verbalizes/displays adequate comfort level or baseline comfort level  Outcome: Progressing     Problem: Safety - Adult  Goal: Free from fall injury  Outcome: Progressing     Problem: Discharge Planning  Goal: Discharge to home or other facility with appropriate resources  Outcome: Progressing     Problem: Chronic Conditions and Co-morbidities  Goal: Patient's chronic conditions and co-morbidity symptoms are monitored and maintained or improved  Outcome: Progressing     Problem: Fall/Injury  Goal: Not fall by end of shift  Outcome: Progressing  Goal: Be free from injury by end of the shift  Outcome: Progressing  Goal: Verbalize understanding of personal risk factors for fall in the hospital  Outcome: Progressing  Goal: Verbalize understanding of risk factor reduction measures to prevent injury from fall in the home  Outcome: Progressing  Goal: Use assistive devices by end of the shift  Outcome: Progressing  Goal: Pace activities to prevent fatigue by end of the shift  Outcome: Progressing   The patient's goals for the shift include  having a BM    The clinical goals for the shift include Pain control    Over the shift, the patient did not make progress toward the following goals. Barriers to progression include pain. Recommendations to address these  barriers include pain meds, cold application, limb evelation, and rest.

## 2024-04-06 NOTE — CARE PLAN
The patient's goals for the shift include      The clinical goals for the shift include pain control    Problem: Pain  Goal: Takes deep breaths with improved pain control throughout the shift  Outcome: Progressing

## 2024-04-09 LAB
LABORATORY COMMENT REPORT: NORMAL
PATH REPORT.FINAL DX SPEC: NORMAL
PATH REPORT.GROSS SPEC: NORMAL
PATH REPORT.RELEVANT HX SPEC: NORMAL
PATH REPORT.TOTAL CANCER: NORMAL

## 2024-04-13 LAB
ATRIAL RATE: 76 BPM
P AXIS: 29 DEGREES
P OFFSET: 185 MS
P ONSET: 136 MS
PR INTERVAL: 178 MS
Q ONSET: 225 MS
QRS COUNT: 12 BEATS
QRS DURATION: 92 MS
QT INTERVAL: 412 MS
QTC CALCULATION(BAZETT): 463 MS
QTC FREDERICIA: 445 MS
R AXIS: 7 DEGREES
T AXIS: 28 DEGREES
T OFFSET: 431 MS
VENTRICULAR RATE: 76 BPM

## 2024-04-18 ENCOUNTER — HOSPITAL ENCOUNTER (OUTPATIENT)
Dept: RADIOLOGY | Facility: CLINIC | Age: 64
Discharge: HOME | End: 2024-04-18
Payer: MEDICARE

## 2024-04-18 ENCOUNTER — OFFICE VISIT (OUTPATIENT)
Dept: ORTHOPEDIC SURGERY | Facility: CLINIC | Age: 64
End: 2024-04-18
Payer: MEDICARE

## 2024-04-18 DIAGNOSIS — M25.552 LEFT HIP PAIN: ICD-10-CM

## 2024-04-18 PROCEDURE — 1157F ADVNC CARE PLAN IN RCRD: CPT | Performed by: ORTHOPAEDIC SURGERY

## 2024-04-18 PROCEDURE — 73502 X-RAY EXAM HIP UNI 2-3 VIEWS: CPT | Mod: LT

## 2024-04-18 PROCEDURE — 1111F DSCHRG MED/CURRENT MED MERGE: CPT | Performed by: ORTHOPAEDIC SURGERY

## 2024-04-18 PROCEDURE — 99024 POSTOP FOLLOW-UP VISIT: CPT | Performed by: ORTHOPAEDIC SURGERY

## 2024-04-18 PROCEDURE — 1160F RVW MEDS BY RX/DR IN RCRD: CPT | Performed by: ORTHOPAEDIC SURGERY

## 2024-04-18 PROCEDURE — 1159F MED LIST DOCD IN RCRD: CPT | Performed by: ORTHOPAEDIC SURGERY

## 2024-04-18 NOTE — PROGRESS NOTES
Chief Complaint   Patient presents with    Left Hip - Post-op     4/1/24 LT TRINITY         This is a 64 y.o. male who is 2 weeks out from left total hip.  Pain is under control with current medications.  No drainage from his incision no fevers or chills.  Progressing well with physical therapy and appropriately improving in function.  No other new issues or symptoms.  Patient is at a rehab facility.  He had a PE and is on blood thinners for that.    Left hip examination: Surgical incision well approximated no erythema no drainage.  no pain with range of motion neurovascular tact distally    X-rays of the hip were reviewed independently interpreted by me today, the show stable total hip arthroplasty no fracture dislocation or loosening    Impression plan: 64 y.o. male 2 weeks out from left total hip.  We discussed continuing physical therapy and home exercise program. Pain medications to be used as needed. Assistive devices as needed per PT. We discussed DVT prophylaxis until 6 weeks. No dentist until 3 months and thereafter dental prophylaxis for life. Activity as tolerated weightbearing as tolerated, hip precautions until 3 months. I have personally reviewed the OARRS report for the patient. This report is scanned into the electronic medical record. I have considered the risks of abuse, dependence, addiction and diversion. Follow up 4 weeks with xrays left.  His rehab is little slow, he is afraid to put weight on it.  Advised him that he should proceed with rehab, reassured him that he can put weight on his hip.  X-ray looks great I will see him back in 4 weeks

## 2024-05-13 ENCOUNTER — TELEPHONE (OUTPATIENT)
Dept: PRIMARY CARE | Facility: CLINIC | Age: 64
End: 2024-05-13

## 2024-05-13 ENCOUNTER — OFFICE VISIT (OUTPATIENT)
Dept: PRIMARY CARE | Facility: CLINIC | Age: 64
End: 2024-05-13
Payer: MEDICARE

## 2024-05-13 VITALS
WEIGHT: 218.8 LBS | TEMPERATURE: 97.1 F | DIASTOLIC BLOOD PRESSURE: 84 MMHG | HEART RATE: 55 BPM | OXYGEN SATURATION: 96 % | BODY MASS INDEX: 28.08 KG/M2 | SYSTOLIC BLOOD PRESSURE: 120 MMHG

## 2024-05-13 DIAGNOSIS — Z96.642 S/P TOTAL LEFT HIP ARTHROPLASTY: ICD-10-CM

## 2024-05-13 DIAGNOSIS — E78.5 DYSLIPIDEMIA: ICD-10-CM

## 2024-05-13 DIAGNOSIS — I26.99 ACUTE PULMONARY EMBOLISM WITHOUT ACUTE COR PULMONALE, UNSPECIFIED PULMONARY EMBOLISM TYPE (MULTI): Primary | ICD-10-CM

## 2024-05-13 DIAGNOSIS — E78.00 HYPERCHOLESTEREMIA: ICD-10-CM

## 2024-05-13 DIAGNOSIS — E55.9 VITAMIN D DEFICIENCY, UNSPECIFIED: ICD-10-CM

## 2024-05-13 DIAGNOSIS — F17.219 CIGARETTE NICOTINE DEPENDENCE WITH NICOTINE-INDUCED DISORDER: ICD-10-CM

## 2024-05-13 DIAGNOSIS — J43.9 PULMONARY EMPHYSEMA, UNSPECIFIED EMPHYSEMA TYPE (MULTI): ICD-10-CM

## 2024-05-13 DIAGNOSIS — K21.9 GASTROESOPHAGEAL REFLUX DISEASE WITHOUT ESOPHAGITIS: ICD-10-CM

## 2024-05-13 DIAGNOSIS — I25.10 ASCVD (ARTERIOSCLEROTIC CARDIOVASCULAR DISEASE): ICD-10-CM

## 2024-05-13 PROCEDURE — 1157F ADVNC CARE PLAN IN RCRD: CPT | Performed by: INTERNAL MEDICINE

## 2024-05-13 PROCEDURE — 1160F RVW MEDS BY RX/DR IN RCRD: CPT | Performed by: INTERNAL MEDICINE

## 2024-05-13 PROCEDURE — 99496 TRANSJ CARE MGMT HIGH F2F 7D: CPT | Performed by: INTERNAL MEDICINE

## 2024-05-13 PROCEDURE — 4004F PT TOBACCO SCREEN RCVD TLK: CPT | Performed by: INTERNAL MEDICINE

## 2024-05-13 PROCEDURE — 1159F MED LIST DOCD IN RCRD: CPT | Performed by: INTERNAL MEDICINE

## 2024-05-13 RX ORDER — WARFARIN 2.5 MG/1
TABLET ORAL
Qty: 30 TABLET | Refills: 0 | Status: SHIPPED | OUTPATIENT
Start: 2024-05-13 | End: 2024-05-20 | Stop reason: SDUPTHER

## 2024-05-13 RX ORDER — OMEPRAZOLE 40 MG/1
40 CAPSULE, DELAYED RELEASE ORAL NIGHTLY
COMMUNITY
Start: 2024-05-13

## 2024-05-13 RX ORDER — ATORVASTATIN CALCIUM 40 MG/1
40 TABLET, FILM COATED ORAL NIGHTLY
COMMUNITY
Start: 2024-05-13

## 2024-05-13 ASSESSMENT — ENCOUNTER SYMPTOMS
DIARRHEA: 0
UNEXPECTED WEIGHT CHANGE: 0
BRUISES/BLEEDS EASILY: 0
FEVER: 0
BLOOD IN STOOL: 0
HEADACHES: 0
DIZZINESS: 0
PALPITATIONS: 0
SORE THROAT: 0
ABDOMINAL PAIN: 0
DIFFICULTY URINATING: 0
WHEEZING: 0
SINUS PAIN: 0
ARTHRALGIAS: 1
FATIGUE: 0
COUGH: 0

## 2024-05-13 NOTE — PROGRESS NOTES
Subjective   Patient ID: Julio Carranza is a 64 y.o. male who presents for Hospital Follow-up (Patient has been out of Eliquis for a week, insurance would not pay, has not called  yet).    Transition of care  Patient admission history and physical, hospital course, medications, verified and reviewed  Patient contacted after discharge, medications verified comes today for follow-up    Inpatient facility: [facility] diagnosis: Acute PE  Discharged to: Home  Discharge date: 5/8/24  Initial Call date: 5/9/24  Spoke with patient/caregiver: PT                                                                      Do you need assistance  visits prior to your PCP visit: No  Home health care ordered: No  Have you been contacted by home care and have a start of care date: No  Are you taking medications as prescribed at discharge: Yes     Referral to [facility] Pharmacist: No  Patient advised to bring all medications to PCP follow-up appointment.  Patient advised to follow discharge instructions until provider follow-up.  TCM visit date: 5/13/24  TCM provider visit with: Mendez Coronel MD    Patient underwent left hip surgery required extended rehab patient developed left lower lobe pulmonary embolism treated with Eliquis on discharge 1 week ago from rehab patient insurance denied his Eliquis patient contacted rehab for change advised him to contact Dr. Ricci his orthopedic surgeon patient was not able to contact them and he opted to wait until today's appointment  Patient without any anticoagulation for 1 week denies any shortness of breath or shortness of breath    Follow-up  -Status post left hip arthroplasty continue monitoring with physical therapy follow-up orthopedic surgery  - Status post left pulmonary embolism need to be anticoagulated for 3 months patient unable to afford Eliquis started on warfarin today 10 mg today then 7.5 mg tomorrow then 5 mg daily until reevaluation by  "INR clinic with the pharmacy in [place] given referral today contact office for any need to scheduling  - Hidradenitis now resolving with antibiotic no recurrence  -Large umbilical hernia obstruction no gangrene patient counseled about smoking cessation weight loss  -Lung CT low-dose showed lung nodule repeat in 1 year repeat in September 2024  - Nicotine dependency counseled about smoking cessation  \"I spent 3 minutes counseling patient about need for smoking/tobacco cessation and how I can support efforts when patient is ready to quit.  Discussed nicotine replacement therapy, Varenicline, Bupropion, hypnosis, support groups, and accupunture as potential options.  Patient currently has no signs or symptoms of tobacco related disease.\".  -Hypertension improving continue with current medication- Dyslipidemia continue low-carb diet  - Coronary artery disease compensated continue with aspirin daily counseled about nicotine cessation  Follow-up 3 months             Review of Systems   Constitutional:  Negative for fatigue, fever and unexpected weight change.   HENT:  Negative for congestion, ear discharge, ear pain, mouth sores, sinus pain and sore throat.    Eyes:  Negative for visual disturbance.   Respiratory:  Negative for cough and wheezing.    Cardiovascular:  Negative for chest pain, palpitations and leg swelling.   Gastrointestinal:  Negative for abdominal pain, blood in stool and diarrhea.   Genitourinary:  Negative for difficulty urinating.   Musculoskeletal:  Positive for arthralgias.   Skin:  Negative for rash.   Neurological:  Negative for dizziness and headaches.   Hematological:  Does not bruise/bleed easily.   Psychiatric/Behavioral:  Negative for behavioral problems.    All other systems reviewed and are negative.      Objective   Lab Results   Component Value Date    HGBA1C 5.2 06/18/2022      /84   Pulse 55   Temp 36.2 °C (97.1 °F)   Wt 99.2 kg (218 lb 12.8 oz)   SpO2 96%   BMI 28.08 kg/m² "     Physical Exam  Vitals and nursing note reviewed.   Constitutional:       Appearance: Normal appearance.   HENT:      Head: Normocephalic.      Nose: Nose normal.   Eyes:      Conjunctiva/sclera: Conjunctivae normal.      Pupils: Pupils are equal, round, and reactive to light.   Cardiovascular:      Rate and Rhythm: Regular rhythm.   Pulmonary:      Effort: Pulmonary effort is normal.      Breath sounds: Normal breath sounds.   Abdominal:      General: Abdomen is flat.      Palpations: Abdomen is soft.   Musculoskeletal:         General: Tenderness (Left hip arthroplasty incisions healing) present.      Cervical back: Neck supple.   Skin:     General: Skin is warm.   Neurological:      General: No focal deficit present.      Mental Status: He is oriented to person, place, and time.   Psychiatric:         Mood and Affect: Mood normal.         Assessment/Plan   Julio was seen today for hospital follow-up.  Diagnoses and all orders for this visit:  Acute pulmonary embolism without acute cor pulmonale, unspecified pulmonary embolism type (Multi) (Primary)  -     Referral to Anticoagulation-Warfarin Monitoring-Pharmacist Clinic  -     warfarin (Coumadin) 2.5 mg tablet; Take 4 tablets today then 3 tablets tomorrow then 2 tablets daily until follow-up INR with the clinical pharmacy for further recommendation  S/P total left hip arthroplasty  ASCVD (arteriosclerotic cardiovascular disease)  Pulmonary emphysema, unspecified emphysema type (Multi)  Hypercholesteremia  Cigarette nicotine dependence with nicotine-induced disorder  Vitamin D deficiency, unspecified  Dyslipidemia  Gastroesophageal reflux disease without esophagitis  Other orders  -     Follow Up In Primary Care - Established   Patient was identified as a fall risk. Risk prevention instructions provided.  Transition of care  Patient admission history and physical, hospital course, medications, verified and reviewed  Patient contacted after discharge,  medications verified comes today for follow-up    Inpatient facility: [facility]  Discharge diagnosis: Acute PE  Discharged to: Home  Discharge date: 5/8/24  Initial Call date: 5/9/24  Spoke with patient/caregiver: PT                                                                      Do you need assistance  visits prior to your PCP visit: No  Home health care ordered: No  Have you been contacted by home care and have a start of care date: No  Are you taking medications as prescribed at discharge: Yes     Referral to [facility] Pharmacist: No  Patient advised to bring all medications to PCP follow-up appointment.  Patient advised to follow discharge instructions until provider follow-up.  TCM visit date: 5/13/24  TCM provider visit with: Mendez Coronel MD    Patient underwent left hip surgery required extended rehab patient developed left lower lobe pulmonary embolism treated with Eliquis on discharge 1 week ago from rehab patient insurance denied his Eliquis patient contacted rehab for change advised him to contact Dr. Ricci his orthopedic surgeon patient was not able to contact them and he opted to wait until today's appointment  Patient without any anticoagulation for 1 week denies any shortness of breath or shortness of breath    Follow-up  -Status post left hip arthroplasty continue monitoring with physical therapy follow-up orthopedic surgery  - Status post left pulmonary embolism need to be anticoagulated for 3 months patient unable to afford Eliquis started on warfarin today 10 mg today then 7.5 mg tomorrow then 5 mg daily until reevaluation by INR clinic with the pharmacy in [place] given referral today contact office for any need to scheduling  - Hidradenitis now resolving with antibiotic no recurrence  -Large umbilical hernia obstruction no gangrene patient counseled about smoking cessation weight loss  -Lung CT low-dose showed lung nodule repeat in 1 year repeat in September 2024  -  "Nicotine dependency counseled about smoking cessation  \"I spent 3 minutes counseling patient about need for smoking/tobacco cessation and how I can support efforts when patient is ready to quit.  Discussed nicotine replacement therapy, Varenicline, Bupropion, hypnosis, support groups, and accupunture as potential options.  Patient currently has no signs or symptoms of tobacco related disease.\".  -Hypertension improving continue with current medication- Dyslipidemia continue low-carb diet  - Coronary artery disease compensated continue with aspirin daily counseled about nicotine cessation  Follow-up 3 months    "

## 2024-05-13 NOTE — TELEPHONE ENCOUNTER
Transition of Care    Inpatient facility: [facility]  Discharge diagnosis: Acute PE  Discharged to: Home  Discharge date: 5/8/24  Initial Call date: 5/9/24  Spoke with patient/caregiver: PT                                                                     Do you need assistance  visits prior to your PCP visit: No  Home health care ordered: No  Have you been contacted by home care and have a start of care date: No  Are you taking medications as prescribed at discharge: Yes    Referral to [facility] Pharmacist: No  Patient advised to bring all medications to PCP follow-up appointment.  Patient advised to follow discharge instructions until provider follow-up.  TCM visit date: 5/13/24  TCM provider visit with: Mendez Coronel MD

## 2024-05-13 NOTE — PATIENT INSTRUCTIONS

## 2024-05-16 ENCOUNTER — ANTICOAGULATION - WARFARIN VISIT (OUTPATIENT)
Dept: PHARMACY | Facility: HOSPITAL | Age: 64
End: 2024-05-16
Payer: MEDICARE

## 2024-05-16 DIAGNOSIS — I26.99 ACUTE PULMONARY EMBOLISM WITHOUT ACUTE COR PULMONALE, UNSPECIFIED PULMONARY EMBOLISM TYPE (MULTI): Primary | ICD-10-CM

## 2024-05-16 DIAGNOSIS — I26.99 OTHER ACUTE PULMONARY EMBOLISM WITHOUT ACUTE COR PULMONALE (MULTI): ICD-10-CM

## 2024-05-16 LAB
POC INR: 3 (ref 2–3)
POC PROTHROMBIN TIME: ABNORMAL

## 2024-05-16 NOTE — PROGRESS NOTES
Subjective     Julio Carranza is a 64 y.o. male who presents for anticoagulation follow up.     Location: [facility]     Referring Provider: Dr. Mendez Coronel   INR Goal: 2.0-3.0  Indication: Pulmonary Embolism (PE)    Bleeding signs/symptoms: No  Bruising: No   Major bleeding event: No  Thrombosis signs/symptoms: No  Thromboembolic event: No  Missed doses: No  Extra doses: No  Medication changes: No  Dietary changes: No  Change in health: No  Change in activity: No  Alcohol: No  Other concerns: No  Upcoming Surgeries: no  Parenteral anticoagulation: no    Current Outpatient Medications on File Prior to Visit   Medication Sig Dispense Refill    albuterol 90 mcg/actuation inhaler inhale 1 to 2 puffs by mouth every 4 to 6 hours as needed 18 g 5    atorvastatin (Lipitor) 40 mg tablet Take 1 tablet (40 mg) by mouth once daily at bedtime.      cyanocobalamin (Vitamin B-12) 100 mcg tablet Take 1 tablet (100 mcg) by mouth once daily.      fluticasone (Flonase) 50 mcg/actuation nasal spray Administer 2 sprays into each nostril 2 times a day. Shake gently. Before first use, prime pump. After use, clean tip and replace cap. 16 g 12    folic acid (Folvite) 1 mg tablet Take 1 tablet (1 mg) by mouth once daily. 90 tablet 1    losartan (Cozaar) 100 mg tablet Take 1 tablet (100 mg) by mouth once daily. 90 tablet 1    multivitamin capsule Take 1 capsule by mouth once daily.      omeprazole (PriLOSEC) 40 mg DR capsule Take 1 capsule (40 mg) by mouth once daily at bedtime.      primidone (Mysoline) 50 mg tablet Take 1 tablet (50 mg) by mouth once daily at bedtime. 90 tablet 1    tamsulosin (Flomax) 0.4 mg 24 hr capsule Take 1 capsule (0.4 mg) by mouth once daily at bedtime.      topiramate (Topamax) 50 mg tablet Take 1 tablet (50 mg) by mouth 2 times a day.      warfarin (Coumadin) 2.5 mg tablet Take 4 tablets today then 3 tablets tomorrow then 2 tablets daily until follow-up INR with the  clinical pharmacy for further recommendation 30 tablet 0    [DISCONTINUED] apixaban (Eliquis) 5 mg tablet Take 1 tablet (5 mg) by mouth 2 times a day.      [DISCONTINUED] atorvastatin (Lipitor) 40 mg tablet Take 1 tablet (40 mg) by mouth once daily. (Patient taking differently: Take 1 tablet (40 mg) by mouth once daily at bedtime.) 90 tablet 1    [DISCONTINUED] omeprazole (PriLOSEC) 40 mg DR capsule Take 1 capsule (40 mg) by mouth once daily. (Patient taking differently: Take 1 capsule (40 mg) by mouth once daily at bedtime.) 90 capsule 1     No current facility-administered medications on file prior to visit.        Objective   Anticoagulation Summary  As of 5/16/2024      INR goal:  2.0-3.0   TTR:  --   INR used for dosing:  3.00 (5/16/2024)   Weekly warfarin total:  35 mg             Lab Results   Component Value Date    INR 3.00 (N) 05/16/2024    INR 1.0 01/18/2023    INR 1.0 06/29/2022    INR 0.9 06/22/2021    PROTIME 10.4 01/18/2023    PROTIME 11.5 06/29/2022    PROTIME 10.5 06/22/2021       Assessment/Plan   This is an initial visit for the patient. Patient was diagnosed with PE on 4/1/24 and is anticipated to be on anticoagulation x3 months.  He was initiated on Eliquis and has been taking since discharge. Patient was discharged to SNF x30 days and upon discharge discovered the Eliquis copay would exceed $800 per month. Patient stopped Eliquis and was not anticoagulated x3 days until PCP visit. Warfarin therapy was started Monday 5/13/24. Today is day 4 of warfarin. Current INR is Therapeutic at 3.0. Patient reports taking warfarin as prescribed since initiation: 10 mg x1 on 5/13, 7.5 mg x1 on 5/14 and 5 mg x 1 on 5/15. Patient has 2.5 mg tablets and reported taking one tablet, 2.5 mg today. Advised patient to continue taking warfarin 5 mg (2 tablets) daily and monitor for side effects related to bleeding/bruising. Will check INR again in 4 days.    Discussed transition back to Eliquis with patient, however,  DDI with primidone prohibits Eliquis use. Primidone may reduce serum concentrations of Eliquis and decrease anticoagulant efficacy. Warfarin and primidone also interact (reduced anticoagulant efficacy) however, titration of warfarin dose based on INR results, the interaction can be safely managed. Will continue close follow up and dose adjustments of warfarin as needed.     Follow Up: Monday 5/20     Kym Stevens, MasonD

## 2024-05-20 ENCOUNTER — ANTICOAGULATION - WARFARIN VISIT (OUTPATIENT)
Dept: PHARMACY | Facility: HOSPITAL | Age: 64
End: 2024-05-20
Payer: MEDICARE

## 2024-05-20 DIAGNOSIS — I26.99 ACUTE PULMONARY EMBOLISM WITHOUT ACUTE COR PULMONALE, UNSPECIFIED PULMONARY EMBOLISM TYPE (MULTI): Primary | ICD-10-CM

## 2024-05-20 LAB
POC INR: 5.3 (ref 2–3)
POC PROTHROMBIN TIME: ABNORMAL

## 2024-05-20 RX ORDER — WARFARIN 2.5 MG/1
TABLET ORAL
Qty: 60 TABLET | Refills: 2 | Status: SHIPPED | OUTPATIENT
Start: 2024-05-20

## 2024-05-20 NOTE — PROGRESS NOTES
Subjective     Julio Carranza is a 64 y.o. male who presents for anticoagulation follow up.     Location: [facility]     Referring Provider: Dr. Mendez Coronel   INR Goal: 2.0-3.0  Indication: Pulmonary Embolism (PE)    Bleeding signs/symptoms:    Bruising:     Major bleeding event:    Thrombosis signs/symptoms:    Thromboembolic event:    Missed doses:    Extra doses:    Medication changes:    Dietary changes:    Change in health:    Change in activity:    Alcohol:    Other concerns:    Upcoming Surgeries: no  Parenteral anticoagulation: no    Current Outpatient Medications on File Prior to Visit   Medication Sig Dispense Refill    albuterol 90 mcg/actuation inhaler inhale 1 to 2 puffs by mouth every 4 to 6 hours as needed 18 g 5    atorvastatin (Lipitor) 40 mg tablet Take 1 tablet (40 mg) by mouth once daily at bedtime.      cyanocobalamin (Vitamin B-12) 100 mcg tablet Take 1 tablet (100 mcg) by mouth once daily.      fluticasone (Flonase) 50 mcg/actuation nasal spray Administer 2 sprays into each nostril 2 times a day. Shake gently. Before first use, prime pump. After use, clean tip and replace cap. 16 g 12    folic acid (Folvite) 1 mg tablet Take 1 tablet (1 mg) by mouth once daily. 90 tablet 1    losartan (Cozaar) 100 mg tablet Take 1 tablet (100 mg) by mouth once daily. 90 tablet 1    multivitamin capsule Take 1 capsule by mouth once daily.      omeprazole (PriLOSEC) 40 mg DR capsule Take 1 capsule (40 mg) by mouth once daily at bedtime.      primidone (Mysoline) 50 mg tablet Take 1 tablet (50 mg) by mouth once daily at bedtime. 90 tablet 1    tamsulosin (Flomax) 0.4 mg 24 hr capsule Take 1 capsule (0.4 mg) by mouth once daily at bedtime.      topiramate (Topamax) 50 mg tablet Take 1 tablet (50 mg) by mouth 2 times a day.      warfarin (Coumadin) 2.5 mg tablet Take 4 tablets today then 3 tablets tomorrow then 2 tablets daily until follow-up INR with the clinical pharmacy  for further recommendation 30 tablet 0    [DISCONTINUED] apixaban (Eliquis) 5 mg tablet Take 1 tablet (5 mg) by mouth 2 times a day.      [DISCONTINUED] apixaban (Eliquis) 5 mg tablet d 60 tablet 0     No current facility-administered medications on file prior to visit.        Objective   Anticoagulation Summary  As of 2024      INR goal:  2.0-3.0   TTR:  --   INR used for dosin.30 (2024)   Weekly warfarin total:  35 mg             Lab Results   Component Value Date    INR 5.30 (A) 2024    INR 3.00 (N) 2024    INR 1.0 2023    INR 1.0 2022    INR 0.9 2021    PROTIME 10.4 2023    PROTIME 11.5 2022    PROTIME 10.5 2021       Assessment/Plan   Current INR is Supratherapeutic at 5.3. Previous INR was Therapeutic at 3.0. Today is day 7 of warfarin therapy. Patient denied any signs or symptoms of bleeding or bruising. Also denied any changes in medication or diet. Previously discussed transition of warfarin to Eliquis. Discussed that given DDI with primidone, it is not recommended for patient to transition due to reduced Eliquis efficacy with primidone. Patient verbalized understanding and will proceed with warfarin.     Advised patient to hold warfarin x2 doses, tomorrow and Wednesday, as patient has already taken dose today. Then will lower current warfarin regimen to 2.5 mg then 5 mg alternating every 2 days. Sent refill to [facility] per patient's request. Also advised patient to monitor for signs or symptoms of bleeding/bruising. Unable to follow up in one week due to the holiday. Patient declined sooner follow up and will present on 24.      Follow Up: 2 weeks    Kym Stevens, MasonD

## 2024-05-22 ENCOUNTER — OFFICE VISIT (OUTPATIENT)
Dept: ORTHOPEDIC SURGERY | Facility: CLINIC | Age: 64
End: 2024-05-22
Payer: MEDICARE

## 2024-05-22 ENCOUNTER — HOSPITAL ENCOUNTER (OUTPATIENT)
Dept: RADIOLOGY | Facility: CLINIC | Age: 64
Discharge: HOME | End: 2024-05-22
Payer: MEDICARE

## 2024-05-22 DIAGNOSIS — M25.552 LEFT HIP PAIN: ICD-10-CM

## 2024-05-22 DIAGNOSIS — M17.11 ARTHRITIS OF RIGHT KNEE: Primary | ICD-10-CM

## 2024-05-22 PROCEDURE — 1160F RVW MEDS BY RX/DR IN RCRD: CPT | Performed by: ORTHOPAEDIC SURGERY

## 2024-05-22 PROCEDURE — 4004F PT TOBACCO SCREEN RCVD TLK: CPT | Performed by: ORTHOPAEDIC SURGERY

## 2024-05-22 PROCEDURE — 73502 X-RAY EXAM HIP UNI 2-3 VIEWS: CPT | Mod: LT

## 2024-05-22 PROCEDURE — 99214 OFFICE O/P EST MOD 30 MIN: CPT | Performed by: ORTHOPAEDIC SURGERY

## 2024-05-22 PROCEDURE — 73502 X-RAY EXAM HIP UNI 2-3 VIEWS: CPT | Mod: LEFT SIDE | Performed by: RADIOLOGY

## 2024-05-22 PROCEDURE — 20610 DRAIN/INJ JOINT/BURSA W/O US: CPT | Performed by: ORTHOPAEDIC SURGERY

## 2024-05-22 PROCEDURE — 1157F ADVNC CARE PLAN IN RCRD: CPT | Performed by: ORTHOPAEDIC SURGERY

## 2024-05-22 PROCEDURE — 1159F MED LIST DOCD IN RCRD: CPT | Performed by: ORTHOPAEDIC SURGERY

## 2024-05-22 RX ORDER — TRIAMCINOLONE ACETONIDE 40 MG/ML
1 INJECTION, SUSPENSION INTRA-ARTICULAR; INTRAMUSCULAR
Status: COMPLETED | OUTPATIENT
Start: 2024-05-22 | End: 2024-05-22

## 2024-05-22 RX ADMIN — TRIAMCINOLONE ACETONIDE 1 ML: 40 INJECTION, SUSPENSION INTRA-ARTICULAR; INTRAMUSCULAR at 13:04

## 2024-05-22 NOTE — PROGRESS NOTES
This is a consultation from Dr. Mendez Coronel MD for follow-up left total hip, right knee pain      This is a 64 y.o. male who presents for follow-up for his left total hip.  He is about 6 weeks out, he is doing better.  Patient is been putting more weight on the leg and progressing with his physical therapy.  He is not having any of the pain he had before surgery.  He is still somewhat hesitant in using a cane but he is doing much better than he did the last time I saw him.  No drainage from his incision no fevers no chills.  He is having more pain in his right knee, he had right knee pain for a long time this is a chronic issue.  Sharp pain over the medial knee worse with walking proving with rest.  He had injections in the past but is been a while    Physical Exam    There has been no interval change in this patient's past medical, surgical, medications, allergies, family history or social history since the most recent visit to a provider within our department. 14 point review of systems was performed, reviewed, and negative except for pertinent positives documented in the history of present illness.     Constitutional: well developed, well nourished male in no acute distress  Psychiatric: normal mood, appropriate affect  Eyes: sclera anicteric  HENT: normocephalic/atraumatic  CV: regular rate and rhythm   Respiratory: non labored breathing  Integumentary: no rash  Neurological: moves all extremities    Left hip examination: Well-healed surgical incision erythema no drainage no pain with range of motion neurovascular tact distally.    Right knee exam: skin intact no lacerations or abrations. no effusion.  Tender medial joint line. negative log roll negative patellar grind. ROM 0-120. stable to varus and valgus stress at 0 and 30 degrees. negative lachman negative posterior drawer negative denny. 5/5 ehl/fhl/gs/ta. silt s/s/sp/dp/t. 2+ dp/pt        Xrays were ordered by me, they were reviewed and  independently interpreted by me today, they show stable left total hip arthroplasty no fracture dislocation or loosening    L Inj/Asp: R knee on 5/22/2024 1:04 PM  Indications: pain and joint swelling  Details: 22 G needle, anterolateral approach  Medications: 1 mL triamcinolone acetonide 40 mg/mL    Discussion:  I discussed the conservative treatment options for knee osteoarthritis including but not limited to physical therapy, oral NSAIDS, activity and lifestyle modification, and corticosteroid injections. Pt has elected to undergo a cortisone injection today. I have explained the risk and benefits of an injection including the possibility of joint infection, bleeding, damage to cartilage, allergic reaction. Patient verbalized understanding and gave verbal consent wishes to proceed with a intra-articular cortisone injection for their knee.    Procedure:  After discussing the risk and benefits of the procedure, we proceeded with an intra-articular right knee injection. We discussed the risks and benefits and potential morbidity related to the treatment, and to the prescription medication administered in the injection    With the patient's informed verbal consent, the right knee was prepped in standard sterile fashion with Chlorhexidine. The skin was then anesthetized with ethyl chloride spray and cleaned again with Chlorhexidine. The knee was then apirated/injected with a prefilled 20-gauge syringe of 40 mg Kenalog + 4 ml Lidocaine using the lateral approach without complications.  The patient tolerated this well and felt immediate initial relief of symptoms. A bandaid was applied and the patient ambulated out of the clinic on ther own accord without difficulty. Patient was instructed to avoid physical activity for 24-48 hours to prevent the knees from swelling and may ice the knees as tolerated. Patient should contact the office if any signs of of infection appear: redness, fever, chills, drainage, swelling or  warmth to the knees.  Pt understands that the injections can be repeated no sooner than 3 months.    Procedure, treatment alternatives, risks and benefits explained, specific risks discussed. Consent was given by the patient. Immediately prior to procedure a time out was called to verify the correct patient, procedure, equipment, support staff and site/side marked as required. Patient was prepped and draped in the usual sterile fashion.             Impression/Plan: This is a 64 y.o. male status post left total hip doing well.  We discussed continuing physical therapy and home exercise program. Pain medications to be used as needed. Assistive devices as needed per PT. We discussed DVT prophylaxis until 6 weeks. No dentist until 3 months and thereafter dental prophylaxis for life. Activity as tolerated weightbearing as tolerated, hip precautions until 3 months. I have personally reviewed the OARRS report for the patient. This report is scanned into the electronic medical record. I have considered the risks of abuse, dependence, addiction and diversion.    Also with right knee arthritis, I had an in depth discussion with the patient regarding treatment options for arthritis and their relative risks and benefits. We reviewed surgical and nonsurgical option for treatment. Treatments include anti inflammatory medications, physical therapy, weight loss, activity modification, use of assistive devices, injection therapies. We discussed current prescriptions and risks and benefits of continuation of prescription medication as apporpriate. We discussed that arthritis is often progressive over time, an in end stage arthritis surgical interventions can be considered, including arthroplasty. All questions were answered and the patient voiced their understanding.  I will see him back as needed    BMI Readings from Last 1 Encounters:   05/13/24 28.08 kg/m²      Lab Results   Component Value Date    CREATININE 0.80 04/05/2024      Tobacco Use: High Risk (5/22/2024)    Patient History     Smoking Tobacco Use: Every Day     Smokeless Tobacco Use: Never     Passive Exposure: Not on file      Computed MELD 3.0 unavailable. One or more values for this score either were not found within the given timeframe or did not fit some other criterion.  Computed MELD-Na unavailable. One or more values for this score either were not found within the given timeframe or did not fit some other criterion.    Anticoagulation Episode Summary       Current INR goal:  2.0-3.0   TTR:  --   Next INR check:  5/30/2024   INR from last check:  5.30 (5/20/2024)   Weekly max warfarin dose:     Target end date:  8/30/2024   INR check location:     Preferred lab:     Send INR reminders to:  [place]  [facility]    Indications    Acute pulmonary embolism without acute cor pulmonale  unspecified pulmonary embolism type (Multi) [I26.99]             Comments:               Anticoagulation Care Providers       Provider Role Specialty Phone number    Mendez Coronel MD Referring Internal Medicine [phone]           Lab Results   Component Value Date    HGBA1C 5.2 06/18/2022     Lab Results   Component Value Date    STAPHMRSASCR No Staphylococcus aureus isolated 03/13/2024

## 2024-05-28 DIAGNOSIS — I10 HYPERTENSION, UNSPECIFIED TYPE: ICD-10-CM

## 2024-05-28 RX ORDER — LOSARTAN POTASSIUM 100 MG/1
100 TABLET ORAL DAILY
Qty: 90 TABLET | Refills: 0 | Status: SHIPPED | OUTPATIENT
Start: 2024-05-28

## 2024-05-30 ENCOUNTER — ANTICOAGULATION - WARFARIN VISIT (OUTPATIENT)
Dept: PHARMACY | Facility: HOSPITAL | Age: 64
End: 2024-05-30
Payer: MEDICARE

## 2024-05-30 DIAGNOSIS — I26.99 ACUTE PULMONARY EMBOLISM WITHOUT ACUTE COR PULMONALE, UNSPECIFIED PULMONARY EMBOLISM TYPE (MULTI): Primary | ICD-10-CM

## 2024-05-30 LAB
POC INR: 1.7 (ref 2–3)
POC PROTHROMBIN TIME: ABNORMAL

## 2024-05-30 NOTE — PROGRESS NOTES
Subjective     Julio Carranza is a 64 y.o. male who presents for anticoagulation follow up.     Location: [facility]     Referring Provider: Dr. Mendez Coronel   INR Goal: 2.0-3.0  Indication: Pulmonary Embolism (PE)    Bleeding signs/symptoms: No  Bruising: Yes   Major bleeding event: No  Thrombosis signs/symptoms: No  Thromboembolic event: No  Missed doses: Yes  Extra doses: No  Medication changes: No  Dietary changes: No  Change in health: No  Change in activity: No  Alcohol: No  Other concerns: No  Upcoming Surgeries: no  Parenteral anticoagulation: no    Current Outpatient Medications on File Prior to Visit   Medication Sig Dispense Refill    albuterol 90 mcg/actuation inhaler inhale 1 to 2 puffs by mouth every 4 to 6 hours as needed 18 g 5    atorvastatin (Lipitor) 40 mg tablet Take 1 tablet (40 mg) by mouth once daily at bedtime.      cyanocobalamin (Vitamin B-12) 100 mcg tablet Take 1 tablet (100 mcg) by mouth once daily.      fluticasone (Flonase) 50 mcg/actuation nasal spray Administer 2 sprays into each nostril 2 times a day. Shake gently. Before first use, prime pump. After use, clean tip and replace cap. 16 g 12    folic acid (Folvite) 1 mg tablet Take 1 tablet (1 mg) by mouth once daily. 90 tablet 1    losartan (Cozaar) 100 mg tablet Take 1 tablet (100 mg) by mouth once daily. 90 tablet 0    multivitamin capsule Take 1 capsule by mouth once daily.      omeprazole (PriLOSEC) 40 mg DR capsule Take 1 capsule (40 mg) by mouth once daily at bedtime.      primidone (Mysoline) 50 mg tablet Take 1 tablet (50 mg) by mouth once daily at bedtime. 90 tablet 1    tamsulosin (Flomax) 0.4 mg 24 hr capsule Take 1 capsule (0.4 mg) by mouth once daily at bedtime.      topiramate (Topamax) 50 mg tablet Take 1 tablet (50 mg) by mouth 2 times a day.      warfarin (Coumadin) 2.5 mg tablet Take up to 2 tablets (5 mg) by mouth daily as directed by pharmacy clinic 60 tablet 2     [DISCONTINUED] losartan (Cozaar) 100 mg tablet Take 1 tablet (100 mg) by mouth once daily. 90 tablet 1     No current facility-administered medications on file prior to visit.        Objective   Anticoagulation Summary  As of 2024      INR goal:  2.0-3.0   TTR:  36.7% (1 wk)   INR used for dosin.70 (2024)   Weekly warfarin total:  22.5 mg             Lab Results   Component Value Date    INR 1.70 (A) 2024    INR 5.30 (A) 2024    INR 3.00 (N) 2024    PROTIME 10.4 2023    PROTIME 11.5 2022    PROTIME 10.5 2021       Assessment/Plan   Current INR is Subtherapeutic at 1.7. Previous INR was Supratherapeutic at 5.3. Patient reports taking warfarin differently than previously recommended. Patient has been taking only one tablet daily rather than alternating 2.5 mg and 5 mg. Patient also reports some bruising on the top of his right foot. He is unsure where the bruising came from. Writer advised patient to continue to monitor and contact clinic if symptoms worsen. Since INR is only slightly low, advised patient to lower current warfarin regimen to 5 mg on Tue/Thur and 2.5 mg all other days.     Follow Up: 1 week    Kym Stevens, PharmD

## 2024-06-06 ENCOUNTER — ANTICOAGULATION - WARFARIN VISIT (OUTPATIENT)
Dept: PHARMACY | Facility: HOSPITAL | Age: 64
End: 2024-06-06
Payer: MEDICARE

## 2024-06-06 DIAGNOSIS — I26.99 ACUTE PULMONARY EMBOLISM WITHOUT ACUTE COR PULMONALE, UNSPECIFIED PULMONARY EMBOLISM TYPE (MULTI): Primary | ICD-10-CM

## 2024-06-06 LAB
POC INR: 1.9 (ref 2–3)
POC PROTHROMBIN TIME: ABNORMAL

## 2024-06-06 NOTE — PROGRESS NOTES
Subjective     Julio Carranza is a 64 y.o. male who presents for anticoagulation follow up.     Location: [facility]     Referring Provider: Dr. Mendez Coronel   INR Goal: 2.0-3.0  Indication: Pulmonary Embolism (PE)    Bleeding signs/symptoms: No  Bruising: No   Major bleeding event: No  Thrombosis signs/symptoms: No  Thromboembolic event: No  Missed doses: No  Extra doses: No  Medication changes: No  Dietary changes: No  Change in health: No  Change in activity: No  Alcohol: No  Other concerns: No  Upcoming Surgeries: no  Parenteral anticoagulation: no    Current Outpatient Medications on File Prior to Visit   Medication Sig Dispense Refill    albuterol 90 mcg/actuation inhaler inhale 1 to 2 puffs by mouth every 4 to 6 hours as needed 18 g 5    atorvastatin (Lipitor) 40 mg tablet Take 1 tablet (40 mg) by mouth once daily at bedtime.      cyanocobalamin (Vitamin B-12) 100 mcg tablet Take 1 tablet (100 mcg) by mouth once daily.      fluticasone (Flonase) 50 mcg/actuation nasal spray Administer 2 sprays into each nostril 2 times a day. Shake gently. Before first use, prime pump. After use, clean tip and replace cap. 16 g 12    folic acid (Folvite) 1 mg tablet Take 1 tablet (1 mg) by mouth once daily. 90 tablet 1    losartan (Cozaar) 100 mg tablet Take 1 tablet (100 mg) by mouth once daily. 90 tablet 0    multivitamin capsule Take 1 capsule by mouth once daily.      omeprazole (PriLOSEC) 40 mg DR capsule Take 1 capsule (40 mg) by mouth once daily at bedtime.      primidone (Mysoline) 50 mg tablet Take 1 tablet (50 mg) by mouth once daily at bedtime. 90 tablet 1    tamsulosin (Flomax) 0.4 mg 24 hr capsule Take 1 capsule (0.4 mg) by mouth once daily at bedtime.      topiramate (Topamax) 50 mg tablet Take 1 tablet (50 mg) by mouth 2 times a day.      warfarin (Coumadin) 2.5 mg tablet Take up to 2 tablets (5 mg) by mouth daily as directed by pharmacy clinic 60 tablet 2     No current  facility-administered medications on file prior to visit.        Objective   Anticoagulation Summary  As of 2024      INR goal:  2.0-3.0   TTR:  19.1% (2 wk)   INR used for dosin.90 (2024)   Weekly warfarin total:  25 mg             Lab Results   Component Value Date    INR 1.90 (A) 2024    INR 1.70 (A) 2024    INR 5.30 (A) 2024    PROTIME 10.4 2023    PROTIME 11.5 2022    PROTIME 10.5 2021       Assessment/Plan   Current INR is Subtherapeutic at 1.9. Previous INR was Subtherapeutic at 1.7. No changes reported from previous visit. Subtherapeutic INRs believed to be related to stabilizing warfarin dosing after initiation. Advised patient to increase current warfarin regimen to 5 mg on Tue/Thur/Sat and 2.5 mg all other days.    [facility] [phone] notified of change in dosing.      Follow Up: 1 week    Kym Stevens, PharmD

## 2024-06-13 ENCOUNTER — APPOINTMENT (OUTPATIENT)
Dept: PRIMARY CARE | Facility: CLINIC | Age: 64
End: 2024-06-13
Payer: MEDICARE

## 2024-06-13 ENCOUNTER — APPOINTMENT (OUTPATIENT)
Dept: PHARMACY | Facility: HOSPITAL | Age: 64
End: 2024-06-13
Payer: MEDICARE

## 2024-06-13 ENCOUNTER — LAB (OUTPATIENT)
Dept: LAB | Facility: LAB | Age: 64
End: 2024-06-13
Payer: MEDICARE

## 2024-06-13 VITALS
WEIGHT: 228 LBS | SYSTOLIC BLOOD PRESSURE: 116 MMHG | OXYGEN SATURATION: 97 % | BODY MASS INDEX: 29.26 KG/M2 | DIASTOLIC BLOOD PRESSURE: 84 MMHG | HEART RATE: 77 BPM | TEMPERATURE: 96.2 F

## 2024-06-13 DIAGNOSIS — R79.9 ABNORMAL FINDING OF BLOOD CHEMISTRY, UNSPECIFIED: ICD-10-CM

## 2024-06-13 DIAGNOSIS — I26.99 ACUTE PULMONARY EMBOLISM WITHOUT ACUTE COR PULMONALE, UNSPECIFIED PULMONARY EMBOLISM TYPE (MULTI): ICD-10-CM

## 2024-06-13 DIAGNOSIS — I26.99 ACUTE PULMONARY EMBOLISM WITHOUT ACUTE COR PULMONALE, UNSPECIFIED PULMONARY EMBOLISM TYPE (MULTI): Primary | ICD-10-CM

## 2024-06-13 DIAGNOSIS — Z96.642 S/P TOTAL LEFT HIP ARTHROPLASTY: ICD-10-CM

## 2024-06-13 DIAGNOSIS — F17.219 CIGARETTE NICOTINE DEPENDENCE WITH NICOTINE-INDUCED DISORDER: ICD-10-CM

## 2024-06-13 DIAGNOSIS — Z79.899 HIGH RISK MEDICATION USE: ICD-10-CM

## 2024-06-13 DIAGNOSIS — E78.5 DYSLIPIDEMIA: ICD-10-CM

## 2024-06-13 DIAGNOSIS — R29.818 SUSPECTED SLEEP APNEA: ICD-10-CM

## 2024-06-13 DIAGNOSIS — R25.2 MUSCLE CRAMPS: Primary | ICD-10-CM

## 2024-06-13 DIAGNOSIS — G47.00 INSOMNIA, UNSPECIFIED TYPE: ICD-10-CM

## 2024-06-13 DIAGNOSIS — R25.2 MUSCLE CRAMPS: ICD-10-CM

## 2024-06-13 LAB
ANION GAP SERPL CALC-SCNC: 13 MMOL/L (ref 10–20)
BASOPHILS # BLD AUTO: 0.06 X10*3/UL (ref 0–0.1)
BASOPHILS NFR BLD AUTO: 0.9 %
BUN SERPL-MCNC: 11 MG/DL (ref 6–23)
CALCIUM SERPL-MCNC: 9.3 MG/DL (ref 8.6–10.3)
CHLORIDE SERPL-SCNC: 103 MMOL/L (ref 98–107)
CO2 SERPL-SCNC: 24 MMOL/L (ref 21–32)
CREAT SERPL-MCNC: 1.1 MG/DL (ref 0.5–1.3)
EGFRCR SERPLBLD CKD-EPI 2021: 74 ML/MIN/1.73M*2
EOSINOPHIL # BLD AUTO: 0 X10*3/UL (ref 0–0.7)
EOSINOPHIL NFR BLD AUTO: 0 %
ERYTHROCYTE [DISTWIDTH] IN BLOOD BY AUTOMATED COUNT: 13.2 % (ref 11.5–14.5)
GLUCOSE SERPL-MCNC: 90 MG/DL (ref 74–99)
HCT VFR BLD AUTO: 42.3 % (ref 41–52)
HGB BLD-MCNC: 13.3 G/DL (ref 13.5–17.5)
IMM GRANULOCYTES # BLD AUTO: 0.04 X10*3/UL (ref 0–0.7)
IMM GRANULOCYTES NFR BLD AUTO: 0.6 % (ref 0–0.9)
IRON SATN MFR SERPL: 18 % (ref 25–45)
IRON SERPL-MCNC: 57 UG/DL (ref 35–150)
LYMPHOCYTES # BLD AUTO: 1.58 X10*3/UL (ref 1.2–4.8)
LYMPHOCYTES NFR BLD AUTO: 22.5 %
MAGNESIUM SERPL-MCNC: 2.16 MG/DL (ref 1.6–2.4)
MCH RBC QN AUTO: 29.7 PG (ref 26–34)
MCHC RBC AUTO-ENTMCNC: 31.4 G/DL (ref 32–36)
MCV RBC AUTO: 94 FL (ref 80–100)
MONOCYTES # BLD AUTO: 0.65 X10*3/UL (ref 0.1–1)
MONOCYTES NFR BLD AUTO: 9.3 %
NEUTROPHILS # BLD AUTO: 4.68 X10*3/UL (ref 1.2–7.7)
NEUTROPHILS NFR BLD AUTO: 66.7 %
NRBC BLD-RTO: 0 /100 WBCS (ref 0–0)
PLATELET # BLD AUTO: 213 X10*3/UL (ref 150–450)
POC INR: 2.4 (ref 2–3)
POC PROTHROMBIN TIME: ABNORMAL
POTASSIUM SERPL-SCNC: 4.4 MMOL/L (ref 3.5–5.3)
RBC # BLD AUTO: 4.48 X10*6/UL (ref 4.5–5.9)
SODIUM SERPL-SCNC: 136 MMOL/L (ref 136–145)
TIBC SERPL-MCNC: 321 UG/DL (ref 240–445)
UIBC SERPL-MCNC: 264 UG/DL (ref 110–370)
WBC # BLD AUTO: 7 X10*3/UL (ref 4.4–11.3)

## 2024-06-13 PROCEDURE — 83550 IRON BINDING TEST: CPT

## 2024-06-13 PROCEDURE — 83540 ASSAY OF IRON: CPT

## 2024-06-13 PROCEDURE — 83735 ASSAY OF MAGNESIUM: CPT

## 2024-06-13 PROCEDURE — 1160F RVW MEDS BY RX/DR IN RCRD: CPT | Performed by: INTERNAL MEDICINE

## 2024-06-13 PROCEDURE — 85025 COMPLETE CBC W/AUTO DIFF WBC: CPT

## 2024-06-13 PROCEDURE — 4004F PT TOBACCO SCREEN RCVD TLK: CPT | Performed by: INTERNAL MEDICINE

## 2024-06-13 PROCEDURE — 80048 BASIC METABOLIC PNL TOTAL CA: CPT

## 2024-06-13 PROCEDURE — 1157F ADVNC CARE PLAN IN RCRD: CPT | Performed by: INTERNAL MEDICINE

## 2024-06-13 PROCEDURE — 36415 COLL VENOUS BLD VENIPUNCTURE: CPT

## 2024-06-13 PROCEDURE — 99214 OFFICE O/P EST MOD 30 MIN: CPT | Performed by: INTERNAL MEDICINE

## 2024-06-13 PROCEDURE — 1159F MED LIST DOCD IN RCRD: CPT | Performed by: INTERNAL MEDICINE

## 2024-06-13 RX ORDER — ACETAMINOPHEN 160 MG/5ML
200 SUSPENSION, ORAL (FINAL DOSE FORM) ORAL DAILY
Qty: 90 CAPSULE | Refills: 3 | Status: SHIPPED | OUTPATIENT
Start: 2024-06-13 | End: 2025-06-13

## 2024-06-13 RX ORDER — PRIMIDONE 50 MG/1
50 TABLET ORAL NIGHTLY
Qty: 90 TABLET | Refills: 1 | Status: SHIPPED | OUTPATIENT
Start: 2024-06-13

## 2024-06-13 NOTE — PROGRESS NOTES
Subjective     Julio Carranza is a 64 y.o. male who presents for anticoagulation follow up.     Location: [facility]     Referring Provider: Dr. Mendez Coronel  INR Goal: 2.0-3.0  Indication: Pulmonary Embolism (PE)    Bleeding signs/symptoms: No  Bruising: No   Major bleeding event: No  Thrombosis signs/symptoms: No  Thromboembolic event: No  Missed doses: No  Extra doses: No  Medication changes: No  Dietary changes: No  Change in health: No  Change in activity: No  Alcohol: No  Other concerns: No  Upcoming Surgeries: no  Parenteral anticoagulation: no    Current Outpatient Medications on File Prior to Visit   Medication Sig Dispense Refill    albuterol 90 mcg/actuation inhaler inhale 1 to 2 puffs by mouth every 4 to 6 hours as needed 18 g 5    atorvastatin (Lipitor) 40 mg tablet Take 1 tablet (40 mg) by mouth once daily at bedtime.      coenzyme Q-10 200 mg capsule Take 1 capsule (200 mg) by mouth once daily. 90 capsule 3    cyanocobalamin (Vitamin B-12) 100 mcg tablet Take 1 tablet (100 mcg) by mouth once daily.      fluticasone (Flonase) 50 mcg/actuation nasal spray Administer 2 sprays into each nostril 2 times a day. Shake gently. Before first use, prime pump. After use, clean tip and replace cap. 16 g 12    folic acid (Folvite) 1 mg tablet Take 1 tablet (1 mg) by mouth once daily. 90 tablet 1    losartan (Cozaar) 100 mg tablet Take 1 tablet (100 mg) by mouth once daily. 90 tablet 0    multivitamin capsule Take 1 capsule by mouth once daily.      omeprazole (PriLOSEC) 40 mg DR capsule Take 1 capsule (40 mg) by mouth once daily at bedtime.      primidone (Mysoline) 50 mg tablet Take 1 tablet (50 mg) by mouth once daily at bedtime. 90 tablet 1    tamsulosin (Flomax) 0.4 mg 24 hr capsule Take 1 capsule (0.4 mg) by mouth once daily at bedtime.      topiramate (Topamax) 50 mg tablet Take 1 tablet (50 mg) by mouth 2 times a day.      warfarin (Coumadin) 2.5 mg tablet Take up to 2  tablets (5 mg) by mouth daily as directed by pharmacy clinic 60 tablet 2    [DISCONTINUED] primidone (Mysoline) 50 mg tablet Take 1 tablet (50 mg) by mouth once daily at bedtime. 90 tablet 1     No current facility-administered medications on file prior to visit.        Objective   Anticoagulation Summary  As of 2024      INR goal:  2.0-3.0   TTR:  38.6% (3 wk)   INR used for dosin.40 (2024)   Weekly warfarin total:  25 mg             Lab Results   Component Value Date    INR 2.40 (N) 2024    INR 1.90 (A) 2024    INR 1.70 (A) 2024    PROTIME 10.4 2023    PROTIME 11.5 2022    PROTIME 10.5 2021       Assessment/Plan   Current INR is Therapeutic at 2.4. Previous INR was Subtherapeutic at 1.9. No changes reported from previous visit. Plan to continue current warfarin regimen of 5 mg on /Thur/Sat and 2.5 mg all other days.     Patient is headed out of town this weekend and will be back in 2 weeks. Will plan for follow up on return.     Follow Up:      Kym Stevens, MasonD

## 2024-06-13 NOTE — PROGRESS NOTES
"Subjective   Patient ID: Julio Carranza is a 64 y.o. male who presents for Pulmonary embolism (1 month management ).    -Status post left hip arthroplasty continue monitoring with physical therapy, muscle cramps specially at night Tere unclear need to follow-up BMP magnesium level iron studies follow-up results closely  - Status post left pulmonary embolism continue with anticoagulation for 3 months now under care of the warfarin clinic INR therapeutic  -Large umbilical hernia obstruction no gangrene patient counseled about smoking cessation weight loss  -Lung CT low-dose showed lung nodule repeat in 1 year repeat in September 2024  - Nicotine dependency counseled about smoking cessation  \"I spent 3 minutes counseling patient about need for smoking/tobacco cessation and how I can support efforts when patient is ready to quit.  Discussed nicotine replacement therapy, Varenicline, Bupropion, hypnosis, support groups, and accupunture as potential options.  Patient currently has no signs or symptoms of tobacco related disease.\".  -Hypertension improving continue with current medication- Dyslipidemia continue low-carb diet  - Coronary artery disease compensated continue with aspirin daily counseled about nicotine cessation  Follow-up 3 months             Review of Systems   Constitutional:  Negative for fatigue, fever and unexpected weight change.   HENT:  Negative for congestion, ear discharge, ear pain, mouth sores, sinus pain and sore throat.    Eyes:  Negative for visual disturbance.   Respiratory:  Negative for cough and wheezing.    Cardiovascular:  Negative for chest pain, palpitations and leg swelling.   Gastrointestinal:  Negative for abdominal pain, blood in stool and diarrhea.   Genitourinary:  Negative for difficulty urinating.   Musculoskeletal:  Positive for arthralgias.   Skin:  Negative for rash.   Neurological:  Negative for dizziness and headaches.   Hematological:  Does not bruise/bleed easily. "   Psychiatric/Behavioral:  Negative for behavioral problems.    All other systems reviewed and are negative.      Objective   Lab Results   Component Value Date    HGBA1C 5.2 06/18/2022      /84   Pulse 77   Temp 35.7 °C (96.2 °F)   Wt 103 kg (228 lb)   SpO2 97%   BMI 29.26 kg/m²   Lab Results   Component Value Date    WBC 7.0 06/13/2024    HGB 13.3 (L) 06/13/2024    HCT 42.3 06/13/2024     06/13/2024    CHOL 158 08/16/2023    TRIG 97 08/16/2023    HDL 39.2 (A) 08/16/2023    ALT 13 03/13/2024    AST 16 03/13/2024     06/13/2024    K 4.4 06/13/2024     06/13/2024    CREATININE 1.10 06/13/2024    BUN 11 06/13/2024    CO2 24 06/13/2024    TSH 0.73 08/16/2023    PSA 2.1 12/09/2022    INR 2.40 (N) 06/13/2024    HGBA1C 5.2 06/18/2022     par   Physical Exam  Vitals and nursing note reviewed.   Constitutional:       Appearance: Normal appearance.   HENT:      Head: Normocephalic.      Nose: Nose normal.   Eyes:      Conjunctiva/sclera: Conjunctivae normal.      Pupils: Pupils are equal, round, and reactive to light.   Cardiovascular:      Rate and Rhythm: Regular rhythm.   Pulmonary:      Effort: Pulmonary effort is normal.      Breath sounds: Normal breath sounds.   Abdominal:      General: Abdomen is flat.      Palpations: Abdomen is soft.   Musculoskeletal:      Cervical back: Neck supple.   Skin:     General: Skin is warm.   Neurological:      General: No focal deficit present.      Mental Status: He is oriented to person, place, and time.   Psychiatric:         Mood and Affect: Mood normal.         Assessment/Plan   Julio was seen today for pulmonary embolism.  Diagnoses and all orders for this visit:  Muscle cramps (Primary)  -     Basic metabolic panel; Future  -     Magnesium; Future  -     Iron and TIBC; Future  -     coenzyme Q-10 200 mg capsule; Take 1 capsule (200 mg) by mouth once daily.  Acute pulmonary embolism without acute cor pulmonale, unspecified pulmonary embolism type  "(Multi)  Insomnia, unspecified type  -     primidone (Mysoline) 50 mg tablet; Take 1 tablet (50 mg) by mouth once daily at bedtime.  High risk medication use  -     CBC and Auto Differential; Future  Abnormal finding of blood chemistry, unspecified  -     Iron and TIBC; Future  Suspected sleep apnea  S/P total left hip arthroplasty  Dyslipidemia  Cigarette nicotine dependence with nicotine-induced disorder   -Status post left hip arthroplasty continue monitoring with physical therapy, muscle cramps specially at night Tere unclear need to follow-up BMP magnesium level iron studies follow-up results closely  - Status post left pulmonary embolism continue with anticoagulation for 3 months now under care of the warfarin clinic INR therapeutic  -Large umbilical hernia obstruction no gangrene patient counseled about smoking cessation weight loss  -Lung CT low-dose showed lung nodule repeat in 1 year repeat in September 2024  - Nicotine dependency counseled about smoking cessation  \"I spent 3 minutes counseling patient about need for smoking/tobacco cessation and how I can support efforts when patient is ready to quit.  Discussed nicotine replacement therapy, Varenicline, Bupropion, hypnosis, support groups, and accupunture as potential options.  Patient currently has no signs or symptoms of tobacco related disease.\".  -Hypertension improving continue with current medication- Dyslipidemia continue low-carb diet  - Coronary artery disease compensated continue with aspirin daily counseled about nicotine cessation  Follow-up 3 months    "

## 2024-06-16 ASSESSMENT — ENCOUNTER SYMPTOMS
BLOOD IN STOOL: 0
DIZZINESS: 0
HEADACHES: 0
COUGH: 0
UNEXPECTED WEIGHT CHANGE: 0
DIFFICULTY URINATING: 0
FATIGUE: 0
ABDOMINAL PAIN: 0
BRUISES/BLEEDS EASILY: 0
WHEEZING: 0
PALPITATIONS: 0
ARTHRALGIAS: 1
SORE THROAT: 0
FEVER: 0
SINUS PAIN: 0
DIARRHEA: 0

## 2024-07-08 ENCOUNTER — APPOINTMENT (OUTPATIENT)
Dept: PHARMACY | Facility: HOSPITAL | Age: 64
End: 2024-07-08
Payer: MEDICARE

## 2024-07-12 ENCOUNTER — TELEPHONE (OUTPATIENT)
Dept: ORTHOPEDIC SURGERY | Facility: CLINIC | Age: 64
End: 2024-07-12
Payer: MEDICARE

## 2024-07-12 DIAGNOSIS — M62.838 MUSCLE SPASM: Primary | ICD-10-CM

## 2024-07-12 DIAGNOSIS — M25.552 LEFT HIP PAIN: ICD-10-CM

## 2024-07-12 NOTE — TELEPHONE ENCOUNTER
Surgery- 4/1/24 [facility]     Patient called this morning stating that he is having muscle cramps in his left thigh and calf over the last few days. No trauma. States that Dr. Ricci prescribed muscle relaxers while he was inpatient for surgery and those helped significantly with the muscle cramping. Would like to know if its possible to get another prescription??

## 2024-07-13 RX ORDER — BACLOFEN 20 MG/1
TABLET ORAL
Qty: 90 TABLET | Refills: 0 | Status: SHIPPED | OUTPATIENT
Start: 2024-07-13

## 2024-07-16 DIAGNOSIS — J43.9 PULMONARY EMPHYSEMA, UNSPECIFIED EMPHYSEMA TYPE (MULTI): ICD-10-CM

## 2024-07-16 RX ORDER — ALBUTEROL SULFATE 90 UG/1
AEROSOL, METERED RESPIRATORY (INHALATION)
Qty: 18 G | Refills: 5 | Status: SHIPPED | OUTPATIENT
Start: 2024-07-16

## 2024-07-18 ENCOUNTER — ANTICOAGULATION - WARFARIN VISIT (OUTPATIENT)
Dept: PHARMACY | Facility: HOSPITAL | Age: 64
End: 2024-07-18
Payer: MEDICARE

## 2024-07-18 ENCOUNTER — HOSPITAL ENCOUNTER (OUTPATIENT)
Dept: RADIOLOGY | Facility: HOSPITAL | Age: 64
Discharge: HOME | End: 2024-07-18
Payer: MEDICARE

## 2024-07-18 DIAGNOSIS — I26.99 ACUTE PULMONARY EMBOLISM WITHOUT ACUTE COR PULMONALE, UNSPECIFIED PULMONARY EMBOLISM TYPE (MULTI): Primary | ICD-10-CM

## 2024-07-18 DIAGNOSIS — M25.552 LEFT HIP PAIN: ICD-10-CM

## 2024-07-18 LAB
POC INR: 1.8 (ref 2–3)
POC PROTHROMBIN TIME: ABNORMAL

## 2024-07-18 PROCEDURE — 73502 X-RAY EXAM HIP UNI 2-3 VIEWS: CPT | Mod: LEFT SIDE | Performed by: RADIOLOGY

## 2024-07-18 PROCEDURE — 73502 X-RAY EXAM HIP UNI 2-3 VIEWS: CPT | Mod: LT

## 2024-07-18 NOTE — PROGRESS NOTES
Subjective     Julio Carranza is a 64 y.o. male who presents for anticoagulation follow up.     Location: [facility]     Referring Provider: Dr. Mendez Coronel   INR Goal: 2.0-3.0  Indication: Pulmonary Embolism (PE)    Bleeding signs/symptoms: No  Bruising: No   Major bleeding event: No  Thrombosis signs/symptoms: No  Thromboembolic event: No  Missed doses: No  Extra doses: No  Medication changes: No  Dietary changes: No  Change in health: No  Change in activity: No  Alcohol: No  Other concerns: No  Upcoming Surgeries: no  Parenteral anticoagulation: no    Current Outpatient Medications on File Prior to Visit   Medication Sig Dispense Refill    albuterol 90 mcg/actuation inhaler inhale 1 to 2 puffs by mouth every 4 to 6 hours as needed 18 g 5    atorvastatin (Lipitor) 40 mg tablet Take 1 tablet (40 mg) by mouth once daily at bedtime.      baclofen (Lioresal) 20 mg tablet Take 1 Tablet orally 3 times per day for muscle spasm. 90 tablet 0    coenzyme Q-10 200 mg capsule Take 1 capsule (200 mg) by mouth once daily. 90 capsule 3    cyanocobalamin (Vitamin B-12) 100 mcg tablet Take 1 tablet (100 mcg) by mouth once daily.      fluticasone (Flonase) 50 mcg/actuation nasal spray Administer 2 sprays into each nostril 2 times a day. Shake gently. Before first use, prime pump. After use, clean tip and replace cap. 16 g 12    folic acid (Folvite) 1 mg tablet Take 1 tablet (1 mg) by mouth once daily. 90 tablet 1    losartan (Cozaar) 100 mg tablet Take 1 tablet (100 mg) by mouth once daily. 90 tablet 0    multivitamin capsule Take 1 capsule by mouth once daily.      omeprazole (PriLOSEC) 40 mg DR capsule Take 1 capsule (40 mg) by mouth once daily at bedtime.      primidone (Mysoline) 50 mg tablet Take 1 tablet (50 mg) by mouth once daily at bedtime. 90 tablet 1    tamsulosin (Flomax) 0.4 mg 24 hr capsule Take 1 capsule (0.4 mg) by mouth once daily at bedtime.      topiramate (Topamax) 50 mg  tablet Take 1 tablet (50 mg) by mouth 2 times a day.      warfarin (Coumadin) 2.5 mg tablet Take up to 2 tablets (5 mg) by mouth daily as directed by pharmacy clinic 60 tablet 2    [DISCONTINUED] albuterol 90 mcg/actuation inhaler inhale 1 to 2 puffs by mouth every 4 to 6 hours as needed 18 g 5     No current facility-administered medications on file prior to visit.        Objective   Anticoagulation Summary  As of 2024      INR goal:  2.0-3.0   TTR:  56.0% (1.9 mo)   INR used for dosin.80 (2024)   Weekly warfarin total:  27.5 mg             Lab Results   Component Value Date    INR 1.80 (A) 2024    INR 2.40 (N) 2024    INR 1.90 (A) 2024    PROTIME 10.4 2023    PROTIME 11.5 2022    PROTIME 10.5 2021       Assessment/Plan   Current INR is Subtherapeutic at 1.8. Previous INR was Subtherapeutic at 2.4. No changes reported from previous visit with regards to anticoagulation. No clear cause for subtherapeutic level identified. Advised patient to increase current warfarin regimen of 2.5 mg on // and 5 mg all other days.     Follow Up: 1 month    Kym Stevens, MasonD

## 2024-07-19 NOTE — TELEPHONE ENCOUNTER
4/1/24 lt janeth  Patient called and stated he completed his xray yesterday. Would like to know the results of the xray.

## 2024-08-15 ENCOUNTER — APPOINTMENT (OUTPATIENT)
Dept: PHARMACY | Facility: HOSPITAL | Age: 64
End: 2024-08-15
Payer: MEDICARE

## 2024-08-15 DIAGNOSIS — I26.99 ACUTE PULMONARY EMBOLISM WITHOUT ACUTE COR PULMONALE, UNSPECIFIED PULMONARY EMBOLISM TYPE (MULTI): Primary | ICD-10-CM

## 2024-08-15 LAB
POC INR: 3.8 (ref 2–3)
POC PROTHROMBIN TIME: ABNORMAL

## 2024-08-15 NOTE — PROGRESS NOTES
Subjective     Julio Carranza is a 64 y.o. male who presents for anticoagulation follow up.     Location: [facility]     Referring Provider: Dr. Mendez Coronel   INR Goal: 2.0-3.0  Indication: Pulmonary Embolism (PE)    Bleeding signs/symptoms: No  Bruising: No   Major bleeding event: No  Thrombosis signs/symptoms: No  Thromboembolic event: No  Missed doses: No  Extra doses: No  Medication changes: No  Dietary changes: No  Change in health: No  Change in activity: No  Alcohol: Yes  Other concerns: No  Upcoming Surgeries: no  Parenteral anticoagulation: no    Current Outpatient Medications on File Prior to Visit   Medication Sig Dispense Refill    albuterol 90 mcg/actuation inhaler inhale 1 to 2 puffs by mouth every 4 to 6 hours as needed 18 g 5    atorvastatin (Lipitor) 40 mg tablet Take 1 tablet (40 mg) by mouth once daily at bedtime.      baclofen (Lioresal) 20 mg tablet Take 1 Tablet orally 3 times per day for muscle spasm. 90 tablet 0    coenzyme Q-10 200 mg capsule Take 1 capsule (200 mg) by mouth once daily. 90 capsule 3    cyanocobalamin (Vitamin B-12) 100 mcg tablet Take 1 tablet (100 mcg) by mouth once daily.      fluticasone (Flonase) 50 mcg/actuation nasal spray Administer 2 sprays into each nostril 2 times a day. Shake gently. Before first use, prime pump. After use, clean tip and replace cap. 16 g 12    folic acid (Folvite) 1 mg tablet Take 1 tablet (1 mg) by mouth once daily. 90 tablet 1    losartan (Cozaar) 100 mg tablet Take 1 tablet (100 mg) by mouth once daily. 90 tablet 0    multivitamin capsule Take 1 capsule by mouth once daily.      omeprazole (PriLOSEC) 40 mg DR capsule Take 1 capsule (40 mg) by mouth once daily at bedtime.      primidone (Mysoline) 50 mg tablet Take 1 tablet (50 mg) by mouth once daily at bedtime. 90 tablet 1    tamsulosin (Flomax) 0.4 mg 24 hr capsule Take 1 capsule (0.4 mg) by mouth once daily at bedtime.      topiramate (Topamax) 50 mg  tablet Take 1 tablet (50 mg) by mouth 2 times a day.      warfarin (Coumadin) 2.5 mg tablet Take up to 2 tablets (5 mg) by mouth daily as directed by pharmacy clinic 60 tablet 2     No current facility-administered medications on file prior to visit.        Objective   Anticoagulation Summary  As of 8/15/2024      INR goal:  2.0-3.0   TTR:  54.0% (2.8 mo)   INR used for dosing:  3.80 (8/15/2024)   Weekly warfarin total:  25 mg             Lab Results   Component Value Date    INR 3.80 (A) 08/15/2024    INR 1.80 (A) 07/18/2024    INR 2.40 (N) 06/13/2024    PROTIME 10.4 01/18/2023    PROTIME 11.5 06/29/2022    PROTIME 10.5 06/22/2021       Assessment/Plan   Current INR is Supratherapeutic at 3.8. Previous INR was Subtherapeutic at 1.8. Patient reports having a couple beers yesterday. Otherwise, denies changes in diet, lifestyle, or medications. Of note, patient reports taking a lower weekly dose than previously discussed. Anticoagulation summary updated to reflect current dosing. Patient has already taken warfarin dose today. Advised patient to hold warfarin x1 dose tomorrow, then resume current warfarin regimen of 5 mg on Sun/Tue/Thur and 2.5 mg all other days.     Follow Up: 3 weeks    Kym Stevens, MasonD

## 2024-08-28 ENCOUNTER — APPOINTMENT (OUTPATIENT)
Dept: ORTHOPEDIC SURGERY | Facility: CLINIC | Age: 64
End: 2024-08-28
Payer: MEDICARE

## 2024-08-28 ENCOUNTER — HOSPITAL ENCOUNTER (OUTPATIENT)
Dept: RADIOLOGY | Facility: CLINIC | Age: 64
Discharge: HOME | End: 2024-08-28
Payer: MEDICARE

## 2024-08-28 DIAGNOSIS — M17.0 PRIMARY OSTEOARTHRITIS OF BOTH KNEES: ICD-10-CM

## 2024-08-28 DIAGNOSIS — M25.552 LEFT HIP PAIN: ICD-10-CM

## 2024-08-28 DIAGNOSIS — M17.11 ARTHRITIS OF RIGHT KNEE: Primary | ICD-10-CM

## 2024-08-28 DIAGNOSIS — M17.11 ARTHRITIS OF RIGHT KNEE: ICD-10-CM

## 2024-08-28 DIAGNOSIS — M62.838 MUSCLE SPASM: ICD-10-CM

## 2024-08-28 PROCEDURE — 1160F RVW MEDS BY RX/DR IN RCRD: CPT | Performed by: ORTHOPAEDIC SURGERY

## 2024-08-28 PROCEDURE — 1159F MED LIST DOCD IN RCRD: CPT | Performed by: ORTHOPAEDIC SURGERY

## 2024-08-28 PROCEDURE — 99214 OFFICE O/P EST MOD 30 MIN: CPT | Performed by: ORTHOPAEDIC SURGERY

## 2024-08-28 PROCEDURE — 73564 X-RAY EXAM KNEE 4 OR MORE: CPT | Mod: RT

## 2024-08-28 PROCEDURE — 4004F PT TOBACCO SCREEN RCVD TLK: CPT | Performed by: ORTHOPAEDIC SURGERY

## 2024-08-28 PROCEDURE — 1125F AMNT PAIN NOTED PAIN PRSNT: CPT | Performed by: ORTHOPAEDIC SURGERY

## 2024-08-28 PROCEDURE — 1157F ADVNC CARE PLAN IN RCRD: CPT | Performed by: ORTHOPAEDIC SURGERY

## 2024-08-28 PROCEDURE — 73502 X-RAY EXAM HIP UNI 2-3 VIEWS: CPT | Mod: LT

## 2024-08-28 PROCEDURE — 20610 DRAIN/INJ JOINT/BURSA W/O US: CPT | Performed by: ORTHOPAEDIC SURGERY

## 2024-08-28 RX ORDER — TRIAMCINOLONE ACETONIDE 40 MG/ML
2.5 INJECTION, SUSPENSION INTRA-ARTICULAR; INTRAMUSCULAR
Status: COMPLETED | OUTPATIENT
Start: 2024-08-28 | End: 2024-08-28

## 2024-08-28 RX ORDER — BACLOFEN 20 MG/1
TABLET ORAL
Qty: 90 TABLET | Refills: 0 | Status: SHIPPED | OUTPATIENT
Start: 2024-08-28

## 2024-08-28 ASSESSMENT — PAIN SCALES - GENERAL: PAINLEVEL_OUTOF10: 7

## 2024-08-28 NOTE — PROGRESS NOTES
This is a consultation from Dr. Mendez Coronel MD for   Chief Complaint   Patient presents with   • Left Hip - Follow-up     4/1/24 LT TRINITY, RT KNEE PAIN   • Right Knee - Pain       This is a 64 y.o. male who presents for follow-up for his bilateral knees.  Patient has bilateral knee arthritis, injected his right knee about 3 months ago.  This was helpful and lasted for a while.  Since then he has had return of his symptoms exacerbation of his pain in the right knee.  He is also had increased pain in the left knee, this is a new thing for him.  No numbness or tingling no fevers or chills no shooting pendent down the leg either side.  He had a left total hip about 5 months ago which is doing great, no pain in the hip no drainage from the incision no fevers no chills.    Physical Exam    There has been no interval change in this patient's past medical, surgical, medications, allergies, family history or social history since the most recent visit to a provider within our department. 14 point review of systems was performed, reviewed, and negative except for pertinent positives documented in the history of present illness.     Constitutional: well developed, well nourished male in no acute distress  Psychiatric: normal mood, appropriate affect  Eyes: sclera anicteric  HENT: normocephalic/atraumatic  CV: regular rate and rhythm   Respiratory: non labored breathing  Integumentary: no rash  Neurological: moves all extremities    Bilateral knee exam: skin intact no lacerations or abrations. no effusion.  Tender medial joint line. negative log roll negative patellar grind. ROM 0-120. stable to varus and valgus stress at 0 and 30 degrees. negative lachman negative posterior drawer negative denny. 5/5 ehl/fhl/gs/ta. silt s/s/sp/dp/t. 2+ dp/pt    Left hip examination: Well-healed surgical incision erythema no drainage no pain with range of motion neurovascular tact distally    Xrays were ordered by me, they were reviewed  and independently interpreted by me today, they show moderate degenerative changes medial joint space narrowing on the right knee and an osteochondral defect.  Moderate mild degenerative changes of the left side    L Inj/Asp: bilateral knee on 8/28/2024 1:05 PM  Indications: pain and joint swelling  Details: 22 G needle, anterolateral approach  Medications (Right): 2.5 mg triamcinolone acetonide 40 mg/mL  Medications (Left): 2.5 mg triamcinolone acetonide 40 mg/mL    Discussion:  I discussed the conservative treatment options for knee osteoarthritis including but not limited to physical therapy, oral NSAIDS, activity and lifestyle modification, and corticosteroid injections. Pt has elected to undergo a cortisone injection today. I have explained the risk and benefits of an injection including the possibility of joint infection, bleeding, damage to cartilage, allergic reaction. Patient verbalized understanding and gave verbal consent wishes to proceed with a intra-articular cortisone injection for their knee.    Procedure:  After discussing the risk and benefits of the procedure, we proceeded with an intra-articular bilateral knee injection. We discussed the risks and benefits and potential morbidity related to the treatment, and to the prescription medication administered in the injection    With the patient's informed verbal consent, the bilateral knees were prepped in standard sterile fashion with Chlorhexidine. The skin was then anesthetized with ethyl chloride spray and cleaned again with Chlorhexidine. The bilateral knees were then apirated/injected with a prefilled 20-gauge syringe of 40 mg Kenalog + 4 ml Lidocaine using the lateral approach without complications.  The patient tolerated this well and felt immediate initial relief of symptoms. A bandaid was applied and the patient ambulated out of the clinic on ther own accord without difficulty. Patient was instructed to avoid physical activity for 24-48 hours  to prevent the knees from swelling and may ice the knees as tolerated. Patient should contact the office if any signs of of infection appear: redness, fever, chills, drainage, swelling or warmth to the knees.  Pt understands that the injections can be repeated no sooner than 3 months.      Procedure, treatment alternatives, risks and benefits explained, specific risks discussed. Consent was given by the patient. Immediately prior to procedure a time out was called to verify the correct patient, procedure, equipment, support staff and site/side marked as required. Patient was prepped and draped in the usual sterile fashion.         Impression/Plan: This is a 64 y.o. male with bilateral knee arthritis.  I had an in depth discussion with the patient regarding treatment options for arthritis and their relative risks and benefits. We reviewed surgical and nonsurgical option for treatment. Treatments include anti inflammatory medications, physical therapy, weight loss, activity modification, use of assistive devices, injection therapies. We discussed current prescriptions and risks and benefits of continuation of prescription medication as apporpriate. We discussed that arthritis is often progressive over time, an in end stage arthritis surgical interventions can be considered, including arthroplasty. All questions were answered and the patient voiced their understanding.  Continue nonsurgical management for his knees.  For the hip, he may follow-up as needed for radiographic follow-up.  Weightbearing as tolerated activity as tolerated    BMI Readings from Last 1 Encounters:   06/13/24 29.26 kg/m²      Lab Results   Component Value Date    CREATININE 1.10 06/13/2024     Tobacco Use: High Risk (8/28/2024)    Patient History    • Smoking Tobacco Use: Every Day    • Smokeless Tobacco Use: Never    • Passive Exposure: Not on file      Computed MELD 3.0 unavailable. One or more values for this score either were not found  within the given timeframe or did not fit some other criterion.  Computed MELD-Na unavailable. One or more values for this score either were not found within the given timeframe or did not fit some other criterion.    Anticoagulation Episode Summary       Current INR goal:  2.0-3.0   TTR:  54.0% (2.8 mo)   Next INR check:  9/5/2024   INR from last check:  3.80 (8/15/2024)   Weekly max warfarin dose:  --   Target end date:  8/30/2024   INR check location:  --   Preferred lab:  --   Send INR reminders to:  [facility]    Indications    Acute pulmonary embolism without acute cor pulmonale  unspecified pulmonary embolism type (Multi) [I26.99]             Comments:  --             Anticoagulation Care Providers       Provider Role Specialty Phone number    Mendez Coronel MD Referring Internal Medicine [phone]           Lab Results   Component Value Date    HGBA1C 5.2 06/18/2022     Lab Results   Component Value Date    STAPHMRSASCR No Staphylococcus aureus isolated 03/13/2024

## 2024-09-05 ENCOUNTER — APPOINTMENT (OUTPATIENT)
Dept: PHARMACY | Facility: HOSPITAL | Age: 64
End: 2024-09-05
Payer: MEDICARE

## 2024-09-12 ENCOUNTER — APPOINTMENT (OUTPATIENT)
Dept: PRIMARY CARE | Facility: CLINIC | Age: 64
End: 2024-09-12
Payer: MEDICARE

## 2024-09-24 ENCOUNTER — TELEPHONE (OUTPATIENT)
Dept: ORTHOPEDIC SURGERY | Facility: CLINIC | Age: 64
End: 2024-09-24

## 2024-09-24 ENCOUNTER — APPOINTMENT (OUTPATIENT)
Dept: PRIMARY CARE | Facility: CLINIC | Age: 64
End: 2024-09-24
Payer: MEDICARE

## 2024-09-24 VITALS
OXYGEN SATURATION: 96 % | WEIGHT: 223.2 LBS | SYSTOLIC BLOOD PRESSURE: 126 MMHG | HEIGHT: 74 IN | DIASTOLIC BLOOD PRESSURE: 72 MMHG | HEART RATE: 75 BPM | TEMPERATURE: 97.4 F | BODY MASS INDEX: 28.64 KG/M2

## 2024-09-24 DIAGNOSIS — I26.99 ACUTE PULMONARY EMBOLISM WITHOUT ACUTE COR PULMONALE, UNSPECIFIED PULMONARY EMBOLISM TYPE (MULTI): ICD-10-CM

## 2024-09-24 DIAGNOSIS — Z00.00 ROUTINE GENERAL MEDICAL EXAMINATION AT HEALTH CARE FACILITY: Primary | ICD-10-CM

## 2024-09-24 DIAGNOSIS — Z23 FLU VACCINE NEED: ICD-10-CM

## 2024-09-24 DIAGNOSIS — H26.9 CATARACT, UNSPECIFIED CATARACT TYPE, UNSPECIFIED LATERALITY: ICD-10-CM

## 2024-09-24 DIAGNOSIS — F17.219 CIGARETTE NICOTINE DEPENDENCE WITH NICOTINE-INDUCED DISORDER: ICD-10-CM
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
## 2024-09-24 PROCEDURE — 99397 PER PM REEVAL EST PAT 65+ YR: CPT | Performed by: INTERNAL MEDICINE

## 2024-09-24 PROCEDURE — 1170F FXNL STATUS ASSESSED: CPT | Performed by: INTERNAL MEDICINE

## 2024-09-24 PROCEDURE — 99406 BEHAV CHNG SMOKING 3-10 MIN: CPT | Performed by: INTERNAL MEDICINE

## 2024-09-24 PROCEDURE — 4004F PT TOBACCO SCREEN RCVD TLK: CPT | Performed by: INTERNAL MEDICINE

## 2024-09-24 PROCEDURE — 99214 OFFICE O/P EST MOD 30 MIN: CPT | Performed by: INTERNAL MEDICINE

## 2024-09-24 PROCEDURE — 3008F BODY MASS INDEX DOCD: CPT | Performed by: INTERNAL MEDICINE

## 2024-09-24 PROCEDURE — 90662 IIV NO PRSV INCREASED AG IM: CPT | Performed by: INTERNAL MEDICINE

## 2024-09-24 PROCEDURE — 1124F ACP DISCUSS-NO DSCNMKR DOCD: CPT | Performed by: INTERNAL MEDICINE

## 2024-09-24 PROCEDURE — 1160F RVW MEDS BY RX/DR IN RCRD: CPT | Performed by: INTERNAL MEDICINE

## 2024-09-24 PROCEDURE — 1159F MED LIST DOCD IN RCRD: CPT | Performed by: INTERNAL MEDICINE

## 2024-09-24 PROCEDURE — G0439 PPPS, SUBSEQ VISIT: HCPCS | Performed by: INTERNAL MEDICINE

## 2024-09-24 PROCEDURE — 1157F ADVNC CARE PLAN IN RCRD: CPT | Performed by: INTERNAL MEDICINE

## 2024-09-24 PROCEDURE — G0008 ADMIN INFLUENZA VIRUS VAC: HCPCS | Performed by: INTERNAL MEDICINE

## 2024-09-24 RX ORDER — FLUOCINONIDE 0.5 MG/G
CREAM TOPICAL 2 TIMES DAILY PRN
Qty: 15 G | Refills: 0 | Status: SHIPPED | OUTPATIENT
Start: 2024-09-24 | End: 2025-09-24

## 2024-09-24 RX ORDER — PNEUMOCOCCAL 20-VALENT CONJUGATE VACCINE 2.2; 2.2; 2.2; 2.2; 2.2; 2.2; 2.2; 2.2; 2.2; 2.2; 2.2; 2.2; 2.2; 2.2; 2.2; 2.2; 4.4; 2.2; 2.2; 2.2 UG/.5ML; UG/.5ML; UG/.5ML; UG/.5ML; UG/.5ML; UG/.5ML; UG/.5ML; UG/.5ML; UG/.5ML; UG/.5ML; UG/.5ML; UG/.5ML; UG/.5ML; UG/.5ML; UG/.5ML; UG/.5ML; UG/.5ML; UG/.5ML; UG/.5ML; UG/.5ML
0.5 INJECTION, SUSPENSION INTRAMUSCULAR ONCE
Qty: 0.5 ML | Refills: 0 | Status: SHIPPED | OUTPATIENT
Start: 2024-09-24 | End: 2024-09-24

## 2024-09-24 ASSESSMENT — ENCOUNTER SYMPTOMS
BLOOD IN STOOL: 0
SORE THROAT: 0
DIFFICULTY URINATING: 0
ARTHRALGIAS: 0
WHEEZING: 0
PALPITATIONS: 0
DEPRESSION: 0
DIARRHEA: 0
UNEXPECTED WEIGHT CHANGE: 0
BRUISES/BLEEDS EASILY: 0
FEVER: 0
LOSS OF SENSATION IN FEET: 1
FATIGUE: 0
ABDOMINAL PAIN: 0
OCCASIONAL FEELINGS OF UNSTEADINESS: 1
HEADACHES: 0
SINUS PAIN: 0
COUGH: 0
DIZZINESS: 0

## 2024-09-24 ASSESSMENT — PATIENT HEALTH QUESTIONNAIRE - PHQ9
10. IF YOU CHECKED OFF ANY PROBLEMS, HOW DIFFICULT HAVE THESE PROBLEMS MADE IT FOR YOU TO DO YOUR WORK, TAKE CARE OF THINGS AT HOME, OR GET ALONG WITH OTHER PEOPLE: NOT DIFFICULT AT ALL
1. LITTLE INTEREST OR PLEASURE IN DOING THINGS: SEVERAL DAYS
SUM OF ALL RESPONSES TO PHQ9 QUESTIONS 1 AND 2: 1
2. FEELING DOWN, DEPRESSED OR HOPELESS: NOT AT ALL

## 2024-09-24 ASSESSMENT — ACTIVITIES OF DAILY LIVING (ADL)
DRESSING: INDEPENDENT
TAKING_MEDICATION: INDEPENDENT
MANAGING_FINANCES: INDEPENDENT
GROCERY_SHOPPING: INDEPENDENT
BATHING: INDEPENDENT
DOING_HOUSEWORK: INDEPENDENT

## 2024-09-24 NOTE — TELEPHONE ENCOUNTER
Julio had cortisone injections on 8/28- he states they only helped with the pain for about 2 weeks. He is wondering if we can go ahead and order gel injections for him? Does he need to be seen for this or can I enter the order?

## 2024-09-24 NOTE — PROGRESS NOTES
"Subjective   Patient ID: Julio Carranza is a 64 y.o. male who presents for Medicare Annual Wellness Visit Subsequent, Immunizations (GIVEN FLU VACCINE), Hernia (UMBILICAL ), Cataract (RIGHT EYE), Insect Bite (LEFT CHEEK), and WANTS TO DISCONTINUE TO WARFARIN (FOR PE).    HPI       Review of Systems    Objective   Lab Results   Component Value Date    HGBA1C 5.2 06/18/2022      /72   Pulse 75   Temp 36.3 °C (97.4 °F)   Ht 1.88 m (6' 2\")   Wt 101 kg (223 lb 3.2 oz)   SpO2 96%   BMI 28.66 kg/m²     Physical Exam    Assessment/Plan   Julio was seen today for medicare annual wellness visit subsequent, immunizations, hernia, cataract, insect bite and wants to discontinue to warfarin.  Diagnoses and all orders for this visit:  Flu vaccine need  -     Flu vaccine, trivalent, preservative free, HIGH-DOSE, age 65y+ (Fluzone)     "

## 2024-09-24 NOTE — PROGRESS NOTES
"Subjective   Reason for Visit: Julio Carranza is an 64 y.o. male here for a Medicare Wellness visit.          Reviewed all medications by prescribing practitioner or clinical pharmacist (such as prescriptions, OTCs, herbal therapies and supplements) and documented in the medical record.    Medicare Annual Wellness Visit Subsequent, Immunizations (GIVEN FLU VACCINE), Hernia (UMBILICAL ), Cataract (RIGHT EYE), Insect Bite (LEFT CHEEK), and WANTS TO DISCONTINUE TO WARFARIN (FOR PE)    Annual preventive visit  -Vaccinations needs booster for tetanus vaccine and Prevnar 20  Given flu vaccine today  - Screening for colon cancer obtained 2016 repeat in 10 years 2026  - Screen for depression negative  - Advance of directive reviewed    Follow-up  -Left-sided facial insect bite and swelling mild superficial cellulitis May use Lidex cream for 2 to 3 days follow-up if no improvement  -Status post left pulmonary embolism in April 2024 patient to continue warfarin  Need to repeat CT angiogram for further recommendation May need to discontinue reweigh patient in 4 weeks  - Underlying history of cataract refer patient to ophthalmology for further eval recommendation  -Status post left hip arthroplasty doing well  -Lung CT low-dose showed lung nodule repeat in 1 year repeat in September 2024  - Nicotine dependency counseled about smoking cessation  \"I spent 3 minutes counseling patient about need for smoking/tobacco cessation and how I can support efforts when patient is ready to quit.  Discussed nicotine replacement therapy, Varenicline, Bupropion, hypnosis, support groups, and accupunture as potential options.  Patient currently has no signs or symptoms of tobacco related disease.\".  -Hypertension improving continue with current medication- Dyslipidemia continue low-carb diet  - Coronary artery disease compensated continue with aspirin daily counseled about nicotine cessation  Follow-up 4 weeks             Patient Care " "Team:  Mendez Coronel MD as PCP - General  Mendez Coronel MD as PCP - [employer] PCP  Danny Ricci MD as Consulting Physician (Orthopaedic Surgery)     Review of Systems   Constitutional:  Negative for fatigue, fever and unexpected weight change.   HENT:  Negative for congestion, ear discharge, ear pain, mouth sores, sinus pain and sore throat.    Eyes:  Negative for visual disturbance.   Respiratory:  Negative for cough and wheezing.    Cardiovascular:  Negative for chest pain, palpitations and leg swelling.   Gastrointestinal:  Negative for abdominal pain, blood in stool and diarrhea.   Genitourinary:  Negative for difficulty urinating.   Musculoskeletal:  Negative for arthralgias.   Skin:  Positive for rash.   Neurological:  Negative for dizziness and headaches.   Hematological:  Does not bruise/bleed easily.   Psychiatric/Behavioral:  Negative for behavioral problems.    All other systems reviewed and are negative.      Objective   Vitals:  /72   Pulse 75   Temp 36.3 °C (97.4 °F)   Ht 1.88 m (6' 2\")   Wt 101 kg (223 lb 3.2 oz)   SpO2 96%   BMI 28.66 kg/m²     Lab Results   Component Value Date    WBC 7.0 06/13/2024    HGB 13.3 (L) 06/13/2024    HCT 42.3 06/13/2024     06/13/2024    CHOL 158 08/16/2023    TRIG 97 08/16/2023    HDL 39.2 (A) 08/16/2023    ALT 13 03/13/2024    AST 16 03/13/2024     06/13/2024    K 4.4 06/13/2024     06/13/2024    CREATININE 1.10 06/13/2024    BUN 11 06/13/2024    CO2 24 06/13/2024    TSH 0.73 08/16/2023    PSA 2.1 12/09/2022    INR 3.80 (A) 08/15/2024    HGBA1C 5.2 06/18/2022     par   Physical Exam  Vitals and nursing note reviewed.   Constitutional:       Appearance: Normal appearance.   HENT:      Head: Normocephalic.      Nose: Nose normal.   Eyes:      Conjunctiva/sclera: Conjunctivae normal.      Pupils: Pupils are equal, round, and reactive to light.   Cardiovascular:      Rate and Rhythm: Regular rhythm.   Pulmonary: "      Effort: Pulmonary effort is normal.      Breath sounds: Normal breath sounds.   Abdominal:      General: Abdomen is flat.      Palpations: Abdomen is soft.   Musculoskeletal:      Cervical back: Neck supple.   Skin:     General: Skin is warm.      Findings: Erythema (Left sided.  Regular erythema from insect bite) present.   Neurological:      General: No focal deficit present.      Mental Status: He is oriented to person, place, and time.   Psychiatric:         Mood and Affect: Mood normal.         Assessment & Plan  Flu vaccine need    Orders:    Flu vaccine, trivalent, preservative free, HIGH-DOSE, age 65y+ (Fluzone)    Acute pulmonary embolism without acute cor pulmonale, unspecified pulmonary embolism type (Multi)    Orders:    CT angio chest for pulmonary embolism; Future    Creatinine, Serum; Future    Cigarette nicotine dependence with nicotine-induced disorder         Routine general medical examination at health care facility    Orders:    1 Year Follow Up In Primary Care - Wellness Exam; Future    diphth,pertus,acell,,tetanus (BoostRIX) 2.5-8-5 Lf-mcg-Lf/0.5mL injection; Inject 0.5 mL into the muscle 1 time for 1 dose.    pneumoc 20-jordyn conj-dip cr,PF, (Prevnar 20, PF,) 0.5 mL vaccine; Inject 0.5 mL into the muscle 1 time for 1 dose.    Cataract, unspecified cataract type, unspecified laterality    Orders:    Referral to Ophthalmology; Future    Insect bite, unspecified site, initial encounter    Orders:    fluocinonide (Lidex) 0.05 % cream; Apply topically 2 times a day as needed for irritation or rash.     Medicare Annual Wellness Visit Subsequent, Immunizations (GIVEN FLU VACCINE), Hernia (UMBILICAL ), Cataract (RIGHT EYE), Insect Bite (LEFT CHEEK), and WANTS TO DISCONTINUE TO WARFARIN (FOR PE)    Annual preventive visit  -Vaccinations needs booster for tetanus vaccine and Prevnar 20  Given flu vaccine today  - Screening for colon cancer obtained 2016 repeat in 10 years 2026  - Screen for  "depression negative  - Advance of directive reviewed    Follow-up  -Left-sided facial insect bite and swelling mild superficial cellulitis May use Lidex cream for 2 to 3 days follow-up if no improvement  -Status post left pulmonary embolism in April 2024 patient to continue warfarin  Need to repeat CT angiogram for further recommendation May need to discontinue reweigh patient in 4 weeks  - Underlying history of cataract refer patient to ophthalmology for further eval recommendation  -Status post left hip arthroplasty doing well  -Lung CT low-dose showed lung nodule repeat in 1 year repeat in September 2024  - Nicotine dependency counseled about smoking cessation  \"I spent 3 minutes counseling patient about need for smoking/tobacco cessation and how I can support efforts when patient is ready to quit.  Discussed nicotine replacement therapy, Varenicline, Bupropion, hypnosis, support groups, and accupunture as potential options.  Patient currently has no signs or symptoms of tobacco related disease.\".  -Hypertension improving continue with current medication- Dyslipidemia continue low-carb diet  - Coronary artery disease compensated continue with aspirin daily counseled about nicotine cessation  Follow-up 4 weeks              "

## 2024-09-25 DIAGNOSIS — M17.0 ARTHRITIS OF BOTH KNEES: Primary | ICD-10-CM

## 2024-09-26 RX ORDER — HYALURONATE SODIUM, STABILIZED 60 MG/3 ML
SYRINGE (ML) INTRAARTICULAR
Qty: 6 ML | Refills: 0 | Status: SHIPPED | OUTPATIENT
Start: 2024-09-26

## 2024-09-27 ENCOUNTER — LAB (OUTPATIENT)
Dept: LAB | Facility: LAB | Age: 64
End: 2024-09-27
Payer: MEDICARE

## 2024-09-27 DIAGNOSIS — I26.99 ACUTE PULMONARY EMBOLISM WITHOUT ACUTE COR PULMONALE, UNSPECIFIED PULMONARY EMBOLISM TYPE (MULTI): ICD-10-CM

## 2024-09-27 DIAGNOSIS — I26.99 OTHER ACUTE PULMONARY EMBOLISM WITHOUT ACUTE COR PULMONALE: ICD-10-CM

## 2024-09-27 LAB
ANION GAP SERPL CALC-SCNC: 10 MMOL/L (ref 10–20)
BUN SERPL-MCNC: 9 MG/DL (ref 6–23)
CALCIUM SERPL-MCNC: 9.2 MG/DL (ref 8.6–10.3)
CHLORIDE SERPL-SCNC: 104 MMOL/L (ref 98–107)
CO2 SERPL-SCNC: 27 MMOL/L (ref 21–32)
CREAT SERPL-MCNC: 1.04 MG/DL (ref 0.5–1.3)
EGFRCR SERPLBLD CKD-EPI 2021: 79 ML/MIN/1.73M*2
ERYTHROCYTE [DISTWIDTH] IN BLOOD BY AUTOMATED COUNT: 13.6 % (ref 11.5–14.5)
GLUCOSE SERPL-MCNC: 95 MG/DL (ref 74–99)
HCT VFR BLD AUTO: 44.5 % (ref 41–52)
HGB BLD-MCNC: 14.3 G/DL (ref 13.5–17.5)
MCH RBC QN AUTO: 29.6 PG (ref 26–34)
MCHC RBC AUTO-ENTMCNC: 32.1 G/DL (ref 32–36)
MCV RBC AUTO: 92 FL (ref 80–100)
NRBC BLD-RTO: 0 /100 WBCS (ref 0–0)
PLATELET # BLD AUTO: 200 X10*3/UL (ref 150–450)
POTASSIUM SERPL-SCNC: 4.3 MMOL/L (ref 3.5–5.3)
RBC # BLD AUTO: 4.83 X10*6/UL (ref 4.5–5.9)
SODIUM SERPL-SCNC: 137 MMOL/L (ref 136–145)
WBC # BLD AUTO: 8 X10*3/UL (ref 4.4–11.3)

## 2024-09-27 PROCEDURE — 36415 COLL VENOUS BLD VENIPUNCTURE: CPT

## 2024-09-28 ENCOUNTER — SPECIALTY PHARMACY (OUTPATIENT)
Dept: PHARMACY | Facility: CLINIC | Age: 64
End: 2024-09-28

## 2024-09-30 ENCOUNTER — APPOINTMENT (OUTPATIENT)
Dept: RADIOLOGY | Facility: HOSPITAL | Age: 64
End: 2024-09-30
Payer: MEDICARE

## 2024-09-30 ENCOUNTER — APPOINTMENT (OUTPATIENT)
Dept: CARDIOLOGY | Facility: HOSPITAL | Age: 64
End: 2024-09-30
Payer: MEDICARE

## 2024-09-30 ENCOUNTER — HOSPITAL ENCOUNTER (EMERGENCY)
Facility: HOSPITAL | Age: 64
Discharge: HOME | End: 2024-09-30
Attending: EMERGENCY MEDICINE
Payer: MEDICARE

## 2024-09-30 VITALS
HEART RATE: 65 BPM | DIASTOLIC BLOOD PRESSURE: 88 MMHG | WEIGHT: 224 LBS | OXYGEN SATURATION: 99 % | RESPIRATION RATE: 14 BRPM | SYSTOLIC BLOOD PRESSURE: 123 MMHG | HEIGHT: 74 IN | BODY MASS INDEX: 28.75 KG/M2 | TEMPERATURE: 97.9 F

## 2024-09-30 DIAGNOSIS — R79.1 SUBTHERAPEUTIC INTERNATIONAL NORMALIZED RATIO (INR): ICD-10-CM

## 2024-09-30 DIAGNOSIS — Z86.711 HISTORY OF PULMONARY EMBOLISM: ICD-10-CM

## 2024-09-30 DIAGNOSIS — R07.9 CHEST PAIN, UNSPECIFIED TYPE: Primary | ICD-10-CM

## 2024-09-30 DIAGNOSIS — R06.02 SHORTNESS OF BREATH: ICD-10-CM

## 2024-09-30 DIAGNOSIS — Z79.01 ANTICOAGULATED ON WARFARIN: ICD-10-CM

## 2024-09-30 LAB
ALBUMIN SERPL BCP-MCNC: 4.5 G/DL (ref 3.4–5)
ALP SERPL-CCNC: 107 U/L (ref 33–136)
ALT SERPL W P-5'-P-CCNC: 22 U/L (ref 10–52)
ANION GAP SERPL CALC-SCNC: 16 MMOL/L (ref 10–20)
APTT PPP: 32 SECONDS (ref 27–38)
AST SERPL W P-5'-P-CCNC: 18 U/L (ref 9–39)
BASOPHILS # BLD AUTO: 0.04 X10*3/UL (ref 0–0.1)
BASOPHILS NFR BLD AUTO: 0.5 %
BILIRUB SERPL-MCNC: 0.5 MG/DL (ref 0–1.2)
BUN SERPL-MCNC: 12 MG/DL (ref 6–23)
CALCIUM SERPL-MCNC: 9.4 MG/DL (ref 8.6–10.3)
CARDIAC TROPONIN I PNL SERPL HS: <3 NG/L (ref 0–20)
CARDIAC TROPONIN I PNL SERPL HS: <3 NG/L (ref 0–20)
CHLORIDE SERPL-SCNC: 103 MMOL/L (ref 98–107)
CO2 SERPL-SCNC: 24 MMOL/L (ref 21–32)
CREAT SERPL-MCNC: 1.14 MG/DL (ref 0.5–1.3)
EGFRCR SERPLBLD CKD-EPI 2021: 71 ML/MIN/1.73M*2
EOSINOPHIL # BLD AUTO: 0.02 X10*3/UL (ref 0–0.7)
EOSINOPHIL NFR BLD AUTO: 0.3 %
ERYTHROCYTE [DISTWIDTH] IN BLOOD BY AUTOMATED COUNT: 13.7 % (ref 11.5–14.5)
GLUCOSE SERPL-MCNC: 84 MG/DL (ref 74–99)
HCT VFR BLD AUTO: 46.5 % (ref 41–52)
HGB BLD-MCNC: 14.9 G/DL (ref 13.5–17.5)
IMM GRANULOCYTES # BLD AUTO: 0.03 X10*3/UL (ref 0–0.7)
IMM GRANULOCYTES NFR BLD AUTO: 0.4 % (ref 0–0.9)
INR PPP: 1.4 (ref 0.9–1.1)
LYMPHOCYTES # BLD AUTO: 1.32 X10*3/UL (ref 1.2–4.8)
LYMPHOCYTES NFR BLD AUTO: 18.1 %
MAGNESIUM SERPL-MCNC: 2.03 MG/DL (ref 1.6–2.4)
MCH RBC QN AUTO: 29.5 PG (ref 26–34)
MCHC RBC AUTO-ENTMCNC: 32 G/DL (ref 32–36)
MCV RBC AUTO: 92 FL (ref 80–100)
MONOCYTES # BLD AUTO: 0.57 X10*3/UL (ref 0.1–1)
MONOCYTES NFR BLD AUTO: 7.8 %
NEUTROPHILS # BLD AUTO: 5.31 X10*3/UL (ref 1.2–7.7)
NEUTROPHILS NFR BLD AUTO: 72.9 %
NRBC BLD-RTO: 0 /100 WBCS (ref 0–0)
PLATELET # BLD AUTO: 213 X10*3/UL (ref 150–450)
POTASSIUM SERPL-SCNC: 4.2 MMOL/L (ref 3.5–5.3)
PROT SERPL-MCNC: 7.3 G/DL (ref 6.4–8.2)
PROTHROMBIN TIME: 16 SECONDS (ref 9.8–12.8)
RBC # BLD AUTO: 5.05 X10*6/UL (ref 4.5–5.9)
SODIUM SERPL-SCNC: 139 MMOL/L (ref 136–145)
WBC # BLD AUTO: 7.3 X10*3/UL (ref 4.4–11.3)

## 2024-09-30 PROCEDURE — 83735 ASSAY OF MAGNESIUM: CPT | Performed by: EMERGENCY MEDICINE

## 2024-09-30 PROCEDURE — 85610 PROTHROMBIN TIME: CPT | Performed by: EMERGENCY MEDICINE

## 2024-09-30 PROCEDURE — 93005 ELECTROCARDIOGRAM TRACING: CPT

## 2024-09-30 PROCEDURE — 71045 X-RAY EXAM CHEST 1 VIEW: CPT

## 2024-09-30 PROCEDURE — 96374 THER/PROPH/DIAG INJ IV PUSH: CPT

## 2024-09-30 PROCEDURE — 36415 COLL VENOUS BLD VENIPUNCTURE: CPT | Performed by: EMERGENCY MEDICINE

## 2024-09-30 PROCEDURE — 96372 THER/PROPH/DIAG INJ SC/IM: CPT | Mod: 59 | Performed by: EMERGENCY MEDICINE

## 2024-09-30 PROCEDURE — 2550000001 HC RX 255 CONTRASTS: Mod: SE | Performed by: EMERGENCY MEDICINE

## 2024-09-30 PROCEDURE — 71275 CT ANGIOGRAPHY CHEST: CPT

## 2024-09-30 PROCEDURE — 2500000004 HC RX 250 GENERAL PHARMACY W/ HCPCS (ALT 636 FOR OP/ED): Mod: SE | Performed by: EMERGENCY MEDICINE

## 2024-09-30 PROCEDURE — 84484 ASSAY OF TROPONIN QUANT: CPT | Performed by: EMERGENCY MEDICINE

## 2024-09-30 PROCEDURE — 71275 CT ANGIOGRAPHY CHEST: CPT | Performed by: RADIOLOGY

## 2024-09-30 PROCEDURE — 85025 COMPLETE CBC W/AUTO DIFF WBC: CPT | Performed by: EMERGENCY MEDICINE

## 2024-09-30 PROCEDURE — 71045 X-RAY EXAM CHEST 1 VIEW: CPT | Performed by: RADIOLOGY

## 2024-09-30 PROCEDURE — 99285 EMERGENCY DEPT VISIT HI MDM: CPT | Mod: 25

## 2024-09-30 PROCEDURE — 96375 TX/PRO/DX INJ NEW DRUG ADDON: CPT

## 2024-09-30 PROCEDURE — 80053 COMPREHEN METABOLIC PANEL: CPT | Performed by: EMERGENCY MEDICINE

## 2024-09-30 PROCEDURE — 85730 THROMBOPLASTIN TIME PARTIAL: CPT | Performed by: EMERGENCY MEDICINE

## 2024-09-30 RX ORDER — ENOXAPARIN SODIUM 100 MG/ML
150 INJECTION SUBCUTANEOUS ONCE
Status: COMPLETED | OUTPATIENT
Start: 2024-09-30 | End: 2024-09-30

## 2024-09-30 RX ORDER — ONDANSETRON HYDROCHLORIDE 2 MG/ML
4 INJECTION, SOLUTION INTRAVENOUS ONCE
Status: COMPLETED | OUTPATIENT
Start: 2024-09-30 | End: 2024-09-30

## 2024-09-30 RX ORDER — ENOXAPARIN SODIUM 150 MG/ML
1.5 INJECTION SUBCUTANEOUS ONCE
Status: DISCONTINUED | OUTPATIENT
Start: 2024-09-30 | End: 2024-09-30

## 2024-09-30 RX ORDER — MORPHINE SULFATE 4 MG/ML
4 INJECTION, SOLUTION INTRAMUSCULAR; INTRAVENOUS ONCE
Status: COMPLETED | OUTPATIENT
Start: 2024-09-30 | End: 2024-09-30

## 2024-09-30 ASSESSMENT — HEART SCORE
AGE: 65+
HEART SCORE: 4
ECG: NORMAL
RISK FACTORS: >2 RISK FACTORS OR HX OF ATHEROSCLEROTIC DISEASE
TROPONIN: LESS THAN OR EQUAL TO NORMAL LIMIT
HISTORY: SLIGHTLY SUSPICIOUS

## 2024-09-30 ASSESSMENT — PAIN DESCRIPTION - LOCATION
LOCATION: BACK
LOCATION: CHEST

## 2024-09-30 ASSESSMENT — PAIN SCALES - GENERAL
PAINLEVEL_OUTOF10: 8
PAINLEVEL_OUTOF10: 9

## 2024-09-30 ASSESSMENT — PAIN - FUNCTIONAL ASSESSMENT: PAIN_FUNCTIONAL_ASSESSMENT: 0-10

## 2024-09-30 ASSESSMENT — PAIN DESCRIPTION - ORIENTATION: ORIENTATION: RIGHT

## 2024-09-30 ASSESSMENT — PAIN DESCRIPTION - PAIN TYPE: TYPE: ACUTE PAIN

## 2024-09-30 NOTE — DISCHARGE INSTRUCTIONS
Your Coumadin level was low at 1.4.  It should be between 2 and 3.  I notified your primary care physician and the pharmacist at the Coumadin clinic.  She will call you to schedule follow-up appointment.  You were given Lovenox today which will keep your blood thin for the next 24 hours.    You have no blood clots on your CT scan of your chest.    Follow-up with Dr. Coronel in the office.  Return at anytime with any concerns.  Get well soon.

## 2024-09-30 NOTE — ED PROVIDER NOTES
HPI   Chief Complaint   Patient presents with    Chest Pain     Pt presents with c/o  chest pain, states he was diagnosed with a PE and has been taking blood thinner       64-year-old male presents for evaluation of chest pain.  He was diagnosed with a pulmonary embolism previously.  He is on warfarin.  He also has shortness of breath.  Symptoms for past 3 weeks.  The chest pain is in the right side of his chest and radiates to his right shoulder and right upper back.  No aggravating or alleviating factors.  No falls or syncopal episodes.      History provided by:  Patient and medical records          Patient History   Past Medical History:   Diagnosis Date    CAD (coronary artery disease)     Emphysema lung (Multi)     Fall     tripped in shower  no injury    GERD (gastroesophageal reflux disease)     Hidradenitis     History of blood transfusion     after back surgery  no reaction    HLD (hyperlipidemia)     HTN (hypertension)     Local infection of the skin and subcutaneous tissue, unspecified 12/01/2015    Skin infection, bacterial    Lung nodule     OA (osteoarthritis)     Other cervical disc displacement, unspecified cervical region 02/28/2019    Cervical disc herniation    Personal history of other diseases of the digestive system 03/25/2021    History of chronic constipation    Personal history of other diseases of the respiratory system 12/03/2014    History of chronic obstructive lung disease    Personal history of other diseases of the respiratory system 07/14/2017    History of acute bronchitis    Personal history of other diseases of the respiratory system 07/15/2016    History of acute bronchitis    Psychophysiologic insomnia 03/25/2021    Chronic insomnia    Umbilical hernia     Unspecified cataract     Cataract of left eye    Use of cane as ambulatory aid     Wears dentures     full upper and lower    Wears glasses      Past Surgical History:   Procedure Laterality Date    APPENDECTOMY  12/03/2014     Appendectomy    BACK SURGERY  12/03/2014    Lower Back Surgery    FL GUIDED ASPIRATION OR INJECTION LARGE JOINT LEFT Left 10/18/2023    FL GUIDED ASPIRATION OR INJECTION LARGE JOINT LEFT 10/18/2023 [facility]    OTHER SURGICAL HISTORY Left 11/06/2019    Cataract surgery     No family history on file.  Social History     Tobacco Use    Smoking status: Every Day     Current packs/day: 0.50     Average packs/day: 0.5 packs/day for 3.7 years (1.9 ttl pk-yrs)     Types: Cigarettes     Start date: 2021    Smokeless tobacco: Never   Vaping Use    Vaping status: Never Used   Substance Use Topics    Alcohol use: Yes     Alcohol/week: 6.0 standard drinks of alcohol     Types: 6 Cans of beer per week    Drug use: Yes     Types: Marijuana       Physical Exam   ED Triage Vitals [09/30/24 1037]   Temperature Heart Rate Respirations BP   36.6 °C (97.9 °F) 79 18 (!) 130/95      Pulse Ox Temp Source Heart Rate Source Patient Position   98 % Temporal -- --      BP Location FiO2 (%)     -- --       Physical Exam  Vitals and nursing note reviewed.   Constitutional:       General: He is not in acute distress.     Appearance: He is well-developed.   HENT:      Head: Normocephalic and atraumatic.   Eyes:      Conjunctiva/sclera: Conjunctivae normal.   Cardiovascular:      Rate and Rhythm: Normal rate and regular rhythm.      Pulses:           Radial pulses are 2+ on the right side and 2+ on the left side.        Dorsalis pedis pulses are 2+ on the right side and 2+ on the left side.      Heart sounds: No murmur heard.  Pulmonary:      Effort: Pulmonary effort is normal. No respiratory distress.      Breath sounds: Normal breath sounds.   Abdominal:      Palpations: Abdomen is soft.      Tenderness: There is no abdominal tenderness.   Musculoskeletal:         General: No swelling.      Cervical back: Neck supple.      Right lower leg: Tenderness present.      Comments: Right shoulder tenderness without deformity   Skin:     General: Skin  is warm and dry.      Capillary Refill: Capillary refill takes less than 2 seconds.   Neurological:      General: No focal deficit present.      Mental Status: He is alert and oriented to person, place, and time.      Cranial Nerves: No cranial nerve deficit.      Motor: No weakness.   Psychiatric:         Mood and Affect: Mood normal.           ED Course & MDM   ED Course as of 09/30/24 1535   Mon Sep 30, 2024   1047 ECG 12 lead  EKG interpreted by me shows sinus rhythm with rate of 71.  Artifactual baseline.  Normal ST segments.  No acute injury pattern. [BT]   1312 64-year-old male with history of PE presents with chest pain, shortness of breath.  Labs, INR, CT chest PE study.  Zofran and morphine. [BT]   1313 CT angio chest for pulmonary embolism  IMPRESSION:  No evidence of pulmonary embolism.    Noted [BT]   1313 INR(!): 1.4  INR subtherapeutic at 1.4. [BT]   1424 ECG 12 lead  Repeat EKG interpreted by me shows sinus rhythm with rate of 66.  Incomplete right bundle branch block.  No acute injury pattern. [BT]   1444 Troponin negative x 2.  EKG without acute injury pattern.  Doubt acute coronary syndrome. [BT]   1534 Given Lovenox 150 mg (1.5 mg/kg) SQ.  Spoke with pharmacist at Coumadin clinic and inform PCP of subtherapeutic INR.  They will follow-up with him regarding Coumadin dosing.  No PE on CT chest today.  Troponins negative.  Exact cause of chest pain unclear.  I considered admission.  Patient is asymptomatic.  He is safe for outpatient follow-up with his PCP.  He was advised to return for repeat evaluation at anytime with increasing or worsening chest pain or any other concerns.  He agrees with this plan and verbalized understanding.  Discharged home. [BT]      ED Course User Index  [BT] Danny Small,          Diagnoses as of 09/30/24 1535   Chest pain, unspecified type   Shortness of breath   History of pulmonary embolism   Anticoagulated on warfarin   Subtherapeutic international normalized  ratio (INR)                 No data recorded       HEART Score: 4 (09/30/24 1151 : Danny Small DO)                         Medical Decision Making      Procedure  Procedures     Danny Small DO  09/30/24 [id number]

## 2024-10-01 LAB
ATRIAL RATE: 66 BPM
ATRIAL RATE: 71 BPM
P AXIS: 19 DEGREES
P AXIS: 36 DEGREES
P OFFSET: 183 MS
P OFFSET: 187 MS
P ONSET: 134 MS
P ONSET: 134 MS
PR INTERVAL: 174 MS
PR INTERVAL: 178 MS
Q ONSET: 221 MS
Q ONSET: 223 MS
QRS COUNT: 11 BEATS
QRS COUNT: 12 BEATS
QRS DURATION: 94 MS
QRS DURATION: 96 MS
QT INTERVAL: 410 MS
QT INTERVAL: 434 MS
QTC CALCULATION(BAZETT): 445 MS
QTC CALCULATION(BAZETT): 454 MS
QTC FREDERICIA: 433 MS
QTC FREDERICIA: 448 MS
R AXIS: 15 DEGREES
R AXIS: 20 DEGREES
T AXIS: 35 DEGREES
T AXIS: 51 DEGREES
T OFFSET: 426 MS
T OFFSET: 440 MS
VENTRICULAR RATE: 66 BPM
VENTRICULAR RATE: 71 BPM

## 2024-10-04 ENCOUNTER — TELEPHONE (OUTPATIENT)
Dept: ORTHOPEDIC SURGERY | Facility: CLINIC | Age: 64
End: 2024-10-04
Payer: MEDICARE

## 2024-10-04 ENCOUNTER — SPECIALTY PHARMACY (OUTPATIENT)
Dept: PHARMACY | Facility: CLINIC | Age: 64
End: 2024-10-04

## 2024-10-04 ENCOUNTER — APPOINTMENT (OUTPATIENT)
Dept: RADIOLOGY | Facility: HOSPITAL | Age: 64
End: 2024-10-04
Payer: MEDICARE

## 2024-10-04 NOTE — TELEPHONE ENCOUNTER
Pt call after receiving a denial for gel injections from [facility] [employer].  Wonders if there is another drug or an appeal process.  Please call patient.

## 2024-10-07 DIAGNOSIS — J43.9 PULMONARY EMPHYSEMA, UNSPECIFIED EMPHYSEMA TYPE (MULTI): ICD-10-CM

## 2024-10-07 RX ORDER — ALBUTEROL SULFATE 90 UG/1
INHALANT RESPIRATORY (INHALATION)
Qty: 18 G | Refills: 5 | Status: SHIPPED | OUTPATIENT
Start: 2024-10-07

## 2024-10-14 DIAGNOSIS — M62.838 MUSCLE SPASM: ICD-10-CM

## 2024-10-14 RX ORDER — BACLOFEN 20 MG/1
TABLET ORAL
Qty: 90 TABLET | Refills: 0 | Status: SHIPPED | OUTPATIENT
Start: 2024-10-14

## 2024-10-15 PROCEDURE — RXMED WILLOW AMBULATORY MEDICATION CHARGE

## 2024-10-16 ENCOUNTER — TELEPHONE (OUTPATIENT)
Dept: ORTHOPEDIC SURGERY | Facility: CLINIC | Age: 64
End: 2024-10-16
Payer: MEDICAID

## 2024-10-16 NOTE — TELEPHONE ENCOUNTER
----- Message from Nydia PEACOCK sent at 10/16/2024 12:28 PM EDT -----  Regarding: Schedule delivery  Hello~    Durolane     I just spoke to the patient and he is ready to schedule delivery of the medication to your office.      Please let me know if the following delivery date and address are OK :    Tuesday 10/22/24      Dr. Danny Ricci   [id number]  [facility] [address]    Thank you,    Nydia Mckeon

## 2024-10-21 ENCOUNTER — APPOINTMENT (OUTPATIENT)
Dept: PRIMARY CARE | Facility: CLINIC | Age: 64
End: 2024-10-21
Payer: MEDICARE

## 2024-10-21 ENCOUNTER — ANTICOAGULATION - WARFARIN VISIT (OUTPATIENT)
Dept: PHARMACY | Facility: HOSPITAL | Age: 64
End: 2024-10-21
Payer: MEDICAID

## 2024-10-21 VITALS
BODY MASS INDEX: 29.2 KG/M2 | OXYGEN SATURATION: 98 % | TEMPERATURE: 97.3 F | SYSTOLIC BLOOD PRESSURE: 132 MMHG | WEIGHT: 227.4 LBS | HEART RATE: 75 BPM | DIASTOLIC BLOOD PRESSURE: 82 MMHG

## 2024-10-21 DIAGNOSIS — K21.9 GASTROESOPHAGEAL REFLUX DISEASE WITHOUT ESOPHAGITIS: ICD-10-CM

## 2024-10-21 DIAGNOSIS — I25.83 CORONARY ATHEROSCLEROSIS DUE TO LIPID RICH PLAQUE: ICD-10-CM

## 2024-10-21 DIAGNOSIS — I26.99 ACUTE PULMONARY EMBOLISM WITHOUT ACUTE COR PULMONALE, UNSPECIFIED PULMONARY EMBOLISM TYPE (MULTI): ICD-10-CM

## 2024-10-21 DIAGNOSIS — K42.9 UMBILICAL HERNIA WITHOUT OBSTRUCTION AND WITHOUT GANGRENE: Primary | ICD-10-CM

## 2024-10-21 DIAGNOSIS — G95.9 DISEASE OF SPINAL CORD, UNSPECIFIED: ICD-10-CM

## 2024-10-21 DIAGNOSIS — M46.96 UNSPECIFIED INFLAMMATORY SPONDYLOPATHY, LUMBAR REGION (CMS-HCC): ICD-10-CM

## 2024-10-21 DIAGNOSIS — I26.99 ACUTE PULMONARY EMBOLISM WITHOUT ACUTE COR PULMONALE, UNSPECIFIED PULMONARY EMBOLISM TYPE (MULTI): Primary | ICD-10-CM

## 2024-10-21 DIAGNOSIS — F17.219 CIGARETTE NICOTINE DEPENDENCE WITH NICOTINE-INDUCED DISORDER: ICD-10-CM

## 2024-10-21 LAB
POC INR: 1.1 (ref 2–3)
POC PROTHROMBIN TIME: ABNORMAL

## 2024-10-21 PROCEDURE — 4004F PT TOBACCO SCREEN RCVD TLK: CPT | Performed by: INTERNAL MEDICINE

## 2024-10-21 PROCEDURE — 85610 PROTHROMBIN TIME: CPT | Mod: QW | Performed by: PHARMACY

## 2024-10-21 PROCEDURE — 1160F RVW MEDS BY RX/DR IN RCRD: CPT | Performed by: INTERNAL MEDICINE

## 2024-10-21 PROCEDURE — 1157F ADVNC CARE PLAN IN RCRD: CPT | Performed by: INTERNAL MEDICINE

## 2024-10-21 PROCEDURE — 99214 OFFICE O/P EST MOD 30 MIN: CPT | Performed by: INTERNAL MEDICINE

## 2024-10-21 PROCEDURE — 1159F MED LIST DOCD IN RCRD: CPT | Performed by: INTERNAL MEDICINE

## 2024-10-21 ASSESSMENT — ENCOUNTER SYMPTOMS
HEADACHES: 0
SINUS PAIN: 0
SORE THROAT: 0
UNEXPECTED WEIGHT CHANGE: 0
BLOOD IN STOOL: 0
DIARRHEA: 0
WHEEZING: 0
COUGH: 0
ABDOMINAL PAIN: 0
FATIGUE: 0
DIFFICULTY URINATING: 0
FEVER: 0
DIZZINESS: 0
ARTHRALGIAS: 0
BRUISES/BLEEDS EASILY: 0
PALPITATIONS: 0

## 2024-10-21 NOTE — PROGRESS NOTES
Subjective     Julio Carranza is a 64 y.o. male who presents for anticoagulation follow up.     Location: [facility]     Referring Provider: Dr.Yasser Coronel  INR Goal: 2.0-3.0  Indication: Pulmonary Embolism (PE)    Bleeding signs/symptoms: No  Bruising: No   Major bleeding event: No  Thrombosis signs/symptoms: No  Thromboembolic event: No  Missed doses: No  Extra doses: No  Medication changes: No  Dietary changes: No  Change in health: No  Change in activity: No  Alcohol: No  Other concerns: No  Upcoming Surgeries: no  Parenteral anticoagulation: no    Current Outpatient Medications on File Prior to Visit   Medication Sig Dispense Refill    albuterol 90 mcg/actuation inhaler inhale 1 to 2 puffs by mouth every 4 to 6 hours as needed 18 g 5    atorvastatin (Lipitor) 40 mg tablet Take 1 tablet (40 mg) by mouth once daily at bedtime.      baclofen (Lioresal) 20 mg tablet Take 1 Tablet orally 3 times per day for muscle spasm. 90 tablet 0    coenzyme Q-10 200 mg capsule Take 1 capsule (200 mg) by mouth once daily. (Patient not taking: Reported on 9/24/2024) 90 capsule 3    cyanocobalamin (Vitamin B-12) 100 mcg tablet Take 1 tablet (100 mcg) by mouth once daily.      fluocinonide (Lidex) 0.05 % cream Apply topically 2 times a day as needed for irritation or rash. 15 g 0    fluticasone (Flonase) 50 mcg/actuation nasal spray Administer 2 sprays into each nostril 2 times a day. Shake gently. Before first use, prime pump. After use, clean tip and replace cap. 16 g 12    folic acid (Folvite) 1 mg tablet Take 1 tablet (1 mg) by mouth once daily. 90 tablet 1    losartan (Cozaar) 100 mg tablet Take 1 tablet (100 mg) by mouth once daily. 90 tablet 0    multivitamin capsule Take 1 capsule by mouth once daily.      omeprazole (PriLOSEC) 40 mg DR capsule Take 1 capsule (40 mg) by mouth once daily at bedtime.      primidone (Mysoline) 50 mg tablet Take 1 tablet (50 mg) by mouth once daily at  "bedtime. 90 tablet 1    sodium hyaluronate (Durolane) 60 mg/3 mL injection PHYSICIAN TO INJECTION 3ML, ONE DOSE INTO BOTH JOINTS ONE TIME 6 mL 0    tamsulosin (Flomax) 0.4 mg 24 hr capsule Take 1 capsule (0.4 mg) by mouth once daily at bedtime.      topiramate (Topamax) 50 mg tablet Take 1 tablet (50 mg) by mouth continuously.      warfarin (Coumadin) 2.5 mg tablet Take 2 tablets (5 mg) by mouth on Sun/Tue/Thur and 1 tablet (2.5 mg) by mouth all other days 120 tablet 1     No current facility-administered medications on file prior to visit.        Objective   Anticoagulation Summary  As of 10/21/2024      INR goal:  2.0-3.0   TTR:  42.8% (5 mo)   INR used for dosin.10 (10/21/2024)   Weekly warfarin total:  25 mg             Lab Results   Component Value Date    INR 1.10 (A) 10/21/2024    INR 1.4 (H) 2024    INR 3.80 (A) 08/15/2024    INR 1.80 (A) 2024    PROTIME 16.0 (H) 2024    PROTIME 10.4 2023    PROTIME 11.5 2022    PROTIME 10.5 2021       Assessment/Plan   Current INR is Subtherapeutic at 1.1. Previous INR was Subtherapeutic at 1.4. Patient reports that he had a chest CT on 24 that showed that his blood clots were gone. Verified that impression of scan indicates \"no evidence of pulmonary embolism.\" At this time, patient has completed 6 months of anticoagulation. Patient cleared by PCP to stop warfarin therapy at this time. Confirmed via secure chat with Dr. Coronel. Patient no longer requires pharmacy follow up for INR monitoring. Will resolve episode.     Kym Stevens, PharmD    "

## 2024-10-21 NOTE — PROGRESS NOTES
"Subjective   Patient ID: Julio Carranza is a 64 y.o. male who presents for Results, ER Follow-up (For chest pain, tightness,), and pulmonary embolism (Would like to go off of the warfarin).    - Follow-up from the emergency room patient developed atypical chest pain workup is negative  CT scan for PE protocol showed complete within [place] of the previous PE patient completed 6 months of warfarin  May discontinue warfarin at this time continue with aspirin daily counseled about prevention  -Shortness of breath possible COPD reflux disease  Refer patient to cardiology for cardiac workup  -Possible underlying hiatal hernia r, to gastroenterology patient will need upper endoscopy  - Large umbilical hernia not obstructed arrange for surgical repair when patient stable in 3 months after cardiac and GI clearance  -- Screening for colon cancer obtained 2016 repeat in 10 years 2026  - Screen for depression negative  - Advance of directive reviewed   -Status post left hip arthroplasty doing well  -Lung CT low-dose showed lung nodule repeat in 1 year repeat in September 2024 no change repeat in 1 year September 2025  - Nicotine dependency counseled about smoking cessation  \"I spent 3 minutes counseling patient about need for smoking/tobacco cessation and how I can support efforts when patient is ready to quit.  Discussed nicotine replacement therapy, Varenicline, Bupropion, hypnosis, support groups, and accupunture as potential options.  Patient currently has no signs or symptoms of tobacco related disease.\".  -Hypertension improving continue with current medication- Dyslipidemia continue low-carb diet  - Coronary artery disease compensated continue with aspirin daily counseled about nicotine cessation  Follow-up 3 months               ER Follow-up  Pertinent negatives include no abdominal pain, arthralgias, chest pain, congestion, coughing, fatigue, fever, headaches, rash or sore throat.          Review of Systems "   Constitutional:  Negative for fatigue, fever and unexpected weight change.   HENT:  Negative for congestion, ear discharge, ear pain, mouth sores, sinus pain and sore throat.    Eyes:  Negative for visual disturbance.   Respiratory:  Negative for cough and wheezing.    Cardiovascular:  Negative for chest pain, palpitations and leg swelling.   Gastrointestinal:  Negative for abdominal pain, blood in stool and diarrhea.   Genitourinary:  Negative for difficulty urinating.   Musculoskeletal:  Negative for arthralgias.   Skin:  Negative for rash.   Neurological:  Negative for dizziness and headaches.   Hematological:  Does not bruise/bleed easily.   Psychiatric/Behavioral:  Negative for behavioral problems.    All other systems reviewed and are negative.      Objective   Lab Results   Component Value Date    HGBA1C 5.2 06/18/2022      /82   Pulse 75   Temp 36.3 °C (97.3 °F)   Wt 103 kg (227 lb 6.4 oz)   SpO2 98%   BMI 29.20 kg/m²     Physical Exam  Vitals and nursing note reviewed.   Constitutional:       Appearance: Normal appearance.   HENT:      Head: Normocephalic.      Nose: Nose normal.   Eyes:      Conjunctiva/sclera: Conjunctivae normal.      Pupils: Pupils are equal, round, and reactive to light.   Cardiovascular:      Rate and Rhythm: Regular rhythm.   Pulmonary:      Effort: Pulmonary effort is normal.      Breath sounds: Normal breath sounds.   Abdominal:      General: Abdomen is flat.      Palpations: Abdomen is soft.   Musculoskeletal:      Cervical back: Neck supple.   Skin:     General: Skin is warm.   Neurological:      General: No focal deficit present.      Mental Status: He is oriented to person, place, and time.   Psychiatric:         Mood and Affect: Mood normal.         Assessment/Plan   Julio was seen today for results, er follow-up and pulmonary embolism.  Diagnoses and all orders for this visit:  Umbilical hernia without obstruction and without gangrene (Primary)  Acute pulmonary  "embolism without acute cor pulmonale, unspecified pulmonary embolism type (Multi)  Cigarette nicotine dependence with nicotine-induced disorder  Gastroesophageal reflux disease without esophagitis  -     Referral to Gastroenterology; Future  Coronary atherosclerosis due to lipid rich plaque  -     Referral to Cardiology; Future  Disease of spinal cord, unspecified  Unspecified inflammatory spondylopathy, lumbar region (CMS-HCC)  Other orders  -     Follow Up In Primary Care - Established  -     Follow Up In Primary Care - Established; Future     Patient was identified as a fall risk. Risk prevention instructions provided.     Follow-up from the emergency room patient developed atypical chest pain workup is negative  CT scan for PE protocol showed complete within [place] of the previous PE patient completed 6 months of warfarin  May discontinue warfarin at this time continue with aspirin daily counseled about prevention  -Shortness of breath possible COPD reflux disease  Refer patient to cardiology for cardiac workup  -Possible underlying hiatal hernia r, to gastroenterology patient will need upper endoscopy  - Large umbilical hernia not obstructed arrange for surgical repair when patient stable in 3 months after cardiac and GI clearance  -- Screening for colon cancer obtained 2016 repeat in 10 years 2026  - Screen for depression negative  - Advance of directive reviewed   -Status post left hip arthroplasty doing well  -Lung CT low-dose showed lung nodule repeat in 1 year repeat in September 2024 no change repeat in 1 year September 2025  - Nicotine dependency counseled about smoking cessation  \"I spent 3 minutes counseling patient about need for smoking/tobacco cessation and how I can support efforts when patient is ready to quit.  Discussed nicotine replacement therapy, Varenicline, Bupropion, hypnosis, support groups, and accupunture as potential options.  Patient currently has no signs or symptoms of tobacco " "related disease.\".  -Hypertension improving continue with current medication- Dyslipidemia continue low-carb diet  - Coronary artery disease compensated continue with aspirin daily counseled about nicotine cessation  Follow-up 3 months             "

## 2024-10-22 ENCOUNTER — PHARMACY VISIT (OUTPATIENT)
Dept: PHARMACY | Facility: CLINIC | Age: 64
End: 2024-10-22
Payer: COMMERCIAL

## 2024-10-23 ENCOUNTER — OFFICE VISIT (OUTPATIENT)
Dept: ORTHOPEDIC SURGERY | Facility: CLINIC | Age: 64
End: 2024-10-23
Payer: MEDICARE

## 2024-10-23 DIAGNOSIS — M17.11 ARTHRITIS OF RIGHT KNEE: ICD-10-CM

## 2024-10-23 DIAGNOSIS — M17.12 ARTHRITIS OF LEFT KNEE: Primary | ICD-10-CM

## 2024-10-23 PROCEDURE — 20610 DRAIN/INJ JOINT/BURSA W/O US: CPT

## 2024-10-23 PROCEDURE — 99024 POSTOP FOLLOW-UP VISIT: CPT

## 2024-10-23 PROCEDURE — 1159F MED LIST DOCD IN RCRD: CPT

## 2024-10-23 PROCEDURE — 1125F AMNT PAIN NOTED PAIN PRSNT: CPT

## 2024-10-23 PROCEDURE — 4004F PT TOBACCO SCREEN RCVD TLK: CPT

## 2024-10-23 PROCEDURE — 1157F ADVNC CARE PLAN IN RCRD: CPT

## 2024-10-23 PROCEDURE — 1160F RVW MEDS BY RX/DR IN RCRD: CPT

## 2024-10-23 ASSESSMENT — PAIN SCALES - GENERAL: PAINLEVEL_OUTOF10: 7

## 2024-10-23 ASSESSMENT — PAIN - FUNCTIONAL ASSESSMENT: PAIN_FUNCTIONAL_ASSESSMENT: 0-10

## 2024-10-23 NOTE — PROGRESS NOTES
Julio Carranza is a 64 y.o. male here for gel injection  Chief Complaint   Patient presents with    Right Knee - Arthritis     bilat durolane- spec pharm    Left Knee - Arthritis         L Inj/Asp: bilateral knee on 10/23/2024 11:02 AM  Indications: pain and joint swelling  Details: 22 G needle, anterolateral approach  Medications (Right): 60 mg sodium hyaluronate 60 mg/3 mL  Medications (Left): 60 mg sodium hyaluronate 60 mg/3 mL    Discussion:  I discussed the conservative treatment options for knee osteoarthritis including but not limited to physical therapy, oral NSAIDS, activity and lifestyle modification, hyaluronic acid injections and corticosteroid injections. Pt has elected to undergo a hyaluronic acid injection today. I have explained the risk and benefits of an injection including the possibility of joint infection, bleeding, damage to cartilage, allergic reaction. Patient verbalized understanding and gave verbal consent wishes to proceed with a intra-articular hyaluronic acid injection for their knee.    Procedure:  After discussing the risk and benefits of the procedure, we proceeded with an intra-articular bilateral knee injection. We discussed the risks and benefits and potential morbidity related to the treatment, and to the prescription medication administered in the injection    With the patient's informed verbal consent, the bilateral knees were prepped in standard sterile fashion with Chlorhexidine. The skin was then anesthetized with ethyl chloride spray and cleaned again with Chlorhexidine. The bilateral knees were then apirated/injected with a prefilled 20-gauge syringe of 3ml/60mg Durolane in each knee using the lateral approach without complications.  The patient tolerated this well.  A bandaid was applied and the patient ambulated out of the clinic on ther own accord without difficulty. Patient was instructed to avoid physical activity for 24-48 hours to prevent the knees from swelling  and may ice the knees as tolerated. Patient should contact the office if any signs of of infection appear: redness, fever, chills, drainage, swelling or warmth to the knees.        Procedure, treatment alternatives, risks and benefits explained, specific risks discussed. Consent was given by the patient. Immediately prior to procedure a time out was called to verify the correct patient, procedure, equipment, support staff and site/side marked as required. Patient was prepped and draped in the usual sterile fashion.

## 2024-11-26 NOTE — TELEPHONE ENCOUNTER
10/23/24 bilat knee pain  Patient called left message requesting refill on Baclofen 20mg tablets.  [place], [place]

## 2024-11-29 ENCOUNTER — APPOINTMENT (OUTPATIENT)
Dept: OPHTHALMOLOGY | Facility: CLINIC | Age: 64
End: 2024-11-29
Payer: MEDICARE

## 2024-12-03 DIAGNOSIS — G47.00 INSOMNIA, UNSPECIFIED TYPE: ICD-10-CM

## 2024-12-03 RX ORDER — PRIMIDONE 50 MG/1
50 TABLET ORAL NIGHTLY
Qty: 90 TABLET | Refills: 1 | Status: SHIPPED | OUTPATIENT
Start: 2024-12-03

## 2025-01-06 DIAGNOSIS — J43.9 PULMONARY EMPHYSEMA, UNSPECIFIED EMPHYSEMA TYPE (MULTI): ICD-10-CM

## 2025-01-06 RX ORDER — ALBUTEROL SULFATE 90 UG/1
INHALANT RESPIRATORY (INHALATION)
Qty: 18 G | Refills: 5 | Status: SHIPPED | OUTPATIENT
Start: 2025-01-06

## 2025-01-10 ENCOUNTER — TELEPHONE (OUTPATIENT)
Dept: ORTHOPEDIC SURGERY | Facility: CLINIC | Age: 65
End: 2025-01-10
Payer: MEDICARE

## 2025-01-10 DIAGNOSIS — M62.838 MUSCLE SPASM: ICD-10-CM

## 2025-01-10 RX ORDER — BACLOFEN 20 MG/1
TABLET ORAL
Qty: 90 TABLET | Refills: 0 | Status: SHIPPED | OUTPATIENT
Start: 2025-01-10

## 2025-01-10 NOTE — TELEPHONE ENCOUNTER
10/23/24 BILAT KNEE PAIN, 4/1/24 lt janeth  Patient requesting refill on Baclofen 20mg tablets.    [place], [place]

## 2025-01-15 ENCOUNTER — PRE-ADMISSION TESTING (OUTPATIENT)
Dept: PREADMISSION TESTING | Facility: HOSPITAL | Age: 65
End: 2025-01-15
Payer: MEDICARE

## 2025-01-15 VITALS
TEMPERATURE: 97 F | WEIGHT: 230 LBS | BODY MASS INDEX: 29.52 KG/M2 | HEART RATE: 87 BPM | SYSTOLIC BLOOD PRESSURE: 144 MMHG | OXYGEN SATURATION: 98 % | DIASTOLIC BLOOD PRESSURE: 92 MMHG | RESPIRATION RATE: 16 BRPM | HEIGHT: 74 IN

## 2025-01-15 DIAGNOSIS — Z01.818 PRE-OP TESTING: Primary | ICD-10-CM

## 2025-01-15 LAB
ANION GAP SERPL CALCULATED.3IONS-SCNC: 12 MMOL/L (ref 10–20)
BASOPHILS # BLD AUTO: 0.04 X10*3/UL (ref 0–0.1)
BASOPHILS NFR BLD AUTO: 0.7 %
BUN SERPL-MCNC: 9 MG/DL (ref 6–23)
CALCIUM SERPL-MCNC: 8.9 MG/DL (ref 8.6–10.3)
CHLORIDE SERPL-SCNC: 105 MMOL/L (ref 98–107)
CO2 SERPL-SCNC: 27 MMOL/L (ref 21–32)
CREAT SERPL-MCNC: 1.18 MG/DL (ref 0.5–1.3)
EGFRCR SERPLBLD CKD-EPI 2021: 68 ML/MIN/1.73M*2
EOSINOPHIL # BLD AUTO: 0.14 X10*3/UL (ref 0–0.7)
EOSINOPHIL NFR BLD AUTO: 2.4 %
ERYTHROCYTE [DISTWIDTH] IN BLOOD BY AUTOMATED COUNT: 12.7 % (ref 11.5–14.5)
GLUCOSE SERPL-MCNC: 96 MG/DL (ref 74–99)
HCT VFR BLD AUTO: 46.2 % (ref 41–52)
HGB BLD-MCNC: 14.7 G/DL (ref 13.5–17.5)
IMM GRANULOCYTES # BLD AUTO: 0.02 X10*3/UL (ref 0–0.7)
IMM GRANULOCYTES NFR BLD AUTO: 0.3 % (ref 0–0.9)
LYMPHOCYTES # BLD AUTO: 1.05 X10*3/UL (ref 1.2–4.8)
LYMPHOCYTES NFR BLD AUTO: 17.9 %
MCH RBC QN AUTO: 29.3 PG (ref 26–34)
MCHC RBC AUTO-ENTMCNC: 31.8 G/DL (ref 32–36)
MCV RBC AUTO: 92 FL (ref 80–100)
MONOCYTES # BLD AUTO: 0.59 X10*3/UL (ref 0.1–1)
MONOCYTES NFR BLD AUTO: 10.1 %
NEUTROPHILS # BLD AUTO: 4.02 X10*3/UL (ref 1.2–7.7)
NEUTROPHILS NFR BLD AUTO: 68.6 %
NRBC BLD-RTO: 0 /100 WBCS (ref 0–0)
PLATELET # BLD AUTO: 225 X10*3/UL (ref 150–450)
POTASSIUM SERPL-SCNC: 4.3 MMOL/L (ref 3.5–5.3)
RBC # BLD AUTO: 5.01 X10*6/UL (ref 4.5–5.9)
SODIUM SERPL-SCNC: 140 MMOL/L (ref 136–145)
WBC # BLD AUTO: 5.9 X10*3/UL (ref 4.4–11.3)

## 2025-01-15 PROCEDURE — 99204 OFFICE O/P NEW MOD 45 MIN: CPT | Performed by: PHYSICIAN ASSISTANT

## 2025-01-15 PROCEDURE — 36415 COLL VENOUS BLD VENIPUNCTURE: CPT

## 2025-01-15 PROCEDURE — 80048 BASIC METABOLIC PNL TOTAL CA: CPT

## 2025-01-15 PROCEDURE — 85025 COMPLETE CBC W/AUTO DIFF WBC: CPT

## 2025-01-15 ASSESSMENT — DUKE ACTIVITY SCORE INDEX (DASI)
CAN YOU TAKE CARE OF YOURSELF (EAT, DRESS, BATHE, OR USE TOILET): YES
CAN YOU DO HEAVY WORK AROUND THE HOUSE LIKE SCRUBBING FLOORS OR LIFTING AND MOVING HEAVY FURNITURE: NO
DASI METS SCORE: 5.6
CAN YOU WALK INDOORS, SUCH AS AROUND YOUR HOUSE: YES
CAN YOU WALK A BLOCK OR TWO ON LEVEL GROUND: YES
TOTAL_SCORE: 23.2
CAN YOU DO YARD WORK LIKE RAKING LEAVES, WEEDING OR PUSHING A MOWER: YES
CAN YOU DO LIGHT WORK AROUND THE HOUSE LIKE DUSTING OR WASHING DISHES: YES
CAN YOU CLIMB A FLIGHT OF STAIRS OR WALK UP A HILL: NO
CAN YOU RUN A SHORT DISTANCE: NO
CAN YOU DO MODERATE WORK AROUND THE HOUSE LIKE VACUUMING, SWEEPING FLOORS OR CARRYING GROCERIES: YES
CAN YOU HAVE SEXUAL RELATIONS: YES
CAN YOU PARTICIPATE IN STRENOUS SPORTS LIKE SWIMMING, SINGLES TENNIS, FOOTBALL, BASKETBALL, OR SKIING: NO
CAN YOU PARTICIPATE IN MODERATE RECREATIONAL ACTIVITIES LIKE GOLF, BOWLING, DANCING, DOUBLES TENNIS OR THROWING A BASEBALL OR FOOTBALL: NO

## 2025-01-15 ASSESSMENT — ENCOUNTER SYMPTOMS
ARTHRALGIAS: 1
ROS GI COMMENTS: SEE HPI
PSYCHIATRIC NEGATIVE: 1
SHORTNESS OF BREATH: 1
CONSTITUTIONAL NEGATIVE: 1
LIGHT-HEADEDNESS: 1

## 2025-01-15 NOTE — PREPROCEDURE INSTRUCTIONS
PAT DISCHARGE INSTRUCTIONS    Please call the Same Day Surgery (SDS) Department of the hospital where your procedure will be performed after 2:00 PM the day before your surgery. If you are scheduled on a Monday, or a Tuesday following a Monday holiday, you will need to call on the last business day prior to your surgery.    [facility]  [address], [address]  [phone]    [facility]  [address] [place] [address] [phone]    [facility]  [address]  [place] [id number]  [phone]    Please let your surgeon know if:      You develop any open sores, shingles, burning or painful urination as these may increase your risk of an infection.   You no longer wish to have the surgery.   Any other personal circumstances change that may lead to the need to cancel or defer this surgery-such as being sick or getting admitted to any hospital within one week of your planned procedure.    Your contact details change, such as a change of address or phone number.    Starting now:     Please DO NOT drink alcohol or smoke for 24 hours before surgery. It is well known that quitting smoking can make a huge difference to your health and recovery from surgery. The longer you abstain from smoking, the better your chances of a healthy recovery. If you need help with quitting, call 1-800-QUIT-NOW to be connected to a trained counselor who will discuss the best methods to help you quit.     Before your surgery:    Please stop all supplements 7 days prior to surgery. Or as directed by your surgeon.   Please stop taking NSAID pain medicine such as Advil and Motrin 7 days before surgery.    If you develop any fever, cough, cold, rashes, cuts, scratches, scrapes, urinary symptoms or infection anywhere on your body (including teeth and gums) prior to surgery, please call your surgeon’s office as soon as  possible. This may require treatment to reduce the chance of cancellation on the day of surgery.    The day before your surgery:   Get a good night’s rest.  Use the special soap for bathing if you have been instructed to use one.    Scheduled surgery times may change and you will be notified if this occurs - please check your personal voicemail for any updates.     On the morning of surgery:   Wear comfortable, loose fitting clothes which open in the front. Please do not wear moisturizers, creams, lotions, makeup or perfume.    Please bring with you to surgery:   Photo ID and insurance card   Current list of medicines and allergies   Pacemaker/ Defibrillator/Heart stent cards   CPAP machine and mask    Slings/ splints/ crutches   A copy of your complete advanced directive/DHPOA.    Please do NOT bring with you to surgery:   All jewelry and valuables should be left at home.   Prosthetic devices such as contact lenses, hearing aids, dentures, eyelash extensions, hairpins and body piercings must be removed prior to going in to the surgical suite.    After outpatient surgery:   A responsible adult MUST accompany you at the time of discharge and stay with you for 24 hours after your surgery. You may NOT drive yourself home after surgery.    Do not drive, operate machinery, make critical decisions or do activities that require co-ordination or balance until after a night’s sleep.   Do not drink alcoholic beverages for 24 hours.   Instructions for resuming your medications will be provided by your surgeon.    CALL YOUR DOCTOR AFTER SURGERY IF YOU HAVE:     Chills and/or a fever of 101° F or higher.    Redness, swelling, pus or drainage from your surgical wound or a bad smell from the wound.    Lightheadedness, fainting or confusion.    Persistent vomiting (throwing up) and are not able to eat or drink for 12 hours.    Three or more loose, watery bowel movements in 24 hours (diarrhea).   Difficulty or pain while urinating(  after non-urological surgery)    Pain and swelling in your legs, especially if it is only on one side.    Difficulty breathing or are breathing faster than normal.    Any new concerning symptoms.      FOLLOW ALL INSTRUCTIONS FROM DR VU     Medication List            Accurate as of January 15, 2025  7:47 AM. Always use your most recent med list.                albuterol 90 mcg/actuation inhaler  inhale 1 to 2 puffs by mouth every 4 to 6 hours as needed  Medication Adjustments for Surgery: Take/Use as prescribed     atorvastatin 40 mg tablet  Commonly known as: Lipitor  Medication Adjustments for Surgery: Take/Use as prescribed  Notes to patient: CONTINUE PER USUAL/TAKE NIGHT BEFORE SURGERY     baclofen 20 mg tablet  Commonly known as: Lioresal  Take 1 Tablet orally 3 times per day for muscle spasm.  Medication Adjustments for Surgery: Take/Use as prescribed     cyanocobalamin 100 mcg tablet  Commonly known as: Vitamin B-12  Additional Medication Adjustments for Surgery: Take last dose 7 days before surgery     fluocinonide 0.05 % cream  Commonly known as: Lidex  Apply topically 2 times a day as needed for irritation or rash.  Medication Adjustments for Surgery: Take/Use as prescribed     fluticasone 50 mcg/actuation nasal spray  Commonly known as: Flonase  Administer 2 sprays into each nostril 2 times a day. Shake gently. Before first use, prime pump. After use, clean tip and replace cap.  Medication Adjustments for Surgery: Take/Use as prescribed     folic acid 1 mg tablet  Commonly known as: Folvite  Take 1 tablet (1 mg) by mouth once daily.  Additional Medication Adjustments for Surgery: Take last dose 7 days before surgery     losartan 100 mg tablet  Commonly known as: Cozaar  Take 1 tablet (100 mg) by mouth once daily.  Medication Adjustments for Surgery: Take last dose 1 day (24 hours) before surgery     multivitamin capsule  Additional Medication Adjustments for Surgery: Take last dose 7 days before  surgery     omeprazole 40 mg DR capsule  Commonly known as: PriLOSEC  Medication Adjustments for Surgery: Take/Use as prescribed     primidone 50 mg tablet  Commonly known as: Mysoline  Take 1 tablet (50 mg) by mouth once daily at bedtime.  Notes to patient: CONTINUE PER USUAL/TAKE NIGHT BEFORE SURGERY     tamsulosin 0.4 mg 24 hr capsule  Commonly known as: Flomax  Medication Adjustments for Surgery: Take/Use as prescribed  Notes to patient: CONTINUE PER USUAL/TAKE NIGHT BEFORE SURGERY     topiramate 50 mg tablet  Commonly known as: Topamax  Medication Adjustments for Surgery: Take/Use as prescribed

## 2025-01-15 NOTE — CPM/PAT H&P
"CPM/PAT Evaluation       Name: Julio Carranza (Julio Carranza)  /Age: 1960/64 y.o.     In-Person       Chief Complaint: \"chest pressure after eating\"    HPI  The patient is a 64 year old male.  For the past few months he has experienced upper epigastric pressure and burning sensation after meals.  This occurs with every meal and any type of foot.  He has associated nausea, diarrhea, bloating, cramping and increased belching.  His PCP instructed him to take omeprazole twice daily, but he continues with the above symptoms.  He was seen by his cardiologist in  and any cardiac etiology was ruled out.  His PCP referred him to Dr. Abdi for an EGD and colonoscopy.    Past Medical History:   Diagnosis Date    Arthritis     BPH (benign prostatic hyperplasia)     CAD (coronary artery disease)     Emphysema lung (Multi)     Fall     tripped in shower  no injury    GERD (gastroesophageal reflux disease)     Hidradenitis     History of blood transfusion     after back surgery  no reaction    History of pulmonary embolism 2024    Post left total hip surgery    History of TIA (transient ischemic attack)     HLD (hyperlipidemia)     HTN (hypertension)     Irritable bowel syndrome     Local infection of the skin and subcutaneous tissue, unspecified 2015    Skin infection, bacterial    Lung nodule     OA (osteoarthritis)     Other cervical disc displacement, unspecified cervical region 2019    Cervical disc herniation    Personal history of other diseases of the digestive system 2021    History of chronic constipation    Personal history of other diseases of the respiratory system 2014    History of chronic obstructive lung disease    Personal history of other diseases of the respiratory system 07/15/2016    History of acute bronchitis    Psychophysiologic insomnia 2021    Chronic insomnia    Umbilical hernia     Unspecified cataract     Cataract of left eye    Use of cane " as ambulatory aid     Wears dentures     full upper and lower    Wears glasses        Past Surgical History:   Procedure Laterality Date    APPENDECTOMY      BACK SURGERY      Lower Back Surgery    CARDIAC CATHETERIZATION  2020    Mild coronary artery disease    CATARACT EXTRACTION Left     FL GUIDED ASPIRATION OR INJECTION LARGE JOINT LEFT Left 10/18/2023    FL GUIDED ASPIRATION OR INJECTION LARGE JOINT LEFT 10/18/2023 [facility]    HIP ARTHROPLASTY Left 04/2024     Social History     Tobacco Use    Smoking status: Every Day     Current packs/day: 1.50     Average packs/day: 1.5 packs/day for 47.0 years (70.6 ttl pk-yrs)     Types: Cigarettes     Start date: 1978    Smokeless tobacco: Never    Tobacco comments:     DOWN TO 4 CIGARETTES PER DAY   Substance Use Topics    Alcohol use: Yes     Alcohol/week: 4.0 standard drinks of alcohol     Types: 4 Cans of beer per week     Social History     Substance and Sexual Activity   Drug Use Yes    Frequency: 1.0 times per week    Types: Marijuana     Allergies   Allergen Reactions    Etodolac Palpitations    Penicillins Unknown     since a child    Escitalopram Oxalate Palpitations     long qt    Pollen Extracts Other     Current Outpatient Medications   Medication Sig Dispense Refill    albuterol 90 mcg/actuation inhaler inhale 1 to 2 puffs by mouth every 4 to 6 hours as needed 18 g 5    atorvastatin (Lipitor) 40 mg tablet Take 1 tablet (40 mg) by mouth once daily at bedtime.      baclofen (Lioresal) 20 mg tablet Take 1 Tablet orally 3 times per day for muscle spasm. 90 tablet 0    cyanocobalamin (Vitamin B-12) 100 mcg tablet Take 1 tablet (100 mcg) by mouth once daily.      fluocinonide (Lidex) 0.05 % cream Apply topically 2 times a day as needed for irritation or rash. 15 g 0    fluticasone (Flonase) 50 mcg/actuation nasal spray Administer 2 sprays into each nostril 2 times a day. Shake gently. Before first use, prime pump. After use, clean tip and replace cap. 16 g 12  "   folic acid (Folvite) 1 mg tablet Take 1 tablet (1 mg) by mouth once daily. 90 tablet 1    losartan (Cozaar) 100 mg tablet Take 1 tablet (100 mg) by mouth once daily. 90 tablet 0    multivitamin capsule Take 1 capsule by mouth once daily.      omeprazole (PriLOSEC) 40 mg DR capsule Take 1 capsule (40 mg) by mouth once daily at bedtime.      primidone (Mysoline) 50 mg tablet Take 1 tablet (50 mg) by mouth once daily at bedtime. 90 tablet 1    tamsulosin (Flomax) 0.4 mg 24 hr capsule Take 1 capsule (0.4 mg) by mouth once daily at bedtime.      topiramate (Topamax) 50 mg tablet Take 1 tablet (50 mg) by mouth continuously.       No current facility-administered medications for this visit.     Review of Systems   Constitutional: Negative.    HENT: Negative.     Respiratory:  Positive for shortness of breath.    Cardiovascular:  Positive for chest pain.   Gastrointestinal:         See HPI   Genitourinary: Negative.    Musculoskeletal:  Positive for arthralgias.   Neurological:  Positive for light-headedness.   Psychiatric/Behavioral: Negative.       BP (!) 144/92   Pulse 87   Temp 36.1 °C (97 °F) (Temporal)   Resp 16   Ht 1.88 m (6' 2\")   Wt 104 kg (230 lb)   SpO2 98%   BMI 29.53 kg/m²     Physical Exam  Vitals reviewed.   Constitutional:       Appearance: Normal appearance.   HENT:      Head: Normocephalic and atraumatic.      Mouth/Throat:      Mouth: Mucous membranes are moist.      Pharynx: Oropharynx is clear.   Eyes:      Extraocular Movements: Extraocular movements intact.      Pupils: Pupils are equal, round, and reactive to light.      Comments: Glasses     Cardiovascular:      Rate and Rhythm: Normal rate and regular rhythm.      Heart sounds: Normal heart sounds.   Pulmonary:      Effort: Pulmonary effort is normal.      Breath sounds: Normal breath sounds.   Abdominal:      General: Bowel sounds are normal.      Palpations: Abdomen is soft.   Musculoskeletal:         General: No swelling.   Skin:     " General: Skin is warm and dry.   Neurological:      General: No focal deficit present.      Mental Status: He is alert and oriented to person, place, and time.   Psychiatric:         Mood and Affect: Mood normal.         Behavior: Behavior normal.        PAT AIRWAY:   Airway:     Mallampati::  III    TM distance::  >3 FB    Neck ROM::  Full   Edentulous      ASA:   III  DASI SCORE:  23.2  METS SCORE:  5.6  CHAD2 SCORE:  5.9%  REVISED CARDIAC RISK INDEX:  0.9%  STOP BANG SCORE:  4  CAPRINI DVT SCORE:  8  RAIN SCORE:  0.23%  ARISCAT SCORE:   1.6%    EKG  9/30/2024  CBC, BMP ordered during PAT visit    Assessment and Plan:     H/O gastrointestinal reflux disease:  Colonoscopy, Esophagogastroduodenoscopy  COPD - managed with albuterol  H/O pulmonary embolism 4/2024 - post hip replacement - managed with warfarin X 6 months  H/O TIA 2018 - no residual side effects  CAD - s/p heart catheterization 2020 - mild nonobstructive CAD - currently asymptomatic, followed by Dr. David Tran - cardiac clearance 12/19/2024 - scanned under MEDIA TAB  Hypertension - taking Stephen Lazaro PA-C

## 2025-01-17 ENCOUNTER — HOSPITAL ENCOUNTER (OUTPATIENT)
Dept: GASTROENTEROLOGY | Facility: HOSPITAL | Age: 65
Discharge: HOME | End: 2025-01-17
Payer: MEDICARE

## 2025-01-17 ENCOUNTER — ANESTHESIA (OUTPATIENT)
Dept: GASTROENTEROLOGY | Facility: HOSPITAL | Age: 65
End: 2025-01-17
Payer: MEDICARE

## 2025-01-17 ENCOUNTER — ANESTHESIA EVENT (OUTPATIENT)
Dept: GASTROENTEROLOGY | Facility: HOSPITAL | Age: 65
End: 2025-01-17
Payer: MEDICARE

## 2025-01-17 VITALS
DIASTOLIC BLOOD PRESSURE: 84 MMHG | RESPIRATION RATE: 17 BRPM | HEART RATE: 67 BPM | SYSTOLIC BLOOD PRESSURE: 143 MMHG | TEMPERATURE: 97 F | OXYGEN SATURATION: 97 %

## 2025-01-17 DIAGNOSIS — Z80.0 FAMILY HISTORY OF COLON CANCER: ICD-10-CM

## 2025-01-17 DIAGNOSIS — Z12.11 SCREEN FOR COLON CANCER: ICD-10-CM

## 2025-01-17 DIAGNOSIS — K59.09 OTHER CONSTIPATION: ICD-10-CM

## 2025-01-17 DIAGNOSIS — K92.1 MELENA: Primary | ICD-10-CM

## 2025-01-17 DIAGNOSIS — R13.19 ESOPHAGEAL DYSPHAGIA: ICD-10-CM

## 2025-01-17 PROCEDURE — 7100000002 HC RECOVERY ROOM TIME - EACH INCREMENTAL 1 MINUTE

## 2025-01-17 PROCEDURE — 7100000001 HC RECOVERY ROOM TIME - INITIAL BASE CHARGE

## 2025-01-17 PROCEDURE — 7100000010 HC PHASE TWO TIME - EACH INCREMENTAL 1 MINUTE

## 2025-01-17 PROCEDURE — 45378 DIAGNOSTIC COLONOSCOPY: CPT | Performed by: INTERNAL MEDICINE

## 2025-01-17 PROCEDURE — 43239 EGD BIOPSY SINGLE/MULTIPLE: CPT | Performed by: INTERNAL MEDICINE

## 2025-01-17 PROCEDURE — 7100000009 HC PHASE TWO TIME - INITIAL BASE CHARGE

## 2025-01-17 PROCEDURE — 3700000002 HC GENERAL ANESTHESIA TIME - EACH INCREMENTAL 1 MINUTE

## 2025-01-17 PROCEDURE — 2500000004 HC RX 250 GENERAL PHARMACY W/ HCPCS (ALT 636 FOR OP/ED): Performed by: ANESTHESIOLOGIST ASSISTANT

## 2025-01-17 PROCEDURE — 2500000004 HC RX 250 GENERAL PHARMACY W/ HCPCS (ALT 636 FOR OP/ED): Performed by: NURSE ANESTHETIST, CERTIFIED REGISTERED

## 2025-01-17 PROCEDURE — 3700000001 HC GENERAL ANESTHESIA TIME - INITIAL BASE CHARGE

## 2025-01-17 RX ORDER — ALBUTEROL SULFATE 0.83 MG/ML
2.5 SOLUTION RESPIRATORY (INHALATION) ONCE AS NEEDED
OUTPATIENT
Start: 2025-01-17

## 2025-01-17 RX ORDER — MEPERIDINE HYDROCHLORIDE 25 MG/ML
12.5 INJECTION INTRAMUSCULAR; INTRAVENOUS; SUBCUTANEOUS EVERY 10 MIN PRN
OUTPATIENT
Start: 2025-01-17

## 2025-01-17 RX ORDER — LABETALOL HYDROCHLORIDE 5 MG/ML
5 INJECTION, SOLUTION INTRAVENOUS ONCE AS NEEDED
OUTPATIENT
Start: 2025-01-17

## 2025-01-17 RX ORDER — FENTANYL CITRATE 50 UG/ML
INJECTION, SOLUTION INTRAMUSCULAR; INTRAVENOUS AS NEEDED
Status: DISCONTINUED | OUTPATIENT
Start: 2025-01-17 | End: 2025-01-17

## 2025-01-17 RX ORDER — MIDAZOLAM HYDROCHLORIDE 1 MG/ML
INJECTION, SOLUTION INTRAMUSCULAR; INTRAVENOUS AS NEEDED
Status: DISCONTINUED | OUTPATIENT
Start: 2025-01-17 | End: 2025-01-17

## 2025-01-17 RX ORDER — PROPOFOL 10 MG/ML
INJECTION, EMULSION INTRAVENOUS AS NEEDED
Status: DISCONTINUED | OUTPATIENT
Start: 2025-01-17 | End: 2025-01-17

## 2025-01-17 RX ORDER — IPRATROPIUM BROMIDE 0.5 MG/2.5ML
500 SOLUTION RESPIRATORY (INHALATION) AS NEEDED
OUTPATIENT
Start: 2025-01-17

## 2025-01-17 RX ORDER — SODIUM CHLORIDE, SODIUM LACTATE, POTASSIUM CHLORIDE, CALCIUM CHLORIDE 600; 310; 30; 20 MG/100ML; MG/100ML; MG/100ML; MG/100ML
100 INJECTION, SOLUTION INTRAVENOUS CONTINUOUS
OUTPATIENT
Start: 2025-01-17 | End: 2025-01-18

## 2025-01-17 RX ORDER — PROCHLORPERAZINE EDISYLATE 5 MG/ML
5 INJECTION INTRAMUSCULAR; INTRAVENOUS ONCE AS NEEDED
OUTPATIENT
Start: 2025-01-17

## 2025-01-17 RX ORDER — HYDRALAZINE HYDROCHLORIDE 20 MG/ML
5 INJECTION INTRAMUSCULAR; INTRAVENOUS EVERY 30 MIN PRN
OUTPATIENT
Start: 2025-01-17

## 2025-01-17 RX ORDER — FENTANYL CITRATE 50 UG/ML
25 INJECTION, SOLUTION INTRAMUSCULAR; INTRAVENOUS EVERY 5 MIN PRN
OUTPATIENT
Start: 2025-01-17

## 2025-01-17 RX ORDER — ONDANSETRON HYDROCHLORIDE 2 MG/ML
INJECTION, SOLUTION INTRAVENOUS AS NEEDED
Status: DISCONTINUED | OUTPATIENT
Start: 2025-01-17 | End: 2025-01-17

## 2025-01-17 RX ORDER — DIPHENHYDRAMINE HYDROCHLORIDE 50 MG/ML
12.5 INJECTION INTRAMUSCULAR; INTRAVENOUS ONCE AS NEEDED
OUTPATIENT
Start: 2025-01-17

## 2025-01-17 RX ORDER — ONDANSETRON HYDROCHLORIDE 2 MG/ML
4 INJECTION, SOLUTION INTRAVENOUS ONCE AS NEEDED
OUTPATIENT
Start: 2025-01-17

## 2025-01-17 RX ORDER — FENTANYL CITRATE 50 UG/ML
50 INJECTION, SOLUTION INTRAMUSCULAR; INTRAVENOUS EVERY 5 MIN PRN
OUTPATIENT
Start: 2025-01-17

## 2025-01-17 RX ADMIN — PROPOFOL 30 MG: 10 INJECTION, EMULSION INTRAVENOUS at 10:05

## 2025-01-17 RX ADMIN — ONDANSETRON HYDROCHLORIDE 4 MG: 2 INJECTION INTRAMUSCULAR; INTRAVENOUS at 10:23

## 2025-01-17 RX ADMIN — MIDAZOLAM 1 MG: 1 INJECTION INTRAMUSCULAR; INTRAVENOUS at 10:00

## 2025-01-17 RX ADMIN — MIDAZOLAM 1 MG: 1 INJECTION INTRAMUSCULAR; INTRAVENOUS at 10:04

## 2025-01-17 RX ADMIN — PROPOFOL 100 MCG/KG/MIN: 10 INJECTION, EMULSION INTRAVENOUS at 10:06

## 2025-01-17 RX ADMIN — PROPOFOL 20 MG: 10 INJECTION, EMULSION INTRAVENOUS at 10:04

## 2025-01-17 RX ADMIN — SODIUM CHLORIDE, POTASSIUM CHLORIDE, SODIUM LACTATE AND CALCIUM CHLORIDE: 600; 310; 30; 20 INJECTION, SOLUTION INTRAVENOUS at 09:57

## 2025-01-17 RX ADMIN — FENTANYL CITRATE 25 MCG: 0.05 INJECTION, SOLUTION INTRAMUSCULAR; INTRAVENOUS at 10:00

## 2025-01-17 RX ADMIN — PROPOFOL 50 MG: 10 INJECTION, EMULSION INTRAVENOUS at 10:01

## 2025-01-17 RX ADMIN — FENTANYL CITRATE 25 MCG: 0.05 INJECTION, SOLUTION INTRAMUSCULAR; INTRAVENOUS at 10:09

## 2025-01-17 ASSESSMENT — PAIN - FUNCTIONAL ASSESSMENT
PAIN_FUNCTIONAL_ASSESSMENT: 0-10

## 2025-01-17 ASSESSMENT — PAIN SCALES - GENERAL
PAINLEVEL_OUTOF10: 0 - NO PAIN
PAINLEVEL_OUTOF10: 4
PAINLEVEL_OUTOF10: 0 - NO PAIN
PAINLEVEL_OUTOF10: 3

## 2025-01-17 ASSESSMENT — PAIN DESCRIPTION - DESCRIPTORS: DESCRIPTORS: ACHING

## 2025-01-17 NOTE — ANESTHESIA PREPROCEDURE EVALUATION
Patient: Julio Carranza    Procedure Information       Date/Time: 01/17/25 0915    Scheduled providers: Al Abdi MD; Torito Mancia MD; VINITA Brian    Procedures:       COLONOSCOPY      EGD    Location: [facility]            Relevant Problems   Anesthesia (within normal limits)      Cardiac   (+) Atherosclerosis of native coronary artery of transplanted heart without angina pectoris   (+) Hypercholesteremia   (-) History of coronary artery bypass graft   (-) Pacemaker      Pulmonary   (+) Acute pulmonary embolism without acute cor pulmonale, unspecified pulmonary embolism type (Multi)   (+) COPD (chronic obstructive pulmonary disease) (Multi)   (-) Asthma   (-) MITCHELL (obstructive sleep apnea)      Neuro   (+) Anxiety   (-) CVA (cerebral vascular accident) (Multi)   (-) Seizures (Multi)      GI   (+) GERD (gastroesophageal reflux disease)   (+) IBS (irritable bowel syndrome)      /Renal   (+) BPH with obstruction/lower urinary tract symptoms      Endocrine   (-) Diabetes mellitus, type 2 (Multi)      Hematology   (-) Coagulopathy (Multi)      Musculoskeletal   (+) Osteoarthritis of right knee       Clinical information reviewed:   Tobacco  Allergies  Meds   Med Hx  Surg Hx   Fam Hx  Soc Hx        NPO Detail:  NPO/Void Status  Carbohydrate Drink Given Prior to Surgery? : N  Date of Last Liquid: 01/17/25  Time of Last Liquid: 0400  Date of Last Solid: 01/16/25  Time of Last Solid: 1200 (tomato soup and crackers)         Physical Exam    Airway  Mallampati: I  TM distance: >3 FB  Neck ROM: full     Cardiovascular    Dental   (+) upper dentures, lower dentures     Pulmonary    Abdominal            Anesthesia Plan    History of general anesthesia?: yes  History of complications of general anesthesia?: no    ASA 3     MAC     intravenous induction   Postoperative administration of opioids is intended.  Anesthetic plan and risks discussed with patient.

## 2025-01-17 NOTE — POST-PROCEDURE NOTE
Patient brought over from PACU.  He is alert and awake.  Denies any pain.  Tolerated snack without N&V.  He called spouse to pick him up.     1138-Discharge instructions reviewed with patient, verbalized understanding.  He is getting dressed.     1150-Dr. Abdi was in to see patient.

## 2025-01-17 NOTE — ANESTHESIA POSTPROCEDURE EVALUATION
Patient: Julio Carranza    Procedure Summary       Date: 01/17/25 Room / Location: [facility]    Anesthesia Start: 0951 Anesthesia Stop: 1039    Procedures:       COLONOSCOPY      EGD Diagnosis:       Family history of colon cancer      Other constipation      Melena      Esophageal dysphagia    Scheduled Providers: Al Abdi MD; Torito Mancia MD; VINITA Brian Responsible Provider: Torito Mancia MD    Anesthesia Type: MAC ASA Status: 3            Anesthesia Type: MAC    Vitals Value Taken Time   /92 01/17/25 1106   Temp 36.1 °C (97 °F) 01/17/25 1105   Pulse 70 01/17/25 1108   Resp 12 01/17/25 1108   SpO2 93 % 01/17/25 1108   Vitals shown include unfiled device data.    Anesthesia Post Evaluation    Patient location during evaluation: PACU  Patient participation: complete - patient participated  Level of consciousness: awake  Pain management: adequate  Multimodal analgesia pain management approach  Airway patency: patent  Cardiovascular status: acceptable and hemodynamically stable  Respiratory status: acceptable and spontaneous ventilation  Hydration status: euvolemic  Postoperative Nausea and Vomiting: none  Comments: No nausea or vomiting        There were no known notable events for this encounter.

## 2025-01-22 ENCOUNTER — APPOINTMENT (OUTPATIENT)
Dept: PRIMARY CARE | Facility: CLINIC | Age: 65
End: 2025-01-22
Payer: MEDICARE

## 2025-02-05 ENCOUNTER — APPOINTMENT (OUTPATIENT)
Dept: PRIMARY CARE | Facility: CLINIC | Age: 65
End: 2025-02-05
Payer: MEDICARE

## 2025-03-05 DIAGNOSIS — R10.0 ACUTE ABDOMEN: Primary | ICD-10-CM

## 2025-03-07 ENCOUNTER — HOSPITAL ENCOUNTER (OUTPATIENT)
Dept: RADIOLOGY | Facility: HOSPITAL | Age: 65
Discharge: HOME | End: 2025-03-07
Payer: MEDICARE

## 2025-03-07 DIAGNOSIS — R10.9 UNSPECIFIED ABDOMINAL PAIN: ICD-10-CM

## 2025-03-07 DIAGNOSIS — R10.0 ACUTE ABDOMEN: ICD-10-CM

## 2025-03-07 LAB
CREAT SERPL-MCNC: 0.92 MG/DL (ref 0.5–1.3)
EGFRCR SERPLBLD CKD-EPI 2021: >90 ML/MIN/1.73M*2

## 2025-03-07 PROCEDURE — 36415 COLL VENOUS BLD VENIPUNCTURE: CPT

## 2025-03-07 PROCEDURE — 82565 ASSAY OF CREATININE: CPT

## 2025-03-07 PROCEDURE — 2550000001 HC RX 255 CONTRASTS

## 2025-03-07 PROCEDURE — 74177 CT ABD & PELVIS W/CONTRAST: CPT

## 2025-03-07 RX ADMIN — IOHEXOL 75 ML: 350 INJECTION, SOLUTION INTRAVENOUS at 10:26

## 2025-03-15 ENCOUNTER — APPOINTMENT (OUTPATIENT)
Dept: RADIOLOGY | Facility: HOSPITAL | Age: 65
DRG: 193 | End: 2025-03-15
Payer: MEDICARE

## 2025-03-15 ENCOUNTER — APPOINTMENT (OUTPATIENT)
Dept: CARDIOLOGY | Facility: HOSPITAL | Age: 65
DRG: 193 | End: 2025-03-15
Payer: MEDICARE

## 2025-03-15 ENCOUNTER — HOSPITAL ENCOUNTER (INPATIENT)
Facility: HOSPITAL | Age: 65
DRG: 193 | End: 2025-03-15
Attending: STUDENT IN AN ORGANIZED HEALTH CARE EDUCATION/TRAINING PROGRAM | Admitting: INTERNAL MEDICINE
Payer: MEDICARE

## 2025-03-15 DIAGNOSIS — F51.01 PRIMARY INSOMNIA: ICD-10-CM

## 2025-03-15 DIAGNOSIS — K56.7 ILEUS (MULTI): ICD-10-CM

## 2025-03-15 DIAGNOSIS — K44.9 HIATAL HERNIA: ICD-10-CM

## 2025-03-15 DIAGNOSIS — N13.8 BPH WITH OBSTRUCTION/LOWER URINARY TRACT SYMPTOMS: ICD-10-CM

## 2025-03-15 DIAGNOSIS — I25.10 ASCVD (ARTERIOSCLEROTIC CARDIOVASCULAR DISEASE): ICD-10-CM

## 2025-03-15 DIAGNOSIS — F41.9 ANXIETY: ICD-10-CM

## 2025-03-15 DIAGNOSIS — J10.1 INFLUENZA A: Primary | ICD-10-CM

## 2025-03-15 DIAGNOSIS — K59.00 CONSTIPATION, UNSPECIFIED CONSTIPATION TYPE: ICD-10-CM

## 2025-03-15 DIAGNOSIS — N40.1 BPH WITH OBSTRUCTION/LOWER URINARY TRACT SYMPTOMS: ICD-10-CM

## 2025-03-15 DIAGNOSIS — J43.9 PULMONARY EMPHYSEMA, UNSPECIFIED EMPHYSEMA TYPE (MULTI): ICD-10-CM

## 2025-03-15 PROBLEM — Z72.0 TOBACCO USE: Status: ACTIVE | Noted: 2025-03-15

## 2025-03-15 PROBLEM — J44.1 COPD EXACERBATION (MULTI): Status: ACTIVE | Noted: 2025-03-15

## 2025-03-15 LAB
ALBUMIN SERPL BCP-MCNC: 4.2 G/DL (ref 3.4–5)
ALP SERPL-CCNC: 88 U/L (ref 33–136)
ALT SERPL W P-5'-P-CCNC: 23 U/L (ref 10–52)
ANION GAP SERPL CALC-SCNC: 14 MMOL/L (ref 10–20)
AST SERPL W P-5'-P-CCNC: 31 U/L (ref 9–39)
BASOPHILS # BLD AUTO: 0.02 X10*3/UL (ref 0–0.1)
BASOPHILS NFR BLD AUTO: 0.4 %
BILIRUB SERPL-MCNC: 0.5 MG/DL (ref 0–1.2)
BUN SERPL-MCNC: 15 MG/DL (ref 6–23)
CALCIUM SERPL-MCNC: 8.8 MG/DL (ref 8.6–10.3)
CHLORIDE SERPL-SCNC: 103 MMOL/L (ref 98–107)
CO2 SERPL-SCNC: 24 MMOL/L (ref 21–32)
CREAT SERPL-MCNC: 0.96 MG/DL (ref 0.5–1.3)
D DIMER PPP FEU-MCNC: 1725 NG/ML FEU
EGFRCR SERPLBLD CKD-EPI 2021: 87 ML/MIN/1.73M*2
EOSINOPHIL # BLD AUTO: 0.11 X10*3/UL (ref 0–0.7)
EOSINOPHIL NFR BLD AUTO: 1.9 %
ERYTHROCYTE [DISTWIDTH] IN BLOOD BY AUTOMATED COUNT: 12.6 % (ref 11.5–14.5)
FLUAV RNA RESP QL NAA+PROBE: DETECTED
FLUBV RNA RESP QL NAA+PROBE: NOT DETECTED
GLUCOSE SERPL-MCNC: 110 MG/DL (ref 74–99)
HCT VFR BLD AUTO: 46.9 % (ref 41–52)
HGB BLD-MCNC: 15.5 G/DL (ref 13.5–17.5)
IMM GRANULOCYTES # BLD AUTO: 0.02 X10*3/UL (ref 0–0.7)
IMM GRANULOCYTES NFR BLD AUTO: 0.4 % (ref 0–0.9)
LYMPHOCYTES # BLD AUTO: 0.71 X10*3/UL (ref 1.2–4.8)
LYMPHOCYTES NFR BLD AUTO: 12.6 %
MCH RBC QN AUTO: 29 PG (ref 26–34)
MCHC RBC AUTO-ENTMCNC: 33 G/DL (ref 32–36)
MCV RBC AUTO: 88 FL (ref 80–100)
MONOCYTES # BLD AUTO: 0.62 X10*3/UL (ref 0.1–1)
MONOCYTES NFR BLD AUTO: 11 %
NEUTROPHILS # BLD AUTO: 4.17 X10*3/UL (ref 1.2–7.7)
NEUTROPHILS NFR BLD AUTO: 73.7 %
NRBC BLD-RTO: 0 /100 WBCS (ref 0–0)
PLATELET # BLD AUTO: 205 X10*3/UL (ref 150–450)
POTASSIUM SERPL-SCNC: 4.2 MMOL/L (ref 3.5–5.3)
PROT SERPL-MCNC: 7.3 G/DL (ref 6.4–8.2)
RBC # BLD AUTO: 5.34 X10*6/UL (ref 4.5–5.9)
SARS-COV-2 RNA RESP QL NAA+PROBE: NOT DETECTED
SODIUM SERPL-SCNC: 137 MMOL/L (ref 136–145)
WBC # BLD AUTO: 5.7 X10*3/UL (ref 4.4–11.3)

## 2025-03-15 PROCEDURE — 2550000001 HC RX 255 CONTRASTS

## 2025-03-15 PROCEDURE — 96366 THER/PROPH/DIAG IV INF ADDON: CPT

## 2025-03-15 PROCEDURE — 99223 1ST HOSP IP/OBS HIGH 75: CPT

## 2025-03-15 PROCEDURE — 2500000004 HC RX 250 GENERAL PHARMACY W/ HCPCS (ALT 636 FOR OP/ED): Performed by: STUDENT IN AN ORGANIZED HEALTH CARE EDUCATION/TRAINING PROGRAM

## 2025-03-15 PROCEDURE — 2500000001 HC RX 250 WO HCPCS SELF ADMINISTERED DRUGS (ALT 637 FOR MEDICARE OP): Performed by: NURSE PRACTITIONER

## 2025-03-15 PROCEDURE — 71250 CT THORAX DX C-: CPT

## 2025-03-15 PROCEDURE — 96372 THER/PROPH/DIAG INJ SC/IM: CPT | Mod: 59

## 2025-03-15 PROCEDURE — 2500000005 HC RX 250 GENERAL PHARMACY W/O HCPCS

## 2025-03-15 PROCEDURE — 2500000005 HC RX 250 GENERAL PHARMACY W/O HCPCS: Performed by: INTERNAL MEDICINE

## 2025-03-15 PROCEDURE — 96376 TX/PRO/DX INJ SAME DRUG ADON: CPT | Mod: 59

## 2025-03-15 PROCEDURE — 71275 CT ANGIOGRAPHY CHEST: CPT | Performed by: RADIOLOGY

## 2025-03-15 PROCEDURE — 71046 X-RAY EXAM CHEST 2 VIEWS: CPT

## 2025-03-15 PROCEDURE — 94667 MNPJ CHEST WALL 1ST: CPT | Mod: 59

## 2025-03-15 PROCEDURE — 87636 SARSCOV2 & INF A&B AMP PRB: CPT | Performed by: STUDENT IN AN ORGANIZED HEALTH CARE EDUCATION/TRAINING PROGRAM

## 2025-03-15 PROCEDURE — 96365 THER/PROPH/DIAG IV INF INIT: CPT | Mod: 59

## 2025-03-15 PROCEDURE — 71275 CT ANGIOGRAPHY CHEST: CPT

## 2025-03-15 PROCEDURE — 85025 COMPLETE CBC W/AUTO DIFF WBC: CPT | Performed by: STUDENT IN AN ORGANIZED HEALTH CARE EDUCATION/TRAINING PROGRAM

## 2025-03-15 PROCEDURE — 2500000004 HC RX 250 GENERAL PHARMACY W/ HCPCS (ALT 636 FOR OP/ED)

## 2025-03-15 PROCEDURE — 99285 EMERGENCY DEPT VISIT HI MDM: CPT | Mod: 25 | Performed by: STUDENT IN AN ORGANIZED HEALTH CARE EDUCATION/TRAINING PROGRAM

## 2025-03-15 PROCEDURE — 96361 HYDRATE IV INFUSION ADD-ON: CPT

## 2025-03-15 PROCEDURE — G0378 HOSPITAL OBSERVATION PER HR: HCPCS

## 2025-03-15 PROCEDURE — 2500000001 HC RX 250 WO HCPCS SELF ADMINISTERED DRUGS (ALT 637 FOR MEDICARE OP): Performed by: STUDENT IN AN ORGANIZED HEALTH CARE EDUCATION/TRAINING PROGRAM

## 2025-03-15 PROCEDURE — 93005 ELECTROCARDIOGRAM TRACING: CPT

## 2025-03-15 PROCEDURE — 2500000002 HC RX 250 W HCPCS SELF ADMINISTERED DRUGS (ALT 637 FOR MEDICARE OP, ALT 636 FOR OP/ED): Mod: MUE

## 2025-03-15 PROCEDURE — 94640 AIRWAY INHALATION TREATMENT: CPT

## 2025-03-15 PROCEDURE — 96375 TX/PRO/DX INJ NEW DRUG ADDON: CPT | Mod: 59

## 2025-03-15 PROCEDURE — 2500000001 HC RX 250 WO HCPCS SELF ADMINISTERED DRUGS (ALT 637 FOR MEDICARE OP)

## 2025-03-15 PROCEDURE — 94669 MECHANICAL CHEST WALL OSCILL: CPT

## 2025-03-15 PROCEDURE — 71250 CT THORAX DX C-: CPT | Mod: FOREIGN READ | Performed by: RADIOLOGY

## 2025-03-15 PROCEDURE — 94668 MNPJ CHEST WALL SBSQ: CPT | Mod: 59

## 2025-03-15 PROCEDURE — 80053 COMPREHEN METABOLIC PANEL: CPT | Performed by: STUDENT IN AN ORGANIZED HEALTH CARE EDUCATION/TRAINING PROGRAM

## 2025-03-15 PROCEDURE — 36415 COLL VENOUS BLD VENIPUNCTURE: CPT | Performed by: NURSE PRACTITIONER

## 2025-03-15 PROCEDURE — 85379 FIBRIN DEGRADATION QUANT: CPT | Performed by: NURSE PRACTITIONER

## 2025-03-15 PROCEDURE — 71046 X-RAY EXAM CHEST 2 VIEWS: CPT | Mod: FOREIGN READ | Performed by: RADIOLOGY

## 2025-03-15 PROCEDURE — 2500000002 HC RX 250 W HCPCS SELF ADMINISTERED DRUGS (ALT 637 FOR MEDICARE OP, ALT 636 FOR OP/ED): Performed by: NURSE PRACTITIONER

## 2025-03-15 PROCEDURE — 36415 COLL VENOUS BLD VENIPUNCTURE: CPT | Performed by: STUDENT IN AN ORGANIZED HEALTH CARE EDUCATION/TRAINING PROGRAM

## 2025-03-15 PROCEDURE — 96360 HYDRATION IV INFUSION INIT: CPT | Mod: 59

## 2025-03-15 PROCEDURE — 94760 N-INVAS EAR/PLS OXIMETRY 1: CPT

## 2025-03-15 RX ORDER — ACETAMINOPHEN 160 MG/5ML
650 SOLUTION ORAL EVERY 4 HOURS PRN
Status: DISCONTINUED | OUTPATIENT
Start: 2025-03-15 | End: 2025-03-15

## 2025-03-15 RX ORDER — ONDANSETRON HYDROCHLORIDE 2 MG/ML
4 INJECTION, SOLUTION INTRAVENOUS ONCE
Status: COMPLETED | OUTPATIENT
Start: 2025-03-15 | End: 2025-03-15

## 2025-03-15 RX ORDER — LOSARTAN POTASSIUM 50 MG/1
100 TABLET ORAL DAILY
Status: DISCONTINUED | OUTPATIENT
Start: 2025-03-15 | End: 2025-03-29 | Stop reason: HOSPADM

## 2025-03-15 RX ORDER — ONDANSETRON 4 MG/1
4 TABLET, ORALLY DISINTEGRATING ORAL EVERY 8 HOURS PRN
Status: DISCONTINUED | OUTPATIENT
Start: 2025-03-15 | End: 2025-03-29 | Stop reason: HOSPADM

## 2025-03-15 RX ORDER — PANTOPRAZOLE SODIUM 40 MG/10ML
40 INJECTION, POWDER, LYOPHILIZED, FOR SOLUTION INTRAVENOUS
Status: DISCONTINUED | OUTPATIENT
Start: 2025-03-16 | End: 2025-03-16

## 2025-03-15 RX ORDER — PANTOPRAZOLE SODIUM 40 MG/1
40 TABLET, DELAYED RELEASE ORAL
Status: DISCONTINUED | OUTPATIENT
Start: 2025-03-16 | End: 2025-03-15 | Stop reason: SDUPTHER

## 2025-03-15 RX ORDER — ENOXAPARIN SODIUM 100 MG/ML
40 INJECTION SUBCUTANEOUS EVERY 24 HOURS
Status: DISCONTINUED | OUTPATIENT
Start: 2025-03-15 | End: 2025-03-29 | Stop reason: HOSPADM

## 2025-03-15 RX ORDER — ACETAMINOPHEN 325 MG/1
975 TABLET ORAL ONCE
Status: COMPLETED | OUTPATIENT
Start: 2025-03-15 | End: 2025-03-15

## 2025-03-15 RX ORDER — CODEINE PHOSPHATE AND GUAIFENESIN 10; 100 MG/5ML; MG/5ML
5 SOLUTION ORAL EVERY 6 HOURS PRN
Status: DISCONTINUED | OUTPATIENT
Start: 2025-03-15 | End: 2025-03-26

## 2025-03-15 RX ORDER — ACETAMINOPHEN 325 MG/1
650 TABLET ORAL EVERY 4 HOURS PRN
Status: DISCONTINUED | OUTPATIENT
Start: 2025-03-15 | End: 2025-03-25

## 2025-03-15 RX ORDER — OSELTAMIVIR PHOSPHATE 75 MG/1
75 CAPSULE ORAL 2 TIMES DAILY
Status: DISPENSED | OUTPATIENT
Start: 2025-03-15 | End: 2025-03-20

## 2025-03-15 RX ORDER — LIDOCAINE 560 MG/1
1 PATCH PERCUTANEOUS; TOPICAL; TRANSDERMAL DAILY
Status: DISCONTINUED | OUTPATIENT
Start: 2025-03-15 | End: 2025-03-29 | Stop reason: HOSPADM

## 2025-03-15 RX ORDER — PREDNISONE 20 MG/1
40 TABLET ORAL ONCE
Status: COMPLETED | OUTPATIENT
Start: 2025-03-15 | End: 2025-03-15

## 2025-03-15 RX ORDER — ACETAMINOPHEN 650 MG/1
650 SUPPOSITORY RECTAL EVERY 4 HOURS PRN
Status: DISCONTINUED | OUTPATIENT
Start: 2025-03-15 | End: 2025-03-15

## 2025-03-15 RX ORDER — PRIMIDONE 50 MG/1
50 TABLET ORAL NIGHTLY
Status: DISCONTINUED | OUTPATIENT
Start: 2025-03-15 | End: 2025-03-29 | Stop reason: HOSPADM

## 2025-03-15 RX ORDER — PANTOPRAZOLE SODIUM 40 MG/1
40 TABLET, DELAYED RELEASE ORAL
Status: DISCONTINUED | OUTPATIENT
Start: 2025-03-16 | End: 2025-03-17

## 2025-03-15 RX ORDER — ONDANSETRON HYDROCHLORIDE 2 MG/ML
4 INJECTION, SOLUTION INTRAVENOUS EVERY 8 HOURS PRN
Status: DISCONTINUED | OUTPATIENT
Start: 2025-03-15 | End: 2025-03-28

## 2025-03-15 RX ORDER — POLYETHYLENE GLYCOL 3350 17 G/17G
17 POWDER, FOR SOLUTION ORAL DAILY
Status: DISCONTINUED | OUTPATIENT
Start: 2025-03-15 | End: 2025-03-29 | Stop reason: HOSPADM

## 2025-03-15 RX ORDER — GUAIFENESIN 600 MG/1
600 TABLET, EXTENDED RELEASE ORAL EVERY 12 HOURS PRN
Status: DISCONTINUED | OUTPATIENT
Start: 2025-03-15 | End: 2025-03-15

## 2025-03-15 RX ORDER — ALBUTEROL SULFATE 90 UG/1
2 INHALANT RESPIRATORY (INHALATION) EVERY 2 HOUR PRN
Status: DISCONTINUED | OUTPATIENT
Start: 2025-03-15 | End: 2025-03-29 | Stop reason: HOSPADM

## 2025-03-15 RX ORDER — BACLOFEN 10 MG/1
20 TABLET ORAL 3 TIMES DAILY PRN
Status: DISCONTINUED | OUTPATIENT
Start: 2025-03-15 | End: 2025-03-29 | Stop reason: HOSPADM

## 2025-03-15 RX ORDER — BENZONATATE 100 MG/1
100 CAPSULE ORAL 3 TIMES DAILY
Status: DISCONTINUED | OUTPATIENT
Start: 2025-03-15 | End: 2025-03-16

## 2025-03-15 RX ORDER — GUAIFENESIN/DEXTROMETHORPHAN 100-10MG/5
5 SYRUP ORAL EVERY 4 HOURS PRN
Status: DISCONTINUED | OUTPATIENT
Start: 2025-03-15 | End: 2025-03-15

## 2025-03-15 RX ORDER — IPRATROPIUM BROMIDE AND ALBUTEROL SULFATE 2.5; .5 MG/3ML; MG/3ML
3 SOLUTION RESPIRATORY (INHALATION)
Status: DISCONTINUED | OUTPATIENT
Start: 2025-03-15 | End: 2025-03-29 | Stop reason: HOSPADM

## 2025-03-15 RX ORDER — TALC
6 POWDER (GRAM) TOPICAL NIGHTLY PRN
Status: DISCONTINUED | OUTPATIENT
Start: 2025-03-15 | End: 2025-03-29 | Stop reason: HOSPADM

## 2025-03-15 RX ORDER — FOLIC ACID 1 MG/1
1 TABLET ORAL DAILY
Status: DISCONTINUED | OUTPATIENT
Start: 2025-03-15 | End: 2025-03-29 | Stop reason: HOSPADM

## 2025-03-15 RX ORDER — ALBUTEROL SULFATE 90 UG/1
2 INHALANT RESPIRATORY (INHALATION) EVERY 6 HOURS PRN
Status: DISCONTINUED | OUTPATIENT
Start: 2025-03-15 | End: 2025-03-15

## 2025-03-15 RX ADMIN — BENZONATATE 100 MG: 100 CAPSULE ORAL at 15:05

## 2025-03-15 RX ADMIN — BACLOFEN 20 MG: 10 TABLET ORAL at 16:46

## 2025-03-15 RX ADMIN — FOLIC ACID 1 MG: 1 TABLET ORAL at 16:46

## 2025-03-15 RX ADMIN — AZITHROMYCIN MONOHYDRATE 500 MG: 500 INJECTION, POWDER, LYOPHILIZED, FOR SOLUTION INTRAVENOUS at 13:37

## 2025-03-15 RX ADMIN — ACETAMINOPHEN 650 MG: 325 TABLET, FILM COATED ORAL at 14:31

## 2025-03-15 RX ADMIN — PREDNISONE 40 MG: 20 TABLET ORAL at 12:46

## 2025-03-15 RX ADMIN — ONDANSETRON 4 MG: 2 INJECTION INTRAMUSCULAR; INTRAVENOUS at 09:55

## 2025-03-15 RX ADMIN — LOSARTAN POTASSIUM 100 MG: 50 TABLET, FILM COATED ORAL at 16:46

## 2025-03-15 RX ADMIN — IPRATROPIUM BROMIDE AND ALBUTEROL SULFATE 3 ML: .5; 3 SOLUTION RESPIRATORY (INHALATION) at 20:55

## 2025-03-15 RX ADMIN — IPRATROPIUM BROMIDE AND ALBUTEROL SULFATE 3 ML: .5; 3 SOLUTION RESPIRATORY (INHALATION) at 16:17

## 2025-03-15 RX ADMIN — OSELTAMAVIR PHOSPHATE 75 MG: 75 CAPSULE ORAL at 20:38

## 2025-03-15 RX ADMIN — Medication 2 L/MIN: at 23:20

## 2025-03-15 RX ADMIN — IOHEXOL 75 ML: 350 INJECTION, SOLUTION INTRAVENOUS at 15:32

## 2025-03-15 RX ADMIN — GUAIFENESIN AND CODEINE PHOSPHATE 5 ML: 100; 10 SOLUTION ORAL at 16:46

## 2025-03-15 RX ADMIN — ENOXAPARIN SODIUM 40 MG: 40 INJECTION SUBCUTANEOUS at 13:37

## 2025-03-15 RX ADMIN — PRIMIDONE 50 MG: 50 TABLET ORAL at 20:38

## 2025-03-15 RX ADMIN — OSELTAMAVIR PHOSPHATE 75 MG: 75 CAPSULE ORAL at 13:37

## 2025-03-15 RX ADMIN — GUAIFENESIN AND DEXTROMETHORPHAN 5 ML: 100; 10 SYRUP ORAL at 14:31

## 2025-03-15 RX ADMIN — SODIUM CHLORIDE, SODIUM LACTATE, POTASSIUM CHLORIDE, AND CALCIUM CHLORIDE 1000 ML: .6; .31; .03; .02 INJECTION, SOLUTION INTRAVENOUS at 12:46

## 2025-03-15 RX ADMIN — BENZOCAINE AND MENTHOL 1 LOZENGE: 15; 3.6 LOZENGE ORAL at 20:39

## 2025-03-15 RX ADMIN — LIDOCAINE 1 PATCH: 560 PATCH PERCUTANEOUS; TOPICAL; TRANSDERMAL at 15:05

## 2025-03-15 RX ADMIN — METHYLPREDNISOLONE SODIUM SUCCINATE 40 MG: 40 INJECTION, POWDER, FOR SOLUTION INTRAMUSCULAR; INTRAVENOUS at 20:38

## 2025-03-15 RX ADMIN — Medication 6 MG: at 20:39

## 2025-03-15 RX ADMIN — BENZONATATE 100 MG: 100 CAPSULE ORAL at 20:38

## 2025-03-15 RX ADMIN — METHYLPREDNISOLONE SODIUM SUCCINATE 40 MG: 40 INJECTION, POWDER, FOR SOLUTION INTRAMUSCULAR; INTRAVENOUS at 13:37

## 2025-03-15 RX ADMIN — ACETAMINOPHEN 975 MG: 325 TABLET, FILM COATED ORAL at 09:55

## 2025-03-15 SDOH — ECONOMIC STABILITY: INCOME INSECURITY: IN THE PAST 12 MONTHS HAS THE ELECTRIC, GAS, OIL, OR WATER COMPANY THREATENED TO SHUT OFF SERVICES IN YOUR HOME?: NO

## 2025-03-15 SDOH — SOCIAL STABILITY: SOCIAL INSECURITY
WITHIN THE LAST YEAR, HAVE YOU BEEN RAPED OR FORCED TO HAVE ANY KIND OF SEXUAL ACTIVITY BY YOUR PARTNER OR EX-PARTNER?: NO

## 2025-03-15 SDOH — SOCIAL STABILITY: SOCIAL INSECURITY: ARE YOU OR HAVE YOU BEEN THREATENED OR ABUSED PHYSICALLY, EMOTIONALLY, OR SEXUALLY BY ANYONE?: NO

## 2025-03-15 SDOH — HEALTH STABILITY: MENTAL HEALTH: HOW OFTEN DO YOU HAVE SIX OR MORE DRINKS ON ONE OCCASION?: MONTHLY

## 2025-03-15 SDOH — ECONOMIC STABILITY: FOOD INSECURITY: WITHIN THE PAST 12 MONTHS, YOU WORRIED THAT YOUR FOOD WOULD RUN OUT BEFORE YOU GOT THE MONEY TO BUY MORE.: NEVER TRUE

## 2025-03-15 SDOH — SOCIAL STABILITY: SOCIAL INSECURITY: ARE THERE ANY APPARENT SIGNS OF INJURIES/BEHAVIORS THAT COULD BE RELATED TO ABUSE/NEGLECT?: NO

## 2025-03-15 SDOH — SOCIAL STABILITY: SOCIAL INSECURITY
WITHIN THE LAST YEAR, HAVE YOU BEEN KICKED, HIT, SLAPPED, OR OTHERWISE PHYSICALLY HURT BY YOUR PARTNER OR EX-PARTNER?: NO

## 2025-03-15 SDOH — SOCIAL STABILITY: SOCIAL INSECURITY: WITHIN THE LAST YEAR, HAVE YOU BEEN AFRAID OF YOUR PARTNER OR EX-PARTNER?: NO

## 2025-03-15 SDOH — ECONOMIC STABILITY: FOOD INSECURITY: WITHIN THE PAST 12 MONTHS, THE FOOD YOU BOUGHT JUST DIDN'T LAST AND YOU DIDN'T HAVE MONEY TO GET MORE.: NEVER TRUE

## 2025-03-15 SDOH — SOCIAL STABILITY: SOCIAL INSECURITY: ABUSE: ADULT

## 2025-03-15 SDOH — ECONOMIC STABILITY: HOUSING INSECURITY: IN THE PAST 12 MONTHS, HOW MANY TIMES HAVE YOU MOVED WHERE YOU WERE LIVING?: 0

## 2025-03-15 SDOH — HEALTH STABILITY: MENTAL HEALTH: HOW OFTEN DO YOU HAVE A DRINK CONTAINING ALCOHOL?: 2-4 TIMES A MONTH

## 2025-03-15 SDOH — SOCIAL STABILITY: SOCIAL INSECURITY: DOES ANYONE TRY TO KEEP YOU FROM HAVING/CONTACTING OTHER FRIENDS OR DOING THINGS OUTSIDE YOUR HOME?: NO

## 2025-03-15 SDOH — ECONOMIC STABILITY: FOOD INSECURITY: HOW HARD IS IT FOR YOU TO PAY FOR THE VERY BASICS LIKE FOOD, HOUSING, MEDICAL CARE, AND HEATING?: NOT VERY HARD

## 2025-03-15 SDOH — ECONOMIC STABILITY: HOUSING INSECURITY: AT ANY TIME IN THE PAST 12 MONTHS, WERE YOU HOMELESS OR LIVING IN A SHELTER (INCLUDING NOW)?: NO

## 2025-03-15 SDOH — HEALTH STABILITY: MENTAL HEALTH: HOW MANY DRINKS CONTAINING ALCOHOL DO YOU HAVE ON A TYPICAL DAY WHEN YOU ARE DRINKING?: 5 OR 6

## 2025-03-15 SDOH — SOCIAL STABILITY: SOCIAL INSECURITY: DO YOU FEEL ANYONE HAS EXPLOITED OR TAKEN ADVANTAGE OF YOU FINANCIALLY OR OF YOUR PERSONAL PROPERTY?: NO

## 2025-03-15 SDOH — SOCIAL STABILITY: SOCIAL INSECURITY: DO YOU FEEL UNSAFE GOING BACK TO THE PLACE WHERE YOU ARE LIVING?: NO

## 2025-03-15 SDOH — ECONOMIC STABILITY: HOUSING INSECURITY: IN THE LAST 12 MONTHS, WAS THERE A TIME WHEN YOU WERE NOT ABLE TO PAY THE MORTGAGE OR RENT ON TIME?: NO

## 2025-03-15 SDOH — SOCIAL STABILITY: SOCIAL INSECURITY: WITHIN THE LAST YEAR, HAVE YOU BEEN HUMILIATED OR EMOTIONALLY ABUSED IN OTHER WAYS BY YOUR PARTNER OR EX-PARTNER?: NO

## 2025-03-15 SDOH — SOCIAL STABILITY: SOCIAL INSECURITY: HAS ANYONE EVER THREATENED TO HURT YOUR FAMILY OR YOUR PETS?: NO

## 2025-03-15 SDOH — ECONOMIC STABILITY: TRANSPORTATION INSECURITY: IN THE PAST 12 MONTHS, HAS LACK OF TRANSPORTATION KEPT YOU FROM MEDICAL APPOINTMENTS OR FROM GETTING MEDICATIONS?: NO

## 2025-03-15 SDOH — SOCIAL STABILITY: SOCIAL INSECURITY: WERE YOU ABLE TO COMPLETE ALL THE BEHAVIORAL HEALTH SCREENINGS?: YES

## 2025-03-15 SDOH — SOCIAL STABILITY: SOCIAL INSECURITY: HAVE YOU HAD THOUGHTS OF HARMING ANYONE ELSE?: NO

## 2025-03-15 SDOH — SOCIAL STABILITY: SOCIAL INSECURITY: HAVE YOU HAD ANY THOUGHTS OF HARMING ANYONE ELSE?: NO

## 2025-03-15 ASSESSMENT — COGNITIVE AND FUNCTIONAL STATUS - GENERAL
CLIMB 3 TO 5 STEPS WITH RAILING: A LITTLE
WALKING IN HOSPITAL ROOM: A LITTLE
WALKING IN HOSPITAL ROOM: A LITTLE
STANDING UP FROM CHAIR USING ARMS: A LITTLE
STANDING UP FROM CHAIR USING ARMS: A LITTLE
HELP NEEDED FOR BATHING: A LITTLE
DRESSING REGULAR UPPER BODY CLOTHING: A LITTLE
MOBILITY SCORE: 21
PATIENT BASELINE BEDBOUND: NO
DAILY ACTIVITIY SCORE: 22
CLIMB 3 TO 5 STEPS WITH RAILING: A LITTLE
HELP NEEDED FOR BATHING: A LITTLE
MOBILITY SCORE: 21
DAILY ACTIVITIY SCORE: 22
DRESSING REGULAR UPPER BODY CLOTHING: A LITTLE

## 2025-03-15 ASSESSMENT — PAIN DESCRIPTION - PAIN TYPE
TYPE: ACUTE PAIN
TYPE: ACUTE PAIN

## 2025-03-15 ASSESSMENT — ACTIVITIES OF DAILY LIVING (ADL)
BATHING: INDEPENDENT
LACK_OF_TRANSPORTATION: NO
FEEDING YOURSELF: INDEPENDENT
DRESSING YOURSELF: INDEPENDENT
ASSISTIVE_DEVICE: WALKER;CANE
WALKS IN HOME: INDEPENDENT
TOILETING: INDEPENDENT
GROOMING: INDEPENDENT
PATIENT'S MEMORY ADEQUATE TO SAFELY COMPLETE DAILY ACTIVITIES?: YES
HEARING - RIGHT EAR: FUNCTIONAL
ADEQUATE_TO_COMPLETE_ADL: YES
HEARING - LEFT EAR: FUNCTIONAL
JUDGMENT_ADEQUATE_SAFELY_COMPLETE_DAILY_ACTIVITIES: YES

## 2025-03-15 ASSESSMENT — ENCOUNTER SYMPTOMS
DYSURIA: 0
VOMITING: 0
DIFFICULTY URINATING: 0
SHORTNESS OF BREATH: 1
ACTIVITY CHANGE: 1
NAUSEA: 0
DIZZINESS: 1
COUGH: 1
ABDOMINAL PAIN: 0
CONSTIPATION: 0
FEVER: 1
CHILLS: 1
CHEST TIGHTNESS: 1
DIARRHEA: 1
APPETITE CHANGE: 1

## 2025-03-15 ASSESSMENT — PAIN SCALES - GENERAL
PAINLEVEL_OUTOF10: 3
PAINLEVEL_OUTOF10: 0 - NO PAIN
PAINLEVEL_OUTOF10: 5 - MODERATE PAIN

## 2025-03-15 ASSESSMENT — PAIN DESCRIPTION - LOCATION
LOCATION: CHEST
LOCATION: CHEST

## 2025-03-15 ASSESSMENT — LIFESTYLE VARIABLES
SKIP TO QUESTIONS 9-10: 0
AUDIT-C TOTAL SCORE: 6

## 2025-03-15 ASSESSMENT — PATIENT HEALTH QUESTIONNAIRE - PHQ9
SUM OF ALL RESPONSES TO PHQ9 QUESTIONS 1 & 2: 0
2. FEELING DOWN, DEPRESSED OR HOPELESS: NOT AT ALL
1. LITTLE INTEREST OR PLEASURE IN DOING THINGS: NOT AT ALL

## 2025-03-15 ASSESSMENT — PAIN DESCRIPTION - PROGRESSION: CLINICAL_PROGRESSION: GRADUALLY IMPROVING

## 2025-03-15 ASSESSMENT — PAIN - FUNCTIONAL ASSESSMENT
PAIN_FUNCTIONAL_ASSESSMENT: 0-10
PAIN_FUNCTIONAL_ASSESSMENT: 0-10

## 2025-03-15 ASSESSMENT — PAIN DESCRIPTION - DESCRIPTORS: DESCRIPTORS: DISCOMFORT

## 2025-03-15 NOTE — H&P
History Of Present Illness  Julio Carranza is a 64 y.o. male presenting with shortness of breath. Patient reports he has been sick for about 5 days now. His symptoms started with fever and chills. He then developed a hacking dry cough and progressive shortness of breath with activity. He has been dizzy due to coughing fits. He has also had a poor appetite and had some diarrhea at home. He has been taking Nyquil cold and flu with no significant improvement in symptoms. He has an O2 concentrator at home that belonged to his girlfriend who passed away recently and actually put himself on 2L O2 at home due to shortness of breath. He does not normally wear O2 but is a 1/4 ppd smoker; he previously smoked up to 2.5 ppd. He came to the ED today for further evaluation of shortness of breath. SpO2 was 92% on room air. Labs were significant for D-dimer 1725. Flu A PCR was positive. CT chest without contrast did not show any infiltrate or effusion. Patient was given prednisone 40 mg PO x 1 dose, Zofran 4 mg IV x 1, Tylenol 975 mg PO x 1, and a 1L LR bolus. He is admitted to med/surg for further care. CT PE is pending at this time.      Past Medical History  Past Medical History:   Diagnosis Date    Arthritis     BPH (benign prostatic hyperplasia)     CAD (coronary artery disease)     Emphysema lung (Multi)     Fall     tripped in shower  no injury    GERD (gastroesophageal reflux disease)     Hidradenitis     History of blood transfusion     after back surgery  no reaction    History of pulmonary embolism 04/2024    Post left total hip surgery    History of TIA (transient ischemic attack) 2018    HLD (hyperlipidemia)     HTN (hypertension)     Irritable bowel syndrome     Local infection of the skin and subcutaneous tissue, unspecified 12/01/2015    Skin infection, bacterial    Lung nodule     OA (osteoarthritis)     Other cervical disc displacement, unspecified cervical region 02/28/2019    Cervical disc herniation    Personal  history of other diseases of the digestive system 03/25/2021    History of chronic constipation    Personal history of other diseases of the respiratory system 12/03/2014    History of chronic obstructive lung disease    Personal history of other diseases of the respiratory system 07/15/2016    History of acute bronchitis    Psychophysiologic insomnia 03/25/2021    Chronic insomnia    Umbilical hernia     Unspecified cataract     Cataract of left eye    Use of cane as ambulatory aid     Wears dentures     full upper and lower    Wears glasses        Surgical History  Past Surgical History:   Procedure Laterality Date    APPENDECTOMY      BACK SURGERY      Lower Back Surgery    CARDIAC CATHETERIZATION  2020    Mild coronary artery disease    CATARACT EXTRACTION Left     FL GUIDED ASPIRATION OR INJECTION LARGE JOINT LEFT Left 10/18/2023    FL GUIDED ASPIRATION OR INJECTION LARGE JOINT LEFT 10/18/2023 [facility]    HIP ARTHROPLASTY Left 04/2024        Social History  He reports that he has been smoking cigarettes. He started smoking about 47 years ago. He has a 70.8 pack-year smoking history. He has never used smokeless tobacco. He reports current alcohol use of about 4.0 standard drinks of alcohol per week. He reports current drug use. Frequency: 1.00 time per week. Drug: Marijuana.    Family History  No family history on file.     Allergies  Etodolac, Penicillins, Escitalopram oxalate, and Pollen extracts    Review of Systems   Constitutional:  Positive for activity change, appetite change, chills and fever.   HENT:  Positive for congestion.    Respiratory:  Positive for cough, chest tightness and shortness of breath.    Cardiovascular:  Negative for leg swelling.   Gastrointestinal:  Positive for diarrhea. Negative for abdominal pain, constipation, nausea and vomiting.   Genitourinary:  Negative for difficulty urinating and dysuria.   Neurological:  Positive for dizziness. Negative for syncope.   All other systems  "reviewed and are negative.       Physical Exam  Constitutional:       General: He is not in acute distress.     Appearance: He is not toxic-appearing.   HENT:      Head: Normocephalic and atraumatic.      Mouth/Throat:      Mouth: Mucous membranes are moist.   Eyes:      Conjunctiva/sclera: Conjunctivae normal.   Cardiovascular:      Rate and Rhythm: Normal rate and regular rhythm.   Pulmonary:      Effort: No respiratory distress.      Breath sounds: Wheezing and rhonchi present.      Comments: On room air with SpO2 90-93%  Abdominal:      General: There is no distension.      Palpations: Abdomen is soft.      Tenderness: There is no abdominal tenderness.   Musculoskeletal:      Right lower leg: No edema.      Left lower leg: No edema.   Skin:     General: Skin is warm and dry.   Neurological:      Mental Status: He is alert and oriented to person, place, and time.   Psychiatric:         Mood and Affect: Mood normal.         Behavior: Behavior normal.          Last Recorded Vitals  Blood pressure 122/83, pulse 80, temperature 36.4 °C (97.5 °F), temperature source Temporal, resp. rate 18, height 1.88 m (6' 2\"), weight 103 kg (227 lb 1.2 oz), SpO2 94%.    Relevant Results        Scheduled medications  azithromycin, 500 mg, intravenous, q24h  benzonatate, 100 mg, oral, TID  enoxaparin, 40 mg, subcutaneous, q24h  ipratropium-albuteroL, 3 mL, nebulization, TID  lidocaine, 1 patch, transdermal, Daily  methylPREDNISolone sodium succinate (PF), 40 mg, intravenous, q8h  oseltamivir, 75 mg, oral, BID  [START ON 3/16/2025] pantoprazole, 40 mg, oral, Daily before breakfast   Or  [START ON 3/16/2025] pantoprazole, 40 mg, intravenous, Daily before breakfast  polyethylene glycol, 17 g, oral, Daily      Continuous medications     PRN medications  PRN medications: acetaminophen **OR** acetaminophen **OR** acetaminophen, acetaminophen **OR** acetaminophen **OR** acetaminophen, benzocaine-menthol, codeine-guaifenesin, melatonin, " ondansetron ODT **OR** ondansetron    Results for orders placed or performed during the hospital encounter of 03/15/25 (from the past 24 hours)   Sars-CoV-2 and Influenza A/B PCR   Result Value Ref Range    Flu A Result Detected (A) Not Detected    Flu B Result Not Detected Not Detected    Coronavirus 2019, PCR Not Detected Not Detected   Comprehensive metabolic panel   Result Value Ref Range    Glucose 110 (H) 74 - 99 mg/dL    Sodium 137 136 - 145 mmol/L    Potassium 4.2 3.5 - 5.3 mmol/L    Chloride 103 98 - 107 mmol/L    Bicarbonate 24 21 - 32 mmol/L    Anion Gap 14 10 - 20 mmol/L    Urea Nitrogen 15 6 - 23 mg/dL    Creatinine 0.96 0.50 - 1.30 mg/dL    eGFR 87 >60 mL/min/1.73m*2    Calcium 8.8 8.6 - 10.3 mg/dL    Albumin 4.2 3.4 - 5.0 g/dL    Alkaline Phosphatase 88 33 - 136 U/L    Total Protein 7.3 6.4 - 8.2 g/dL    AST 31 9 - 39 U/L    Bilirubin, Total 0.5 0.0 - 1.2 mg/dL    ALT 23 10 - 52 U/L   CBC and Auto Differential   Result Value Ref Range    WBC 5.7 4.4 - 11.3 x10*3/uL    nRBC 0.0 0.0 - 0.0 /100 WBCs    RBC 5.34 4.50 - 5.90 x10*6/uL    Hemoglobin 15.5 13.5 - 17.5 g/dL    Hematocrit 46.9 41.0 - 52.0 %    MCV 88 80 - 100 fL    MCH 29.0 26.0 - 34.0 pg    MCHC 33.0 32.0 - 36.0 g/dL    RDW 12.6 11.5 - 14.5 %    Platelets 205 150 - 450 x10*3/uL    Neutrophils % 73.7 40.0 - 80.0 %    Immature Granulocytes %, Automated 0.4 0.0 - 0.9 %    Lymphocytes % 12.6 13.0 - 44.0 %    Monocytes % 11.0 2.0 - 10.0 %    Eosinophils % 1.9 0.0 - 6.0 %    Basophils % 0.4 0.0 - 2.0 %    Neutrophils Absolute 4.17 1.20 - 7.70 x10*3/uL    Immature Granulocytes Absolute, Automated 0.02 0.00 - 0.70 x10*3/uL    Lymphocytes Absolute 0.71 (L) 1.20 - 4.80 x10*3/uL    Monocytes Absolute 0.62 0.10 - 1.00 x10*3/uL    Eosinophils Absolute 0.11 0.00 - 0.70 x10*3/uL    Basophils Absolute 0.02 0.00 - 0.10 x10*3/uL   D-dimer, VTE Exclusion   Result Value Ref Range    D-Dimer, Quantitative VTE Exclusion 1,725 (H) <=500 ng/mL FEU     *Note: Due to a  large number of results and/or encounters for the requested time period, some results have not been displayed. A complete set of results can be found in Results Review.     CT chest wo IV contrast    Result Date: 3/15/2025  STUDY: CT Chest without IV Contrast; 3/15/2025 10:06 INDICATION: Evaluate for pneumonia. COMPARISON: 3/15/2025 XR Chest, 9/30/2024 CTA Chest, 9/3/2020 CT Chest. ACCESSION NUMBER(S): [id number] ORDERING CLINICIAN: LORI HADDAD TECHNIQUE:  CT of the chest was performed without contrast.  Automated mA/kV exposure control was utilized and patient examination was performed in strict accordance with principles of ALARA. FINDINGS: MEDIASTINUM: The heart is normal in size without pericardial effusion.  Coronary artery calcifications are identified.  LUNGS/PLEURA: There is elevation of left hemidiaphragm. There is an infiltrate in the left lung base most consistent with atelectasis. LYMPH NODES: There are prominent mediastinal lymph nodes. UPPER ABDOMEN: Upper abdomen demonstrates no acute pathology. BONES: There are no acute fractures.  No suspicious bony lesions.    No evidence consolidating infiltrate or effusion. Elevated left hemidiaphragm with left basilar subsegmental atelectasis. Signed by Noé Ogden MD    XR chest 2 views    Result Date: 3/15/2025  STUDY: Chest Radiographs;  3/15/2025 9:20AM INDICATION: Cough. COMPARISON: XR Chest: 9/30/2024, 4/3/2024 CT Chest: 9/3/2020 ACCESSION NUMBER(S): [id number] ORDERING CLINICIAN: LORI HADDAD TECHNIQUE:  Frontal and lateral chest. FINDINGS: CARDIOMEDIASTINAL SILHOUETTE: Cardiomediastinal silhouette is normal in size and configuration.  LUNGS: There are linear infiltrates in the left lung base.  These were seen as September 2024 unchanged.  ABDOMEN: No remarkable upper abdominal findings.  BONES: No acute osseous changes.    Chronic changes to the left lung base. No evidence of acute infiltrate or effusion. Signed by Noé Ogden MD    CT  abdomen pelvis w IV and oral contrast    Result Date: 3/8/2025  Interpreted By:  Sean Jeong, STUDY: CT ABDOMEN PELVIS W IV AND ORAL CONTRAST;  3/7/2025 10:45 am   INDICATION: Signs/Symptoms:CT ABD PEL W ORAL AND IV.   ,R10.9 Unspecified abdominal pain   COMPARISON: CT abdomen/pelvis dated 09/29/2022.   ACCESSION NUMBER(S): [id number]   ORDERING CLINICIAN: MOHIT MENJIVAR   TECHNIQUE: CT of the abdomen and pelvis was performed.  Standard contiguous axial images were obtained at 3 mm slice thickness through the abdomen and pelvis. Coronal and sagittal reconstructions at 3 mm slice thickness were performed.  75 ML of Omnipaque 350 was administered intravenously without immediate complication.   FINDINGS: LOWER CHEST: The partially visualized lower lungs are unremarkable. The heart is normal in size without significant pericardial effusion. The distal esophagus is unremarkable.   ABDOMEN:   LIVER: The liver is normal in size without evidence of focal liver lesions.   BILE DUCTS: The intrahepatic and extrahepatic ducts are not dilated.   GALLBLADDER: The gallbladder is nondistended and without evidence of radiopaque stones.   PANCREAS: The pancreas appears unremarkable without evidence of ductal dilatation or masses.   SPLEEN: The spleen is normal in size without focal lesions.   ADRENAL GLANDS: Bilateral adrenal glands appear normal.   KIDNEYS AND URETERS: The kidneys are normal in size and enhance symmetrically.  No hydroureteronephrosis or nephroureterolithiasis is identified.   PELVIS:   BLADDER: The urinary bladder appears normal without abnormal wall thickening.   REPRODUCTIVE ORGANS: The prostate is not enlarged. Postoperative changes of UroLift are suggested.   BOWEL: The stomach is unremarkable.   There is scattered colonic diverticulosis without evidence of diverticulitis. Otherwise, the small and large bowel are normal in caliber without evidence of inflammatory wall thickening. The appendix is not  definitely visualized. There is however no pericecal stranding or fluid.   VESSELS: There is no aneurysmal dilatation of the abdominal aorta. The IVC appears normal. Mild atherosclerotic calcifications of the abdominal aorta and its branching vessels are noted.   PERITONEUM/RETROPERITONEUM/LYMPH NODES: There is no free or loculated fluid collection, no free intraperitoneal air. The retroperitoneum appears normal.  No abdominopelvic lymphadenopathy by CT size criteria.   BONES AND ABDOMINAL WALL: Left total hip arthroplasty is noted without evidence of hardware complication. Posterior spinal fusion hardware of L2-S1 with intervertebral disc spacer at L4-L5 again noted. No acute osseous abnormalities or suspicious osseous lesions. Multilevel discogenic degenerative changes are again noted throughout the lower thoracic and lumbar spine with scattered intervertebral disc space narrowing and vertebral body osteophytosis. Unchanged appearance of a moderate-sized fat containing umbilical hernia and small fat containing right inguinal hernia.       1.  No acute process within the abdomen or pelvis. 2. Unchanged appearance of a moderate-sized fat containing umbilical hernia and small fat containing right inguinal hernia. 3. Colonic diverticulosis without evidence of diverticulitis.   MACRO: None   Signed by: Sean Jeong 3/8/2025 9:30 AM Dictation workstation:   [id number]        Assessment/Plan   Assessment & Plan  Influenza A    COPD exacerbation (Multi)    Tobacco use      #Influenza A  #COPD, in acute exacerbation  #Tobacco use  - Presented to ED with 5 days of cough, congestion, shortness of breath, poor appetite, diarrhea, fevers, chills  - Flu A positive  - CT chest: No evidence consolidating infiltrate or effusion. Elevated left hemidiaphragm with left basilar subsegmental atelectasis.   - WBC 5.7   - D-dimer 1725, CTA chest to rule out PE pending   - Procal pending  - Sputum culture if able   - Start  azithromycin (day 1)   - Solumedrol 40 mg IVP q8h   - DuoNebs TID  - Tessalon 100 mg TID   - Lidocaine patch for pleuritic chest pain  - Tamiflu 75 mg BID   - Robitussin AC q6h PRN for cough   - Tylenol PRN for fever   - RT eval and treat protocol  - Bronchial hygiene  - Isolation protocol for flu A  - Patient declines nicotine patch at this time; encourage cessation on discharge   - Monitor SpO2 continuously   - Remains on room air   - Ambulatory pulse ox before discharge     Home meds pending review        DVT PPx: Lovenox   GI PPx: Protonix   Diet: Regular   Code Status: Full     Disposition: Patient requires inpatient management at this time.       Susi Mejia PA-C  Attending Attestation:    Patient was seen and examined face to face, history and physical was taken personally at bedside the REBEKAH-CNP, was present for the whole duration of the exam who participated in the documentation of this note. I performed the medical decision-making components (assessment and plan of care). I have reviewed the documentation and verified the findings in the note as written with additions or exceptions as stated in the body of this note.  Is current daily smoker, even though cut down on smoking significantly, has been having some upper respiratory infection cough, fever chills for the last for 5 days, his cough is likely paroxysmal and sometimes he get dizzy and lightheadedness with cough more severe when he moves around or take a deep breath.  He was found to be 92% on room air he was placed on oxygen he did have CT angio was negative for PE or any significant infiltrate but he was positive for flu.  He was started on appropriate medication he was seen this morning he is still continue to cough, no chest pain, he is short of breath and having conversational dyspnea.  On exam he has diffuse rhonchi, some wheezes.  Heart is regular, abdomen soft bowel sounds are present, extremities no edema pedal pulses are present, no  focal neurological deficit.  Patient with hypoxic respiratory failure secondary to COPD exacerbation, influenza A, will continue with bronchodilators, Tamiflu, IV steroid and IV antibiotics.  Will check for proptosis.  Patient had negative COVID and RSV    Dr. Deven Steinberg MD  Internal Medicine

## 2025-03-15 NOTE — CARE PLAN
The patient's goals for the shift include      The clinical goals for the shift include Pt will tolerate IV antibiotics and solumdrol with no adverse effects.      Problem: Pain  Goal: Takes deep breaths with improved pain control throughout the shift  Outcome: Progressing  Goal: Turns in bed with improved pain control throughout the shift  Outcome: Progressing  Goal: Walks with improved pain control throughout the shift  Outcome: Progressing  Goal: Performs ADL's with improved pain control throughout shift  Outcome: Progressing  Goal: Participates in PT with improved pain control throughout the shift  Outcome: Progressing  Goal: Free from opioid side effects throughout the shift  Outcome: Progressing  Goal: Free from acute confusion related to pain meds throughout the shift  Outcome: Progressing     Over the shift, patient tolerated his iv antibiotics and steroids with no adverse effects. Pt got cough syrup with codeine and a lidocaine patch to his chest for muscle strain from coughing. VSS, patient gets GARCIAS which he has been x 5days with his cough.

## 2025-03-16 VITALS
WEIGHT: 227.07 LBS | HEIGHT: 74 IN | DIASTOLIC BLOOD PRESSURE: 74 MMHG | TEMPERATURE: 97.9 F | SYSTOLIC BLOOD PRESSURE: 108 MMHG | OXYGEN SATURATION: 90 % | HEART RATE: 68 BPM | BODY MASS INDEX: 29.14 KG/M2 | RESPIRATION RATE: 17 BRPM

## 2025-03-16 PROBLEM — J96.01 ACUTE RESPIRATORY FAILURE WITH HYPOXIA: Status: ACTIVE | Noted: 2025-03-16

## 2025-03-16 PROBLEM — I10 BENIGN ESSENTIAL HTN: Status: ACTIVE | Noted: 2025-03-16

## 2025-03-16 PROBLEM — K44.9 HIATAL HERNIA: Status: ACTIVE | Noted: 2025-03-16

## 2025-03-16 PROBLEM — F51.01 PRIMARY INSOMNIA: Status: ACTIVE | Noted: 2025-03-16

## 2025-03-16 LAB
ANION GAP SERPL CALC-SCNC: 11 MMOL/L (ref 10–20)
BUN SERPL-MCNC: 17 MG/DL (ref 6–23)
CALCIUM SERPL-MCNC: 8.5 MG/DL (ref 8.6–10.3)
CHLORIDE SERPL-SCNC: 107 MMOL/L (ref 98–107)
CO2 SERPL-SCNC: 24 MMOL/L (ref 21–32)
CREAT SERPL-MCNC: 0.77 MG/DL (ref 0.5–1.3)
EGFRCR SERPLBLD CKD-EPI 2021: >90 ML/MIN/1.73M*2
ERYTHROCYTE [DISTWIDTH] IN BLOOD BY AUTOMATED COUNT: 12 % (ref 11.5–14.5)
GLUCOSE SERPL-MCNC: 171 MG/DL (ref 74–99)
HCT VFR BLD AUTO: 42.6 % (ref 41–52)
HGB BLD-MCNC: 13.7 G/DL (ref 13.5–17.5)
MCH RBC QN AUTO: 28.9 PG (ref 26–34)
MCHC RBC AUTO-ENTMCNC: 32.2 G/DL (ref 32–36)
MCV RBC AUTO: 90 FL (ref 80–100)
NRBC BLD-RTO: 0 /100 WBCS (ref 0–0)
PLATELET # BLD AUTO: 205 X10*3/UL (ref 150–450)
POTASSIUM SERPL-SCNC: 4 MMOL/L (ref 3.5–5.3)
PROCALCITONIN SERPL-MCNC: 0.05 NG/ML
RBC # BLD AUTO: 4.74 X10*6/UL (ref 4.5–5.9)
SODIUM SERPL-SCNC: 138 MMOL/L (ref 136–145)
WBC # BLD AUTO: 3.3 X10*3/UL (ref 4.4–11.3)

## 2025-03-16 PROCEDURE — 9420000001 HC RT PATIENT EDUCATION 5 MIN: Mod: IPSPLIT

## 2025-03-16 PROCEDURE — 2500000005 HC RX 250 GENERAL PHARMACY W/O HCPCS: Mod: IPSPLIT

## 2025-03-16 PROCEDURE — 80048 BASIC METABOLIC PNL TOTAL CA: CPT

## 2025-03-16 PROCEDURE — 2500000002 HC RX 250 W HCPCS SELF ADMINISTERED DRUGS (ALT 637 FOR MEDICARE OP, ALT 636 FOR OP/ED)

## 2025-03-16 PROCEDURE — 2500000002 HC RX 250 W HCPCS SELF ADMINISTERED DRUGS (ALT 637 FOR MEDICARE OP, ALT 636 FOR OP/ED): Mod: IPSPLIT | Performed by: NURSE PRACTITIONER

## 2025-03-16 PROCEDURE — 94667 MNPJ CHEST WALL 1ST: CPT | Mod: IPSPLIT

## 2025-03-16 PROCEDURE — 1200000002 HC GENERAL ROOM WITH TELEMETRY DAILY: Mod: IPSPLIT

## 2025-03-16 PROCEDURE — 85027 COMPLETE CBC AUTOMATED: CPT

## 2025-03-16 PROCEDURE — 2500000004 HC RX 250 GENERAL PHARMACY W/ HCPCS (ALT 636 FOR OP/ED): Mod: IPSPLIT

## 2025-03-16 PROCEDURE — 99232 SBSQ HOSP IP/OBS MODERATE 35: CPT | Performed by: NURSE PRACTITIONER

## 2025-03-16 PROCEDURE — 36415 COLL VENOUS BLD VENIPUNCTURE: CPT

## 2025-03-16 PROCEDURE — 2500000001 HC RX 250 WO HCPCS SELF ADMINISTERED DRUGS (ALT 637 FOR MEDICARE OP): Performed by: NURSE PRACTITIONER

## 2025-03-16 PROCEDURE — 96376 TX/PRO/DX INJ SAME DRUG ADON: CPT

## 2025-03-16 PROCEDURE — 2500000001 HC RX 250 WO HCPCS SELF ADMINISTERED DRUGS (ALT 637 FOR MEDICARE OP)

## 2025-03-16 PROCEDURE — 84145 PROCALCITONIN (PCT): CPT | Mod: GENLAB

## 2025-03-16 PROCEDURE — 94760 N-INVAS EAR/PLS OXIMETRY 1: CPT | Mod: IPSPLIT

## 2025-03-16 PROCEDURE — 87798 DETECT AGENT NOS DNA AMP: CPT | Mod: GENLAB | Performed by: NURSE PRACTITIONER

## 2025-03-16 PROCEDURE — 2500000002 HC RX 250 W HCPCS SELF ADMINISTERED DRUGS (ALT 637 FOR MEDICARE OP, ALT 636 FOR OP/ED): Mod: IPSPLIT

## 2025-03-16 PROCEDURE — 94668 MNPJ CHEST WALL SBSQ: CPT | Mod: IPSPLIT

## 2025-03-16 PROCEDURE — 2500000005 HC RX 250 GENERAL PHARMACY W/O HCPCS: Mod: IPSPLIT | Performed by: INTERNAL MEDICINE

## 2025-03-16 PROCEDURE — 94640 AIRWAY INHALATION TREATMENT: CPT | Mod: MUE

## 2025-03-16 RX ORDER — AZITHROMYCIN 250 MG/1
500 TABLET, FILM COATED ORAL
Status: DISCONTINUED | OUTPATIENT
Start: 2025-03-17 | End: 2025-03-16

## 2025-03-16 RX ORDER — AZITHROMYCIN 250 MG/1
500 TABLET, FILM COATED ORAL
Status: COMPLETED | OUTPATIENT
Start: 2025-03-16 | End: 2025-03-18

## 2025-03-16 RX ORDER — BENZONATATE 100 MG/1
200 CAPSULE ORAL 3 TIMES DAILY
Status: DISCONTINUED | OUTPATIENT
Start: 2025-03-16 | End: 2025-03-25

## 2025-03-16 RX ORDER — ALBUTEROL SULFATE 0.83 MG/ML
2.5 SOLUTION RESPIRATORY (INHALATION) EVERY 2 HOUR PRN
Status: DISCONTINUED | OUTPATIENT
Start: 2025-03-16 | End: 2025-03-29 | Stop reason: HOSPADM

## 2025-03-16 RX ORDER — TRAZODONE HYDROCHLORIDE 50 MG/1
50 TABLET ORAL NIGHTLY
Status: DISCONTINUED | OUTPATIENT
Start: 2025-03-16 | End: 2025-03-29 | Stop reason: HOSPADM

## 2025-03-16 RX ORDER — ALBUTEROL SULFATE 0.83 MG/ML
SOLUTION RESPIRATORY (INHALATION)
Status: COMPLETED
Start: 2025-03-16 | End: 2025-03-16

## 2025-03-16 RX ADMIN — Medication 2 L/MIN: at 16:01

## 2025-03-16 RX ADMIN — BENZONATATE 200 MG: 100 CAPSULE ORAL at 14:01

## 2025-03-16 RX ADMIN — LOSARTAN POTASSIUM 100 MG: 50 TABLET, FILM COATED ORAL at 08:32

## 2025-03-16 RX ADMIN — PANTOPRAZOLE SODIUM 40 MG: 40 TABLET, DELAYED RELEASE ORAL at 06:05

## 2025-03-16 RX ADMIN — IPRATROPIUM BROMIDE AND ALBUTEROL SULFATE 3 ML: .5; 3 SOLUTION RESPIRATORY (INHALATION) at 15:59

## 2025-03-16 RX ADMIN — AZITHROMYCIN DIHYDRATE 500 MG: 250 TABLET, FILM COATED ORAL at 16:44

## 2025-03-16 RX ADMIN — PRIMIDONE 50 MG: 50 TABLET ORAL at 21:03

## 2025-03-16 RX ADMIN — OSELTAMAVIR PHOSPHATE 75 MG: 75 CAPSULE ORAL at 08:32

## 2025-03-16 RX ADMIN — BENZONATATE 200 MG: 100 CAPSULE ORAL at 08:32

## 2025-03-16 RX ADMIN — TRAZODONE HYDROCHLORIDE 50 MG: 50 TABLET ORAL at 21:02

## 2025-03-16 RX ADMIN — IPRATROPIUM BROMIDE AND ALBUTEROL SULFATE 3 ML: .5; 3 SOLUTION RESPIRATORY (INHALATION) at 11:24

## 2025-03-16 RX ADMIN — GUAIFENESIN AND CODEINE PHOSPHATE 5 ML: 100; 10 SOLUTION ORAL at 08:33

## 2025-03-16 RX ADMIN — BENZONATATE 200 MG: 100 CAPSULE ORAL at 21:01

## 2025-03-16 RX ADMIN — METHYLPREDNISOLONE SODIUM SUCCINATE 40 MG: 40 INJECTION, POWDER, FOR SOLUTION INTRAMUSCULAR; INTRAVENOUS at 05:24

## 2025-03-16 RX ADMIN — ALBUTEROL SULFATE 2.5 MG: 2.5 SOLUTION RESPIRATORY (INHALATION) at 02:23

## 2025-03-16 RX ADMIN — GUAIFENESIN AND CODEINE PHOSPHATE 5 ML: 100; 10 SOLUTION ORAL at 13:55

## 2025-03-16 RX ADMIN — GUAIFENESIN AND CODEINE PHOSPHATE 5 ML: 100; 10 SOLUTION ORAL at 21:00

## 2025-03-16 RX ADMIN — ENOXAPARIN SODIUM 40 MG: 40 INJECTION SUBCUTANEOUS at 12:22

## 2025-03-16 RX ADMIN — IPRATROPIUM BROMIDE AND ALBUTEROL SULFATE 3 ML: .5; 3 SOLUTION RESPIRATORY (INHALATION) at 21:23

## 2025-03-16 RX ADMIN — METHYLPREDNISOLONE SODIUM SUCCINATE 40 MG: 40 INJECTION, POWDER, FOR SOLUTION INTRAMUSCULAR; INTRAVENOUS at 21:02

## 2025-03-16 RX ADMIN — LIDOCAINE 1 PATCH: 560 PATCH PERCUTANEOUS; TOPICAL; TRANSDERMAL at 08:32

## 2025-03-16 RX ADMIN — METHYLPREDNISOLONE SODIUM SUCCINATE 40 MG: 40 INJECTION, POWDER, FOR SOLUTION INTRAMUSCULAR; INTRAVENOUS at 12:22

## 2025-03-16 RX ADMIN — Medication 2 L/MIN: at 21:23

## 2025-03-16 RX ADMIN — FOLIC ACID 1 MG: 1 TABLET ORAL at 08:32

## 2025-03-16 RX ADMIN — BACLOFEN 20 MG: 10 TABLET ORAL at 08:33

## 2025-03-16 RX ADMIN — OSELTAMAVIR PHOSPHATE 75 MG: 75 CAPSULE ORAL at 21:02

## 2025-03-16 RX ADMIN — AZITHROMYCIN MONOHYDRATE 500 MG: 500 INJECTION, POWDER, LYOPHILIZED, FOR SOLUTION INTRAVENOUS at 12:22

## 2025-03-16 RX ADMIN — ACETAMINOPHEN 650 MG: 325 TABLET, FILM COATED ORAL at 11:23

## 2025-03-16 RX ADMIN — GUAIFENESIN AND CODEINE PHOSPHATE 5 ML: 100; 10 SOLUTION ORAL at 02:19

## 2025-03-16 RX ADMIN — Medication 6 MG: at 21:01

## 2025-03-16 ASSESSMENT — COGNITIVE AND FUNCTIONAL STATUS - GENERAL
HELP NEEDED FOR BATHING: A LITTLE
MOBILITY SCORE: 21
WALKING IN HOSPITAL ROOM: A LITTLE
DAILY ACTIVITIY SCORE: 22
DRESSING REGULAR UPPER BODY CLOTHING: A LITTLE
STANDING UP FROM CHAIR USING ARMS: A LITTLE
CLIMB 3 TO 5 STEPS WITH RAILING: A LITTLE

## 2025-03-16 ASSESSMENT — PAIN SCALES - GENERAL
PAINLEVEL_OUTOF10: 0 - NO PAIN
PAINLEVEL_OUTOF10: 3
PAINLEVEL_OUTOF10: 3

## 2025-03-16 ASSESSMENT — PAIN DESCRIPTION - LOCATION: LOCATION: HEAD

## 2025-03-16 NOTE — PROGRESS NOTES
Julio Carranza is a 64 y.o. male on day 0 of admission presenting with Influenza A.      Subjective   Patient assessed at bedside; lying in bed. He complained he can't sleep due to coughing. His chest hurts due to coughing. He denied fever, chills, N/V/D/C. He is on oxygen 2lpm via [place]       Objective     Last Recorded Vitals  /79 (BP Location: Right arm, Patient Position: Lying)   Pulse 66   Temp 36.3 °C (97.3 °F) (Temporal)   Resp 18   Wt 103 kg (227 lb 1.2 oz)   SpO2 92%   Intake/Output last 3 Shifts:    Intake/Output Summary (Last 24 hours) at 3/16/2025 1134  Last data filed at 3/15/2025 1639  Gross per 24 hour   Intake 250 ml   Output --   Net 250 ml       Admission Weight  Weight: 104 kg (230 lb) (03/15/25 0855)    Daily Weight  03/15/25 : 103 kg (227 lb 1.2 oz)    Image Results      Physical Exam  Vitals reviewed.   Constitutional:       Appearance: Normal appearance. He is obese.   HENT:      Head: Normocephalic and atraumatic.      Right Ear: External ear normal.      Left Ear: External ear normal.      Nose: Nose normal.      Mouth/Throat:      Mouth: Mucous membranes are moist.      Pharynx: Oropharynx is clear.   Eyes:      Conjunctiva/sclera: Conjunctivae normal.      Pupils: Pupils are equal, round, and reactive to light.   Cardiovascular:      Rate and Rhythm: Normal rate and regular rhythm.      Pulses: Normal pulses.      Heart sounds: Normal heart sounds.   Pulmonary:      Effort: Pulmonary effort is normal.      Breath sounds: Wheezing and rhonchi present.   Abdominal:      General: Bowel sounds are normal.      Palpations: Abdomen is soft.   Musculoskeletal:         General: Normal range of motion.      Cervical back: Normal range of motion and neck supple.   Skin:     General: Skin is warm and dry.   Neurological:      General: No focal deficit present.      Mental Status: He is alert and oriented to person, place, and time.   Psychiatric:         Mood and Affect: Mood normal.          Behavior: Behavior normal.         Relevant Results    Scheduled medications  azithromycin, 500 mg, intravenous, q24h  benzonatate, 200 mg, oral, TID  enoxaparin, 40 mg, subcutaneous, q24h  influenza, 0.5 mL, intramuscular, During hospitalization  folic acid, 1 mg, oral, Daily  ipratropium-albuteroL, 3 mL, nebulization, TID  lidocaine, 1 patch, transdermal, Daily  losartan, 100 mg, oral, Daily  methylPREDNISolone sodium succinate (PF), 40 mg, intravenous, q8h  oseltamivir, 75 mg, oral, BID  oxygen, , inhalation, Continuous - Inhalation  pantoprazole, 40 mg, oral, Daily before breakfast   Or  pantoprazole, 40 mg, intravenous, Daily before breakfast  polyethylene glycol, 17 g, oral, Daily  primidone, 50 mg, oral, Nightly      Continuous medications     PRN medications  PRN medications: acetaminophen **OR** [DISCONTINUED] acetaminophen **OR** [DISCONTINUED] acetaminophen, acetaminophen **OR** [DISCONTINUED] acetaminophen **OR** [DISCONTINUED] acetaminophen, albuterol, albuterol, baclofen, benzocaine-menthol, codeine-guaifenesin, melatonin, ondansetron ODT **OR** ondansetron    Results for orders placed or performed during the hospital encounter of 03/15/25 (from the past 24 hours)   D-dimer, VTE Exclusion   Result Value Ref Range    D-Dimer, Quantitative VTE Exclusion 1,725 (H) <=500 ng/mL FEU   CBC   Result Value Ref Range    WBC 3.3 (L) 4.4 - 11.3 x10*3/uL    nRBC 0.0 0.0 - 0.0 /100 WBCs    RBC 4.74 4.50 - 5.90 x10*6/uL    Hemoglobin 13.7 13.5 - 17.5 g/dL    Hematocrit 42.6 41.0 - 52.0 %    MCV 90 80 - 100 fL    MCH 28.9 26.0 - 34.0 pg    MCHC 32.2 32.0 - 36.0 g/dL    RDW 12.0 11.5 - 14.5 %    Platelets 205 150 - 450 x10*3/uL   Basic metabolic panel   Result Value Ref Range    Glucose 171 (H) 74 - 99 mg/dL    Sodium 138 136 - 145 mmol/L    Potassium 4.0 3.5 - 5.3 mmol/L    Chloride 107 98 - 107 mmol/L    Bicarbonate 24 21 - 32 mmol/L    Anion Gap 11 10 - 20 mmol/L    Urea Nitrogen 17 6 - 23 mg/dL    Creatinine 0.77  0.50 - 1.30 mg/dL    eGFR >90 >60 mL/min/1.73m*2    Calcium 8.5 (L) 8.6 - 10.3 mg/dL     *Note: Due to a large number of results and/or encounters for the requested time period, some results have not been displayed. A complete set of results can be found in Results Review.                   Assessment/Plan      Assessment & Plan  Influenza A    COPD exacerbation (Multi)    Tobacco use    Primary insomnia    Hiatal hernia    Benign essential HTN      Influenza A  COPD, in acute exacerbation  Tobacco use  - Presented to ED with 5 days of cough, congestion, shortness of breath, poor appetite, diarrhea, fevers, chills  - Flu A positive  - CT chest: No evidence consolidating infiltrate or effusion. Elevated left hemidiaphragm with left basilar subsegmental atelectasis.   - WBC 5.7   - D-dimer 1725  - CTA chest: No acute pulmonary embolism, Unchanged bronchitis in both lower lobes and the middle lobe and the   lingula   - Procal pending  - Sputum culture if able   - continue azithromycin 500 mg daily (day 2)   - continue Solumedrol 40 mg IVP q8h   - continue DuoNebs TID  - increased Tessalon to 200 mg TID   - continue Lidocaine 4%  patch for pleuritic chest pain  - continue Tamiflu 75 mg BID; day 2/5  - continue Robitussin AC  mg q6h PRN for cough   - continue acetaminophen 650 mg q4h PRN for fever   - RT eval and treat protocol  - Bronchial hygiene  - Isolation protocol for flu A  - Patient declines nicotine patch at this time; encourage cessation on discharge   - SpO2 85% on RA during the night  - supplemental oxygen to keep SpO2 > 90%  - currently on 2lpm via [place]  - No oxygen at home  - Ambulatory pulse ox before discharge   - pertussis pcr pending     Essential HTN  - continue losartan 100 mg daily  - monitor BP    Insomnia  - continue primidone 50 mg hs  - started trazodone 50 mg hs    Hiatal hernia  Chronic GERD  - continue pantoprazole 40 mg daily     DVT PPx:   - continue enoxaparin 40 mg daily    Code  Status: Full      Disposition: Patient requires inpatient management at this time.           Mallorie Medina, APRN-CNP

## 2025-03-16 NOTE — CARE PLAN
The patient's goals for the shift include      The clinical goals for the shift include Will tolerate IV ABX, solumedrol. Maintain safety.

## 2025-03-16 NOTE — CONSULTS
Reason For Consult  COPD Navigator      This RT to see patient for COPD consult. The patient was given a COPD booklet with educational materials regarding pulmonary issues. Smoking cessation education reviewed, documentation given. Patient has smoked for 47 years. Patient currentlysmokes 0.25 ppd. Patient smoked 2.5 ppd at the peak of smoking. Smoking cessation information given.  [facility] support group discussed. Flyer given for next month's meeting. Patient diagnosed with COPD 8 years ago. I educated patient about the disease process and how it affected the lungs making it difficult to breathe. We discussed current medications and if their current medications give them relief. PFTs were done ten years ago. Patient had a 6mwt six or seven years ago. Patient saw Dr. Dugan years ago.  Primary Dr. BONNIE Coronel. Next apt is Tuesday March 18th. Patient does not have Oxygen at home, no cpap or bipap either. Patient given  pulmonary office phone number to make an appt. Patient was very receptive to all information given.      Spacer- The patient was given an aerochamber (spacer) to be administered with a meter dose inhaler at home. I instructed the patient on how to use the spacer with the proper technique to get the best results. In return, the patient stated a good understanding of how it is utilized. I also discussed the names, reasons, and side effects of respiratory medications that are prescribed at home, including dose and frequency.   Flutter valve- The patient was given a flutter valve to help facilitate the removal of mucous from the airways. I instructed the patient on how to use the flutter with the proper technique to get the best results. In return, the patient demonstrated flutter training appropriately with a good understanding on how it is utilized.   Pulmonary rehab- Educated patient on pulmonary rehab program. The patient was given information regarding our pulmonary rehabilitation program.  Spoke to the patient about the benefits of this program which can offer coping skills and exercise strengthening sessions providing a better quality of life. Patient is receptive to joining the program. Pulmonary rehab referral placed.       Kym Iglesias, RRT

## 2025-03-16 NOTE — CARE PLAN
The patient's goals for the shift include      The clinical goals for the shift include Will tolerate IV ABX, solumedrol. Maintain safety.    Alert and oriented.  Tolerating po intake well without c/o n/v.  HS medications given without difficulty.  Lungs diminished. Non productive cough at times.  Medicated with cough medication x1 and cough drop x1.  O2 placed on at 2320 for decrease in O2 sat while sleeping down to 86%.  Voiding without difficulty.  Tolerating IV medication without difficulty.  Safety maintained.

## 2025-03-17 PROBLEM — K59.00 CONSTIPATION: Status: ACTIVE | Noted: 2025-03-17

## 2025-03-17 LAB
ANION GAP SERPL CALC-SCNC: 14 MMOL/L (ref 10–20)
B PARAPERT DNA NPH QL NAA+PROBE: NORMAL
B PERT DNA NPH QL NAA+PROBE: NOT DETECTED
BNP SERPL-MCNC: 96 PG/ML (ref 0–99)
BUN SERPL-MCNC: 22 MG/DL (ref 6–23)
CALCIUM SERPL-MCNC: 8.4 MG/DL (ref 8.6–10.3)
CHLORIDE SERPL-SCNC: 108 MMOL/L (ref 98–107)
CO2 SERPL-SCNC: 24 MMOL/L (ref 21–32)
CREAT SERPL-MCNC: 0.85 MG/DL (ref 0.5–1.3)
EGFRCR SERPLBLD CKD-EPI 2021: >90 ML/MIN/1.73M*2
ERYTHROCYTE [DISTWIDTH] IN BLOOD BY AUTOMATED COUNT: 12.3 % (ref 11.5–14.5)
GLUCOSE SERPL-MCNC: 156 MG/DL (ref 74–99)
HCT VFR BLD AUTO: 40.7 % (ref 41–52)
HGB BLD-MCNC: 13.3 G/DL (ref 13.5–17.5)
MCH RBC QN AUTO: 28.9 PG (ref 26–34)
MCHC RBC AUTO-ENTMCNC: 32.7 G/DL (ref 32–36)
MCV RBC AUTO: 88 FL (ref 80–100)
NRBC BLD-RTO: 0 /100 WBCS (ref 0–0)
PLATELET # BLD AUTO: 206 X10*3/UL (ref 150–450)
POTASSIUM SERPL-SCNC: 3.9 MMOL/L (ref 3.5–5.3)
RBC # BLD AUTO: 4.61 X10*6/UL (ref 4.5–5.9)
SODIUM SERPL-SCNC: 142 MMOL/L (ref 136–145)
WBC # BLD AUTO: 7.5 X10*3/UL (ref 4.4–11.3)

## 2025-03-17 PROCEDURE — 85027 COMPLETE CBC AUTOMATED: CPT | Mod: IPSPLIT

## 2025-03-17 PROCEDURE — 2500000002 HC RX 250 W HCPCS SELF ADMINISTERED DRUGS (ALT 637 FOR MEDICARE OP, ALT 636 FOR OP/ED): Mod: IPSPLIT | Performed by: NURSE PRACTITIONER

## 2025-03-17 PROCEDURE — 97165 OT EVAL LOW COMPLEX 30 MIN: CPT | Mod: GO,IPSPLIT

## 2025-03-17 PROCEDURE — 2500000001 HC RX 250 WO HCPCS SELF ADMINISTERED DRUGS (ALT 637 FOR MEDICARE OP): Mod: IPSPLIT

## 2025-03-17 PROCEDURE — 2500000001 HC RX 250 WO HCPCS SELF ADMINISTERED DRUGS (ALT 637 FOR MEDICARE OP): Mod: IPSPLIT | Performed by: NURSE PRACTITIONER

## 2025-03-17 PROCEDURE — 36415 COLL VENOUS BLD VENIPUNCTURE: CPT | Mod: IPSPLIT

## 2025-03-17 PROCEDURE — 94760 N-INVAS EAR/PLS OXIMETRY 1: CPT | Mod: IPSPLIT

## 2025-03-17 PROCEDURE — 97161 PT EVAL LOW COMPLEX 20 MIN: CPT | Mod: GP,IPSPLIT | Performed by: PHYSICAL THERAPIST

## 2025-03-17 PROCEDURE — 94640 AIRWAY INHALATION TREATMENT: CPT | Mod: IPSPLIT

## 2025-03-17 PROCEDURE — 80048 BASIC METABOLIC PNL TOTAL CA: CPT | Mod: IPSPLIT

## 2025-03-17 PROCEDURE — 83880 ASSAY OF NATRIURETIC PEPTIDE: CPT | Mod: IPSPLIT

## 2025-03-17 PROCEDURE — 2500000005 HC RX 250 GENERAL PHARMACY W/O HCPCS: Mod: IPSPLIT | Performed by: INTERNAL MEDICINE

## 2025-03-17 PROCEDURE — 94668 MNPJ CHEST WALL SBSQ: CPT | Mod: IPSPLIT

## 2025-03-17 PROCEDURE — 99233 SBSQ HOSP IP/OBS HIGH 50: CPT

## 2025-03-17 PROCEDURE — 2500000002 HC RX 250 W HCPCS SELF ADMINISTERED DRUGS (ALT 637 FOR MEDICARE OP, ALT 636 FOR OP/ED): Mod: IPSPLIT | Performed by: INTERNAL MEDICINE

## 2025-03-17 PROCEDURE — 2500000002 HC RX 250 W HCPCS SELF ADMINISTERED DRUGS (ALT 637 FOR MEDICARE OP, ALT 636 FOR OP/ED): Mod: IPSPLIT

## 2025-03-17 PROCEDURE — 2500000005 HC RX 250 GENERAL PHARMACY W/O HCPCS: Mod: IPSPLIT

## 2025-03-17 PROCEDURE — 1200000002 HC GENERAL ROOM WITH TELEMETRY DAILY: Mod: IPSPLIT

## 2025-03-17 PROCEDURE — 2500000004 HC RX 250 GENERAL PHARMACY W/ HCPCS (ALT 636 FOR OP/ED): Mod: JZ,IPSPLIT

## 2025-03-17 RX ORDER — DOCUSATE SODIUM 100 MG/1
100 CAPSULE, LIQUID FILLED ORAL 2 TIMES DAILY
Status: DISCONTINUED | OUTPATIENT
Start: 2025-03-17 | End: 2025-03-29 | Stop reason: HOSPADM

## 2025-03-17 RX ORDER — ACETAMINOPHEN 500 MG
500 TABLET ORAL
COMMUNITY

## 2025-03-17 RX ORDER — LANOLIN ALCOHOL/MO/W.PET/CERES
100 CREAM (GRAM) TOPICAL DAILY
Status: DISCONTINUED | OUTPATIENT
Start: 2025-03-17 | End: 2025-03-29 | Stop reason: HOSPADM

## 2025-03-17 RX ORDER — LANOLIN ALCOHOL/MO/W.PET/CERES
100 CREAM (GRAM) TOPICAL DAILY
COMMUNITY

## 2025-03-17 RX ORDER — PANTOPRAZOLE SODIUM 40 MG/1
40 TABLET, DELAYED RELEASE ORAL
Status: DISCONTINUED | OUTPATIENT
Start: 2025-03-17 | End: 2025-03-29 | Stop reason: HOSPADM

## 2025-03-17 RX ADMIN — AZITHROMYCIN DIHYDRATE 500 MG: 250 TABLET, FILM COATED ORAL at 09:52

## 2025-03-17 RX ADMIN — BENZONATATE 200 MG: 100 CAPSULE ORAL at 16:46

## 2025-03-17 RX ADMIN — OSELTAMAVIR PHOSPHATE 75 MG: 75 CAPSULE ORAL at 09:52

## 2025-03-17 RX ADMIN — ACETAMINOPHEN 650 MG: 325 TABLET, FILM COATED ORAL at 21:02

## 2025-03-17 RX ADMIN — FOLIC ACID 1 MG: 1 TABLET ORAL at 09:52

## 2025-03-17 RX ADMIN — GUAIFENESIN AND CODEINE PHOSPHATE 5 ML: 100; 10 SOLUTION ORAL at 20:56

## 2025-03-17 RX ADMIN — IPRATROPIUM BROMIDE AND ALBUTEROL SULFATE 3 ML: .5; 3 SOLUTION RESPIRATORY (INHALATION) at 10:24

## 2025-03-17 RX ADMIN — ALBUTEROL SULFATE 2.5 MG: 2.5 SOLUTION RESPIRATORY (INHALATION) at 05:45

## 2025-03-17 RX ADMIN — BENZONATATE 200 MG: 100 CAPSULE ORAL at 09:52

## 2025-03-17 RX ADMIN — THIAMINE HCL TAB 100 MG 100 MG: 100 TAB at 13:23

## 2025-03-17 RX ADMIN — LIDOCAINE 1 PATCH: 560 PATCH PERCUTANEOUS; TOPICAL; TRANSDERMAL at 09:52

## 2025-03-17 RX ADMIN — LOSARTAN POTASSIUM 100 MG: 50 TABLET, FILM COATED ORAL at 09:52

## 2025-03-17 RX ADMIN — DOCUSATE SODIUM 100 MG: 100 CAPSULE, LIQUID FILLED ORAL at 09:54

## 2025-03-17 RX ADMIN — DOCUSATE SODIUM 100 MG: 100 CAPSULE, LIQUID FILLED ORAL at 20:56

## 2025-03-17 RX ADMIN — BACLOFEN 20 MG: 10 TABLET ORAL at 20:56

## 2025-03-17 RX ADMIN — Medication 2 L/MIN: at 21:18

## 2025-03-17 RX ADMIN — IPRATROPIUM BROMIDE AND ALBUTEROL SULFATE 3 ML: .5; 3 SOLUTION RESPIRATORY (INHALATION) at 21:18

## 2025-03-17 RX ADMIN — PANTOPRAZOLE SODIUM 40 MG: 40 TABLET, DELAYED RELEASE ORAL at 16:46

## 2025-03-17 RX ADMIN — PRIMIDONE 50 MG: 50 TABLET ORAL at 20:56

## 2025-03-17 RX ADMIN — IPRATROPIUM BROMIDE AND ALBUTEROL SULFATE 3 ML: .5; 3 SOLUTION RESPIRATORY (INHALATION) at 17:07

## 2025-03-17 RX ADMIN — ALBUTEROL SULFATE 2.5 MG: 2.5 SOLUTION RESPIRATORY (INHALATION) at 12:55

## 2025-03-17 RX ADMIN — Medication 6 MG: at 21:02

## 2025-03-17 RX ADMIN — Medication 2 L/MIN: at 10:26

## 2025-03-17 RX ADMIN — ENOXAPARIN SODIUM 40 MG: 40 INJECTION SUBCUTANEOUS at 13:23

## 2025-03-17 RX ADMIN — TRAZODONE HYDROCHLORIDE 50 MG: 50 TABLET ORAL at 20:56

## 2025-03-17 RX ADMIN — PANTOPRAZOLE SODIUM 40 MG: 40 TABLET, DELAYED RELEASE ORAL at 06:23

## 2025-03-17 RX ADMIN — METHYLPREDNISOLONE SODIUM SUCCINATE 40 MG: 40 INJECTION, POWDER, FOR SOLUTION INTRAMUSCULAR; INTRAVENOUS at 16:46

## 2025-03-17 RX ADMIN — METHYLPREDNISOLONE SODIUM SUCCINATE 40 MG: 40 INJECTION, POWDER, FOR SOLUTION INTRAMUSCULAR; INTRAVENOUS at 05:32

## 2025-03-17 RX ADMIN — ACETAMINOPHEN 650 MG: 325 TABLET, FILM COATED ORAL at 09:52

## 2025-03-17 RX ADMIN — BENZONATATE 200 MG: 100 CAPSULE ORAL at 20:56

## 2025-03-17 RX ADMIN — GUAIFENESIN AND CODEINE PHOSPHATE 5 ML: 100; 10 SOLUTION ORAL at 05:40

## 2025-03-17 RX ADMIN — OSELTAMAVIR PHOSPHATE 75 MG: 75 CAPSULE ORAL at 20:56

## 2025-03-17 ASSESSMENT — COGNITIVE AND FUNCTIONAL STATUS - GENERAL
TOILETING: A LITTLE
STANDING UP FROM CHAIR USING ARMS: A LITTLE
STANDING UP FROM CHAIR USING ARMS: A LITTLE
CLIMB 3 TO 5 STEPS WITH RAILING: A LITTLE
PERSONAL GROOMING: A LITTLE
CLIMB 3 TO 5 STEPS WITH RAILING: A LITTLE
MOBILITY SCORE: 21
DAILY ACTIVITIY SCORE: 19
WALKING IN HOSPITAL ROOM: A LITTLE
WALKING IN HOSPITAL ROOM: A LITTLE
HELP NEEDED FOR BATHING: A LITTLE
DRESSING REGULAR LOWER BODY CLOTHING: A LITTLE
DRESSING REGULAR UPPER BODY CLOTHING: A LITTLE
MOVING TO AND FROM BED TO CHAIR: A LITTLE
MOBILITY SCORE: 20
DAILY ACTIVITIY SCORE: 22
HELP NEEDED FOR BATHING: A LITTLE
DRESSING REGULAR UPPER BODY CLOTHING: A LITTLE

## 2025-03-17 ASSESSMENT — PAIN DESCRIPTION - LOCATION: LOCATION: HEAD

## 2025-03-17 ASSESSMENT — ACTIVITIES OF DAILY LIVING (ADL)
ADL_ASSISTANCE: INDEPENDENT
BATHING_ASSISTANCE: STAND BY

## 2025-03-17 ASSESSMENT — PAIN SCALES - GENERAL
PAINLEVEL_OUTOF10: 4
PAINLEVEL_OUTOF10: 4
PAINLEVEL_OUTOF10: 9
PAINLEVEL_OUTOF10: 5 - MODERATE PAIN
PAINLEVEL_OUTOF10: 8

## 2025-03-17 ASSESSMENT — PAIN - FUNCTIONAL ASSESSMENT
PAIN_FUNCTIONAL_ASSESSMENT: 0-10

## 2025-03-17 NOTE — PROGRESS NOTES
03/17/25 1100   Discharge Planning   Home or Post Acute Services In home services   Type of Home Care Services Home nursing visits;Home OT;Home PT   Expected Discharge Disposition Home H  (PT recommending low intensity. Discussed HHC with the pt, he is agreeable. Preferenced  HHC (SN/PT/OT). Provider to place internal referral.)   Patient Choice   Provider Choice list and CMS website (https://medicare.gov/care-compare#search) for post-acute Quality and Resource Measure Data were provided and reviewed with: Patient

## 2025-03-17 NOTE — PROGRESS NOTES
Pharmacy Medication History Review    Julio Carranza is a 64 y.o. male admitted for Influenza A. Pharmacy reviewed the patient's dnuao-xm-qwcqnskol medications and allergies for accuracy.    Sources used to confirm medication list: Informant interview  Informant: Patient  Informant credibility: Good (Able to recall medication, indication, strength, frequency, and/or prescriber for >75% of medications).    Total number of adjustments: 6     Medications Added Medications Removed Medications Adjusted  Adjustments Made   Tylenol Fluocinonide Baclofen TID --> TID PRN   Thiamine  Omeprazole Once daily --> BID     Topiramate BID --> BID PRN     The list below reflectives the updated PTA list. Please review each medication in order reconciliation for additional clarification and justification.  Medications Prior to Admission   Medication Sig Dispense Refill Last Dose/Taking    acetaminophen (Tylenol) 500 mg tablet Take 1 tablet (500 mg) by mouth in the morning and at bedtime.       albuterol 90 mcg/actuation inhaler inhale 1 to 2 puffs by mouth every 4 to 6 hours as needed 18 g 5 Unknown    baclofen (Lioresal) 20 mg tablet Take 1 Tablet orally 3 times per day for muscle spasm. (Patient taking differently: Take 1 tablet (20 mg) by mouth 3 times a day as needed for muscle spasms.) 90 tablet 0     fluocinonide (Lidex) 0.05 % cream Apply topically 2 times a day as needed for irritation or rash. (Patient not taking: Reported on 1/17/2025) 15 g 0     fluticasone (Flonase) 50 mcg/actuation nasal spray Administer 2 sprays into each nostril 2 times a day. Shake gently. Before first use, prime pump. After use, clean tip and replace cap. (Patient taking differently: Administer 2 sprays into each nostril 2 times a day as needed. Shake gently. Before first use, prime pump. After use, clean tip and replace cap.) 16 g 12     folic acid (Folvite) 1 mg tablet Take 1 tablet (1 mg) by mouth once daily. 90 tablet 1     losartan (Cozaar) 100 mg  tablet Take 1 tablet (100 mg) by mouth once daily. 90 tablet 0     omeprazole (PriLOSEC) 40 mg DR capsule Take 1 capsule (40 mg) by mouth 2 times a day.       primidone (Mysoline) 50 mg tablet Take 1 tablet (50 mg) by mouth once daily at bedtime. 90 tablet 1     thiamine 100 mg tablet Take 1 tablet (100 mg) by mouth once daily.       topiramate (Topamax) 50 mg tablet Take 1 tablet (50 mg) by mouth 2 times a day. (Patient taking differently: Take 1 tablet (50 mg) by mouth 2 times a day as needed.)           The list below reflectives the updated allergy list. Please review each documented allergy for additional clarification and justification.  Allergies  Reviewed by Mar Patterson on 3/17/2025        Severity Reactions Comments    Etodolac Medium Palpitations     Penicillins Not Specified Unknown since a child    Escitalopram Oxalate Low Palpitations long qt    Pollen Extracts Low Other             Below are additional concerns with the patient's PTA list.  Spoke w/pt at bedside. Reviewed PTA and allergy list. Please review changes made to the PTA list in the chart above.    Mar Patterson CPhT  Medication History Pharmacy Technician  [facility] 8-4:30  Available via [employer] Secure Chat  OR  [phone]

## 2025-03-17 NOTE — PROGRESS NOTES
"Julio Carranza is a 64 y.o. male on day 1 of admission presenting with Influenza A.    Subjective     No overnight events reported.     Patient is on 2L O2 this morning. He reports that he had a bad coughing fit early this morning and had to get a breathing treatment from RT. He has a poor appetite but no nausea, vomiting, or diarrhea. He reports that he has not had a BM since before admission two days ago; Colace BID added today. Plan of care discussed with patient, all questions answered.         Objective     Physical Exam  Constitutional:       General: He is not in acute distress.     Appearance: He is not toxic-appearing.   HENT:      Head: Normocephalic and atraumatic.      Mouth/Throat:      Mouth: Mucous membranes are moist.   Eyes:      Conjunctiva/sclera: Conjunctivae normal.   Cardiovascular:      Rate and Rhythm: Normal rate and regular rhythm.   Pulmonary:      Effort: No respiratory distress.      Breath sounds: Wheezing and rales present.      Comments: On 2L O2 via NC with SpO2 90% at rest  Abdominal:      General: Bowel sounds are normal. There is no distension.      Palpations: Abdomen is soft.      Tenderness: There is no abdominal tenderness. There is no guarding.      Hernia: A hernia (umbilical) is present.   Musculoskeletal:      Right lower leg: No edema.      Left lower leg: No edema.   Skin:     General: Skin is warm and dry.   Neurological:      Mental Status: He is alert and oriented to person, place, and time.   Psychiatric:         Mood and Affect: Mood normal.         Behavior: Behavior normal.         Last Recorded Vitals  Blood pressure 113/69, pulse 75, temperature 36.8 °C (98.2 °F), temperature source Temporal, resp. rate 18, height 1.88 m (6' 2\"), weight 103 kg (227 lb 1.2 oz), SpO2 94%.  Intake/Output last 3 Shifts:  No intake/output data recorded.    Relevant Results          Scheduled medications  azithromycin, 500 mg, oral, q24h ANDREI  benzonatate, 200 mg, oral, TID  docusate " sodium, 100 mg, oral, BID  enoxaparin, 40 mg, subcutaneous, q24h  influenza, 0.5 mL, intramuscular, During hospitalization  folic acid, 1 mg, oral, Daily  ipratropium-albuteroL, 3 mL, nebulization, TID  lidocaine, 1 patch, transdermal, Daily  losartan, 100 mg, oral, Daily  methylPREDNISolone sodium succinate (PF), 40 mg, intravenous, q12h  oseltamivir, 75 mg, oral, BID  oxygen, , inhalation, Continuous - Inhalation  pantoprazole, 40 mg, oral, Daily before breakfast  polyethylene glycol, 17 g, oral, Daily  primidone, 50 mg, oral, Nightly  traZODone, 50 mg, oral, Nightly      Continuous medications     PRN medications  PRN medications: acetaminophen **OR** [DISCONTINUED] acetaminophen **OR** [DISCONTINUED] acetaminophen, acetaminophen **OR** [DISCONTINUED] acetaminophen **OR** [DISCONTINUED] acetaminophen, albuterol, albuterol, baclofen, benzocaine-menthol, codeine-guaifenesin, melatonin, ondansetron ODT **OR** ondansetron    Results for orders placed or performed during the hospital encounter of 03/15/25 (from the past 24 hours)   Basic Metabolic Panel   Result Value Ref Range    Glucose 156 (H) 74 - 99 mg/dL    Sodium 142 136 - 145 mmol/L    Potassium 3.9 3.5 - 5.3 mmol/L    Chloride 108 (H) 98 - 107 mmol/L    Bicarbonate 24 21 - 32 mmol/L    Anion Gap 14 10 - 20 mmol/L    Urea Nitrogen 22 6 - 23 mg/dL    Creatinine 0.85 0.50 - 1.30 mg/dL    eGFR >90 >60 mL/min/1.73m*2    Calcium 8.4 (L) 8.6 - 10.3 mg/dL   CBC   Result Value Ref Range    WBC 7.5 4.4 - 11.3 x10*3/uL    nRBC 0.0 0.0 - 0.0 /100 WBCs    RBC 4.61 4.50 - 5.90 x10*6/uL    Hemoglobin 13.3 (L) 13.5 - 17.5 g/dL    Hematocrit 40.7 (L) 41.0 - 52.0 %    MCV 88 80 - 100 fL    MCH 28.9 26.0 - 34.0 pg    MCHC 32.7 32.0 - 36.0 g/dL    RDW 12.3 11.5 - 14.5 %    Platelets 206 150 - 450 x10*3/uL   B-Type Natriuretic Peptide   Result Value Ref Range    BNP 96 0 - 99 pg/mL     *Note: Due to a large number of results and/or encounters for the requested time period, some  results have not been displayed. A complete set of results can be found in Results Review.     ECG 12 lead    Result Date: 3/17/2025  Normal sinus rhythm Normal ECG When compared with ECG of 30-SEP-2024 13:32, No significant change was found    CT angio chest for pulmonary embolism    Result Date: 3/15/2025  Interpreted By:  Adi Cedeño, STUDY: CT ANGIO CHEST FOR PULMONARY EMBOLISM;  3/15/2025 3:34 pm   INDICATION: Signs/Symptoms:hypoxia, elevated d-dimer.     COMPARISON: CT chest without contrast earlier same day 15 March 2025 at 1003 hours   ACCESSION NUMBER(S): [id number]   ORDERING CLINICIAN: GUSTAVO BATES   TECHNIQUE: Pulmonary arterial phase CT chest after the uneventful administration of 75 mL IV contrast (Omnipaque 350).   Three dimensional maximum intensity projection (3-D MIPs) image/s were created on a separate dedicated workstation, reviewed and saved   FINDINGS: Compared to earlier same day, this time with the benefit of IV contrast, the following newly evident pertinent negatives are as follows:   No acute pulmonary embolism through the segmental branch level;   No aortic dissection;   No acute right heart strain;   No other newly evident pertinent negatives   Still no pleural or pericardial effusion, pneumothorax, ground-glass airspace disease or lung consolidation   Unchanged bronchitis in both lower lobes and the middle lobe and the lingula   For other details refer to prior report from earlier today       No acute pulmonary embolism   See above and/or refer to prior report from earlier today   MACRO: None   Signed by: Adi Cedeño 3/15/2025 4:05 PM Dictation workstation:   [id number]    CT chest wo IV contrast    Result Date: 3/15/2025  STUDY: CT Chest without IV Contrast; 3/15/2025 10:06 INDICATION: Evaluate for pneumonia. COMPARISON: 3/15/2025 XR Chest, 9/30/2024 CTA Chest, 9/3/2020 CT Chest. ACCESSION NUMBER(S): [id number] ORDERING CLINICIAN: LORI HADDAD TECHNIQUE:  CT of the  chest was performed without contrast.  Automated mA/kV exposure control was utilized and patient examination was performed in strict accordance with principles of ALARA. FINDINGS: MEDIASTINUM: The heart is normal in size without pericardial effusion.  Coronary artery calcifications are identified.  LUNGS/PLEURA: There is elevation of left hemidiaphragm. There is an infiltrate in the left lung base most consistent with atelectasis. LYMPH NODES: There are prominent mediastinal lymph nodes. UPPER ABDOMEN: Upper abdomen demonstrates no acute pathology. BONES: There are no acute fractures.  No suspicious bony lesions.    No evidence consolidating infiltrate or effusion. Elevated left hemidiaphragm with left basilar subsegmental atelectasis. Signed by Noé Ogden MD    XR chest 2 views    Result Date: 3/15/2025  STUDY: Chest Radiographs;  3/15/2025 9:20AM INDICATION: Cough. COMPARISON: XR Chest: 9/30/2024, 4/3/2024 CT Chest: 9/3/2020 ACCESSION NUMBER(S): [id number] ORDERING CLINICIAN: LORI HADDAD TECHNIQUE:  Frontal and lateral chest. FINDINGS: CARDIOMEDIASTINAL SILHOUETTE: Cardiomediastinal silhouette is normal in size and configuration.  LUNGS: There are linear infiltrates in the left lung base.  These were seen as September 2024 unchanged.  ABDOMEN: No remarkable upper abdominal findings.  BONES: No acute osseous changes.    Chronic changes to the left lung base. No evidence of acute infiltrate or effusion. Signed by Noé Ogden MD    CT abdomen pelvis w IV and oral contrast    Result Date: 3/8/2025  Interpreted By:  Sean Jeong, STUDY: CT ABDOMEN PELVIS W IV AND ORAL CONTRAST;  3/7/2025 10:45 am   INDICATION: Signs/Symptoms:CT ABD PEL W ORAL AND IV.   ,R10.9 Unspecified abdominal pain   COMPARISON: CT abdomen/pelvis dated 09/29/2022.   ACCESSION NUMBER(S): [id number]   ORDERING CLINICIAN: MOHIT MENJIVAR   TECHNIQUE: CT of the abdomen and pelvis was performed.  Standard contiguous axial images were obtained  at 3 mm slice thickness through the abdomen and pelvis. Coronal and sagittal reconstructions at 3 mm slice thickness were performed.  75 ML of Omnipaque 350 was administered intravenously without immediate complication.   FINDINGS: LOWER CHEST: The partially visualized lower lungs are unremarkable. The heart is normal in size without significant pericardial effusion. The distal esophagus is unremarkable.   ABDOMEN:   LIVER: The liver is normal in size without evidence of focal liver lesions.   BILE DUCTS: The intrahepatic and extrahepatic ducts are not dilated.   GALLBLADDER: The gallbladder is nondistended and without evidence of radiopaque stones.   PANCREAS: The pancreas appears unremarkable without evidence of ductal dilatation or masses.   SPLEEN: The spleen is normal in size without focal lesions.   ADRENAL GLANDS: Bilateral adrenal glands appear normal.   KIDNEYS AND URETERS: The kidneys are normal in size and enhance symmetrically.  No hydroureteronephrosis or nephroureterolithiasis is identified.   PELVIS:   BLADDER: The urinary bladder appears normal without abnormal wall thickening.   REPRODUCTIVE ORGANS: The prostate is not enlarged. Postoperative changes of UroLift are suggested.   BOWEL: The stomach is unremarkable.   There is scattered colonic diverticulosis without evidence of diverticulitis. Otherwise, the small and large bowel are normal in caliber without evidence of inflammatory wall thickening. The appendix is not definitely visualized. There is however no pericecal stranding or fluid.   VESSELS: There is no aneurysmal dilatation of the abdominal aorta. The IVC appears normal. Mild atherosclerotic calcifications of the abdominal aorta and its branching vessels are noted.   PERITONEUM/RETROPERITONEUM/LYMPH NODES: There is no free or loculated fluid collection, no free intraperitoneal air. The retroperitoneum appears normal.  No abdominopelvic lymphadenopathy by CT size criteria.   BONES AND  ABDOMINAL WALL: Left total hip arthroplasty is noted without evidence of hardware complication. Posterior spinal fusion hardware of L2-S1 with intervertebral disc spacer at L4-L5 again noted. No acute osseous abnormalities or suspicious osseous lesions. Multilevel discogenic degenerative changes are again noted throughout the lower thoracic and lumbar spine with scattered intervertebral disc space narrowing and vertebral body osteophytosis. Unchanged appearance of a moderate-sized fat containing umbilical hernia and small fat containing right inguinal hernia.       1.  No acute process within the abdomen or pelvis. 2. Unchanged appearance of a moderate-sized fat containing umbilical hernia and small fat containing right inguinal hernia. 3. Colonic diverticulosis without evidence of diverticulitis.   MACRO: None   Signed by: Sean Jeong 3/8/2025 9:30 AM Dictation workstation:   [id number]                  Assessment/Plan   Assessment & Plan  Influenza A    COPD exacerbation (Multi)    Tobacco use    Primary insomnia    Hiatal hernia    Benign essential HTN    Acute respiratory failure with hypoxia (Multi)    Constipation    Influenza A  Acute hypoxic respiratory failure   COPD, in acute exacerbation  Tobacco use  - Presented to ED with 5 days of cough, congestion, shortness of breath, poor appetite, diarrhea, fevers, chills  - Flu A positive  - CT chest: No evidence consolidating infiltrate or effusion. Elevated left hemidiaphragm with left basilar subsegmental atelectasis.   - No leukocytosis   - D-dimer 1725  - BNP 96   - CTA chest: No acute pulmonary embolism, Unchanged bronchitis in both lower lobes and the middle lobe and the lingula   - Procal 0.05  - Pertussis PCR negative   - Sputum culture if able   - continue azithromycin 500 mg daily (day 3/3)  - continue Solumedrol 40 mg IVP - decreased to q12h today   - continue DuoNebs TID  - increased Tessalon to 200 mg TID   - continue Lidocaine 4%  patch for  pleuritic chest pain  - continue Tamiflu 75 mg BID; day 3/5  - continue Robitussin AC  mg q6h PRN for cough   - continue acetaminophen 650 mg q4h PRN for fever   - RT eval and treat protocol  - Bronchial hygiene  - Isolation protocol for flu A  - Patient declines nicotine patch at this time; encourage cessation on discharge   - SpO2 85% on RA during the night  - supplemental oxygen to keep SpO2 > 90%  - currently on 2lpm via NC  - No oxygen at home  - Ambulatory pulse ox/overnight O2 trend before discharge   - PT/OT evals- patient agreeable to C on discharge      Constipation  - Patient reports no BM since before admission on 3/15  - Start Colace 100 mg BID   - Miralax 17 g every day  - Monitor for BM    Essential HTN  - continue losartan 100 mg daily  - monitor BP     Insomnia  - continue primidone 50 mg at bedtime   - started trazodone 50 mg at bedtime this admission; monitor response      Hiatal hernia  Chronic GERD  - continue pantoprazole 40 mg daily     DVT PPx:   - continue enoxaparin 40 mg daily     Code Status: Full      Disposition: Patient requires inpatient management at this time.          Susi Mejia PA-C

## 2025-03-17 NOTE — PROGRESS NOTES
03/17/25 0819   Discharge Planning   Living Arrangements Alone   Support Systems Friends/neighbors   Assistance Needed A&Ox3, independent with ADLs, utilizes a rollator or cane. Room air at baseline. Prior to admission pt placed himself on 2L supplemental oxygen using an oxygen concentrator that belonged to his girlfriend who passed away. Pt does not drive, uses [employer] or has a friend transport. Not active with [facility]. Pharmacy [facility] in [facility]. PCP Dr. Mendez Coronel.   Type of Residence Private residence   Number of Stairs to Enter Residence 5   Number of Stairs Within Residence 0   Do you have animals or pets at home? No   Home or Post Acute Services None   Expected Discharge Disposition Home   Does the patient need discharge transport arranged? No   Stroke Family Assessment   Stroke Family Assessment Needed No   Intensity of Service   Intensity of Service 0-30 min

## 2025-03-17 NOTE — PROGRESS NOTES
"Occupational Therapy    Evaluation    Patient Name: Julio Carranza  MRN: [id number]  Department: [facility]  Room: 230[facility]  Today's Date: 3/17/2025  Time Calculation  Start Time: 1238  Stop Time: 1256  Time Calculation (min): 18 min    Assessment  IP OT Assessment  OT Assessment: RN cleared pt for eval. OT orders received. Occupational profile established via interivew method, data gathering, and review of medical records to determine low complexity. At this time, pt is observed to be slightly decreased from baseline d/t increased respiratory needs at this time. Pt demonstrates diminished endurance limiting activity tolerance in turn decreasing PLOF/independence with ADLs. Pt would benefit from OT services to address deficit areas and to prevent further decline. Unable to assess pt getting up/walking around room as RT in and applied breathing treatment (per pt request). At EOB, pt c/c of signficicant weakness \"I feel terrible\" and states this is the worse day he has had since being sick. RT in room with pt at departure, seated EOB. RN notified.  Prognosis: Good  Barriers to Discharge Home: No anticipated barriers  Evaluation/Treatment Tolerance: Patient limited by fatigue  Medical Staff Made Aware: Yes  End of Session Communication: Bedside nurse  End of Session Patient Position:  (seated EOB, RT present)    Plan:  Treatment Interventions: ADL retraining, Functional transfer training, UE strengthening/ROM, Endurance training, Patient/family training, Neuromuscular reeducation  OT Frequency: 3 times per week  OT Discharge Recommendations: Low intensity level of continued care  OT - OK to Discharge: Yes    Subjective   Current Problem:  1. Influenza A        2. Pulmonary emphysema, unspecified emphysema type (Multi)  Referral to Pulmonary Rehabilitation        General:  General  Reason for Referral: Impaired ADLs  Referred By: Jackie JIMENEZ  Past Medical History Relevant to Rehab: CAD, BPH, TIA, PE, OA, Chronic B knee pain, " prior L TRINITY 4/24  Family/Caregiver Present: No  Prior to Session Communication: Bedside nurse  Patient Position Received: Bed, 2 rail up  Preferred Learning Style: kinesthetic, verbal  General Comment: Pt lying in bed, but awake. Agreeable to work with therapy, very pleasant and cooperative throughout.    Precautions:  Medical Precautions: Fall precautions     Date/Time Vitals Session Patient Position Pulse Resp SpO2 BP MAP (mmHg)    03/17/25 1238 During OT  --  77  --  90 %  123/83  --     03/17/25 1248 --  --  76  18  89 %  123/83  96           Pain:  Pain Assessment  Pain Assessment: 0-10  0-10 (Numeric) Pain Score: 5 - Moderate pain  Pain Type: Acute pain  Pain Location: Abdomen (states from excessive coughing)  Pain Interventions:  (breathing treatment from respiratory)    Objective   Cognition:  Overall Cognitive Status: Within Functional Limits  Orientation Level: Oriented X4    Home Living:  Type of Home: Apartment (first floor. Friend lives in apartment above.)  Lives With: Alone, Other (Comment) (Friend lives above, helps as needed (not often))  Home Adaptive Equipment: Walker rolling or standard (rollator)  Home Layout: One level  Home Access: Stairs to enter with rails  Entrance Stairs-Rails: Both  Entrance Stairs-Number of Steps: 5  Bathroom Shower/Tub: Walk-in shower  Bathroom Toilet: Standard  Bathroom Equipment: Grab bars in shower, Grab bars around toilet  Bathroom Accessibility: No concern  Home Living Comments: Sleeps in regular bed. Laundry not in apartment,  friend drives him there to do.     Prior Function:  Level of [place]: Independent with ADLs and functional transfers, Independent with homemaking with ambulation  Receives Help From: Friends (prn)  ADL Assistance: Independent  Homemaking Assistance: Independent  Ambulatory Assistance:  (Uses rollator in AM after first waking up d/t pain in BLE (chronic). Once warmed-up, leaves in living room and does not use again.)  Hand Dominance:  Right  Prior Function Comments: Pt reports prior independence with ADLs/IADLs, except driving (he does not) and relies on friend, ambulates prn with AD. No report of recent falls.    IADL History:  Homemaking Responsibilities: Yes  Meal Prep Responsibility: Primary  Laundry Responsibility: Primary  Cleaning Responsibility: Primary  Bill Paying/Finance Responsibility: Primary  Shopping Responsibility: Primary  Current License: No  Mode of Transportation: Friends    ADL:  Eating Assistance: Independent  Grooming Assistance: Stand by (anticipated)  Grooming Deficit: Setup  Bathing Assistance: Stand by (anticipated)  Bathing Deficit: Setup  UE Dressing Assistance: Stand by (anticipated)  UE Dressing Deficit: Setup  LE Dressing Assistance: Stand by  LE Dressing Deficit: Setup  Toileting Assistance with Device: Stand by (anticipated)  Toileting Deficit: Setup  Functional Assistance: Stand by (anticipated)  Functional Deficit: Supervision/safety    Activity Tolerance:  Endurance: Tolerates less than 10 min exercise with changes in vital signs, Decreased tolerance for upright activites    Bed Mobility/Transfers:   Bed Mobility  Bed Mobility: Yes  Bed Mobility 1  Bed Mobility 1: Supine to sitting  Level of Assistance 1: Modified independent  Bed Mobility Comments 1: no increased time, use of BR.    Transfers  Transfer: Yes  Transfer 1  Transfer From 1: Bed to  Transfer to 1: Stand  Technique 1: Sit to stand, Stand to sit  Transfer Level of Assistance 1: Distant supervision  Trials/Comments 1: x 1 trial. No AD. No struggle observed.    Functional Mobility:  Functional Mobility  Functional Mobility Performed: No    Sitting Balance:  Static Sitting Balance  Static Sitting-Level of Assistance: Independent  Dynamic Sitting Balance  Dynamic Sitting-Level of Assistance: Distant supervision    Standing Balance:  Static Standing Balance  Static Standing-Level of Assistance: Close supervision    IADL's:   Homemaking Responsibilities:  Yes  Meal Prep Responsibility: Primary  Laundry Responsibility: Primary  Cleaning Responsibility: Primary  Bill Paying/Finance Responsibility: Primary  Shopping Responsibility: Primary  Current License: No  Mode of Transportation: Friends    Vision:   Vision - Basic Assessment  Current Vision: Wears glasses all the time    Sensation:  Light Touch: No apparent deficits    Strength:  Strength Comments: BUE grossly 4/5 throughout- functional strength apparent however noticeable weakness and observable shaking throughout BUE. Pt states he feels weak.    Coordination:  Movements are Fluid and Coordinated: Yes     Hand Function:  Hand Function  Gross Grasp: Functional  Coordination: Functional    Extremities:   RUE   RUE : Within Functional Limits and LUE   LUE: Within Functional Limits    Outcome Measures:   [facility] Daily Activity  Putting on and taking off regular lower body clothing: A little  Bathing (including washing, rinsing, drying): A little  Putting on and taking off regular upper body clothing: A little  Toileting, which includes using toilet, bedpan or urinal: A little  Taking care of personal grooming such as brushing teeth: A little  Eating Meals: None  Daily Activity - Total Score: 19      Education Documentation  No documentation found.  Education Comments  No comments found.      Goals:   Encounter Problems       Encounter Problems (Active)       ADLs       Patient will perform UB and LB bathing standing at sink with modified independent level of assistance       Start:  03/17/25    Expected End:  03/31/25            Patient with complete upper body dressing with modified independent level of assistance donning and doffing all UE clothes while supported sitting and standing       Start:  03/17/25    Expected End:  03/31/25            Patient with complete lower body dressing with modified independent level of assistance donning and doffing all LE clothes while supported sitting and standing       Start:   03/17/25    Expected End:  03/31/25            Patient will complete daily grooming tasks entire routine standing at sink with modified independent level of assistance       Start:  03/17/25    Expected End:  03/31/25               BALANCE       Pt will maintain dynamic standing balance during ADL task with modified independent level of assistance in order to demonstrate decreased risk of falling and improved postural control.       Start:  03/17/25    Expected End:  03/31/25               MOBILITY       Patient will perform Functional mobility max Household distances/Community Distances with modified independent level of assistance and least restrictive device in order to improve safety and functional mobility.       Start:  03/17/25    Expected End:  03/31/25               TRANSFERS       Patient will complete sit to stand transfer with modified independent level of assistance and least restrictive device in order to improve safety and prepare for out of bed mobility.       Start:  03/17/25    Expected End:  03/31/25

## 2025-03-17 NOTE — PROGRESS NOTES
Physical Therapy    Physical Therapy Evaluation    Patient Name: Julio Carranza  MRN: [id number]  Department: [facility]  [facility]  Today's Date: 3/17/2025   Time Calculation  Start Time: 1005  Stop Time: 1020  Time Calculation (min): 15 min    Assessment/Plan   PT Assessment  PT Assessment Results: Decreased endurance, Decreased mobility, Pain, Decreased strength  Rehab Prognosis: Good  Barriers to Discharge Home: No anticipated barriers  Evaluation/Treatment Tolerance: Patient tolerated treatment well  Medical Staff Made Aware: Yes  Strengths: Ability to acquire knowledge, Housing layout, Support of Caregivers  Barriers to Participation: Comorbidities  End of Session Communication: Bedside nurse  Assessment Comment: Pt with slight decline from functional baseline requiring increased assistance for safe mobility and ambulation. Pt reports discomfort LB and mild HA symptoms due to increased cough. Feels endurance and coughing are main limitations to mobility and ambulation. Pt able to ambulate in room without device ~35' x 1 with close supervision. Pt demonstrates slight decrease in LE strength, mobility, stability, impaired gait mechanics and pain with cough limiting functional mobility. PT warranted at this time to address impairments so pt can progress towards PLOF and/or safe mobility.  End of Session Patient Position: Bed, 2 rail up, Alarm on  IP OR SWING BED PT PLAN  Inpatient or Swing Bed: Inpatient  PT Plan  Treatment/Interventions: Transfer training, Gait training, Stair training, Neuromuscular re-education, Strengthening, Therapeutic exercise, Therapeutic activity  PT Plan: Ongoing PT  PT Frequency: 3 times per week  PT Discharge Recommendations: Low intensity level of continued care  PT Recommended Transfer Status: Stand by assist (close supervision)  PT - OK to Discharge: Yes  Based on completed evaluation and care plan recommendations, no barriers to discharge to next site of care       Subjective    General Visit Information:  General  Reason for Referral: impaired mobility s/p admission for influenza A  Referred By: Jackie JIMENEZ  Past Medical History Relevant to Rehab: CAD, BPH, TIA, PE, OA, Chronic B knee pain, prior L [facility] 4/24  Family/Caregiver Present: No  Prior to Session Communication: Bedside nurse  Patient Position Received: Bed, 2 rail up, Alarm off, not on at start of session  Preferred Learning Style: kinesthetic, verbal  General Comment: Pt pleasant male agreeable to PT, Productive cough causing discomfort had tylenol prior. Pt reports getting up to use room bathroom. [place], 2 L O2  Home Living:  Home Living  Type of Home: Apartment (pt lives down apartment, friend lives upstairs)  Lives With: Alone  Home Adaptive Equipment: Walker rolling or standard (rollator)  Home Layout: One level  Home Access: Stairs to enter with rails  Entrance Stairs-Rails: Both  Entrance Stairs-Number of Steps: 5  Bathroom Shower/Tub: Walk-in shower  Bathroom Equipment: Grab bars in shower  Prior Level of Function:  Prior Function Per Pt/Caregiver Report  Level of [place]: Independent with ADLs and functional transfers, Independent with homemaking with ambulation  Receives Help From: Friends  Precautions:  Precautions  Medical Precautions: Fall precautions      Date/Time Vitals Session Patient Position Pulse Resp SpO2 BP MAP (mmHg)    03/17/25 1005 --  --  75  --  94 %  --  --     03/17/25 1026 --  --  --  --  94 %  --  --                 Objective   Pain:  Pain Assessment  Pain Assessment: 0-10  0-10 (Numeric) Pain Score: 4  Pain Type: Chronic pain (B Knee and low back chronic pain)  Cognition:  Cognition  Overall Cognitive Status: Within Functional Limits    General Assessments:                  Activity Tolerance  Endurance: Decreased tolerance for upright activites    Strength  Strength Comments: B LE 4+/5 grossly  Coordination  Movements are Fluid and Coordinated: Yes    Static Sitting Balance  Static  Sitting-Level of Assistance: Independent  Dynamic Sitting Balance  Dynamic Sitting-Level of Assistance: Modified independent    Static Standing Balance  Static Standing-Level of Assistance: Modified independent  Dynamic Standing Balance  Dynamic Standing-Level of Assistance: Contact guard  Functional Assessments:  Bed Mobility  Bed Mobility: Yes  Bed Mobility 1  Bed Mobility 1: Supine to sitting, Sitting to supine  Level of Assistance 1: Modified independent    Transfers  Transfer: Yes  Transfer 1  Technique 1: Sit to stand, Stand to sit  Transfer Device 1:  (No device)  Transfer Level of Assistance 1: Contact guard, Close supervision    Ambulation/Gait Training  Ambulation/Gait Training Performed: Yes  Ambulation/Gait Training 1  Device 1: No device  Assistance 1: Contact guard, Close supervision  Quality of Gait 1: Inconsistent stride length, Decreased step length  Comments/Distance (ft) 1: 35' x 1 without O2, oxygen remained above 90%. pt without lob or increased instability. Productive cough during ambulation, reports discomfort with mobility due to cough, no other complaints.    Outcome Measures:  [facility] Basic Mobility  Turning from your back to your side while in a flat bed without using bedrails: None  Moving from lying on your back to sitting on the side of a flat bed without using bedrails: None  Moving to and from bed to chair (including a wheelchair): A little  Standing up from a chair using your arms (e.g. wheelchair or bedside chair): A little  To walk in hospital room: A little  Climbing 3-5 steps with railing: A little  Basic Mobility - Total Score: 20    Encounter Problems       Encounter Problems (Active)       Mobility       STG - Patient will ambulate 120' x 2 with LRAD mod I with improved gait mechanics and O2 remaining above 90%       Start:  03/17/25    Expected End:  03/24/25            Pt with transfer sit to stand to sit and from bed to chair to bed with LRAD mod I        Start:  03/17/25     Expected End:  03/24/25            Pt will negotiate 5 stairs with B handrails Mod I  to simulate home entry and exit set up        Start:  03/17/25    Expected End:  03/24/25            Pt will improve B LE to WFL for improved overall functional mobility        Start:  03/17/25    Expected End:  03/24/25               Pain - Adult              Education Documentation  Mobility Training, taught by Bill Schulz PT at 3/17/2025 10:33 AM.  Learner: Patient  Readiness: Acceptance  Method: Explanation, Demonstration  Response: Demonstrated Understanding, Verbalizes Understanding  Comment: Pt educated on breathing techniques during ambulation and safe transfers technique for safety, and not transferring when feeling light headed    Education Comments  No comments found.

## 2025-03-17 NOTE — CARE PLAN
The patient's goals for the shift include  to get better     The clinical goals for the shift include Pt will tolerate solumedrol and PO azithromycin throughout the shift    Problem: Pain  Goal: Takes deep breaths with improved pain control throughout the shift  Outcome: Progressing  Goal: Turns in bed with improved pain control throughout the shift  Outcome: Progressing  Goal: Walks with improved pain control throughout the shift  Outcome: Progressing  Goal: Performs ADL's with improved pain control throughout shift  Outcome: Progressing  Goal: Participates in PT with improved pain control throughout the shift  Outcome: Progressing  Goal: Free from opioid side effects throughout the shift  Outcome: Progressing  Goal: Free from acute confusion related to pain meds throughout the shift  Outcome: Progressing     Problem: Safety - Adult  Goal: Free from fall injury  Outcome: Progressing     Problem: Pain - Adult  Goal: Verbalizes/displays adequate comfort level or baseline comfort level  Outcome: Progressing     Problem: Discharge Planning  Goal: Discharge to home or other facility with appropriate resources  Outcome: Progressing     Problem: Chronic Conditions and Co-morbidities  Goal: Patient's chronic conditions and co-morbidity symptoms are monitored and maintained or improved  Outcome: Progressing    0930 pt stated he has a headache 9/10 pain and requested tylenol.   1045: Tylenol seemed to help some with pts headache.    Pt has not felt good today. States he feels even worse than yesterday. He stated he was very tired, felt weak and felt he couldn't do much. I have told him with the flu that could happen and he will start to feel better it just takes time. He has been taking on and off his oxygen which we have tried to explain he should be keeping it on due to the decreased SPO2. He looks at the mosimo and looks by the numbers whether he should have it on or not.  Vitals have been stable throughout the shift. Pt  has no needs at this time call light within reach

## 2025-03-17 NOTE — CARE PLAN
The patient's goals for the shift include      The clinical goals for the shift include Will tolerate solumedrol.  Maintain safety    Alert and oriented.  Tolerating po intake well without c/o n/v.  HS medication given without difficulty.  O2 on via NC.  Voiding without difficulty.  Congested cough at times.  IV solumedrol given without difficulty.  Safety maintained.

## 2025-03-18 ENCOUNTER — APPOINTMENT (OUTPATIENT)
Dept: PRIMARY CARE | Facility: CLINIC | Age: 65
End: 2025-03-18
Payer: MEDICARE

## 2025-03-18 LAB
ANION GAP SERPL CALC-SCNC: 11 MMOL/L (ref 10–20)
BUN SERPL-MCNC: 24 MG/DL (ref 6–23)
CALCIUM SERPL-MCNC: 8.5 MG/DL (ref 8.6–10.3)
CHLORIDE SERPL-SCNC: 108 MMOL/L (ref 98–107)
CO2 SERPL-SCNC: 27 MMOL/L (ref 21–32)
CREAT SERPL-MCNC: 0.92 MG/DL (ref 0.5–1.3)
EGFRCR SERPLBLD CKD-EPI 2021: >90 ML/MIN/1.73M*2
ERYTHROCYTE [DISTWIDTH] IN BLOOD BY AUTOMATED COUNT: 12.5 % (ref 11.5–14.5)
GLUCOSE SERPL-MCNC: 108 MG/DL (ref 74–99)
HCT VFR BLD AUTO: 41.1 % (ref 41–52)
HGB BLD-MCNC: 13.5 G/DL (ref 13.5–17.5)
MCH RBC QN AUTO: 28.9 PG (ref 26–34)
MCHC RBC AUTO-ENTMCNC: 32.8 G/DL (ref 32–36)
MCV RBC AUTO: 88 FL (ref 80–100)
NRBC BLD-RTO: 0 /100 WBCS (ref 0–0)
PLATELET # BLD AUTO: 220 X10*3/UL (ref 150–450)
POTASSIUM SERPL-SCNC: 4 MMOL/L (ref 3.5–5.3)
RBC # BLD AUTO: 4.67 X10*6/UL (ref 4.5–5.9)
SODIUM SERPL-SCNC: 142 MMOL/L (ref 136–145)
WBC # BLD AUTO: 8.2 X10*3/UL (ref 4.4–11.3)

## 2025-03-18 PROCEDURE — 85027 COMPLETE CBC AUTOMATED: CPT | Mod: IPSPLIT

## 2025-03-18 PROCEDURE — 94668 MNPJ CHEST WALL SBSQ: CPT | Mod: IPSPLIT

## 2025-03-18 PROCEDURE — 99233 SBSQ HOSP IP/OBS HIGH 50: CPT

## 2025-03-18 PROCEDURE — 97116 GAIT TRAINING THERAPY: CPT | Mod: GP,CQ,IPSPLIT

## 2025-03-18 PROCEDURE — 2500000002 HC RX 250 W HCPCS SELF ADMINISTERED DRUGS (ALT 637 FOR MEDICARE OP, ALT 636 FOR OP/ED): Mod: IPSPLIT | Performed by: NURSE PRACTITIONER

## 2025-03-18 PROCEDURE — 80048 BASIC METABOLIC PNL TOTAL CA: CPT | Mod: IPSPLIT

## 2025-03-18 PROCEDURE — 94669 MECHANICAL CHEST WALL OSCILL: CPT | Mod: IPSPLIT

## 2025-03-18 PROCEDURE — 2500000001 HC RX 250 WO HCPCS SELF ADMINISTERED DRUGS (ALT 637 FOR MEDICARE OP): Mod: IPSPLIT | Performed by: NURSE PRACTITIONER

## 2025-03-18 PROCEDURE — 97530 THERAPEUTIC ACTIVITIES: CPT | Mod: GP,CQ,IPSPLIT

## 2025-03-18 PROCEDURE — 94760 N-INVAS EAR/PLS OXIMETRY 1: CPT | Mod: IPSPLIT

## 2025-03-18 PROCEDURE — 97110 THERAPEUTIC EXERCISES: CPT | Mod: GP,CQ,IPSPLIT

## 2025-03-18 PROCEDURE — 2500000002 HC RX 250 W HCPCS SELF ADMINISTERED DRUGS (ALT 637 FOR MEDICARE OP, ALT 636 FOR OP/ED): Mod: IPSPLIT

## 2025-03-18 PROCEDURE — 94640 AIRWAY INHALATION TREATMENT: CPT | Mod: IPSPLIT

## 2025-03-18 PROCEDURE — 2500000001 HC RX 250 WO HCPCS SELF ADMINISTERED DRUGS (ALT 637 FOR MEDICARE OP): Mod: IPSPLIT

## 2025-03-18 PROCEDURE — 2500000004 HC RX 250 GENERAL PHARMACY W/ HCPCS (ALT 636 FOR OP/ED): Mod: IPSPLIT

## 2025-03-18 PROCEDURE — 2500000005 HC RX 250 GENERAL PHARMACY W/O HCPCS: Mod: IPSPLIT

## 2025-03-18 PROCEDURE — 1200000002 HC GENERAL ROOM WITH TELEMETRY DAILY: Mod: IPSPLIT

## 2025-03-18 PROCEDURE — 36415 COLL VENOUS BLD VENIPUNCTURE: CPT | Mod: IPSPLIT

## 2025-03-18 RX ADMIN — BENZONATATE 200 MG: 100 CAPSULE ORAL at 15:13

## 2025-03-18 RX ADMIN — METHYLPREDNISOLONE SODIUM SUCCINATE 40 MG: 40 INJECTION, POWDER, FOR SOLUTION INTRAMUSCULAR; INTRAVENOUS at 16:38

## 2025-03-18 RX ADMIN — IPRATROPIUM BROMIDE AND ALBUTEROL SULFATE 3 ML: .5; 3 SOLUTION RESPIRATORY (INHALATION) at 08:47

## 2025-03-18 RX ADMIN — Medication 6 MG: at 21:24

## 2025-03-18 RX ADMIN — POLYETHYLENE GLYCOL 3350 17 G: 17 POWDER, FOR SOLUTION ORAL at 09:31

## 2025-03-18 RX ADMIN — METHYLPREDNISOLONE SODIUM SUCCINATE 40 MG: 40 INJECTION, POWDER, FOR SOLUTION INTRAMUSCULAR; INTRAVENOUS at 06:13

## 2025-03-18 RX ADMIN — BENZONATATE 200 MG: 100 CAPSULE ORAL at 09:31

## 2025-03-18 RX ADMIN — TRAZODONE HYDROCHLORIDE 50 MG: 50 TABLET ORAL at 21:24

## 2025-03-18 RX ADMIN — ACETAMINOPHEN 650 MG: 325 TABLET, FILM COATED ORAL at 09:31

## 2025-03-18 RX ADMIN — OSELTAMAVIR PHOSPHATE 75 MG: 75 CAPSULE ORAL at 21:23

## 2025-03-18 RX ADMIN — IPRATROPIUM BROMIDE AND ALBUTEROL SULFATE 3 ML: .5; 3 SOLUTION RESPIRATORY (INHALATION) at 21:31

## 2025-03-18 RX ADMIN — OSELTAMAVIR PHOSPHATE 75 MG: 75 CAPSULE ORAL at 09:31

## 2025-03-18 RX ADMIN — IPRATROPIUM BROMIDE AND ALBUTEROL SULFATE 3 ML: .5; 3 SOLUTION RESPIRATORY (INHALATION) at 15:55

## 2025-03-18 RX ADMIN — FOLIC ACID 1 MG: 1 TABLET ORAL at 09:31

## 2025-03-18 RX ADMIN — PANTOPRAZOLE SODIUM 40 MG: 40 TABLET, DELAYED RELEASE ORAL at 06:13

## 2025-03-18 RX ADMIN — ENOXAPARIN SODIUM 40 MG: 40 INJECTION SUBCUTANEOUS at 15:13

## 2025-03-18 RX ADMIN — DOCUSATE SODIUM 100 MG: 100 CAPSULE, LIQUID FILLED ORAL at 09:31

## 2025-03-18 RX ADMIN — LOSARTAN POTASSIUM 100 MG: 50 TABLET, FILM COATED ORAL at 09:31

## 2025-03-18 RX ADMIN — PANTOPRAZOLE SODIUM 40 MG: 40 TABLET, DELAYED RELEASE ORAL at 15:13

## 2025-03-18 RX ADMIN — AZITHROMYCIN DIHYDRATE 500 MG: 250 TABLET, FILM COATED ORAL at 09:31

## 2025-03-18 RX ADMIN — PRIMIDONE 50 MG: 50 TABLET ORAL at 21:24

## 2025-03-18 RX ADMIN — DOCUSATE SODIUM 100 MG: 100 CAPSULE, LIQUID FILLED ORAL at 21:27

## 2025-03-18 RX ADMIN — BENZONATATE 200 MG: 100 CAPSULE ORAL at 21:23

## 2025-03-18 RX ADMIN — ACETAMINOPHEN 650 MG: 325 TABLET, FILM COATED ORAL at 16:36

## 2025-03-18 RX ADMIN — THIAMINE HCL TAB 100 MG 100 MG: 100 TAB at 09:31

## 2025-03-18 ASSESSMENT — COGNITIVE AND FUNCTIONAL STATUS - GENERAL
MOVING TO AND FROM BED TO CHAIR: A LITTLE
CLIMB 3 TO 5 STEPS WITH RAILING: A LITTLE
HELP NEEDED FOR BATHING: A LITTLE
DRESSING REGULAR UPPER BODY CLOTHING: A LITTLE
WALKING IN HOSPITAL ROOM: A LITTLE
MOBILITY SCORE: 21
WALKING IN HOSPITAL ROOM: A LITTLE
MOBILITY SCORE: 21
DAILY ACTIVITIY SCORE: 22
CLIMB 3 TO 5 STEPS WITH RAILING: A LITTLE
STANDING UP FROM CHAIR USING ARMS: A LITTLE

## 2025-03-18 ASSESSMENT — PAIN SCALES - GENERAL
PAINLEVEL_OUTOF10: 5 - MODERATE PAIN
PAINLEVEL_OUTOF10: 7
PAINLEVEL_OUTOF10: 0 - NO PAIN

## 2025-03-18 ASSESSMENT — PAIN - FUNCTIONAL ASSESSMENT
PAIN_FUNCTIONAL_ASSESSMENT: 0-10

## 2025-03-18 ASSESSMENT — PAIN SCALES - WONG BAKER: WONGBAKER_NUMERICALRESPONSE: HURTS LITTLE BIT

## 2025-03-18 ASSESSMENT — PAIN DESCRIPTION - LOCATION: LOCATION: HEAD

## 2025-03-18 NOTE — CARE PLAN
The clinical goals for the shift include Pt will maintain 92% or greater on 2L supplemental oxyen    Pt was on 2L NC today. Able to get to toilet on his own. Denies any stools today. Had Tylenol for headache. Has congested non-productive cough. Pulse ox >90% on 2L.

## 2025-03-18 NOTE — PROGRESS NOTES
"Juilo Carranza is a 64 y.o. male on day 2 of admission presenting with Influenza A.    Subjective     No overnight events reported.     Patient remains on 2L O2 this morning, still has a dry cough and wheezing on exam. He has a poor appetite but denies n/v/d. He still feels constipated; Miralax and Colace were added yesterday. Overnight O2 trend to be completed tonight. Plan of care discussed with patient, all questions answered.         Objective     Physical Exam  Constitutional:       General: He is not in acute distress.     Appearance: He is not toxic-appearing.   HENT:      Head: Normocephalic and atraumatic.      Mouth/Throat:      Mouth: Mucous membranes are moist.   Eyes:      Conjunctiva/sclera: Conjunctivae normal.   Cardiovascular:      Rate and Rhythm: Normal rate and regular rhythm.   Pulmonary:      Effort: No respiratory distress.      Breath sounds: Wheezing present. No rales.      Comments: On 2L O2 via NC with SpO2 99% at rest  Abdominal:      General: Bowel sounds are normal. There is distension.      Palpations: Abdomen is soft.      Tenderness: There is no abdominal tenderness. There is no guarding.      Hernia: A hernia is present.   Musculoskeletal:         General: No swelling.   Skin:     General: Skin is warm and dry.   Neurological:      Mental Status: He is alert and oriented to person, place, and time.   Psychiatric:         Mood and Affect: Mood normal.         Behavior: Behavior normal.         Last Recorded Vitals  Blood pressure 125/81, pulse 56, temperature 36.5 °C (97.7 °F), temperature source Temporal, resp. rate 18, height 1.88 m (6' 2\"), weight 103 kg (227 lb 1.2 oz), SpO2 95%.  Intake/Output last 3 Shifts:  I/O last 3 completed shifts:  In: 600 (5.8 mL/kg) [P.O.:600]  Out: - (0 mL/kg)   Weight: 103 kg     Relevant Results          Scheduled medications  benzonatate, 200 mg, oral, TID  docusate sodium, 100 mg, oral, BID  enoxaparin, 40 mg, subcutaneous, q24h  influenza, 0.5 mL, " intramuscular, During hospitalization  folic acid, 1 mg, oral, Daily  ipratropium-albuteroL, 3 mL, nebulization, TID  lidocaine, 1 patch, transdermal, Daily  losartan, 100 mg, oral, Daily  methylPREDNISolone sodium succinate (PF), 40 mg, intravenous, q12h  oseltamivir, 75 mg, oral, BID  oxygen, , inhalation, Continuous - Inhalation  pantoprazole, 40 mg, oral, BID AC  polyethylene glycol, 17 g, oral, Daily  primidone, 50 mg, oral, Nightly  thiamine, 100 mg, oral, Daily  traZODone, 50 mg, oral, Nightly      Continuous medications     PRN medications  PRN medications: acetaminophen **OR** [DISCONTINUED] acetaminophen **OR** [DISCONTINUED] acetaminophen, acetaminophen **OR** [DISCONTINUED] acetaminophen **OR** [DISCONTINUED] acetaminophen, albuterol, albuterol, baclofen, benzocaine-menthol, codeine-guaifenesin, melatonin, ondansetron ODT **OR** ondansetron    Results for orders placed or performed during the hospital encounter of 03/15/25 (from the past 24 hours)   CBC   Result Value Ref Range    WBC 8.2 4.4 - 11.3 x10*3/uL    nRBC 0.0 0.0 - 0.0 /100 WBCs    RBC 4.67 4.50 - 5.90 x10*6/uL    Hemoglobin 13.5 13.5 - 17.5 g/dL    Hematocrit 41.1 41.0 - 52.0 %    MCV 88 80 - 100 fL    MCH 28.9 26.0 - 34.0 pg    MCHC 32.8 32.0 - 36.0 g/dL    RDW 12.5 11.5 - 14.5 %    Platelets 220 150 - 450 x10*3/uL   Basic Metabolic Panel   Result Value Ref Range    Glucose 108 (H) 74 - 99 mg/dL    Sodium 142 136 - 145 mmol/L    Potassium 4.0 3.5 - 5.3 mmol/L    Chloride 108 (H) 98 - 107 mmol/L    Bicarbonate 27 21 - 32 mmol/L    Anion Gap 11 10 - 20 mmol/L    Urea Nitrogen 24 (H) 6 - 23 mg/dL    Creatinine 0.92 0.50 - 1.30 mg/dL    eGFR >90 >60 mL/min/1.73m*2    Calcium 8.5 (L) 8.6 - 10.3 mg/dL     *Note: Due to a large number of results and/or encounters for the requested time period, some results have not been displayed. A complete set of results can be found in Results Review.     ECG 12 lead    Result Date: 3/17/2025  Normal sinus  rhythm Normal ECG When compared with ECG of 30-SEP-2024 13:32, No significant change was found    CT angio chest for pulmonary embolism    Result Date: 3/15/2025  Interpreted By:  Adi Cedeño, STUDY: CT ANGIO CHEST FOR PULMONARY EMBOLISM;  3/15/2025 3:34 pm   INDICATION: Signs/Symptoms:hypoxia, elevated d-dimer.     COMPARISON: CT chest without contrast earlier same day 15 March 2025 at 1003 hours   ACCESSION NUMBER(S): [id number]   ORDERING CLINICIAN: GUSTAVO BATES   TECHNIQUE: Pulmonary arterial phase CT chest after the uneventful administration of 75 mL IV contrast (Omnipaque 350).   Three dimensional maximum intensity projection (3-D MIPs) image/s were created on a separate dedicated workstation, reviewed and saved   FINDINGS: Compared to earlier same day, this time with the benefit of IV contrast, the following newly evident pertinent negatives are as follows:   No acute pulmonary embolism through the segmental branch level;   No aortic dissection;   No acute right heart strain;   No other newly evident pertinent negatives   Still no pleural or pericardial effusion, pneumothorax, ground-glass airspace disease or lung consolidation   Unchanged bronchitis in both lower lobes and the middle lobe and the lingula   For other details refer to prior report from earlier today       No acute pulmonary embolism   See above and/or refer to prior report from earlier today   MACRO: None   Signed by: Adi Cedeño 3/15/2025 4:05 PM Dictation workstation:   [id number]    CT chest wo IV contrast    Result Date: 3/15/2025  STUDY: CT Chest without IV Contrast; 3/15/2025 10:06 INDICATION: Evaluate for pneumonia. COMPARISON: 3/15/2025 XR Chest, 9/30/2024 CTA Chest, 9/3/2020 CT Chest. ACCESSION NUMBER(S): [id number] ORDERING CLINICIAN: LORI HADDAD TECHNIQUE:  CT of the chest was performed without contrast.  Automated mA/kV exposure control was utilized and patient examination was performed in strict accordance with  principles of ALARA. FINDINGS: MEDIASTINUM: The heart is normal in size without pericardial effusion.  Coronary artery calcifications are identified.  LUNGS/PLEURA: There is elevation of left hemidiaphragm. There is an infiltrate in the left lung base most consistent with atelectasis. LYMPH NODES: There are prominent mediastinal lymph nodes. UPPER ABDOMEN: Upper abdomen demonstrates no acute pathology. BONES: There are no acute fractures.  No suspicious bony lesions.    No evidence consolidating infiltrate or effusion. Elevated left hemidiaphragm with left basilar subsegmental atelectasis. Signed by Noé Ogden MD    XR chest 2 views    Result Date: 3/15/2025  STUDY: Chest Radiographs;  3/15/2025 9:20AM INDICATION: Cough. COMPARISON: XR Chest: 9/30/2024, 4/3/2024 CT Chest: 9/3/2020 ACCESSION NUMBER(S): [id number] ORDERING CLINICIAN: LORI HADDAD TECHNIQUE:  Frontal and lateral chest. FINDINGS: CARDIOMEDIASTINAL SILHOUETTE: Cardiomediastinal silhouette is normal in size and configuration.  LUNGS: There are linear infiltrates in the left lung base.  These were seen as September 2024 unchanged.  ABDOMEN: No remarkable upper abdominal findings.  BONES: No acute osseous changes.    Chronic changes to the left lung base. No evidence of acute infiltrate or effusion. Signed by Noé Ogden MD    CT abdomen pelvis w IV and oral contrast    Result Date: 3/8/2025  Interpreted By:  Sean Jeong, STUDY: CT ABDOMEN PELVIS W IV AND ORAL CONTRAST;  3/7/2025 10:45 am   INDICATION: Signs/Symptoms:CT ABD PEL W ORAL AND IV.   ,R10.9 Unspecified abdominal pain   COMPARISON: CT abdomen/pelvis dated 09/29/2022.   ACCESSION NUMBER(S): [id number]   ORDERING CLINICIAN: MOHIT MENJIVAR   TECHNIQUE: CT of the abdomen and pelvis was performed.  Standard contiguous axial images were obtained at 3 mm slice thickness through the abdomen and pelvis. Coronal and sagittal reconstructions at 3 mm slice thickness were performed.  75 ML of  Omnipaque 350 was administered intravenously without immediate complication.   FINDINGS: LOWER CHEST: The partially visualized lower lungs are unremarkable. The heart is normal in size without significant pericardial effusion. The distal esophagus is unremarkable.   ABDOMEN:   LIVER: The liver is normal in size without evidence of focal liver lesions.   BILE DUCTS: The intrahepatic and extrahepatic ducts are not dilated.   GALLBLADDER: The gallbladder is nondistended and without evidence of radiopaque stones.   PANCREAS: The pancreas appears unremarkable without evidence of ductal dilatation or masses.   SPLEEN: The spleen is normal in size without focal lesions.   ADRENAL GLANDS: Bilateral adrenal glands appear normal.   KIDNEYS AND URETERS: The kidneys are normal in size and enhance symmetrically.  No hydroureteronephrosis or nephroureterolithiasis is identified.   PELVIS:   BLADDER: The urinary bladder appears normal without abnormal wall thickening.   REPRODUCTIVE ORGANS: The prostate is not enlarged. Postoperative changes of UroLift are suggested.   BOWEL: The stomach is unremarkable.   There is scattered colonic diverticulosis without evidence of diverticulitis. Otherwise, the small and large bowel are normal in caliber without evidence of inflammatory wall thickening. The appendix is not definitely visualized. There is however no pericecal stranding or fluid.   VESSELS: There is no aneurysmal dilatation of the abdominal aorta. The IVC appears normal. Mild atherosclerotic calcifications of the abdominal aorta and its branching vessels are noted.   PERITONEUM/RETROPERITONEUM/LYMPH NODES: There is no free or loculated fluid collection, no free intraperitoneal air. The retroperitoneum appears normal.  No abdominopelvic lymphadenopathy by CT size criteria.   BONES AND ABDOMINAL WALL: Left total hip arthroplasty is noted without evidence of hardware complication. Posterior spinal fusion hardware of L2-S1 with  intervertebral disc spacer at L4-L5 again noted. No acute osseous abnormalities or suspicious osseous lesions. Multilevel discogenic degenerative changes are again noted throughout the lower thoracic and lumbar spine with scattered intervertebral disc space narrowing and vertebral body osteophytosis. Unchanged appearance of a moderate-sized fat containing umbilical hernia and small fat containing right inguinal hernia.       1.  No acute process within the abdomen or pelvis. 2. Unchanged appearance of a moderate-sized fat containing umbilical hernia and small fat containing right inguinal hernia. 3. Colonic diverticulosis without evidence of diverticulitis.   MACRO: None   Signed by: Sean Jeong 3/8/2025 9:30 AM Dictation workstation:   [id number]                  Assessment/Plan   Assessment & Plan  Influenza A    COPD exacerbation (Multi)    Tobacco use    Primary insomnia    Hiatal hernia    Benign essential HTN    Acute respiratory failure with hypoxia (Multi)    Constipation    Influenza A  Acute hypoxic respiratory failure   COPD, in acute exacerbation  Tobacco use  - Presented to ED with 5 days of cough, congestion, shortness of breath, poor appetite, diarrhea, fevers, chills  - Flu A positive  - CT chest: No evidence consolidating infiltrate or effusion. Elevated left hemidiaphragm with left basilar subsegmental atelectasis.   - No leukocytosis   - D-dimer 1725  - BNP 96   - CTA chest: No acute pulmonary embolism, Unchanged bronchitis in both lower lobes and the middle lobe and the lingula   - Procal 0.05  - Pertussis PCR negative   - Sputum culture if able   - azithromycin 500 mg daily completed   - continue Solumedrol 40 mg IVP - decreased to q12h on 3/17    - continue DuoNebs TID  - increased Tessalon to 200 mg TID   - continue Lidocaine 4%  patch for pleuritic chest pain  - continue Tamiflu 75 mg BID; day 4/5  - continue Robitussin AC  mg q6h PRN for cough   - continue acetaminophen 650 mg q4h  PRN for fever   - RT eval and treat protocol  - Bronchial hygiene  - Isolation protocol for flu A  - Patient declines nicotine patch at this time; encourage cessation on discharge   - SpO2 85% on RA during the night  - supplemental oxygen to keep SpO2 > 90%  - currently on 2lpm via NC  - No oxygen at home  - Ambulatory pulse ox/overnight O2 trend before discharge   - PT/OT evals- patient agreeable to C on discharge      Constipation  - Patient reports no BM since before admission on 3/15, has had poor intake while admitted   - Continue Colace 100 mg BID   - Miralax 17 g every day  - Monitor for BM     Essential HTN  - continue losartan 100 mg daily  - monitor BP     Insomnia  - continue primidone 50 mg at bedtime   - started trazodone 50 mg at bedtime this admission; monitor response      Hiatal hernia  Chronic GERD  - continue pantoprazole 40 mg BID      DVT PPx:   - continue enoxaparin 40 mg daily     Code Status: Full      Disposition: Patient requires inpatient management at this time.            Susi Mejia PA-C

## 2025-03-18 NOTE — PROGRESS NOTES
03/18/25 1258   Discharge Planning   Living Arrangements Alone   Support Systems Friends/neighbors   Assistance Needed A&Ox3, independent with ADLs, utilizes a rollator or cane. Room air at baseline. Prior to admission pt placed himself on 2L supplemental oxygen using an oxygen concentrator that belonged to his girlfriend who passed away. Pt does not drive, uses [facility] or has a friend transport. Not active with [facility]. Pharmacy [facility] in [facility]. PCP Dr. Mendez Coronel.   Type of Residence Private residence   Home or Post Acute Services None  (Declining home care services)   Expected Discharge Disposition Home  (Patient declining recommended Low Intensity- he says once he is home, he will resume his activity.)   Does the patient need discharge transport arranged? No   Intensity of Service   Intensity of Service 0-30 min     Overnight study tonight regarding home oxygen.

## 2025-03-18 NOTE — CARE PLAN
The patient's goals for the shift include  to feel better     The clinical goals for the shift include Pt will maintain 92% or greater on 2L supplemental oxyen    Problem: Pain  Goal: Takes deep breaths with improved pain control throughout the shift  Outcome: Progressing  Goal: Turns in bed with improved pain control throughout the shift  Outcome: Progressing  Goal: Walks with improved pain control throughout the shift  Outcome: Progressing  Goal: Performs ADL's with improved pain control throughout shift  Outcome: Progressing  Goal: Participates in PT with improved pain control throughout the shift  Outcome: Progressing  Goal: Free from opioid side effects throughout the shift  Outcome: Progressing  Goal: Free from acute confusion related to pain meds throughout the shift  Outcome: Progressing     Problem: Safety - Adult  Goal: Free from fall injury  Outcome: Progressing     Problem: Pain - Adult  Goal: Verbalizes/displays adequate comfort level or baseline comfort level  Outcome: Progressing     Problem: Discharge Planning  Goal: Discharge to home or other facility with appropriate resources  Outcome: Progressing     Problem: Chronic Conditions and Co-morbidities  Goal: Patient's chronic conditions and co-morbidity symptoms are monitored and maintained or improved  Outcome: Progressing     Problem: Nutrition  Goal: Nutrient intake appropriate for maintaining nutritional needs  Outcome: Progressing

## 2025-03-18 NOTE — CARE PLAN
Problem: Pain  Goal: Takes deep breaths with improved pain control throughout the shift  Outcome: Progressing  Goal: Turns in bed with improved pain control throughout the shift  Outcome: Progressing  Goal: Walks with improved pain control throughout the shift  Outcome: Progressing  Goal: Performs ADL's with improved pain control throughout shift  Outcome: Progressing  Goal: Participates in PT with improved pain control throughout the shift  Outcome: Progressing  Goal: Free from opioid side effects throughout the shift  Outcome: Progressing  Goal: Free from acute confusion related to pain meds throughout the shift  Outcome: Progressing     Problem: Pain - Adult  Goal: Verbalizes/displays adequate comfort level or baseline comfort level  Outcome: Progressing     Problem: Safety - Adult  Goal: Free from fall injury  Outcome: Progressing     Problem: Discharge Planning  Goal: Discharge to home or other facility with appropriate resources  Outcome: Progressing     Problem: Chronic Conditions and Co-morbidities  Goal: Patient's chronic conditions and co-morbidity symptoms are monitored and maintained or improved  Outcome: Progressing     Problem: Nutrition  Goal: Nutrient intake appropriate for maintaining nutritional needs  Outcome: Progressing

## 2025-03-18 NOTE — NURSING NOTE
Assumed care of pt.  Sitting @ side of bed, eating lunch.  Offers no current complaints.  Call bell within reach.

## 2025-03-18 NOTE — PROGRESS NOTES
Physical Therapy    Physical Therapy Treatment    Patient Name: Julio Carranza  MRN: [id number]  Department: [facility]  Room: [address] [place] [address]  Today's Date: 3/18/2025  Time Calculation  Start Time: 0910  Stop Time: 0949  Time Calculation (min): 39 min         Assessment/Plan   PT Assessment  PT Assessment Results: Decreased endurance, Decreased mobility, Pain, Decreased strength  Rehab Prognosis: Good  Barriers to Discharge Home: No anticipated barriers  Evaluation/Treatment Tolerance: Patient limited by fatigue  Medical Staff Made Aware: Yes  Strengths: Rehab experience, Insight into problems, Attitude of self  Barriers to Participation: Comorbidities  End of Session Communication: Bedside nurse  Assessment Comment: Pt demonstrated improved bed mobility and transfer safety to and from bed and toilet this date with dist SUP with good controlled breahting techniques noted with 2L O2 cont. Pt demonstrated improved gait mechanics during room walking with poor cord management noted and SOB and b le faituge contributing to need to sit. Pt cont to require skilled PT to improve strength and endurnace and prevent furhter decline while here in hospital. Pt left in bed, alarm on call bell wihtin reach and all needs addressed.  End of Session Patient Position: Bed, 2 rail up, Alarm on     PT Plan  Treatment/Interventions: Bed mobility, Transfer training, Gait training, Strengthening, Endurance training, Range of motion, Therapeutic exercise, Therapeutic activity  PT Plan: Ongoing PT  PT Frequency: 3 times per week  PT Discharge Recommendations: Low intensity level of continued care  PT Recommended Transfer Status: Assist x1  PT - OK to Discharge: Yes      General Visit Information:   PT  Visit  PT Received On: 03/18/25  Response to Previous Treatment: Patient with no complaints from previous session.  General  Reason for Referral: impaired mobility s/p admission for influenza A  Referred By: Jackie JIMENEZ  Past Medical History Relevant to  Rehab: CAD, BPH, TIA, PE, OA, Chronic B knee pain, prior L [facility] 4/24  Family/Caregiver Present: No  Prior to Session Communication: Bedside nurse  Patient Position Received: Bed, 2 rail up, Alarm off, not on at start of session  Preferred Learning Style: kinesthetic, verbal  General Comment: Pt in bed and agreeable to therapy. Reports increased fatigue and not sleeping well with discomfort in abdomen reported from coughing.    Subjective Feeling tired and sore.   Precautions:  Precautions  LE Weight Bearing Status: Weight Bearing as Tolerated  Medical Precautions: Fall precautions  Precautions Comment: 2L O2          Objective   Pain:  Pain Assessment  Pain Assessment: 0-10  0-10 (Numeric) Pain Score: 5 - Moderate pain  Pain Type: Acute pain  Pain Location: Abdomen  Pain Orientation: Mid  Patient's Stated Pain Goal: No pain  Pain Interventions: Repositioned  Response to Interventions: No change in pain  Cognition:  Cognition  Overall Cognitive Status: Within Functional Limits  Orientation Level: Oriented X4  Attention: Within Functional Limits  Coordination:  Movements are Fluid and Coordinated: Yes    Activity Tolerance:  Activity Tolerance  Endurance: Tolerates 10 - 20 min exercise with multiple rests  Treatments:  Therapeutic Exercise  Therapeutic Exercise Performed: Yes  Therapeutic Exercise Activity 1: 3x5 STS  Therapeutic Exercise Activity 2: 2x10 LAQ       Bed Mobility  Bed Mobility: Yes  Bed Mobility 1  Bed Mobility 1: Supine to sitting  Level of Assistance 1: Modified independent  Bed Mobility Comments 1: Good ue placement and log rolling technique.    Ambulation/Gait Training  Ambulation/Gait Training Performed: Yes  Ambulation/Gait Training 1  Surface 1: Level tile  Device 1: No device  Gait Support Devices: Gait belt  Assistance 1: Close supervision  Quality of Gait 1: Inconsistent stride length, Decreased step length  Comments/Distance (ft) 1: 35-40 ft gait x 2 trials in room with SOB and fatigue  contributing to need to sit with reports on b le fatigue.  some narrow jeff noted during turns. inconsistent step lengths noted.  Transfers  Transfer: Yes  Transfer 1  Transfer From 1: Bed to  Transfer to 1: Stand  Technique 1: Sit to stand, Stand to sit  Transfer Device 1: Gait belt  Transfer Level of Assistance 1: Modified independent  Trials/Comments 1: mult trials MI         Outcome Measures:  [facility] Basic Mobility  Turning from your back to your side while in a flat bed without using bedrails: None  Moving from lying on your back to sitting on the side of a flat bed without using bedrails: None  Moving to and from bed to chair (including a wheelchair): A little  Standing up from a chair using your arms (e.g. wheelchair or bedside chair): None  To walk in hospital room: A little  Climbing 3-5 steps with railing: A little  Basic Mobility - Total Score: 21    Education Documentation  Mobility Training, taught by Shira Alonzo PTA at 3/18/2025  1:05 PM.  Learner: Patient  Readiness: Acceptance  Method: Explanation  Response: Verbalizes Understanding  Comment: Cues for posture and sfety during gait training for wider jeff and speed control to reduc erisk for falls.    Education Comments  No comments found.           Encounter Problems       Encounter Problems (Active)       Mobility       STG - Patient will ambulate 120' x 2 with LRAD mod I with improved gait mechanics and O2 remaining above 90% (Progressing)       Start:  03/17/25    Expected End:  03/24/25            Pt with transfer sit to stand to sit and from bed to chair to bed with LRAD mod I  (Progressing)       Start:  03/17/25    Expected End:  03/24/25            Pt will negotiate 5 stairs with B handrails Mod I  to simulate home entry and exit set up        Start:  03/17/25    Expected End:  03/24/25            Pt will improve B LE to WFL for improved overall functional mobility  (Progressing)       Start:  03/17/25    Expected End:  03/24/25                Pain - Adult

## 2025-03-18 NOTE — PROGRESS NOTES
Occupational Therapy                 Therapy Communication Note    Patient Name: Julio Carranza  MRN: [id number]  Department[facility]  [facility]  Today's Date: 3/18/2025     Discipline: Occupational Therapy    OT Missed Visit: Yes     Missed Visit Reason: Missed Visit Reason: Patient refused (Pt reports not feeling well, upset stomach following lunch. Pt educated on importance of OOB mobility and ADL completion. Pt continued to refuse. Will follow up per pt status and as schedule allows.)    Missed Time: Attempt [id number]

## 2025-03-19 ENCOUNTER — APPOINTMENT (OUTPATIENT)
Dept: RADIOLOGY | Facility: HOSPITAL | Age: 65
DRG: 193 | End: 2025-03-19
Payer: MEDICARE

## 2025-03-19 ENCOUNTER — APPOINTMENT (OUTPATIENT)
Dept: CARDIOLOGY | Facility: HOSPITAL | Age: 65
DRG: 193 | End: 2025-03-19
Payer: MEDICARE

## 2025-03-19 LAB
ANION GAP SERPL CALC-SCNC: 13 MMOL/L (ref 10–20)
ARTERIAL PATENCY WRIST A: POSITIVE
ATRIAL RATE: 67 BPM
ATRIAL RATE: 91 BPM
BASE EXCESS BLDA CALC-SCNC: 5.9 MMOL/L (ref -2–3)
BODY TEMPERATURE: ABNORMAL
BUN SERPL-MCNC: 28 MG/DL (ref 6–23)
CALCIUM SERPL-MCNC: 8.6 MG/DL (ref 8.6–10.3)
CARDIAC TROPONIN I PNL SERPL HS: 3 NG/L (ref 0–20)
CHLORIDE SERPL-SCNC: 106 MMOL/L (ref 98–107)
CO2 SERPL-SCNC: 23 MMOL/L (ref 21–32)
CREAT SERPL-MCNC: 0.86 MG/DL (ref 0.5–1.3)
D DIMER PPP FEU-MCNC: 695 NG/ML FEU
EGFRCR SERPLBLD CKD-EPI 2021: >90 ML/MIN/1.73M*2
ERYTHROCYTE [DISTWIDTH] IN BLOOD BY AUTOMATED COUNT: 12.1 % (ref 11.5–14.5)
GLUCOSE SERPL-MCNC: 110 MG/DL (ref 74–99)
HCO3 BLDA-SCNC: 27.8 MMOL/L (ref 22–26)
HCT VFR BLD AUTO: 41.2 % (ref 41–52)
HGB BLD-MCNC: 13.3 G/DL (ref 13.5–17.5)
HOLD SPECIMEN: NORMAL
INHALED O2 CONCENTRATION: 28 %
MCH RBC QN AUTO: 28.3 PG (ref 26–34)
MCHC RBC AUTO-ENTMCNC: 32.3 G/DL (ref 32–36)
MCV RBC AUTO: 88 FL (ref 80–100)
MRSA DNA SPEC QL NAA+PROBE: NOT DETECTED
NRBC BLD-RTO: 0 /100 WBCS (ref 0–0)
OXYHGB MFR BLDA: 94.1 % (ref 94–98)
P AXIS: 41 DEGREES
P AXIS: 47 DEGREES
P OFFSET: 191 MS
P OFFSET: 194 MS
P ONSET: 138 MS
P ONSET: 145 MS
PCO2 BLDA: 31 MM HG (ref 38–42)
PH BLDA: 7.56 PH (ref 7.38–7.42)
PLATELET # BLD AUTO: 212 X10*3/UL (ref 150–450)
PO2 BLDA: 65 MM HG (ref 85–95)
POTASSIUM SERPL-SCNC: 3.7 MMOL/L (ref 3.5–5.3)
PR INTERVAL: 160 MS
PR INTERVAL: 166 MS
Q ONSET: 221 MS
Q ONSET: 225 MS
QRS COUNT: 11 BEATS
QRS COUNT: 15 BEATS
QRS DURATION: 90 MS
QRS DURATION: 96 MS
QT INTERVAL: 370 MS
QT INTERVAL: 416 MS
QTC CALCULATION(BAZETT): 439 MS
QTC CALCULATION(BAZETT): 455 MS
QTC FREDERICIA: 425 MS
QTC FREDERICIA: 431 MS
R AXIS: 12 DEGREES
R AXIS: 40 DEGREES
RBC # BLD AUTO: 4.7 X10*6/UL (ref 4.5–5.9)
SAO2 % BLDA: 96 % (ref 94–100)
SODIUM SERPL-SCNC: 138 MMOL/L (ref 136–145)
SPECIMEN DRAWN FROM PATIENT: ABNORMAL
T AXIS: 27 DEGREES
T AXIS: 37 DEGREES
T OFFSET: 410 MS
T OFFSET: 429 MS
VENTRICULAR RATE: 67 BPM
VENTRICULAR RATE: 91 BPM
WBC # BLD AUTO: 7.6 X10*3/UL (ref 4.4–11.3)

## 2025-03-19 PROCEDURE — 82810 BLOOD GASES O2 SAT ONLY: CPT | Mod: IPSPLIT | Performed by: STUDENT IN AN ORGANIZED HEALTH CARE EDUCATION/TRAINING PROGRAM

## 2025-03-19 PROCEDURE — 94640 AIRWAY INHALATION TREATMENT: CPT | Mod: IPSPLIT

## 2025-03-19 PROCEDURE — 2500000001 HC RX 250 WO HCPCS SELF ADMINISTERED DRUGS (ALT 637 FOR MEDICARE OP): Mod: IPSPLIT | Performed by: NURSE PRACTITIONER

## 2025-03-19 PROCEDURE — 1200000002 HC GENERAL ROOM WITH TELEMETRY DAILY: Mod: IPSPLIT

## 2025-03-19 PROCEDURE — 74018 RADEX ABDOMEN 1 VIEW: CPT | Mod: IPSPLIT

## 2025-03-19 PROCEDURE — 71045 X-RAY EXAM CHEST 1 VIEW: CPT | Mod: IPSPLIT

## 2025-03-19 PROCEDURE — 80048 BASIC METABOLIC PNL TOTAL CA: CPT | Mod: IPSPLIT

## 2025-03-19 PROCEDURE — 2500000005 HC RX 250 GENERAL PHARMACY W/O HCPCS: Mod: IPSPLIT

## 2025-03-19 PROCEDURE — 74176 CT ABD & PELVIS W/O CONTRAST: CPT | Mod: IPSPLIT

## 2025-03-19 PROCEDURE — 87205 SMEAR GRAM STAIN: CPT | Mod: GENLAB

## 2025-03-19 PROCEDURE — 74176 CT ABD & PELVIS W/O CONTRAST: CPT | Performed by: STUDENT IN AN ORGANIZED HEALTH CARE EDUCATION/TRAINING PROGRAM

## 2025-03-19 PROCEDURE — 2500000002 HC RX 250 W HCPCS SELF ADMINISTERED DRUGS (ALT 637 FOR MEDICARE OP, ALT 636 FOR OP/ED): Mod: IPSPLIT | Performed by: NURSE PRACTITIONER

## 2025-03-19 PROCEDURE — 2500000002 HC RX 250 W HCPCS SELF ADMINISTERED DRUGS (ALT 637 FOR MEDICARE OP, ALT 636 FOR OP/ED): Mod: IPSPLIT

## 2025-03-19 PROCEDURE — 87640 STAPH A DNA AMP PROBE: CPT | Mod: IPSPLIT

## 2025-03-19 PROCEDURE — 2500000002 HC RX 250 W HCPCS SELF ADMINISTERED DRUGS (ALT 637 FOR MEDICARE OP, ALT 636 FOR OP/ED): Mod: IPSPLIT | Performed by: INTERNAL MEDICINE

## 2025-03-19 PROCEDURE — 36600 WITHDRAWAL OF ARTERIAL BLOOD: CPT | Mod: IPSPLIT

## 2025-03-19 PROCEDURE — 74018 RADEX ABDOMEN 1 VIEW: CPT | Performed by: RADIOLOGY

## 2025-03-19 PROCEDURE — 99233 SBSQ HOSP IP/OBS HIGH 50: CPT

## 2025-03-19 PROCEDURE — 82378 CARCINOEMBRYONIC ANTIGEN: CPT | Mod: GENLAB | Performed by: NURSE PRACTITIONER

## 2025-03-19 PROCEDURE — 36415 COLL VENOUS BLD VENIPUNCTURE: CPT | Mod: IPSPLIT

## 2025-03-19 PROCEDURE — 2500000005 HC RX 250 GENERAL PHARMACY W/O HCPCS: Mod: IPSPLIT | Performed by: INTERNAL MEDICINE

## 2025-03-19 PROCEDURE — 94760 N-INVAS EAR/PLS OXIMETRY 1: CPT | Mod: IPSPLIT

## 2025-03-19 PROCEDURE — 84484 ASSAY OF TROPONIN QUANT: CPT | Mod: IPSPLIT | Performed by: STUDENT IN AN ORGANIZED HEALTH CARE EDUCATION/TRAINING PROGRAM

## 2025-03-19 PROCEDURE — 85027 COMPLETE CBC AUTOMATED: CPT | Mod: IPSPLIT

## 2025-03-19 PROCEDURE — 93005 ELECTROCARDIOGRAM TRACING: CPT | Mod: IPSPLIT

## 2025-03-19 PROCEDURE — 71045 X-RAY EXAM CHEST 1 VIEW: CPT | Performed by: RADIOLOGY

## 2025-03-19 PROCEDURE — 71275 CT ANGIOGRAPHY CHEST: CPT | Performed by: RADIOLOGY

## 2025-03-19 PROCEDURE — 2550000001 HC RX 255 CONTRASTS: Mod: IPSPLIT | Performed by: INTERNAL MEDICINE

## 2025-03-19 PROCEDURE — 71275 CT ANGIOGRAPHY CHEST: CPT | Mod: IPSPLIT

## 2025-03-19 PROCEDURE — 85379 FIBRIN DEGRADATION QUANT: CPT | Mod: IPSPLIT | Performed by: STUDENT IN AN ORGANIZED HEALTH CARE EDUCATION/TRAINING PROGRAM

## 2025-03-19 PROCEDURE — 84134 ASSAY OF PREALBUMIN: CPT | Mod: GENLAB

## 2025-03-19 PROCEDURE — 2500000001 HC RX 250 WO HCPCS SELF ADMINISTERED DRUGS (ALT 637 FOR MEDICARE OP): Mod: IPSPLIT

## 2025-03-19 PROCEDURE — 94669 MECHANICAL CHEST WALL OSCILL: CPT | Mod: IPSPLIT

## 2025-03-19 PROCEDURE — 2500000004 HC RX 250 GENERAL PHARMACY W/ HCPCS (ALT 636 FOR OP/ED): Mod: IPSPLIT

## 2025-03-19 RX ORDER — LEVOFLOXACIN 500 MG/1
750 TABLET, FILM COATED ORAL EVERY 24 HOURS
Status: DISCONTINUED | OUTPATIENT
Start: 2025-03-19 | End: 2025-03-21

## 2025-03-19 RX ORDER — SYRING-NEEDL,DISP,INSUL,0.3 ML 29 G X1/2"
296 SYRINGE, EMPTY DISPOSABLE MISCELLANEOUS ONCE
Status: COMPLETED | OUTPATIENT
Start: 2025-03-19 | End: 2025-03-19

## 2025-03-19 RX ORDER — BISACODYL 10 MG/1
10 SUPPOSITORY RECTAL ONCE
Status: COMPLETED | OUTPATIENT
Start: 2025-03-19 | End: 2025-03-19

## 2025-03-19 RX ORDER — SIMETHICONE 80 MG
80 TABLET,CHEWABLE ORAL 4 TIMES DAILY PRN
Status: DISCONTINUED | OUTPATIENT
Start: 2025-03-19 | End: 2025-03-21

## 2025-03-19 RX ORDER — LEVOFLOXACIN 500 MG/1
500 TABLET, FILM COATED ORAL EVERY 24 HOURS
Status: DISCONTINUED | OUTPATIENT
Start: 2025-03-19 | End: 2025-03-19

## 2025-03-19 RX ORDER — LACTULOSE 10 G/15ML
20 SOLUTION ORAL DAILY
Status: DISCONTINUED | OUTPATIENT
Start: 2025-03-19 | End: 2025-03-22

## 2025-03-19 RX ADMIN — BENZONATATE 200 MG: 100 CAPSULE ORAL at 20:17

## 2025-03-19 RX ADMIN — PRIMIDONE 50 MG: 50 TABLET ORAL at 20:15

## 2025-03-19 RX ADMIN — ALBUTEROL SULFATE 2.5 MG: 2.5 SOLUTION RESPIRATORY (INHALATION) at 05:07

## 2025-03-19 RX ADMIN — Medication 2 L/MIN: at 20:26

## 2025-03-19 RX ADMIN — FOLIC ACID 1 MG: 1 TABLET ORAL at 08:48

## 2025-03-19 RX ADMIN — METHYLPREDNISOLONE SODIUM SUCCINATE 40 MG: 40 INJECTION, POWDER, FOR SOLUTION INTRAMUSCULAR; INTRAVENOUS at 17:46

## 2025-03-19 RX ADMIN — THIAMINE HCL TAB 100 MG 100 MG: 100 TAB at 08:48

## 2025-03-19 RX ADMIN — Medication 2 L/MIN: at 14:59

## 2025-03-19 RX ADMIN — LOSARTAN POTASSIUM 100 MG: 50 TABLET, FILM COATED ORAL at 08:48

## 2025-03-19 RX ADMIN — POLYETHYLENE GLYCOL 3350 17 G: 17 POWDER, FOR SOLUTION ORAL at 10:03

## 2025-03-19 RX ADMIN — Medication 6 MG: at 20:15

## 2025-03-19 RX ADMIN — LACTULOSE 20 G: 20 SOLUTION ORAL at 08:47

## 2025-03-19 RX ADMIN — GUAIFENESIN AND CODEINE PHOSPHATE 5 ML: 100; 10 SOLUTION ORAL at 06:11

## 2025-03-19 RX ADMIN — ONDANSETRON 4 MG: 2 INJECTION INTRAMUSCULAR; INTRAVENOUS at 17:46

## 2025-03-19 RX ADMIN — Medication 2 L/MIN: at 05:07

## 2025-03-19 RX ADMIN — DOCUSATE SODIUM 100 MG: 100 CAPSULE, LIQUID FILLED ORAL at 08:48

## 2025-03-19 RX ADMIN — BISACODYL 10 MG: 10 SUPPOSITORY RECTAL at 16:08

## 2025-03-19 RX ADMIN — Medication 296 ML: at 12:46

## 2025-03-19 RX ADMIN — METHYLPREDNISOLONE SODIUM SUCCINATE 40 MG: 40 INJECTION, POWDER, FOR SOLUTION INTRAMUSCULAR; INTRAVENOUS at 05:26

## 2025-03-19 RX ADMIN — LEVOFLOXACIN 750 MG: 500 TABLET, FILM COATED ORAL at 13:33

## 2025-03-19 RX ADMIN — TRAZODONE HYDROCHLORIDE 50 MG: 50 TABLET ORAL at 20:15

## 2025-03-19 RX ADMIN — BENZONATATE 200 MG: 100 CAPSULE ORAL at 08:47

## 2025-03-19 RX ADMIN — IPRATROPIUM BROMIDE AND ALBUTEROL SULFATE 3 ML: .5; 3 SOLUTION RESPIRATORY (INHALATION) at 14:54

## 2025-03-19 RX ADMIN — PANTOPRAZOLE SODIUM 40 MG: 40 TABLET, DELAYED RELEASE ORAL at 06:11

## 2025-03-19 RX ADMIN — ENOXAPARIN SODIUM 40 MG: 40 INJECTION SUBCUTANEOUS at 13:34

## 2025-03-19 RX ADMIN — DOCUSATE SODIUM 100 MG: 100 CAPSULE, LIQUID FILLED ORAL at 20:15

## 2025-03-19 RX ADMIN — IOHEXOL 75 ML: 350 INJECTION, SOLUTION INTRAVENOUS at 07:12

## 2025-03-19 RX ADMIN — IPRATROPIUM BROMIDE AND ALBUTEROL SULFATE 3 ML: .5; 3 SOLUTION RESPIRATORY (INHALATION) at 09:20

## 2025-03-19 RX ADMIN — OSELTAMAVIR PHOSPHATE 75 MG: 75 CAPSULE ORAL at 08:48

## 2025-03-19 RX ADMIN — ONDANSETRON 4 MG: 2 INJECTION INTRAMUSCULAR; INTRAVENOUS at 11:37

## 2025-03-19 ASSESSMENT — PAIN SCALES - GENERAL
PAINLEVEL_OUTOF10: 5 - MODERATE PAIN
PAINLEVEL_OUTOF10: 2
PAINLEVEL_OUTOF10: 0 - NO PAIN
PAINLEVEL_OUTOF10: 7
PAINLEVEL_OUTOF10: 4

## 2025-03-19 ASSESSMENT — COGNITIVE AND FUNCTIONAL STATUS - GENERAL
CLIMB 3 TO 5 STEPS WITH RAILING: A LITTLE
DRESSING REGULAR UPPER BODY CLOTHING: A LITTLE
DAILY ACTIVITIY SCORE: 22
WALKING IN HOSPITAL ROOM: A LITTLE
HELP NEEDED FOR BATHING: A LITTLE
MOBILITY SCORE: 21
STANDING UP FROM CHAIR USING ARMS: A LITTLE

## 2025-03-19 ASSESSMENT — PAIN - FUNCTIONAL ASSESSMENT
PAIN_FUNCTIONAL_ASSESSMENT: 0-10

## 2025-03-19 NOTE — CARE PLAN
The patient's goals for the shift include      The clinical goals for the shift include patient will maintain 92% or greater while on supplemental oxygen this shift    Over the shift, the patient did make progress toward the following goals.     Problem: Pain - Adult  Goal: Verbalizes/displays adequate comfort level or baseline comfort level  Outcome: Progressing     Problem: Safety - Adult  Goal: Free from fall injury  Outcome: Progressing

## 2025-03-19 NOTE — CONSULTS
"Nutrition Initial Assessment:   Nutrition Assessment    Reason for Assessment: Provider consult order (Pt requesting strawberry ensure due to poor PO intake 2/2 flu)    Patient is a 64 y.o. male presenting with Influenza A.    PMH: Acute respiratory failure with hypoxia, COPD exacerbation, constipation, benign essential hypertension (HTN), hiatal hernia, tobacco use, and primary insomnia.    Nutrition History:  Energy Intake: Poor < 50 %  Pain affecting nutrition status: Yes  If yes, Informed Nursing: Saadia Luciano RN  Food and Nutrient History: Patient reports poor oral intake due to abdominal discomfort, nausea, and early satiety. He stated he is only tolerating small amounts of mashed potatoes and chicken soup and is currently consuming Ensure Plus ~3 times/day. He denied vomiting or diarrhea but reported persistent abdominal pain, nausea, and distention. Per nursing documentation, meal intake has consistently ranged from 0-33%, and breakfast was refused this morning. Patient expressed that he hears his stomach making noise but does not feel hunger cues. He has been encouraged to consume small frequent meals/snacks to help stimulate appetite. Constipation continues to be a concern, though patient reports some gas passage and minimal stool. He also reports tremors in his hands, possibly affecting self-feeding ability, though no documentation yet confirms functional impairment. Patient reports ongoing complaints of abdominal pain and distention. He is currently being treated with Miralax and Colace for constipation. According to the nursing flowsheet, the abdomen is noted to be distended, rounded, and tender in all quadrants. The patient is passing gas, and there is no documentation of emesis or evidence of ileus. Bowel sounds are present.       Anthropometrics:  Height: 188 cm (6' 2\")   Weight: 104 kg (230 lb)   BMI (Calculated): 29.52  IBW/kg (Dietitian Calculated): 86 kg  Percent of IBW: 120 %       Weight " History:   Wt Readings from Last 10 Encounters:   03/19/25 104 kg (230 lb)   01/15/25 104 kg (230 lb)   10/21/24 103 kg (227 lb 6.4 oz)   09/30/24 102 kg (224 lb)   09/24/24 101 kg (223 lb 3.2 oz)   06/13/24 103 kg (228 lb)   05/13/24 99.2 kg (218 lb 12.8 oz)   04/01/24 103 kg (227 lb 1.2 oz)   03/13/24 106 kg (234 lb 9.1 oz)   02/12/24 104 kg (229 lb)       Weight Change %:  Weight History / % Weight Change: 03/19/25 (104 kg) to 01/15/25 (104 kg), no change in ~2 months. 03/19/25 (104 kg) to 09/24/24 (101 kg), ~3% gain in ~6 months. 03/19/25 (104 kg) to 06/13/24 (103 kg), ~1% gain in ~9 months.  03/19/25 (104 kg) to 03/13/24 (106 kg), ~1.9% loss in ~1 year.  Significant Weight Loss: No    Nutrition Focused Physical Exam Findings:    Subcutaneous Fat Loss:   Defer Subcutaneous Fat Loss Assessment: Defer all  Defer All Reason: spoke with pt on phone  Muscle Wasting:  Defer Muscle Wasting Assessment: Defer all  Defer All Reason: spoke with pt on phone  Edema:  Edema: none  Physical Findings:  Digestive System Findings: Abdominal bloating, Abdominal distension, Abdominal pain, Constipation, Nausea    Nutrition Significant Labs:  CBC Trend:   Results from last 7 days   Lab Units 03/19/25  0546 03/18/25  0650 03/17/25  0759 03/16/25  0532   WBC AUTO x10*3/uL 7.6 8.2 7.5 3.3*   RBC AUTO x10*6/uL 4.70 4.67 4.61 4.74   HEMOGLOBIN g/dL 13.3* 13.5 13.3* 13.7   HEMATOCRIT % 41.2 41.1 40.7* 42.6   MCV fL 88 88 88 90   PLATELETS AUTO x10*3/uL 212 220 206 205    , BMP Trend:   Results from last 7 days   Lab Units 03/19/25  0546 03/18/25  0650 03/17/25  0758 03/16/25  0532   GLUCOSE mg/dL 110* 108* 156* 171*   CALCIUM mg/dL 8.6 8.5* 8.4* 8.5*   SODIUM mmol/L 138 142 142 138   POTASSIUM mmol/L 3.7 4.0 3.9 4.0   CO2 mmol/L 23 27 24 24   CHLORIDE mmol/L 106 108* 108* 107   BUN mg/dL 28* 24* 22 17   CREATININE mg/dL 0.86 0.92 0.85 0.77     Nutrition Specific Medications:  benzonatate, 200 mg, oral, TID  docusate sodium, 100 mg, oral,  BID  enoxaparin, 40 mg, subcutaneous, q24h  folic acid, 1 mg, oral, Daily  lactulose, 20 g, oral, Daily  levoFLOXacin, 750 mg, oral, q24h  losartan, 100 mg, oral, Daily  methylPREDNISolone sodium succinate (PF), 40 mg, intravenous, q12h  oseltamivir, 75 mg, oral, BID  pantoprazole, 40 mg, oral, BID AC  polyethylene glycol, 17 g, oral, Daily  primidone, 50 mg, oral, Nightly  thiamine, 100 mg, oral, Daily  traZODone, 50 mg, oral, Nightly    I/O:   Last BM Date: 03/14/25;      Dietary Orders (From admission, onward)       Start     Ordered    03/17/25 1543  Oral nutritional supplements  Until discontinued        Question Answer Comment   Deliver with Breakfast    Deliver with Dinner    Deliver with Lunch    Select supplement: Ensure Plus High Protein        03/17/25 1542    03/15/25 1511  May Participate in Room Service With Assistance  ( ROOM SERVICE MAY PARTICIPATE WITH ASSISTANCE)  Once        Question:  .  Answer:  Yes    03/15/25 1510    03/15/25 1258  Adult diet Regular  Diet effective now        Question:  Diet type  Answer:  Regular    03/15/25 1257                Estimated Needs:   Total Energy Estimated Needs in 24 hours (kCal):  (5421-7531 kcal)  Method for Estimating Needs: 25-30 kcal/kg of IBW  Total Protein Estimated Needs in 24 Hours (g):  (103-112g)  Method for Estimating 24 Hour Protein Needs: 1.2-1.3 g/kg of IBW  Total Fluid Estimated Needs in 24 Hours (mL):  (7462-3247 mL)  Method for Estimating 24 Hour Fluid Needs: 1 mL/kcal or per medical team.  Patient on Order Fluid Restriction: No        Nutrition Diagnosis   Malnutrition Diagnosis  Patient has Malnutrition Diagnosis: No (not enough information to fully assess. At risk for Malnutrition)    Nutrition Diagnosis  Patient has Nutrition Diagnosis: Yes  Diagnosis Status (1): New  Nutrition Diagnosis 1: Inadequate protein energy intake  Related to (1): abdominal discomfort, early satiety, nausea, and possible functional limitation from tremors  As  Evidenced by (1): eported intake of <50% of meals, refusal of breakfast, patient report of only tolerating small amounts of food, and nursing documentation of 0-33% meal intake and abdominal distention/tenderness.       Nutrition Interventions/Recommendations   Nutrition prescription for oral nutrition    Nutrition Recommendations:  Individualized Nutrition Prescription Provided for : Regular diet, Ensure Plus High Protein TID    Nutrition Interventions/Goals:   Interventions: Medical food supplement, Meals and snacks  Meals and Snacks: General healthful diet  Goal: encourage protein rich foods. Encourage softer foods that are tolerated  Medical Food Supplement: Commercial beverage medical food supplement therapy  Goal: Ensure Plus High Protein TID (350kcal and 20g protein per 8 fluid oz)  Coordination of Care with Providers: Nursing (IDT rounds and Available - Signed In until 1930 Saadia Luciano RN)      Education Documentation  Nutrition Related Education, taught by Ashley Burns RDN, BRENDAN at 3/19/2025 11:13 AM.  Learner: Patient  Readiness: Acceptance  Method: Explanation  Response: Needs Reinforcement, Verbalizes Understanding          Nutrition Monitoring and Evaluation   Food/Nutrient Related History Monitoring  Monitoring and Evaluation Plan: Intake / amount of food  Intake / Amount of food: Consumes at least 50% or more of meals/snacks/supplements    Anthropometric Measurements  Monitoring and Evaluation Plan: Body weight  Body Weight: Body weight - Maintain stable weight       Physical Exam Findings  Monitoring and Evaluation Plan: Digestive System  Digestive System Finding: Abdominal bloating, Abdominal distension, Abdominal pain, Constipation, Nausea  Criteria: WNL  Other: Evaluate nutrition intervention as compared to nutrition goal(s) and estimated nutrient need criteria.    Goal Status: New goal(s) identified    Time Spent (min): 60 minutes

## 2025-03-19 NOTE — CARE PLAN
The patient's goals for the shift include  no SOB    The clinical goals for the shift include pt will have a decrease in chest pain this shift    Over the shift, the patient did not make progress toward the following goals. Pt continued to have SOB this shift. Placed on RA and did not tolerate, placed back on 2L NC. Pt c/o abd pain. ABD XRAY done. Pt given suppository, and mag citrate this shift with some effect. Pt given Zofran for c/o nausea. 1x episode of emesis. Poor appetite this shift.       Problem: Pain  Goal: Takes deep breaths with improved pain control throughout the shift  Outcome: Progressing  Goal: Turns in bed with improved pain control throughout the shift  Outcome: Progressing  Goal: Walks with improved pain control throughout the shift  Outcome: Not Progressing  Goal: Performs ADL's with improved pain control throughout shift  Outcome: Progressing  Goal: Participates in PT with improved pain control throughout the shift  Outcome: Progressing  Goal: Free from opioid side effects throughout the shift  Outcome: Met  Goal: Free from acute confusion related to pain meds throughout the shift  Outcome: Met     Problem: Pain - Adult  Goal: Verbalizes/displays adequate comfort level or baseline comfort level  Outcome: Met     Problem: Safety - Adult  Goal: Free from fall injury  Outcome: Met     Problem: Discharge Planning  Goal: Discharge to home or other facility with appropriate resources  Outcome: Met     Problem: Chronic Conditions and Co-morbidities  Goal: Patient's chronic conditions and co-morbidity symptoms are monitored and maintained or improved  Outcome: Progressing     Problem: Nutrition  Goal: Nutrient intake appropriate for maintaining nutritional needs  Outcome: Not Progressing     Problem: Fall/Injury  Goal: Not fall by end of shift  Outcome: Progressing  Goal: Be free from injury by end of the shift  Outcome: Progressing  Goal: Verbalize understanding of personal risk factors for fall in  the hospital  Outcome: Met  Goal: Verbalize understanding of risk factor reduction measures to prevent injury from fall in the home  Outcome: Met  Goal: Use assistive devices by end of the shift  Outcome: Progressing  Goal: Pace activities to prevent fatigue by end of the shift  Outcome: Met        Problem: Pain  Goal: Walks with improved pain control throughout the shift  Outcome: Not Progressing     Problem: Nutrition  Goal: Nutrient intake appropriate for maintaining nutritional needs  Outcome: Not Progressing

## 2025-03-19 NOTE — PROGRESS NOTES
Julio Carranza is a 64 y.o. male on day 3 of admission presenting with Influenza A.    Subjective     CTA chest was repeated overnight due to worsening shortness of breath. Troponin at that time was 3. Patient remained on 2L O2 via NC.     Patient is on 2L O2 this morning. CT PE was negative for PE but showed a new area of atelectasis in the left upper lobe. Imaging reviewed with Dr. Michele, will start patient on Levaquin for CAP coverage as he is still requiring supplemental O2. Patient reports he had a small BM overnight but still feels very constipated. He is passing gas frequently and is nauseous. KUB this morning is pending official read but does appear to show significant stool burden. Patient was given lactulose and mag citrate today for constipation, will continue to monitor for BM. Plan of care discussed with patient, all questions answered.         Objective     Physical Exam  Constitutional:       General: He is not in acute distress.     Appearance: He is obese. He is not toxic-appearing.   HENT:      Head: Normocephalic and atraumatic.      Mouth/Throat:      Mouth: Mucous membranes are moist.   Eyes:      Conjunctiva/sclera: Conjunctivae normal.   Cardiovascular:      Rate and Rhythm: Normal rate and regular rhythm.   Pulmonary:      Effort: No respiratory distress.      Breath sounds: Wheezing and rhonchi present.      Comments: On 2L O2 via NC  Abdominal:      General: Bowel sounds are normal. There is distension.      Palpations: Abdomen is soft.      Tenderness: There is abdominal tenderness. There is no guarding.      Hernia: A hernia is present.   Musculoskeletal:         General: No swelling.   Skin:     General: Skin is warm and dry.   Neurological:      Mental Status: He is alert and oriented to person, place, and time.   Psychiatric:         Mood and Affect: Mood normal.         Behavior: Behavior normal.         Last Recorded Vitals  Blood pressure 136/87, pulse 67, temperature 36.9 °C (98.4  "°F), temperature source Temporal, resp. rate 14, height 1.88 m (6' 2\"), weight 104 kg (230 lb), SpO2 94%.  Intake/Output last 3 Shifts:  I/O last 3 completed shifts:  In: 560 (5.4 mL/kg) [P.O.:560]  Out: - (0 mL/kg)   Weight: 104.3 kg     Relevant Results          Scheduled medications  benzonatate, 200 mg, oral, TID  docusate sodium, 100 mg, oral, BID  enoxaparin, 40 mg, subcutaneous, q24h  influenza, 0.5 mL, intramuscular, During hospitalization  folic acid, 1 mg, oral, Daily  ipratropium-albuteroL, 3 mL, nebulization, TID  lactulose, 20 g, oral, Daily  levoFLOXacin, 750 mg, oral, q24h  lidocaine, 1 patch, transdermal, Daily  losartan, 100 mg, oral, Daily  methylPREDNISolone sodium succinate (PF), 40 mg, intravenous, q12h  oseltamivir, 75 mg, oral, BID  oxygen, , inhalation, Continuous - Inhalation  pantoprazole, 40 mg, oral, BID AC  polyethylene glycol, 17 g, oral, Daily  primidone, 50 mg, oral, Nightly  thiamine, 100 mg, oral, Daily  traZODone, 50 mg, oral, Nightly      Continuous medications     PRN medications  PRN medications: acetaminophen **OR** [DISCONTINUED] acetaminophen **OR** [DISCONTINUED] acetaminophen, acetaminophen **OR** [DISCONTINUED] acetaminophen **OR** [DISCONTINUED] acetaminophen, albuterol, albuterol, baclofen, benzocaine-menthol, codeine-guaifenesin, melatonin, ondansetron ODT **OR** ondansetron    Results for orders placed or performed during the hospital encounter of 03/15/25 (from the past 24 hours)   CBC   Result Value Ref Range    WBC 7.6 4.4 - 11.3 x10*3/uL    nRBC 0.0 0.0 - 0.0 /100 WBCs    RBC 4.70 4.50 - 5.90 x10*6/uL    Hemoglobin 13.3 (L) 13.5 - 17.5 g/dL    Hematocrit 41.2 41.0 - 52.0 %    MCV 88 80 - 100 fL    MCH 28.3 26.0 - 34.0 pg    MCHC 32.3 32.0 - 36.0 g/dL    RDW 12.1 11.5 - 14.5 %    Platelets 212 150 - 450 x10*3/uL   Basic Metabolic Panel   Result Value Ref Range    Glucose 110 (H) 74 - 99 mg/dL    Sodium 138 136 - 145 mmol/L    Potassium 3.7 3.5 - 5.3 mmol/L    " Chloride 106 98 - 107 mmol/L    Bicarbonate 23 21 - 32 mmol/L    Anion Gap 13 10 - 20 mmol/L    Urea Nitrogen 28 (H) 6 - 23 mg/dL    Creatinine 0.86 0.50 - 1.30 mg/dL    eGFR >90 >60 mL/min/1.73m*2    Calcium 8.6 8.6 - 10.3 mg/dL   Troponin I, High Sensitivity   Result Value Ref Range    Troponin I, High Sensitivity 3 0 - 20 ng/L   D-dimer, VTE Exclusion   Result Value Ref Range    D-Dimer, Quantitative VTE Exclusion 695 (H) <=500 ng/mL FEU   SST TOP   Result Value Ref Range    Extra Tube Hold for add-ons.    Blood Gas Arterial   Result Value Ref Range    POCT pH, Arterial 7.56 (H) 7.38 - 7.42 pH    POCT pCO2, Arterial 31 (L) 38 - 42 mm Hg    POCT pO2, Arterial 65 (L) 85 - 95 mm Hg    POCT SO2, Arterial 96 94 - 100 %    POCT Oxy Hemoglobin, Arterial 94.1 94.0 - 98.0 %    POCT Base Excess, Arterial 5.9 (H) -2.0 - 3.0 mmol/L    POCT HCO3 Calculated, Arterial 27.8 (H) 22.0 - 26.0 mmol/L    Patient Temperature      FiO2 28 %    Site of Arterial Puncture Radial Left     Tex's Test Positive    ECG 12 lead   Result Value Ref Range    Ventricular Rate 67 BPM    Atrial Rate 67 BPM    MT Interval 166 ms    QRS Duration 96 ms    QT Interval 416 ms    QTC Calculation(Bazett) 439 ms    P Axis 47 degrees    R Axis 12 degrees    T Axis 27 degrees    QRS Count 11 beats    Q Onset 221 ms    P Onset 138 ms    P Offset 191 ms    T Offset 429 ms    QTC Fredericia 431 ms   MRSA Surveillance for Vancomycin De-escalation, PCR    Specimen: Anterior Nares; Swab   Result Value Ref Range    MRSA PCR Not Detected Not Detected     *Note: Due to a large number of results and/or encounters for the requested time period, some results have not been displayed. A complete set of results can be found in Results Review.     ECG 12 lead    Result Date: 3/19/2025  Normal sinus rhythm Normal ECG When compared with ECG of 30-SEP-2024 13:32, No significant change was found See ED provider note for full interpretation and clinical correlation Confirmed by  Addie Rubio ([id number]) on 3/19/2025 12:37:38 PM    ECG 12 lead    Result Date: 3/19/2025  Normal sinus rhythm Normal ECG When compared with ECG of 15-MAR-2025 09:05, (unconfirmed) No significant change was found    CT angio chest for pulmonary embolism    Result Date: 3/19/2025  Interpreted By:  Isiah Rose, STUDY: CT ANGIO CHEST FOR PULMONARY EMBOLISM;  3/19/2025 7:14 am   INDICATION: 63 y/o   M with  Signs/Symptoms:hypoxia, chest pain, elevated D-dimer.     LIMITATIONS: None.   ACCESSION NUMBER(S): [id number]   ORDERING CLINICIAN: CHRISTOPHER GUEVARA   TECHNIQUE: After the administration of intravenous nonionic contrast, thin-section arterial phase images were obtained  from the thoracic inlet down through the iliac crest. Sagittal and coronal reconstruction images were generated. Axial and coronal MIP vascular reconstruction images were also generated.   Mediastinal, lung, bone, and liver windows were reviewed. Omnipaque 350   75 ML     COMPARISON: Most recent prior is from 03/15/2025.   FINDINGS: CHEST WALL/BASE OF THE NECK: The chest wall was grossly intact. No thyromegaly or thyroid mass.   MEDIASTINUM/MAIA: There are multiple small stable nonspecific bilateral hilar and central mediastinal lymph nodes. No axillary adenopathy. Mild cardiomegaly. No pericardial effusion. No thoracic aortic aneurysm or dissection. Imaging occurred late in the levo phase of enhancement. Because of this, there was greater enhancement in the the thoracic aorta than in the pulmonary arteries. Taking this into account, there was no pulmonary artery filling defect in the main pulmonary arteries out to each hilum. Distal to each hilum, the exam is nondiagnostic for pulmonary embolism due to suboptimal vessel opacification.   LUNGS/ PLEURA/ AND TRACHEA: Elevation of the left diaphragm with mild associated pleural and parenchymal scarring at the base of the left chest just above the apex of the elevated left diaphragm. There is a  new band of atelectasis posterolaterally at the base of the left upper lobe. There is scant atelectasis in the paraspinal right lower lobe. No mass or focal pneumonia in either lung. No  pleural effusion or pneumothorax. The trachea was grossly intact.   BONES: No destructive lytic or blastic bone lesion.  There is mild-to-moderate disc space narrowing and endplate osteophytosis throughout the thoracic spine. No acute fracture of the osseous thorax in this exam.   UPPER ABDOMEN: There was splenomegaly with the spleen measuring 14.0 cm AP. Otherwise, the imaged upper abdomen was grossly intact.       No CT evidence of pulmonary embolism in the main pulmonary arteries out through each hilum. For technical reasons, the exam is nondiagnostic for pulmonary embolism distal to each hilum.   Mild stable mediastinal and bilateral hilar adenopathy, perhaps reactive.   Elevation of the left diaphragm with stable scarring at the base of the left lung. There is also mild new atelectasis in the posterolateral inferior left upper lobe.   Cardiomegaly.   Splenomegaly.   Thoracic spine DJD as described. No CT evidence of acute fracture the osseous thorax in this exam.   MACRO: None   Signed by: Isiah Rose 3/19/2025 8:36 AM Dictation workstation:   [id number]    XR chest 1 view    Result Date: 3/19/2025  Interpreted By:  Isiah Rose, STUDY: XR CHEST 1 VIEW;  3/19/2025 5:49 am   INDICATION: Signs/Symptoms:Chest pain.   COMPARISON: CT scan chest from 03/15/2025. Chest x-ray from 03/15/2025 and another from 09/30/2024.   ACCESSION NUMBER(S): [id number]   ORDERING CLINICIAN: CHRISTOPHER GUEVARA   TECHNIQUE: Single AP portable view of the chest was obtained.   FINDINGS: MEDIASTINUM/ LUNGS/ MAIA: Suboptimal level of inspiration. Mild elevation of the left diaphragm. Band of linear atelectasis at the lateral left lung base. Cardiomegaly. Pulmonary vasculature and interstitium are mildly prominent. No abnormal opacity in either lung  worrisome for tumor or pneumonia. No pneumothorax. No tracheal deviation. No abnormal hilar fullness or gross mass on either side.   BONES: No lytic or blastic destructive bone lesion.   UPPER ABDOMEN: Grossly intact.       Hypoventilatory exam. Elevation of the left diaphragm.   Band of linear atelectasis at the lateral left lung base.   Findings suspicious for  mild CHF.   MACRO: None   Signed by: Isiah Rose 3/19/2025 8:27 AM Dictation workstation:   [id number]    CT angio chest for pulmonary embolism    Result Date: 3/15/2025  Interpreted By:  Adi Cedeño, STUDY: CT ANGIO CHEST FOR PULMONARY EMBOLISM;  3/15/2025 3:34 pm   INDICATION: Signs/Symptoms:hypoxia, elevated d-dimer.     COMPARISON: CT chest without contrast earlier same day 15 March 2025 at 1003 hours   ACCESSION NUMBER(S): [id number]   ORDERING CLINICIAN: GUSTAVO BATES   TECHNIQUE: Pulmonary arterial phase CT chest after the uneventful administration of 75 mL IV contrast (Omnipaque 350).   Three dimensional maximum intensity projection (3-D MIPs) image/s were created on a separate dedicated workstation, reviewed and saved   FINDINGS: Compared to earlier same day, this time with the benefit of IV contrast, the following newly evident pertinent negatives are as follows:   No acute pulmonary embolism through the segmental branch level;   No aortic dissection;   No acute right heart strain;   No other newly evident pertinent negatives   Still no pleural or pericardial effusion, pneumothorax, ground-glass airspace disease or lung consolidation   Unchanged bronchitis in both lower lobes and the middle lobe and the lingula   For other details refer to prior report from earlier today       No acute pulmonary embolism   See above and/or refer to prior report from earlier today   MACRO: None   Signed by: Adi Cedeño 3/15/2025 4:05 PM Dictation workstation:   [id number]    CT chest wo IV contrast    Result Date: 3/15/2025  STUDY: CT Chest without IV  Contrast; 3/15/2025 10:06 INDICATION: Evaluate for pneumonia. COMPARISON: 3/15/2025 XR Chest, 9/30/2024 CTA Chest, 9/3/2020 CT Chest. ACCESSION NUMBER(S): [id number] ORDERING CLINICIAN: LORI HADDAD TECHNIQUE:  CT of the chest was performed without contrast.  Automated mA/kV exposure control was utilized and patient examination was performed in strict accordance with principles of ALARA. FINDINGS: MEDIASTINUM: The heart is normal in size without pericardial effusion.  Coronary artery calcifications are identified.  LUNGS/PLEURA: There is elevation of left hemidiaphragm. There is an infiltrate in the left lung base most consistent with atelectasis. LYMPH NODES: There are prominent mediastinal lymph nodes. UPPER ABDOMEN: Upper abdomen demonstrates no acute pathology. BONES: There are no acute fractures.  No suspicious bony lesions.    No evidence consolidating infiltrate or effusion. Elevated left hemidiaphragm with left basilar subsegmental atelectasis. Signed by Noé Ogden MD    XR chest 2 views    Result Date: 3/15/2025  STUDY: Chest Radiographs;  3/15/2025 9:20AM INDICATION: Cough. COMPARISON: XR Chest: 9/30/2024, 4/3/2024 CT Chest: 9/3/2020 ACCESSION NUMBER(S): [id number] ORDERING CLINICIAN: LORI HADDAD TECHNIQUE:  Frontal and lateral chest. FINDINGS: CARDIOMEDIASTINAL SILHOUETTE: Cardiomediastinal silhouette is normal in size and configuration.  LUNGS: There are linear infiltrates in the left lung base.  These were seen as September 2024 unchanged.  ABDOMEN: No remarkable upper abdominal findings.  BONES: No acute osseous changes.    Chronic changes to the left lung base. No evidence of acute infiltrate or effusion. Signed by Noé Ogden MD    CT abdomen pelvis w IV and oral contrast    Result Date: 3/8/2025  Interpreted By:  Sean Jeong, STUDY: CT ABDOMEN PELVIS W IV AND ORAL CONTRAST;  3/7/2025 10:45 am   INDICATION: Signs/Symptoms:CT ABD PEL W ORAL AND IV.   ,R10.9 Unspecified abdominal  pain   COMPARISON: CT abdomen/pelvis dated 09/29/2022.   ACCESSION NUMBER(S): [id number]   ORDERING CLINICIAN: MOHIT MENJIVAR   TECHNIQUE: CT of the abdomen and pelvis was performed.  Standard contiguous axial images were obtained at 3 mm slice thickness through the abdomen and pelvis. Coronal and sagittal reconstructions at 3 mm slice thickness were performed.  75 ML of Omnipaque 350 was administered intravenously without immediate complication.   FINDINGS: LOWER CHEST: The partially visualized lower lungs are unremarkable. The heart is normal in size without significant pericardial effusion. The distal esophagus is unremarkable.   ABDOMEN:   LIVER: The liver is normal in size without evidence of focal liver lesions.   BILE DUCTS: The intrahepatic and extrahepatic ducts are not dilated.   GALLBLADDER: The gallbladder is nondistended and without evidence of radiopaque stones.   PANCREAS: The pancreas appears unremarkable without evidence of ductal dilatation or masses.   SPLEEN: The spleen is normal in size without focal lesions.   ADRENAL GLANDS: Bilateral adrenal glands appear normal.   KIDNEYS AND URETERS: The kidneys are normal in size and enhance symmetrically.  No hydroureteronephrosis or nephroureterolithiasis is identified.   PELVIS:   BLADDER: The urinary bladder appears normal without abnormal wall thickening.   REPRODUCTIVE ORGANS: The prostate is not enlarged. Postoperative changes of UroLift are suggested.   BOWEL: The stomach is unremarkable.   There is scattered colonic diverticulosis without evidence of diverticulitis. Otherwise, the small and large bowel are normal in caliber without evidence of inflammatory wall thickening. The appendix is not definitely visualized. There is however no pericecal stranding or fluid.   VESSELS: There is no aneurysmal dilatation of the abdominal aorta. The IVC appears normal. Mild atherosclerotic calcifications of the abdominal aorta and its branching vessels are  noted.   PERITONEUM/RETROPERITONEUM/LYMPH NODES: There is no free or loculated fluid collection, no free intraperitoneal air. The retroperitoneum appears normal.  No abdominopelvic lymphadenopathy by CT size criteria.   BONES AND ABDOMINAL WALL: Left total hip arthroplasty is noted without evidence of hardware complication. Posterior spinal fusion hardware of L2-S1 with intervertebral disc spacer at L4-L5 again noted. No acute osseous abnormalities or suspicious osseous lesions. Multilevel discogenic degenerative changes are again noted throughout the lower thoracic and lumbar spine with scattered intervertebral disc space narrowing and vertebral body osteophytosis. Unchanged appearance of a moderate-sized fat containing umbilical hernia and small fat containing right inguinal hernia.       1.  No acute process within the abdomen or pelvis. 2. Unchanged appearance of a moderate-sized fat containing umbilical hernia and small fat containing right inguinal hernia. 3. Colonic diverticulosis without evidence of diverticulitis.   MACRO: None   Signed by: Sean Jeong 3/8/2025 9:30 AM Dictation workstation:   [id number]                  Assessment/Plan   Assessment & Plan  Influenza A    COPD exacerbation (Multi)    Tobacco use    Primary insomnia    Hiatal hernia    Benign essential HTN    Acute respiratory failure with hypoxia (Multi)    Constipation    Influenza A  Acute hypoxic respiratory failure   COPD, in acute exacerbation  Tobacco use  - Presented to ED with 5 days of cough, congestion, shortness of breath, poor appetite, diarrhea, fevers, chills  - Flu A positive  - CT chest: No evidence consolidating infiltrate or effusion. Elevated left hemidiaphragm with left basilar subsegmental atelectasis.   - No leukocytosis   - D-dimer 1725  - BNP 96   - CTA chest 3/15: No acute pulmonary embolism, Unchanged bronchitis in both lower lobes and the middle lobe and the lingula   - Procal 0.05  - Pertussis PCR negative    - MRSA nares negative   - Sputum culture if able   - azithromycin 500 mg daily completed   - continue Solumedrol 40 mg IVP - decreased to q12h on 3/17    - continue DuoNebs TID  - increased Tessalon to 200 mg TID   - continue Lidocaine 4%  patch for pleuritic chest pain  - continue Tamiflu 75 mg BID; day 5/5  - continue Robitussin AC  mg q6h PRN for cough   - continue acetaminophen 650 mg q4h PRN for fever   - RT eval and treat protocol  - Bronchial hygiene  - Isolation protocol for flu A  - Patient declines nicotine patch at this time; encourage cessation on discharge   - SpO2 85% on RA during the night  - supplemental oxygen to keep SpO2 > 90%  - currently on 2lpm via NC  - No oxygen at home  - Ambulatory pulse ox/overnight O2 trend before discharge   - PT/OT evals- patient agreeable to [facility] on discharge   - CTA chest 3/19: No CT evidence of pulmonary embolism in the main pulmonary arteries out through each hilum. For technical reasons, the exam is nondiagnostic for pulmonary embolism distal to each hilum. Mild stable mediastinal and bilateral hilar adenopathy, perhaps reactive. Elevation of the left diaphragm with stable scarring at the base of the left lung. There is also mild new atelectasis in the posterolateral inferior left upper lobe.   - Imaging reviewed with Dr. Michele, will start Levaquin for pneumonia coverage     Constipation  - Patient reports no BM since before admission on 3/15, has had poor intake while admitted   - Continue Colace 100 mg BID   - Miralax 17 g every day  - Start lactulose 20 g every day; patient takes PRN at home with good effect  - Mag citrate x 1 dose ordered 3/19   - KUB 3/19 pending read; patient is passing gas frequently so low suspicion for bowel obstruction at this time  - Monitor for BM     Essential HTN  - continue losartan 100 mg daily  - monitor BP     Insomnia  - continue primidone 50 mg at bedtime   - started trazodone 50 mg at bedtime this admission; monitor  response     Hiatal hernia  Chronic GERD  - continue pantoprazole 40 mg BID      DVT PPx:   - continue enoxaparin 40 mg daily     Code Status: Full      Disposition: Patient requires inpatient management at this time.          Susi Mejia PA-C

## 2025-03-19 NOTE — NURSING NOTE
0630 Introduced myself to the patient and obtained morning vital signs, BP was elevated, reported it to the nurse on duty. Patient was pleasant and compliant with care. C/O pain 5/10 which is an improvement from the onset of pain. Assessment completed, patient has no needs at this time, call bell within reach.     1230 Afternoon vitals obtained, patient resting in bed expresses no needs at this time.

## 2025-03-19 NOTE — NURSING NOTE
"1057: pt stated that he hasn't been able to sleep for days, \"maybe an hour a night\" per pt. Pt takes Trazodone 50 mg nightly, and  PRN melatonin but has had minimal effect per pt. KRISTY Mejia PA-C notified of pt complaint.     11:11: Pt c/o abd pain/tenderness 7/10, abd distended. Pt unable to have bowel movement for almost a week. Pt is passing gas. Provider notified and KUB ordered.     1137: pt given Zofran for c/o nausea at this time.    1245: Decrease in nausea. KRISTY Mejia PA-C notified, and magnesium citrate ordered x1 dose. Pt SpO2 at 89-90%, SOB, placed back on 2L NC. RT notified. Call bell in reach.     1600: pt nauseous and vomiting clear emesis. KRISTY Mejia PA-C aware, pt agreeable to a suppository at this time. New orders placed.      1715: pt NPO for CT of ABD  at this time.       "

## 2025-03-19 NOTE — PROGRESS NOTES
03/19/25 1521   Discharge Planning   Living Arrangements Alone   Support Systems Friends/neighbors   Assistance Needed A&Ox3, independent with ADLs, utilizes a rollator or cane. Room air at baseline. Prior to admission pt placed himself on 2L supplemental oxygen using an oxygen concentrator that belonged to his girlfriend who passed away. Pt does not drive, uses [facility] or has a friend transport. Not active with [facility]. Pharmacy [facility] in [facility]. PCP Dr. Mendez Coronel.   Home or Post Acute Services None  (Patient declining low intensity recommendation from therapy. TCC following.)   Expected Discharge Disposition Home  (declining home therapy)   Does the patient need discharge transport arranged? No   Intensity of Service   Intensity of Service 0-30 min     DC PLAN:  Home

## 2025-03-19 NOTE — PROGRESS NOTES
Occupational Therapy                 Therapy Communication Note    Patient Name: Julio Carranza  MRN: [id number]  Department: [facility]  Room: [address] [place], [place] [address]  Today's Date: 3/19/2025     Discipline: Occupational Therapy    OT Missed Visit: Yes     Missed Visit Reason:Attempted to see the pt. for OT.  The pt. declined at this time due to c/o nausea with abdominal pain.  Pt.'s nurse was aware of this.    Missed Time: Attempt at 12:08PM

## 2025-03-20 ENCOUNTER — APPOINTMENT (OUTPATIENT)
Dept: RADIOLOGY | Facility: HOSPITAL | Age: 65
DRG: 193 | End: 2025-03-20
Payer: MEDICARE

## 2025-03-20 LAB
ANION GAP SERPL CALC-SCNC: 12 MMOL/L (ref 10–20)
BACTERIA SPEC RESP CULT: ABNORMAL
BUN SERPL-MCNC: 28 MG/DL (ref 6–23)
CALCIUM SERPL-MCNC: 8.2 MG/DL (ref 8.6–10.3)
CHLORIDE SERPL-SCNC: 107 MMOL/L (ref 98–107)
CO2 SERPL-SCNC: 27 MMOL/L (ref 21–32)
CREAT SERPL-MCNC: 0.93 MG/DL (ref 0.5–1.3)
EGFRCR SERPLBLD CKD-EPI 2021: >90 ML/MIN/1.73M*2
ERYTHROCYTE [DISTWIDTH] IN BLOOD BY AUTOMATED COUNT: 12.2 % (ref 11.5–14.5)
GLUCOSE SERPL-MCNC: 103 MG/DL (ref 74–99)
GRAM STN SPEC: ABNORMAL
HCT VFR BLD AUTO: 40 % (ref 41–52)
HGB BLD-MCNC: 13.1 G/DL (ref 13.5–17.5)
MCH RBC QN AUTO: 29 PG (ref 26–34)
MCHC RBC AUTO-ENTMCNC: 32.8 G/DL (ref 32–36)
MCV RBC AUTO: 89 FL (ref 80–100)
NRBC BLD-RTO: 0 /100 WBCS (ref 0–0)
PLATELET # BLD AUTO: 228 X10*3/UL (ref 150–450)
POTASSIUM SERPL-SCNC: 3.9 MMOL/L (ref 3.5–5.3)
RBC # BLD AUTO: 4.51 X10*6/UL (ref 4.5–5.9)
SODIUM SERPL-SCNC: 142 MMOL/L (ref 136–145)
WBC # BLD AUTO: 6.5 X10*3/UL (ref 4.4–11.3)

## 2025-03-20 PROCEDURE — 99233 SBSQ HOSP IP/OBS HIGH 50: CPT | Performed by: NURSE PRACTITIONER

## 2025-03-20 PROCEDURE — 94640 AIRWAY INHALATION TREATMENT: CPT | Mod: IPSPLIT

## 2025-03-20 PROCEDURE — 2500000005 HC RX 250 GENERAL PHARMACY W/O HCPCS: Mod: IPSPLIT | Performed by: INTERNAL MEDICINE

## 2025-03-20 PROCEDURE — 36415 COLL VENOUS BLD VENIPUNCTURE: CPT | Mod: IPSPLIT

## 2025-03-20 PROCEDURE — 2500000004 HC RX 250 GENERAL PHARMACY W/ HCPCS (ALT 636 FOR OP/ED): Mod: JZ,IPSPLIT

## 2025-03-20 PROCEDURE — 2500000001 HC RX 250 WO HCPCS SELF ADMINISTERED DRUGS (ALT 637 FOR MEDICARE OP): Mod: IPSPLIT

## 2025-03-20 PROCEDURE — 94760 N-INVAS EAR/PLS OXIMETRY 1: CPT | Mod: IPSPLIT

## 2025-03-20 PROCEDURE — 85027 COMPLETE CBC AUTOMATED: CPT | Mod: IPSPLIT

## 2025-03-20 PROCEDURE — 2500000005 HC RX 250 GENERAL PHARMACY W/O HCPCS: Mod: IPSPLIT

## 2025-03-20 PROCEDURE — 74018 RADEX ABDOMEN 1 VIEW: CPT | Mod: IPSPLIT

## 2025-03-20 PROCEDURE — 82374 ASSAY BLOOD CARBON DIOXIDE: CPT | Mod: IPSPLIT

## 2025-03-20 PROCEDURE — 74018 RADEX ABDOMEN 1 VIEW: CPT | Performed by: RADIOLOGY

## 2025-03-20 PROCEDURE — 97116 GAIT TRAINING THERAPY: CPT | Mod: GP,CQ,IPSPLIT

## 2025-03-20 PROCEDURE — 94669 MECHANICAL CHEST WALL OSCILL: CPT | Mod: IPSPLIT

## 2025-03-20 PROCEDURE — 2500000002 HC RX 250 W HCPCS SELF ADMINISTERED DRUGS (ALT 637 FOR MEDICARE OP, ALT 636 FOR OP/ED): Mod: IPSPLIT | Performed by: NURSE PRACTITIONER

## 2025-03-20 PROCEDURE — 1200000002 HC GENERAL ROOM WITH TELEMETRY DAILY: Mod: IPSPLIT

## 2025-03-20 PROCEDURE — 2500000001 HC RX 250 WO HCPCS SELF ADMINISTERED DRUGS (ALT 637 FOR MEDICARE OP): Mod: IPSPLIT | Performed by: NURSE PRACTITIONER

## 2025-03-20 PROCEDURE — 2500000002 HC RX 250 W HCPCS SELF ADMINISTERED DRUGS (ALT 637 FOR MEDICARE OP, ALT 636 FOR OP/ED): Mod: IPSPLIT

## 2025-03-20 PROCEDURE — 94668 MNPJ CHEST WALL SBSQ: CPT | Mod: IPSPLIT

## 2025-03-20 RX ORDER — PREDNISONE 5 MG/1
15 TABLET ORAL DAILY
Status: COMPLETED | OUTPATIENT
Start: 2025-03-23 | End: 2025-03-23

## 2025-03-20 RX ORDER — PREDNISONE 20 MG/1
20 TABLET ORAL DAILY
Status: COMPLETED | OUTPATIENT
Start: 2025-03-21 | End: 2025-03-21

## 2025-03-20 RX ORDER — PREDNISONE 5 MG/1
5 TABLET ORAL DAILY
Status: COMPLETED | OUTPATIENT
Start: 2025-03-24 | End: 2025-03-24

## 2025-03-20 RX ORDER — PREDNISONE 5 MG/1
10 TABLET ORAL DAILY
Status: COMPLETED | OUTPATIENT
Start: 2025-03-22 | End: 2025-03-22

## 2025-03-20 RX ADMIN — TRAZODONE HYDROCHLORIDE 50 MG: 50 TABLET ORAL at 20:34

## 2025-03-20 RX ADMIN — BENZONATATE 200 MG: 100 CAPSULE ORAL at 15:35

## 2025-03-20 RX ADMIN — DOCUSATE SODIUM 100 MG: 100 CAPSULE, LIQUID FILLED ORAL at 12:39

## 2025-03-20 RX ADMIN — THIAMINE HCL TAB 100 MG 100 MG: 100 TAB at 09:27

## 2025-03-20 RX ADMIN — IPRATROPIUM BROMIDE AND ALBUTEROL SULFATE 3 ML: .5; 3 SOLUTION RESPIRATORY (INHALATION) at 15:51

## 2025-03-20 RX ADMIN — IPRATROPIUM BROMIDE AND ALBUTEROL SULFATE 3 ML: .5; 3 SOLUTION RESPIRATORY (INHALATION) at 08:45

## 2025-03-20 RX ADMIN — LEVOFLOXACIN 750 MG: 500 TABLET, FILM COATED ORAL at 12:24

## 2025-03-20 RX ADMIN — ENOXAPARIN SODIUM 40 MG: 40 INJECTION SUBCUTANEOUS at 12:24

## 2025-03-20 RX ADMIN — FOLIC ACID 1 MG: 1 TABLET ORAL at 09:27

## 2025-03-20 RX ADMIN — DOCUSATE SODIUM 100 MG: 100 CAPSULE, LIQUID FILLED ORAL at 20:34

## 2025-03-20 RX ADMIN — BENZONATATE 200 MG: 100 CAPSULE ORAL at 20:34

## 2025-03-20 RX ADMIN — IPRATROPIUM BROMIDE AND ALBUTEROL SULFATE 3 ML: .5; 3 SOLUTION RESPIRATORY (INHALATION) at 21:08

## 2025-03-20 RX ADMIN — METHYLPREDNISOLONE SODIUM SUCCINATE 40 MG: 40 INJECTION, POWDER, FOR SOLUTION INTRAMUSCULAR; INTRAVENOUS at 05:28

## 2025-03-20 RX ADMIN — LOSARTAN POTASSIUM 100 MG: 50 TABLET, FILM COATED ORAL at 09:27

## 2025-03-20 RX ADMIN — Medication 6 MG: at 20:34

## 2025-03-20 RX ADMIN — PRIMIDONE 50 MG: 50 TABLET ORAL at 20:34

## 2025-03-20 RX ADMIN — LIDOCAINE 1 PATCH: 560 PATCH PERCUTANEOUS; TOPICAL; TRANSDERMAL at 09:27

## 2025-03-20 RX ADMIN — PANTOPRAZOLE SODIUM 40 MG: 40 TABLET, DELAYED RELEASE ORAL at 06:16

## 2025-03-20 RX ADMIN — Medication 2 L/MIN: at 20:36

## 2025-03-20 RX ADMIN — BENZONATATE 200 MG: 100 CAPSULE ORAL at 09:27

## 2025-03-20 RX ADMIN — PANTOPRAZOLE SODIUM 40 MG: 40 TABLET, DELAYED RELEASE ORAL at 15:35

## 2025-03-20 ASSESSMENT — COGNITIVE AND FUNCTIONAL STATUS - GENERAL
MOBILITY SCORE: 24
MOBILITY SCORE: 21
MOVING TO AND FROM BED TO CHAIR: A LITTLE
DAILY ACTIVITIY SCORE: 24
CLIMB 3 TO 5 STEPS WITH RAILING: A LITTLE
WALKING IN HOSPITAL ROOM: A LITTLE

## 2025-03-20 ASSESSMENT — PAIN SCALES - GENERAL
PAINLEVEL_OUTOF10: 0 - NO PAIN

## 2025-03-20 ASSESSMENT — PAIN - FUNCTIONAL ASSESSMENT
PAIN_FUNCTIONAL_ASSESSMENT: 0-10

## 2025-03-20 NOTE — PROGRESS NOTES
Physical Therapy    Physical Therapy Treatment    Patient Name: Julio Carranza  MRN: [id number]  Department: [facility]  Room: [address] [place], [place] [address]  Today's Date: 3/20/2025  Time Calculation  Start Time: 1331  Stop Time: 1351  Time Calculation (min): 20 min    The completion of this treatment, chart review, and documentation was done under the supervision of Shira Alonzo PTA.      This visit was supervised and reviewed by JACI for clinical competency and accuracy.  PTA is in agreement with session.      Assessment/Plan   PT Assessment  PT Assessment Results: Decreased endurance, Decreased mobility, Pain, Decreased strength  Rehab Prognosis: Good  Barriers to Discharge Home: No anticipated barriers  Evaluation/Treatment Tolerance: Patient limited by fatigue  Strengths: Ability to acquire knowledge  Barriers to Participation: Comorbidities  End of Session Communication: Bedside nurse  Assessment Comment: Pt demonostrated NBOS, slight path deviation, and slight LOB during gait however was able to self correct. Pt limited due to increased lightheadedness during ambulation requiring a seated rest to resolve. Pt required 25% vc for erect posture and 50% vc for proper breathing technique. Pt continues to require skilled PT to increase functional endurance and strengthen LEs to reduce risk of falls. Pt up in chair with the alarm on, call button within reach and all other needs addressed.  End of Session Patient Position: Up in chair, Alarm on  PT Plan  Inpatient/Swing Bed or Outpatient: Inpatient  PT Plan  Treatment/Interventions: Transfer training, Gait training, Therapeutic exercise  PT Plan: Ongoing PT  PT Frequency: 3 times per week  PT Discharge Recommendations: Low intensity level of continued care  PT Recommended Transfer Status: Stand by assist  PT - OK to Discharge: Yes (Once medically ready)      General Visit Information:   PT  Visit  PT Received On: 03/20/25  Response to Previous Treatment: Patient with no complaints from  previous session.  General  Reason for Referral: impaired mobility s/p admission for influenza A  Referred By: Jackie JIMENEZ  Past Medical History Relevant to Rehab: CAD, BPH, TIA, PE, OA, Chronic B knee pain, prior L [facility]INITY 4/24  Prior to Session Communication: Bedside nurse  Patient Position Received: Bed, 2 rail up, Alarm off, not on at start of session  Preferred Learning Style: kinesthetic, verbal  General Comment: Pt agreeable to therapy. Reports feeling better than earlier however co.    Subjective   Pt reports feeling better than earlier.  Precautions:  Precautions  Medical Precautions: Fall precautions  Precautions Comment: 2L O2        Objective   Pain:  Pain Assessment  Pain Assessment: 0-10  0-10 (Numeric) Pain Score: 0 - No pain  Cognition:  Cognition  Overall Cognitive Status: Within Functional Limits  Coordination:  Movements are Fluid and Coordinated: Yes    Activity Tolerance:  Activity Tolerance  Endurance: Tolerates 10 - 20 min exercise with multiple rests  Treatments:  Therapeutic Exercise  Therapeutic Exercise Performed: Yes  Therapeutic Exercise Activity 1: march x10  Therapeutic Exercise Activity 2: LAQ x10    Ambulation/Gait Training  Ambulation/Gait Training Performed: Yes  Ambulation/Gait Training 1  Surface 1: Level tile  Device 1: No device  Gait Support Devices: Gait belt  Assistance 1: Distant supervision  Quality of Gait 1: Inconsistent stride length, Decreased step length  Comments/Distance (ft) 1: 50-60ft, slight path deviations with NBOS, pt demonstrated slight LOB however was able to self correct.  Transfers  Transfer: Yes  Transfer 1  Transfer From 1: Bed to  Transfer to 1: Stand  Technique 1: Sit to stand, Stand to sit  Transfer Device 1: Gait belt  Transfer Level of Assistance 1: Modified independent    Outcome Measures:  [facility] Basic Mobility  Turning from your back to your side while in a flat bed without using bedrails: None  Moving from lying on your back to sitting on the side  of a flat bed without using bedrails: None  Moving to and from bed to chair (including a wheelchair): A little  Standing up from a chair using your arms (e.g. wheelchair or bedside chair): None  To walk in hospital room: A little  Climbing 3-5 steps with railing: A little  Basic Mobility - Total Score: 21    Education Documentation  Mobility Training, taught by JORGE Harris at 3/20/2025  2:10 PM.  Learner: Patient  Readiness: Acceptance  Method: Explanation  Response: Verbalizes Understanding  Comment: Cues for posture during gait training to reduce risk of falls.    Education Comments  No comments found.        OP EDUCATION:       Encounter Problems       Encounter Problems (Active)       Mobility       STG - Patient will ambulate 120' x 2 with LRAD mod I with improved gait mechanics and O2 remaining above 90% (Progressing)       Start:  03/17/25    Expected End:  03/24/25            Pt with transfer sit to stand to sit and from bed to chair to bed with LRAD mod I  (Progressing)       Start:  03/17/25    Expected End:  03/24/25            Pt will negotiate 5 stairs with B handrails Mod I  to simulate home entry and exit set up        Start:  03/17/25    Expected End:  03/24/25            Pt will improve B LE to WFL for improved overall functional mobility  (Progressing)       Start:  03/17/25    Expected End:  03/24/25               Pain - Adult            JORGE HARRIS PTA

## 2025-03-20 NOTE — PROGRESS NOTES
Physical Therapy                 Therapy Communication Note    Patient Name: Julio Carranza  MRN: [id number]  Department[facility]  [facility]  Today's Date: 3/20/2025     Discipline: Physical Therapy    PT Missed Visit: Yes     Missed Visit Reason: Missed Visit Reason: Patient refused (Pt reports not feeling well and wants to nap and asked PTA to return after lunch.)    Missed Time: Attempt    Comment:

## 2025-03-20 NOTE — PROGRESS NOTES
Occupational Therapy  Therapy Communication Note    Patient Name: Julio Carranza  MRN: [id number]  Department: [facility]  Room: [address] [place], [place] [address]  Today's Date: 3/20/2025     Discipline: Occupational Therapy    OT Missed Visit: Yes     Missed Visit Reason: Missed Visit Reason: Patient refused (Pt. stated he needed RT before he completes OT. RT notified.)    Missed Time: Attempt 0805

## 2025-03-20 NOTE — PROGRESS NOTES
"Julio Carranza is a 64 y.o. male on day 4 of admission presenting with Influenza A.    Subjective   I am upset that I cannot eat. They said my bowels were blocked       Objective   Npo   Ct scan ? Ileus  Feels pretty winded still  Does not like bowel regimen and reports multiple episodes of diarrhea overnight    Physical Exam  Vitals and nursing note reviewed.   Constitutional:       Appearance: Normal appearance.   HENT:      Head: Normocephalic and atraumatic.      Nose: Nose normal.      Mouth/Throat:      Mouth: Mucous membranes are moist.   Eyes:      Extraocular Movements: Extraocular movements intact.      Pupils: Pupils are equal, round, and reactive to light.   Cardiovascular:      Rate and Rhythm: Normal rate and regular rhythm.      Pulses: Normal pulses.      Heart sounds: Normal heart sounds.   Pulmonary:      Effort: Respiratory distress present.      Breath sounds: Wheezing and rhonchi present.   Abdominal:      General: There is distension.      Palpations: Abdomen is soft.   Musculoskeletal:         General: Normal range of motion.      Cervical back: Normal range of motion and neck supple.   Skin:     General: Skin is warm and dry.      Capillary Refill: Capillary refill takes 2 to 3 seconds.   Neurological:      Mental Status: He is alert and oriented to person, place, and time.         Last Recorded Vitals  Blood pressure 134/73, pulse 61, temperature 36.7 °C (98.1 °F), temperature source Temporal, resp. rate 16, height 1.88 m (6' 2\"), weight 104 kg (230 lb), SpO2 94%.  Intake/Output last 3 Shifts:  I/O last 3 completed shifts:  In: 640 (6.1 mL/kg) [P.O.:640]  Out: - (0 mL/kg)   Weight: 104.3 kg     Relevant Results                              Assessment/Plan   Assessment & Plan  Influenza A    COPD exacerbation (Multi)    Tobacco use    Primary insomnia    Hiatal hernia    Benign essential HTN    Acute respiratory failure with hypoxia (Multi)    Constipation      Influenza A  Acute hypoxic " respiratory failure   COPD, in acute exacerbation  Tobacco use  - Presented to ED with 5 days of cough, congestion, shortness of breath, poor appetite, diarrhea, fevers, chills  - Flu A positive  - CT chest: No evidence consolidating infiltrate or effusion. Elevated left hemidiaphragm with left basilar subsegmental atelectasis.   - No leukocytosis   - D-dimer 1725  - BNP 96   - CTA chest 3/15: No acute pulmonary embolism, Unchanged bronchitis in both lower lobes and the middle lobe and the lingula   - Procal 0.05  - Pertussis PCR negative   - MRSA nares negative   - Sputum culture if able   - azithromycin 500 mg daily completed   - continue Solumedrol 40 mg IVP - decreased to q12h on 3/17, switch to po 3/21    - continue DuoNebs TID  - Tessalon to 200 mg TID   - continue Lidocaine 4%  patch for pleuritic chest pain  - completed Tamiflu 75 mg BID; day 5/5  - continue Robitussin AC  mg q6h PRN for cough   - continue acetaminophen 650 mg q4h PRN for fever   - RT eval and treat protocol  - Bronchial hygiene  - Isolation protocol for flu A  - Patient declines nicotine patch at this time; encourage cessation on discharge   - SpO2 85% on RA during the night  - supplemental oxygen to keep SpO2 > 90%  - currently on 2lpm via NC  - No oxygen at home  - Ambulatory pulse ox/overnight O2 trend before discharge   - PT/OT evals- patient agreeable to [facility] on discharge   - CTA chest 3/19: No CT evidence of pulmonary embolism in the main pulmonary arteries out through each hilum. For technical reasons, the exam is nondiagnostic for pulmonary embolism distal to each hilum. Mild stable mediastinal and bilateral hilar adenopathy, perhaps reactive. Elevation of the left diaphragm with stable scarring at the base of the left lung. There is also mild new atelectasis in the posterolateral inferior left upper lobe.   - Levaquin for pneumonia coverage   - Sputum moderate saliva     Constipation  - Patient reports no BM since before  admission on 3/15, has had poor intake while admitted   - Continue Colace 100 mg BID   - Miralax 17 g every day  - lactulose 20 g every day; patient takes PRN at home with good effect  - Mag citrate x 1 dose ordered 3/19   - KUB 3/19 clear  - kub 3/20 improved bowel regimen + moderate stool, clear diet and full liquid in pm if better tomorrow start regular diet   - Monitor for BM  - encouraged pt to ambulate     Essential HTN  - continue losartan 100 mg daily  - monitor BP     Insomnia/Essential tremor  - continue primidone 50 mg at bedtime   - trazodone 50 mg at bedtime this admission; monitor response      Hiatal hernia  Chronic GERD  - continue pantoprazole 40 mg BID      DVT PPx:   - continue enoxaparin 40 mg daily     Code Status: Full      Disposition: Patient requires inpatient management at this time.     Mallorie Gonzalez, APRN-CNP

## 2025-03-20 NOTE — CARE PLAN
The patient's goals for the shift include      The clinical goals for the shift include patient will have a bowel movement this shift    Over the shift, the patient did make progress toward the following goals.     Problem: Pain  Goal: Takes deep breaths with improved pain control throughout the shift  Outcome: Progressing     Problem: Fall/Injury  Goal: Not fall by end of shift  Outcome: Progressing

## 2025-03-20 NOTE — PROGRESS NOTES
Occupational Therapy  Therapy Communication Note    Patient Name: Julio Carranza  MRN: [id number]  Department: [facility]  Room: [address] [place], [place] [address]  Today's Date: 3/20/2025     Discipline: Occupational Therapy    OT Missed Visit: Yes     Missed Visit Reason: Missed Visit Reason: Patient in a medical procedure (RT present with pt. for their treatment.)    Missed Time: Attempt 0840

## 2025-03-21 ENCOUNTER — APPOINTMENT (OUTPATIENT)
Dept: RADIOLOGY | Facility: HOSPITAL | Age: 65
DRG: 193 | End: 2025-03-21
Payer: MEDICARE

## 2025-03-21 LAB
ANION GAP SERPL CALC-SCNC: 9 MMOL/L (ref 10–20)
BUN SERPL-MCNC: 31 MG/DL (ref 6–23)
CALCIUM SERPL-MCNC: 8 MG/DL (ref 8.6–10.3)
CHLORIDE SERPL-SCNC: 107 MMOL/L (ref 98–107)
CO2 SERPL-SCNC: 26 MMOL/L (ref 21–32)
CREAT SERPL-MCNC: 0.95 MG/DL (ref 0.5–1.3)
EGFRCR SERPLBLD CKD-EPI 2021: 88 ML/MIN/1.73M*2
ERYTHROCYTE [DISTWIDTH] IN BLOOD BY AUTOMATED COUNT: 12 % (ref 11.5–14.5)
GLUCOSE SERPL-MCNC: 93 MG/DL (ref 74–99)
HCT VFR BLD AUTO: 41.8 % (ref 41–52)
HGB BLD-MCNC: 13.5 G/DL (ref 13.5–17.5)
MCH RBC QN AUTO: 28.7 PG (ref 26–34)
MCHC RBC AUTO-ENTMCNC: 32.3 G/DL (ref 32–36)
MCV RBC AUTO: 89 FL (ref 80–100)
NRBC BLD-RTO: 0 /100 WBCS (ref 0–0)
PLATELET # BLD AUTO: 222 X10*3/UL (ref 150–450)
POTASSIUM SERPL-SCNC: 3.4 MMOL/L (ref 3.5–5.3)
RBC # BLD AUTO: 4.7 X10*6/UL (ref 4.5–5.9)
SODIUM SERPL-SCNC: 139 MMOL/L (ref 136–145)
WBC # BLD AUTO: 5.9 X10*3/UL (ref 4.4–11.3)

## 2025-03-21 PROCEDURE — 94762 N-INVAS EAR/PLS OXIMTRY CONT: CPT | Mod: IPSPLIT

## 2025-03-21 PROCEDURE — 2500000005 HC RX 250 GENERAL PHARMACY W/O HCPCS: Mod: IPSPLIT | Performed by: INTERNAL MEDICINE

## 2025-03-21 PROCEDURE — 99233 SBSQ HOSP IP/OBS HIGH 50: CPT

## 2025-03-21 PROCEDURE — 2500000004 HC RX 250 GENERAL PHARMACY W/ HCPCS (ALT 636 FOR OP/ED): Mod: IPSPLIT | Performed by: NURSE PRACTITIONER

## 2025-03-21 PROCEDURE — 74018 RADEX ABDOMEN 1 VIEW: CPT | Performed by: RADIOLOGY

## 2025-03-21 PROCEDURE — 2500000001 HC RX 250 WO HCPCS SELF ADMINISTERED DRUGS (ALT 637 FOR MEDICARE OP): Mod: IPSPLIT | Performed by: NURSE PRACTITIONER

## 2025-03-21 PROCEDURE — 2500000001 HC RX 250 WO HCPCS SELF ADMINISTERED DRUGS (ALT 637 FOR MEDICARE OP): Mod: IPSPLIT

## 2025-03-21 PROCEDURE — 2500000005 HC RX 250 GENERAL PHARMACY W/O HCPCS: Mod: IPSPLIT

## 2025-03-21 PROCEDURE — 80048 BASIC METABOLIC PNL TOTAL CA: CPT | Mod: IPSPLIT

## 2025-03-21 PROCEDURE — 94640 AIRWAY INHALATION TREATMENT: CPT | Mod: IPSPLIT

## 2025-03-21 PROCEDURE — 36415 COLL VENOUS BLD VENIPUNCTURE: CPT | Mod: IPSPLIT

## 2025-03-21 PROCEDURE — 99232 SBSQ HOSP IP/OBS MODERATE 35: CPT | Performed by: NURSE PRACTITIONER

## 2025-03-21 PROCEDURE — 2500000004 HC RX 250 GENERAL PHARMACY W/ HCPCS (ALT 636 FOR OP/ED): Mod: IPSPLIT

## 2025-03-21 PROCEDURE — 2500000002 HC RX 250 W HCPCS SELF ADMINISTERED DRUGS (ALT 637 FOR MEDICARE OP, ALT 636 FOR OP/ED): Mod: IPSPLIT | Performed by: NURSE PRACTITIONER

## 2025-03-21 PROCEDURE — 2500000002 HC RX 250 W HCPCS SELF ADMINISTERED DRUGS (ALT 637 FOR MEDICARE OP, ALT 636 FOR OP/ED): Mod: IPSPLIT

## 2025-03-21 PROCEDURE — 94760 N-INVAS EAR/PLS OXIMETRY 1: CPT | Mod: IPSPLIT

## 2025-03-21 PROCEDURE — 2500000002 HC RX 250 W HCPCS SELF ADMINISTERED DRUGS (ALT 637 FOR MEDICARE OP, ALT 636 FOR OP/ED): Mod: IPSPLIT | Performed by: INTERNAL MEDICINE

## 2025-03-21 PROCEDURE — 74018 RADEX ABDOMEN 1 VIEW: CPT | Mod: IPSPLIT

## 2025-03-21 PROCEDURE — 85027 COMPLETE CBC AUTOMATED: CPT | Mod: IPSPLIT

## 2025-03-21 PROCEDURE — 1200000002 HC GENERAL ROOM WITH TELEMETRY DAILY: Mod: IPSPLIT

## 2025-03-21 PROCEDURE — 97530 THERAPEUTIC ACTIVITIES: CPT | Mod: GO,IPSPLIT

## 2025-03-21 RX ORDER — DICYCLOMINE HYDROCHLORIDE 10 MG/1
10 CAPSULE ORAL
Status: DISCONTINUED | OUTPATIENT
Start: 2025-03-21 | End: 2025-03-24

## 2025-03-21 RX ORDER — SIMETHICONE 80 MG
80 TABLET,CHEWABLE ORAL 4 TIMES DAILY
Status: DISCONTINUED | OUTPATIENT
Start: 2025-03-21 | End: 2025-03-29 | Stop reason: HOSPADM

## 2025-03-21 RX ADMIN — FOLIC ACID 1 MG: 1 TABLET ORAL at 08:24

## 2025-03-21 RX ADMIN — Medication 6 MG: at 20:19

## 2025-03-21 RX ADMIN — Medication 2 L/MIN: at 20:40

## 2025-03-21 RX ADMIN — PRIMIDONE 50 MG: 50 TABLET ORAL at 20:19

## 2025-03-21 RX ADMIN — ACETAMINOPHEN 650 MG: 325 TABLET, FILM COATED ORAL at 01:20

## 2025-03-21 RX ADMIN — PANTOPRAZOLE SODIUM 40 MG: 40 TABLET, DELAYED RELEASE ORAL at 15:09

## 2025-03-21 RX ADMIN — SIMETHICONE 80 MG: 80 TABLET, CHEWABLE ORAL at 20:20

## 2025-03-21 RX ADMIN — BENZONATATE 200 MG: 100 CAPSULE ORAL at 20:19

## 2025-03-21 RX ADMIN — ACETAMINOPHEN 650 MG: 325 TABLET, FILM COATED ORAL at 12:32

## 2025-03-21 RX ADMIN — DICYCLOMINE HYDROCHLORIDE 10 MG: 10 CAPSULE ORAL at 20:19

## 2025-03-21 RX ADMIN — Medication 2 L/MIN: at 01:13

## 2025-03-21 RX ADMIN — LACTULOSE 20 G: 20 SOLUTION ORAL at 08:24

## 2025-03-21 RX ADMIN — POLYETHYLENE GLYCOL 3350 17 G: 17 POWDER, FOR SOLUTION ORAL at 08:24

## 2025-03-21 RX ADMIN — ALBUTEROL SULFATE 2.5 MG: 2.5 SOLUTION RESPIRATORY (INHALATION) at 06:10

## 2025-03-21 RX ADMIN — BENZONATATE 200 MG: 100 CAPSULE ORAL at 08:24

## 2025-03-21 RX ADMIN — ACETAMINOPHEN 650 MG: 325 TABLET, FILM COATED ORAL at 08:28

## 2025-03-21 RX ADMIN — PREDNISONE 20 MG: 20 TABLET ORAL at 08:24

## 2025-03-21 RX ADMIN — TRAZODONE HYDROCHLORIDE 50 MG: 50 TABLET ORAL at 20:19

## 2025-03-21 RX ADMIN — PANTOPRAZOLE SODIUM 40 MG: 40 TABLET, DELAYED RELEASE ORAL at 06:13

## 2025-03-21 RX ADMIN — THIAMINE HCL TAB 100 MG 100 MG: 100 TAB at 08:24

## 2025-03-21 RX ADMIN — BENZONATATE 200 MG: 100 CAPSULE ORAL at 15:09

## 2025-03-21 RX ADMIN — ENOXAPARIN SODIUM 40 MG: 40 INJECTION SUBCUTANEOUS at 12:33

## 2025-03-21 RX ADMIN — DICYCLOMINE HYDROCHLORIDE 10 MG: 10 CAPSULE ORAL at 15:09

## 2025-03-21 RX ADMIN — DOCUSATE SODIUM 100 MG: 100 CAPSULE, LIQUID FILLED ORAL at 08:24

## 2025-03-21 RX ADMIN — IPRATROPIUM BROMIDE AND ALBUTEROL SULFATE 3 ML: .5; 3 SOLUTION RESPIRATORY (INHALATION) at 20:40

## 2025-03-21 RX ADMIN — DOCUSATE SODIUM 100 MG: 100 CAPSULE, LIQUID FILLED ORAL at 20:20

## 2025-03-21 RX ADMIN — SIMETHICONE 80 MG: 80 TABLET, CHEWABLE ORAL at 15:15

## 2025-03-21 RX ADMIN — Medication 2 L/MIN: at 09:45

## 2025-03-21 RX ADMIN — ACETAMINOPHEN 650 MG: 325 TABLET, FILM COATED ORAL at 20:20

## 2025-03-21 RX ADMIN — LOSARTAN POTASSIUM 100 MG: 50 TABLET, FILM COATED ORAL at 08:24

## 2025-03-21 RX ADMIN — IPRATROPIUM BROMIDE AND ALBUTEROL SULFATE 3 ML: .5; 3 SOLUTION RESPIRATORY (INHALATION) at 15:19

## 2025-03-21 RX ADMIN — SIMETHICONE 80 MG: 80 TABLET, CHEWABLE ORAL at 17:00

## 2025-03-21 RX ADMIN — IPRATROPIUM BROMIDE AND ALBUTEROL SULFATE 3 ML: .5; 3 SOLUTION RESPIRATORY (INHALATION) at 09:38

## 2025-03-21 ASSESSMENT — PAIN SCALES - GENERAL
PAINLEVEL_OUTOF10: 4
PAINLEVEL_OUTOF10: 3
PAINLEVEL_OUTOF10: 6
PAINLEVEL_OUTOF10: 5 - MODERATE PAIN
PAINLEVEL_OUTOF10: 6
PAINLEVEL_OUTOF10: 2
PAINLEVEL_OUTOF10: 0 - NO PAIN

## 2025-03-21 ASSESSMENT — COGNITIVE AND FUNCTIONAL STATUS - GENERAL
MOBILITY SCORE: 22
STANDING UP FROM CHAIR USING ARMS: A LITTLE
TOILETING: A LITTLE
HELP NEEDED FOR BATHING: A LITTLE
DAILY ACTIVITIY SCORE: 23
DRESSING REGULAR LOWER BODY CLOTHING: A LITTLE
CLIMB 3 TO 5 STEPS WITH RAILING: A LITTLE
PERSONAL GROOMING: A LITTLE
DAILY ACTIVITIY SCORE: 20
DRESSING REGULAR LOWER BODY CLOTHING: A LITTLE

## 2025-03-21 ASSESSMENT — PAIN DESCRIPTION - DESCRIPTORS: DESCRIPTORS: ACHING;SHARP

## 2025-03-21 ASSESSMENT — PAIN DESCRIPTION - LOCATION
LOCATION: ABDOMEN
LOCATION: SHOULDER
LOCATION: ABDOMEN
LOCATION: ABDOMEN

## 2025-03-21 ASSESSMENT — PAIN - FUNCTIONAL ASSESSMENT
PAIN_FUNCTIONAL_ASSESSMENT: 0-10

## 2025-03-21 ASSESSMENT — PAIN DESCRIPTION - ORIENTATION
ORIENTATION: LEFT
ORIENTATION: LOWER

## 2025-03-21 ASSESSMENT — PAIN INTENSITY VAS: VAS_PAIN_BASICVITALS_IP: 0

## 2025-03-21 NOTE — PROGRESS NOTES
03/21/25 1547   Discharge Planning   Living Arrangements Alone   Support Systems Friends/neighbors   Assistance Needed A&Ox3, independent with ADLs, utilizes a rollator or cane. Room air at baseline. Prior to admission pt placed himself on 2L supplemental oxygen using an oxygen concentrator that belonged to his girlfriend who passed away. Pt does not drive, uses [facility] or has a friend transport. Not active with [facility]. Pharmacy [facility] in [facility]. PCP Dr. Mendez Coronel.   Type of Residence Private residence   Home or Post Acute Services None   Expected Discharge Disposition Home  (Declining LOW intensity recommendation)   Does the patient need discharge transport arranged? No   Intensity of Service   Intensity of Service 0-30 min     DC PLAN:  Home

## 2025-03-21 NOTE — PROGRESS NOTES
Occupational Therapy    Occupational Therapy Treatment    Name: Julio Carranza  MRN: [id number]  Department: [facility]  Room: [address] [place] [address]  Date: 03/21/25  Time Calculation  Start Time: 0849  Stop Time: 0904  Time Calculation (min): 15 min    Assessment:  OT Assessment: Pt is a 65 yo M referred to occupational therapy for impaired self-care and functional mobility 2/2 hospitalization for influenza A. Pt demonstrates some progress towards goals this date. Pt completed bed mobility TARYN and functional mobility w/ distant supervision. Pt also completed therapeutic exercises. Pt would continue to benefit from OT services at the LOW intensity level to increase functional independence and help w/ return to PLOF.  Prognosis: Good  Barriers to Discharge Home: No anticipated barriers  Evaluation/Treatment Tolerance: Patient limited by fatigue  Medical Staff Made Aware: Yes  End of Session Communication: Bedside nurse  End of Session Patient Position: Up in chair, Alarm on  Plan:  Treatment Interventions: ADL retraining, Functional transfer training, Endurance training, Equipment evaluation/education, Compensatory technique education  OT Frequency: 3 times per week  OT Discharge Recommendations: Low intensity level of continued care  OT Recommended Transfer Status: Stand by assist, Assist of 1  OT - OK to Discharge: Yes (Based on completed evaluation and care plan recommendations, no barriers to discharge to next site of care)    Subjective   Previous Visit Info:  OT Last Visit  OT Received On: 03/21/25  General:  General  Reason for Referral: Pt is a 65 yo M referred to occupational therapy for imapired self-care and functional mobility 2/2 hospitalization for influenza A.  Referred By: Jackie JIMENEZ  Past Medical History Relevant to Rehab: CAD, BPH, TIA, PE, OA, Chronic B knee pain, prior L [facility] 4/24  Family/Caregiver Present: No  Prior to Session Communication: Bedside nurse  Patient Position Received: Bed, 2 rail up, Alarm off, not  on at start of session  Preferred Learning Style: kinesthetic, verbal  General Comment: Pt agreeable to therapy session this date. Pt w/ c/o stomach pain throughout session. Limited participation this date.  Precautions:  Medical Precautions: Fall precautions, Infection precautions (droplet precautions)  Precautions Comment: doug whitmore 2L O2     Date/Time Vitals Session Patient Position Pulse Resp SpO2 BP MAP (mmHg)    03/21/25 0945 --  --  --  --  95 %  --  --           Vital Signs Comment: SpO2 dropped to 88-89 while competing functional mobility w/ no O2, increase to 95-96% with return to sitting and reapplication of O2    Pain Assessment:  Pain Assessment  Pain Assessment: 0-10  0-10 (Numeric) Pain Score: 5 - Moderate pain  Pain Type: Acute pain  Pain Location: Abdomen  Pain Interventions: Repositioned, Ambulation/increased activity  Response to Interventions: No change in pain, Content/relaxed    Objective   Cognition:  Overall Cognitive Status: Within Functional Limits  Arousal/Alertness: Appropriate responses to stimuli  Orientation Level: Oriented X4  Activities of Daily Living:    Pt deferred ADL completion this date 2/2 completion last night.    Bed Mobility/Transfers:   Bed Mobility  Bed Mobility: Yes  Bed Mobility 1  Bed Mobility 1: Supine to sitting  Level of Assistance 1: Modified independent  Bed Mobility Comments 1: use of bed rails, HOB flat    Transfers  Transfer: Yes  Transfer 1  Transfer From 1: Bed to  Transfer to 1: Stand  Technique 1: Sit to stand, Stand to sit  Transfer Device 1: Gait belt  Transfer Level of Assistance 1: Modified independent  Transfers 2  Transfer From 2: Chair with arms to  Transfer to 2: Stand  Technique 2: Sit to stand, Stand to sit  Transfer Device 2: Gait belt  Transfer Level of Assistance 2: Modified independent    Functional Mobility:  Functional Mobility  Functional Mobility Performed: Yes  Functional Mobility 1  Surface 1: Level tile  Device 1: No  device  Assistance 1: Distant supervision  Comments 1: Pt completed functional mobility in hallway x2. No device, SpO2 decreased to 88-89% during mobility and increased to 95-96% w/ return to seated position and reapplication of O2.  Sitting Balance:  Static Sitting Balance  Static Sitting-Balance Support: Feet supported  Static Sitting-Level of Assistance: Independent  Dynamic Sitting Balance  Dynamic Sitting-Balance Support: Feet supported  Dynamic Sitting-Level of Assistance: Distant supervision  Standing Balance:  Static Standing Balance  Static Standing-Balance Support: No upper extremity supported  Static Standing-Level of Assistance: Distant supervision  Dynamic Standing Balance  Dynamic Standing-Balance Support: No upper extremity supported  Dynamic Standing-Level of Assistance: Close supervision    Therapy/Activity:   Therapeutic Exercise  Therapeutic Exercise Performed: Yes  Therapeutic Exercise Activity 1: 1x10 knee flexion (therapeutic rest break after 5 reps)  Therapeutic Exercise Activity 2: 1x10 shoulder flexion (pt w/ reports of pain in abdomen)  Therapeutic Exercise Activity 3: 1x10 seated marches (therapeutic rest break after 5 reps)    Strength:  Strength  Strength Comments: BUE grossly 4+/5  RUE: Within Functional Limits  LUE: Within Functional Limits    Outcome Measures:  [facility] Daily Activity  Putting on and taking off regular lower body clothing: A little  Bathing (including washing, rinsing, drying): A little  Putting on and taking off regular upper body clothing: None  Toileting, which includes using toilet, bedpan or urinal: A little  Taking care of personal grooming such as brushing teeth: A little  Eating Meals: None  Daily Activity - Total Score: 20    Education Documentation  Body Mechanics, taught by Aleshia Tineo OT at 3/21/2025 11:11 AM.  Learner: Patient  Readiness: Acceptance  Method: Explanation  Response: Verbalizes Understanding  Comment: Pt educated on POC, DC recs, safety  and body mechanics when completing transfers and ADLs    ADL Training, taught by Aleshia Tineo OT at 3/21/2025 11:11 AM.  Learner: Patient  Readiness: Acceptance  Method: Explanation  Response: Verbalizes Understanding  Comment: Pt educated on POC, DC recs, safety and body mechanics when completing transfers and ADLs    Goals:  Encounter Problems       Encounter Problems (Active)       ADLs       Patient will perform UB and LB bathing standing at sink with modified independent level of assistance       Start:  03/17/25    Expected End:  03/31/25            Patient with complete upper body dressing with modified independent level of assistance donning and doffing all UE clothes while supported sitting and standing       Start:  03/17/25    Expected End:  03/31/25            Patient with complete lower body dressing with modified independent level of assistance donning and doffing all LE clothes while supported sitting and standing       Start:  03/17/25    Expected End:  03/31/25            Patient will complete daily grooming tasks entire routine standing at sink with modified independent level of assistance       Start:  03/17/25    Expected End:  03/31/25               BALANCE       Pt will maintain dynamic standing balance during ADL task with modified independent level of assistance in order to demonstrate decreased risk of falling and improved postural control. (Progressing)       Start:  03/17/25    Expected End:  03/31/25               MOBILITY       Patient will perform Functional mobility max Household distances/Community Distances with modified independent level of assistance and least restrictive device in order to improve safety and functional mobility. (Progressing)       Start:  03/17/25    Expected End:  03/31/25               TRANSFERS       Patient will complete sit to stand transfer with modified independent level of assistance and least restrictive device in order to improve safety and prepare  for out of bed mobility. (Progressing)       Start:  03/17/25    Expected End:  03/31/25

## 2025-03-21 NOTE — CARE PLAN
The patient's goals for the shift include      The clinical goals for the shift include pulse ox > 90% without increasein O2 this shift    Pt on room air for overnight sleep study. Pulse ox in 80's and pt placed back on O2  @ 2L NC

## 2025-03-21 NOTE — CONSULTS
Inpatient consult to Gastroenterology  Consult performed by: REBEKAH Lovett-CNP  Consult ordered by: Deven Steinberg MD      History Of Present Illness  Julio Carranza is a 64 y.o. male who presented to [facility] ED with a chief complaint of shortness of breath and flulike symptoms, admitted with influenza A.  He had a CTA chest due to elevated D-dimer which was negative for PE.  He reported constipation during admission and x-ray abdomen showed concern for ileus.  Patient received multiple stool softeners and laxatives with improvement of constipation.    Patient currently reports abdominal bloating, pain, and diarrhea.  Notes that bloating and pain worsen after eating.   Patient reports that Tylenol improves the abdominal pain. He has had a few small liquid BMs this morning and continues to pass gas. He denies issues with constipation prior to admission.       He does see a provider at [facility] for uncontrolled GERD and recently underwent an EGD and colonoscopy.  He reports that colonoscopy was normal.  He has had some increased GERD recently.      Past Medical History:   Diagnosis Date    Arthritis     BPH (benign prostatic hyperplasia)     CAD (coronary artery disease)     Emphysema lung (Multi)     Fall     tripped in shower  no injury    GERD (gastroesophageal reflux disease)     Hidradenitis     History of blood transfusion     after back surgery  no reaction    History of pulmonary embolism 04/2024    Post left total hip surgery    History of TIA (transient ischemic attack) 2018    HLD (hyperlipidemia)     HTN (hypertension)     Irritable bowel syndrome     Local infection of the skin and subcutaneous tissue, unspecified 12/01/2015    Skin infection, bacterial    Lung nodule     OA (osteoarthritis)     Other cervical disc displacement, unspecified cervical region 02/28/2019    Cervical disc herniation    Personal history of other diseases of the digestive system 03/25/2021    History of  "chronic constipation    Personal history of other diseases of the respiratory system 12/03/2014    History of chronic obstructive lung disease    Personal history of other diseases of the respiratory system 07/15/2016    History of acute bronchitis    Psychophysiologic insomnia 03/25/2021    Chronic insomnia    Umbilical hernia     Unspecified cataract     Cataract of left eye    Use of cane as ambulatory aid     Wears dentures     full upper and lower    Wears glasses        Past Surgical History:   Procedure Laterality Date    APPENDECTOMY      BACK SURGERY      Lower Back Surgery    CARDIAC CATHETERIZATION  2020    Mild coronary artery disease    CATARACT EXTRACTION Left     FL GUIDED ASPIRATION OR INJECTION LARGE JOINT LEFT Left 10/18/2023    FL GUIDED ASPIRATION OR INJECTION LARGE JOINT LEFT 10/18/2023 [facility]    HIP ARTHROPLASTY Left 04/2024        Social history  He reports that he has been smoking cigarettes. He started smoking about 47 years ago. He has a 70.8 pack-year smoking history. He has never used smokeless tobacco. He reports current alcohol use of about 4.0 standard drinks of alcohol per week. He reports current drug use. Frequency: 1.00 time per week. Drug: Marijuana.    No family history on file.     Allergies  Etodolac, Penicillins, Escitalopram oxalate, and Pollen extracts      Last Recorded Vitals  Blood pressure 131/76, pulse 66, temperature 36.5 °C (97.7 °F), temperature source Temporal, resp. rate 16, height 1.88 m (6' 2\"), weight 104 kg (230 lb 6.1 oz), SpO2 95%.    Physical Exam  Constitutional: NAD. Alert and cooperative  Skin: no jaundice   Eyes: anicteric, normal conjunctiva  ENT: MMM  Pulmonary: easy and nonlabored on NC  Abdomen: soft, NT, distended. No ascites.  MSK: MAEx4  Extremities: no edema  Neuro: aaox3. No asterixis.   Psych: appropriate mood and behavior    Intake/Output last 3 Shifts:  I/O last 3 completed shifts:  In: 640 (6.1 mL/kg) [P.O.:640]  Out: - (0 mL/kg) "   Weight: 104.5 kg      Current medications during hospitalization  Scheduled medications  benzonatate, 200 mg, oral, TID  dicyclomine, 10 mg, oral, Before meals & nightly  docusate sodium, 100 mg, oral, BID  enoxaparin, 40 mg, subcutaneous, q24h  influenza, 0.5 mL, intramuscular, During hospitalization  folic acid, 1 mg, oral, Daily  ipratropium-albuteroL, 3 mL, nebulization, TID  lactulose, 20 g, oral, Daily  lidocaine, 1 patch, transdermal, Daily  losartan, 100 mg, oral, Daily  oxygen, , inhalation, Continuous - Inhalation  pantoprazole, 40 mg, oral, BID AC  polyethylene glycol, 17 g, oral, Daily  [START ON 3/22/2025] predniSONE, 10 mg, oral, Daily   Followed by  [START ON 3/23/2025] predniSONE, 15 mg, oral, Daily   Followed by  [START ON 3/24/2025] predniSONE, 5 mg, oral, Daily  primidone, 50 mg, oral, Nightly  simethicone, 80 mg, oral, 4x daily  thiamine, 100 mg, oral, Daily  traZODone, 50 mg, oral, Nightly      Continuous medications     PRN medications  PRN medications: acetaminophen **OR** [DISCONTINUED] acetaminophen **OR** [DISCONTINUED] acetaminophen, acetaminophen **OR** [DISCONTINUED] acetaminophen **OR** [DISCONTINUED] acetaminophen, albuterol, albuterol, baclofen, benzocaine-menthol, codeine-guaifenesin, melatonin, ondansetron ODT **OR** ondansetron    Relevant Results  BMP:  Lab Results   Component Value Date     03/21/2025     03/20/2025     03/19/2025    K 3.4 (L) 03/21/2025    K 3.9 03/20/2025    K 3.7 03/19/2025     03/21/2025     03/20/2025     03/19/2025    CO2 26 03/21/2025    CO2 27 03/20/2025    CO2 23 03/19/2025    BUN 31 (H) 03/21/2025    BUN 28 (H) 03/20/2025    BUN 28 (H) 03/19/2025    CREATININE 0.95 03/21/2025    CREATININE 0.93 03/20/2025    CREATININE 0.86 03/19/2025       CBC:  Lab Results   Component Value Date    WBC 5.9 03/21/2025    WBC 6.5 03/20/2025    WBC 7.6 03/19/2025    RBC 4.70 03/21/2025    RBC 4.51 03/20/2025    RBC 4.70 03/19/2025     "HGB 13.5 03/21/2025    HGB 13.1 (L) 03/20/2025    HGB 13.3 (L) 03/19/2025    HCT 41.8 03/21/2025    HCT 40.0 (L) 03/20/2025    HCT 41.2 03/19/2025    MCV 89 03/21/2025    MCV 89 03/20/2025    MCV 88 03/19/2025    MCH 28.7 03/21/2025    MCH 29.0 03/20/2025    MCH 28.3 03/19/2025    MCHC 32.3 03/21/2025    MCHC 32.8 03/20/2025    MCHC 32.3 03/19/2025    RDW 12.0 03/21/2025    RDW 12.2 03/20/2025    RDW 12.1 03/19/2025     03/21/2025     03/20/2025     03/19/2025    MPV 10.3 10/18/2023    MPV 11.1 07/14/2023    MPV 10.9 10/31/2021       Lactate Level:  Lab Results   Component Value Date    LACTATE 1.1 11/28/2021       CRP/ESR Level:  Lab Results   Component Value Date    CRP 2.5 (H) 07/14/2023    CRP 0.67 06/30/2022    SEDRATE 18 07/14/2023       Hepatic Function Panel:  Lab Results   Component Value Date    ALKPHOS 88 03/15/2025    ALKPHOS 107 09/30/2024    ALKPHOS 95 03/13/2024    ALT 23 03/15/2025    ALT 22 09/30/2024    ALT 13 03/13/2024    AST 31 03/15/2025    AST 18 09/30/2024    AST 16 03/13/2024    PROT 7.3 03/15/2025    PROT 7.3 09/30/2024    PROT 6.6 03/13/2024    BILITOT 0.5 03/15/2025    BILITOT 0.5 09/30/2024    BILITOT 0.5 03/13/2024       Magnesium:  Lab Results   Component Value Date    MG 2.03 09/30/2024       INR:  Lab Results   Component Value Date    PROTIME 16.0 (H) 09/30/2024    PROTIME 10.4 01/18/2023    PROTIME 11.5 06/29/2022    INR 1.10 (A) 10/21/2024    INR 1.4 (H) 09/30/2024    INR 3.80 (A) 08/15/2024       TSH/T4:  No results found for: \"TSH\"    Lipid Profile:  No results found for: \"CHLPL\", \"TRIG\", \"HDL\", \"LDLCALC\", \"LDLDIRECT\"    Amylase/Lipase:  Lab Results   Component Value Date    LIPASE 26 06/29/2022    LIPASE 28 06/29/2022    LIPASE 44 11/28/2021       Calcium Level  Lab Results   Component Value Date    CALCIUM 8.0 (L) 03/21/2025    CALCIUM 8.2 (L) 03/20/2025    CALCIUM 8.6 03/19/2025        Radiology:   XR abdomen 1 view   Final Result   Gaseous distention of " the transverse colon appearing slightly more   pronounced when compared with yesterday's study.        Signed by: Dayana Dumont 3/21/2025 11:32 AM   Dictation workstation:   [id number]      XR abdomen 1 view   Final Result   Gaseous distention of the transverse colon, decreased since yesterday.        Elevated left side of the diaphragm.        MACRO:   None        Signed by: Lang Neely 3/20/2025 10:03 AM   Dictation workstation:   [id number]      CT abdomen pelvis wo IV contrast   Final Result   1.  Ascending and transverse colon are distended with liquid stool   and gas, with the more distal descending colon appearing   decompressed, likely representing some lakeshia. No evidence of   inflammatory large bowel wall thickening or small bowel obstruction.   2. Fat containing umbilical and right inguinal hernias are similar in   appearance to prior exam.             MACRO:   None        Signed by: Camron Dorado 3/19/2025 7:23 PM   Dictation workstation:   [id number]      XR abdomen 1 view   Final Result   Large intestinal gaseous distention.        MACRO:   None        Signed by: Lang Neely 3/19/2025 4:57 PM   Dictation workstation:   [id number]      CT angio chest for pulmonary embolism   Final Result   No CT evidence of pulmonary embolism in the main pulmonary arteries   out through each hilum. For technical reasons, the exam is   nondiagnostic for pulmonary embolism distal to each hilum.        Mild stable mediastinal and bilateral hilar adenopathy, perhaps   reactive.        Elevation of the left diaphragm with stable scarring at the base of   the left lung. There is also mild new atelectasis in the   posterolateral inferior left upper lobe.        Cardiomegaly.        Splenomegaly.        Thoracic spine DJD as described. No CT evidence of acute fracture the   osseous thorax in this exam.        MACRO:   None        Signed by: Isiah Rose 3/19/2025 8:36 AM   Dictation workstation:    [id number]      XR chest 1 view   Final Result   Hypoventilatory exam. Elevation of the left diaphragm.        Band of linear atelectasis at the lateral left lung base.        Findings suspicious for  mild CHF.        MACRO:   None        Signed by: Isiah Rose 3/19/2025 8:27 AM   Dictation workstation:   [id number]      CT angio chest for pulmonary embolism   Final Result   No acute pulmonary embolism        See above and/or refer to prior report from earlier today        MACRO:   None        Signed by: Adi Cedeño 3/15/2025 4:05 PM   Dictation workstation:   [id number]      CT chest wo IV contrast   Final Result   No evidence consolidating infiltrate or effusion.   Elevated left hemidiaphragm with left basilar subsegmental   atelectasis.   Signed by Noé Ogden MD      XR chest 2 views   Final Result   Chronic changes to the left lung base.   No evidence of acute infiltrate or effusion.   Signed by Noé Ogden MD           ASSESSMENT/RECS  64 y.o. male on day 5 of admission presenting with Influenza A. GI consulted for constipation. There was concern for ileus on CT on 3/19, repeat abdominal x-rays without evidence of ileus but still with a significant amount of gas.  He complains of gas and bloating after meals. Patient is passing gas and having bowel movements.     Will add simethicone 4 times daily and dicyclomine 10mg ACHS  Apply heating pad  Continue Colace, MiraLAX daily, note that lactulose may be worsening intestinal gas  Monitor BMP, CBC daily  Supportive care per primary team    Thank you for the consult.  Please note there is no GI coverage this weekend.  Will return 3/24/2025    REBEKAH Lovett-CNP

## 2025-03-21 NOTE — CARE PLAN
Patient had a overnight pox  study done on room air.  SPO2 dropped below 88% . Patient placed on 2lpm nc @ 0115. RN aware.

## 2025-03-21 NOTE — PROGRESS NOTES
Julio Carranza is a 64 y.o. male on day 5 of admission presenting with Influenza A.      Subjective   Patient assessed at bedside; lying in bed. He complained of diffuse abdominal pain all over. His abdomen appears distended and tender. He denies SOB, fever, chills, N/V.       Objective     Last Recorded Vitals  /76 (BP Location: Right arm, Patient Position: Lying)   Pulse 66   Temp 36.5 °C (97.7 °F) (Temporal)   Resp 16   Wt 104 kg (230 lb 6.1 oz)   SpO2 95%   Intake/Output last 3 Shifts:    Intake/Output Summary (Last 24 hours) at 3/21/2025 1310  Last data filed at 3/21/2025 1231  Gross per 24 hour   Intake 600 ml   Output --   Net 600 ml       Admission Weight  Weight: 104 kg (230 lb) (03/15/25 0855)    Daily Weight  03/21/25 : 104 kg (230 lb 6.1 oz)    Image Results      Physical Exam  Vitals reviewed.   Constitutional:       Appearance: Normal appearance. He is obese.   HENT:      Head: Normocephalic and atraumatic.      Right Ear: External ear normal.      Left Ear: External ear normal.      Nose: Nose normal.      Mouth/Throat:      Mouth: Mucous membranes are moist.      Pharynx: Oropharynx is clear.   Eyes:      Conjunctiva/sclera: Conjunctivae normal.      Pupils: Pupils are equal, round, and reactive to light.   Cardiovascular:      Rate and Rhythm: Normal rate and regular rhythm.      Pulses: Normal pulses.      Heart sounds: Normal heart sounds.   Pulmonary:      Effort: Pulmonary effort is normal.      Breath sounds: Normal breath sounds.   Abdominal:      General: Bowel sounds are normal. There is distension.      Palpations: Abdomen is soft.      Tenderness: There is abdominal tenderness.   Musculoskeletal:         General: Normal range of motion.      Cervical back: Normal range of motion and neck supple.   Skin:     General: Skin is warm and dry.   Neurological:      General: No focal deficit present.      Mental Status: He is alert and oriented to person, place, and time.   Psychiatric:          Mood and Affect: Mood normal.         Behavior: Behavior normal.         Relevant Results    Scheduled medications  benzonatate, 200 mg, oral, TID  dicyclomine, 10 mg, oral, Before meals & nightly  docusate sodium, 100 mg, oral, BID  enoxaparin, 40 mg, subcutaneous, q24h  influenza, 0.5 mL, intramuscular, During hospitalization  folic acid, 1 mg, oral, Daily  ipratropium-albuteroL, 3 mL, nebulization, TID  lactulose, 20 g, oral, Daily  lidocaine, 1 patch, transdermal, Daily  losartan, 100 mg, oral, Daily  oxygen, , inhalation, Continuous - Inhalation  pantoprazole, 40 mg, oral, BID AC  polyethylene glycol, 17 g, oral, Daily  [START ON 3/22/2025] predniSONE, 10 mg, oral, Daily   Followed by  [START ON 3/23/2025] predniSONE, 15 mg, oral, Daily   Followed by  [START ON 3/24/2025] predniSONE, 5 mg, oral, Daily  primidone, 50 mg, oral, Nightly  simethicone, 80 mg, oral, 4x daily  thiamine, 100 mg, oral, Daily  traZODone, 50 mg, oral, Nightly      Continuous medications     PRN medications  PRN medications: acetaminophen **OR** [DISCONTINUED] acetaminophen **OR** [DISCONTINUED] acetaminophen, acetaminophen **OR** [DISCONTINUED] acetaminophen **OR** [DISCONTINUED] acetaminophen, albuterol, albuterol, baclofen, benzocaine-menthol, codeine-guaifenesin, melatonin, ondansetron ODT **OR** ondansetron    Results for orders placed or performed during the hospital encounter of 03/15/25 (from the past 24 hours)   CBC   Result Value Ref Range    WBC 5.9 4.4 - 11.3 x10*3/uL    nRBC 0.0 0.0 - 0.0 /100 WBCs    RBC 4.70 4.50 - 5.90 x10*6/uL    Hemoglobin 13.5 13.5 - 17.5 g/dL    Hematocrit 41.8 41.0 - 52.0 %    MCV 89 80 - 100 fL    MCH 28.7 26.0 - 34.0 pg    MCHC 32.3 32.0 - 36.0 g/dL    RDW 12.0 11.5 - 14.5 %    Platelets 222 150 - 450 x10*3/uL   Basic Metabolic Panel   Result Value Ref Range    Glucose 93 74 - 99 mg/dL    Sodium 139 136 - 145 mmol/L    Potassium 3.4 (L) 3.5 - 5.3 mmol/L    Chloride 107 98 - 107 mmol/L     Bicarbonate 26 21 - 32 mmol/L    Anion Gap 9 (L) 10 - 20 mmol/L    Urea Nitrogen 31 (H) 6 - 23 mg/dL    Creatinine 0.95 0.50 - 1.30 mg/dL    eGFR 88 >60 mL/min/1.73m*2    Calcium 8.0 (L) 8.6 - 10.3 mg/dL                   Assessment/Plan      Assessment & Plan  Influenza A    COPD exacerbation (Multi)    Tobacco use    Primary insomnia    Hiatal hernia    Benign essential HTN    Acute respiratory failure with hypoxia (Multi)    Constipation    Influenza A  Acute hypoxic respiratory failure   COPD, in acute exacerbation  Tobacco use  - Presented to ED with 5 days of cough, congestion, shortness of breath, poor appetite, diarrhea, fevers, chills  - Flu A positive  - CT chest: No evidence consolidating infiltrate or effusion. Elevated left hemidiaphragm with left basilar subsegmental atelectasis.   - No leukocytosis   - D-dimer 1725  - BNP 96   - CTA chest 3/15: No acute pulmonary embolism, Unchanged bronchitis in both lower lobes and the middle lobe and the lingula   - Procal 0.05  - Pertussis PCR negative   - MRSA nares negative   - Sputum culture if able   - discontinued azithromycin 500 mg daily completed   - continue Solumedrol 40 mg IVP - decreased to q12h on 3/17, switch to po 3/21    - continue DuoNebs TID  - continue Tessalon perles 200 mg TID   - continue Lidocaine 4%  patch for pleuritic chest pain  - completed Tamiflu 75 mg BID; day 5/5  - continue Robitussin AC  mg q6h PRN for cough   - continue acetaminophen 650 mg q4h PRN for fever   - RT eval and treat protocol  - Bronchial hygiene  - Isolation protocol for flu A  - Patient declines nicotine patch at this time; encourage cessation on discharge   - SpO2 85% on RA during the night  - supplemental oxygen to keep SpO2 > 90%  - currently on 2lpm via NC  - No oxygen at home  - Ambulatory pulse ox/overnight O2 trend before discharge   - PT/OT evals- patient agreeable to [facility] on discharge   - CTA chest 3/19: No CT evidence of pulmonary embolism in the main  pulmonary arteries out through each hilum. For technical reasons, the exam is nondiagnostic for pulmonary embolism distal to each hilum. Mild stable mediastinal and bilateral hilar adenopathy, perhaps reactive. Elevation of the left diaphragm with stable scarring at the base of the left lung. There is also mild new atelectasis in the posterolateral inferior left upper lobe.   - completed Levaquin for pneumonia coverage   - Sputum moderate saliva     Constipation  - Patient reports no BM since before admission on 3/15, has had poor intake while admitted   - Continue Colace 100 mg BID   - Miralax 17 g every day  - lactulose 20 g every day; patient takes PRN at home with good effect  - Mag citrate x 1 dose ordered 3/19   - KUB 3/19 clear  - kub 3/20 improved bowel regimen + moderate stool, clear diet and full liquid in pm if better tomorrow start regular diet   - KUB 3/21: Gaseous distention of the transverse colon appearing slightly more pronounced when compared with yesterday's study.   - Monitor for BM  - encouraged pt to ambulate  - GI consult; recommended bentyl and dimethicone  - started dicyclomine 10 mg QID  - started simethicone 80 mg QID     Essential HTN  - continue losartan 100 mg daily  - monitor BP     Insomnia/Essential tremor  - continue primidone 50 mg at bedtime   - continue trazodone 50 mg at bedtime this admission; monitor response      Hiatal hernia  Chronic GERD  - continue pantoprazole 40 mg BID      DVT PPx:   - continue enoxaparin 40 mg daily     Code Status: Full      Disposition: Patient requires inpatient management at this time.     Seen and discussed with MD Mallorie Fishman, APRN-CNP

## 2025-03-21 NOTE — CARE PLAN
The clinical goals for the shift include Pt will have BM's today and be less distended in abd    Pt taking laxatives and stool softeners. His abd is distended and tender. He said he has2 small stools. Dr Carballo was in to see pt. Added a few meds. Pt getting tylenol for abd pain. Appetite poor. Lungs with exp wheezes. Pt was encouraged to ambulate more. K-pad to abd with some relief. Tele is SR--HR= 68.

## 2025-03-21 NOTE — PROGRESS NOTES
"Nutrition Initial Assessment:   Nutrition Assessment         Patient is a 64 y.o. male presenting with Influenza A.    PMH: Acute respiratory failure with hypoxia, COPD exacerbation, constipation, benign essential hypertension (HTN), hiatal hernia, tobacco use, and primary insomnia.    Nutrition History:  Energy Intake: Poor < 50 %  Food and Nutrient History: Attempted to visit the patient on multiple occasions, but he was resting each time. As of this morning, the patient was on a full liquid diet but has since been upgraded to a regular diet. Per IDT rounds, the provider encouraged increased mobility to help alleviate abdominal discomfort. The patient continues to report abdominal pain. Per the nursing flowsheet, meal intake has been documented at 25-50%.       Anthropometrics:  Height: 188 cm (6' 2\")   Weight: 104 kg (230 lb 6.1 oz)   BMI (Calculated): 29.57  IBW/kg (Dietitian Calculated): 86 kg  Percent of IBW: 120 %       Weight History:   Wt Readings from Last 10 Encounters:   03/21/25 104 kg (230 lb 6.1 oz)   01/15/25 104 kg (230 lb)   10/21/24 103 kg (227 lb 6.4 oz)   09/30/24 102 kg (224 lb)   09/24/24 101 kg (223 lb 3.2 oz)   06/13/24 103 kg (228 lb)   05/13/24 99.2 kg (218 lb 12.8 oz)   04/01/24 103 kg (227 lb 1.2 oz)   03/13/24 106 kg (234 lb 9.1 oz)   02/12/24 104 kg (229 lb)       Weight Change %:  Weight History / % Weight Change: no change in past 2 days.  Significant Weight Loss: No    Nutrition Focused Physical Exam Findings:  Defer - pt sleeping    Nutrition Significant Labs:  BMP Trend:   Results from last 7 days   Lab Units 03/21/25  0611 03/20/25  0722 03/19/25  0546 03/18/25  0650   GLUCOSE mg/dL 93 103* 110* 108*   CALCIUM mg/dL 8.0* 8.2* 8.6 8.5*   SODIUM mmol/L 139 142 138 142   POTASSIUM mmol/L 3.4* 3.9 3.7 4.0   CO2 mmol/L 26 27 23 27   CHLORIDE mmol/L 107 107 106 108*   BUN mg/dL 31* 28* 28* 24*   CREATININE mg/dL 0.95 0.93 0.86 0.92     Nutrition Specific Medications:  Scheduled " medications  benzonatate, 200 mg, oral, TID  dicyclomine, 10 mg, oral, Before meals & nightly  docusate sodium, 100 mg, oral, BID  enoxaparin, 40 mg, subcutaneous, q24h  folic acid, 1 mg, oral, Daily  lactulose, 20 g, oral, Daily  losartan, 100 mg, oral, Daily  pantoprazole, 40 mg, oral, BID AC  polyethylene glycol, 17 g, oral, Daily  [START ON 3/22/2025] predniSONE, 10 mg, oral, Daily   Followed by  [START ON 3/23/2025] predniSONE, 15 mg, oral, Daily   Followed by  [START ON 3/24/2025] predniSONE, 5 mg, oral, Daily  primidone, 50 mg, oral, Nightly  simethicone, 80 mg, oral, 4x daily  thiamine, 100 mg, oral, Daily  traZODone, 50 mg, oral, Nightly    I/O:   Last BM Date: 03/21/25; Stool Appearance: Loose (03/20/25 0847)    Dietary Orders (From admission, onward)       Start     Ordered    03/21/25 1325  Adult diet Regular  Diet effective now        Question:  Diet type  Answer:  Regular    03/21/25 1324    03/17/25 1543  Oral nutritional supplements  Until discontinued        Question Answer Comment   Deliver with Breakfast    Deliver with Dinner    Deliver with Lunch    Select supplement: Ensure Plus High Protein        03/17/25 1542    03/15/25 1511  May Participate in Room Service With Assistance  ( ROOM SERVICE MAY PARTICIPATE WITH ASSISTANCE)  Once        Question:  .  Answer:  Yes    03/15/25 1510                Estimated Needs:   Total Energy Estimated Needs in 24 hours (kCal):  (7888-9094 kcal)  Method for Estimating Needs: 25-30 kcal/kg of IBW  Total Protein Estimated Needs in 24 Hours (g):  (103-112g)  Method for Estimating 24 Hour Protein Needs: 1.2-1.3 g/kg of IBW  Total Fluid Estimated Needs in 24 Hours (mL):  (3640-6528 mL)  Method for Estimating 24 Hour Fluid Needs: 1 mL/kcal or per medical team.  Patient on Order Fluid Restriction: No        Nutrition Diagnosis   Malnutrition Diagnosis  Patient has Malnutrition Diagnosis: No (not enough information to fully assess. At risk for  Malnutrition)    Nutrition Diagnosis  Patient has Nutrition Diagnosis: Yes  Diagnosis Status (1): Active  Nutrition Diagnosis 1: Inadequate protein energy intake  Related to (1): abdominal discomfort, early satiety, nausea, and possible functional limitation from tremors  As Evidenced by (1): eported intake of <50% of meals, refusal of breakfast, patient report of only tolerating small amounts of food, and nursing documentation of 0-33% meal intake and abdominal distention/tenderness.       Nutrition Interventions/Recommendations   Nutrition prescription for oral nutrition    Nutrition Recommendations:  Individualized Nutrition Prescription Provided for : Regular diet, Ensure Plus High Protein TID    Nutrition Interventions/Goals:   Interventions: Medical food supplement, Meals and snacks  Meals and Snacks: General healthful diet  Goal: encourage protein rich foods. Encourage softer foods that are tolerated  Medical Food Supplement: Commercial beverage medical food supplement therapy  Goal: Ensure Plus High Protein TID (350kcal and 20g protein per 8 fluid oz)  Coordination of Care with Providers: Nursing (IDT rounds and Available - Signed In until 1930 Saadia Luciano RN)      Education Documentation  Nutrition Related Education, taught by Ashley Burns RDN, LD at 3/19/2025 11:13 AM.  Learner: Patient  Readiness: Acceptance  Method: Explanation  Response: Needs Reinforcement, Verbalizes Understanding          Nutrition Monitoring and Evaluation   Food/Nutrient Related History Monitoring  Monitoring and Evaluation Plan: Intake / amount of food  Intake / Amount of food: Consumes at least 50% or more of meals/snacks/supplements    Anthropometric Measurements  Monitoring and Evaluation Plan: Body weight  Body Weight: Body weight - Maintain stable weight       Physical Exam Findings  Monitoring and Evaluation Plan: Digestive System  Digestive System Finding: Abdominal bloating, Abdominal distension,  Abdominal pain, Constipation, Nausea  Criteria: WNL  Other: Evaluate nutrition intervention as compared to nutrition goal(s) and estimated nutrient need criteria.    Goal Status: Goal(s) not achieved    Time Spent (min): 20 minutes

## 2025-03-22 ENCOUNTER — APPOINTMENT (OUTPATIENT)
Dept: RADIOLOGY | Facility: HOSPITAL | Age: 65
DRG: 193 | End: 2025-03-22
Payer: MEDICARE

## 2025-03-22 LAB
ANION GAP SERPL CALC-SCNC: 9 MMOL/L (ref 10–20)
BUN SERPL-MCNC: 27 MG/DL (ref 6–23)
CALCIUM SERPL-MCNC: 8.1 MG/DL (ref 8.6–10.3)
CHLORIDE SERPL-SCNC: 108 MMOL/L (ref 98–107)
CO2 SERPL-SCNC: 27 MMOL/L (ref 21–32)
CREAT SERPL-MCNC: 0.85 MG/DL (ref 0.5–1.3)
EGFRCR SERPLBLD CKD-EPI 2021: >90 ML/MIN/1.73M*2
ERYTHROCYTE [DISTWIDTH] IN BLOOD BY AUTOMATED COUNT: 12.3 % (ref 11.5–14.5)
GLUCOSE SERPL-MCNC: 92 MG/DL (ref 74–99)
HCT VFR BLD AUTO: 40.4 % (ref 41–52)
HGB BLD-MCNC: 13.5 G/DL (ref 13.5–17.5)
MCH RBC QN AUTO: 29 PG (ref 26–34)
MCHC RBC AUTO-ENTMCNC: 33.4 G/DL (ref 32–36)
MCV RBC AUTO: 87 FL (ref 80–100)
NRBC BLD-RTO: 0 /100 WBCS (ref 0–0)
PLATELET # BLD AUTO: 223 X10*3/UL (ref 150–450)
POTASSIUM SERPL-SCNC: 3.4 MMOL/L (ref 3.5–5.3)
RBC # BLD AUTO: 4.65 X10*6/UL (ref 4.5–5.9)
SODIUM SERPL-SCNC: 141 MMOL/L (ref 136–145)
WBC # BLD AUTO: 6.9 X10*3/UL (ref 4.4–11.3)

## 2025-03-22 PROCEDURE — 2500000001 HC RX 250 WO HCPCS SELF ADMINISTERED DRUGS (ALT 637 FOR MEDICARE OP): Mod: IPSPLIT

## 2025-03-22 PROCEDURE — 1200000002 HC GENERAL ROOM WITH TELEMETRY DAILY: Mod: IPSPLIT

## 2025-03-22 PROCEDURE — 74176 CT ABD & PELVIS W/O CONTRAST: CPT | Mod: IPSPLIT

## 2025-03-22 PROCEDURE — 94668 MNPJ CHEST WALL SBSQ: CPT | Mod: IPSPLIT

## 2025-03-22 PROCEDURE — 2500000005 HC RX 250 GENERAL PHARMACY W/O HCPCS: Mod: IPSPLIT | Performed by: INTERNAL MEDICINE

## 2025-03-22 PROCEDURE — 94640 AIRWAY INHALATION TREATMENT: CPT | Mod: IPSPLIT

## 2025-03-22 PROCEDURE — 97535 SELF CARE MNGMENT TRAINING: CPT | Mod: GO,IPSPLIT

## 2025-03-22 PROCEDURE — 2500000001 HC RX 250 WO HCPCS SELF ADMINISTERED DRUGS (ALT 637 FOR MEDICARE OP): Mod: IPSPLIT | Performed by: NURSE PRACTITIONER

## 2025-03-22 PROCEDURE — 99233 SBSQ HOSP IP/OBS HIGH 50: CPT | Performed by: NURSE PRACTITIONER

## 2025-03-22 PROCEDURE — 36415 COLL VENOUS BLD VENIPUNCTURE: CPT | Mod: IPSPLIT | Performed by: NURSE PRACTITIONER

## 2025-03-22 PROCEDURE — 80048 BASIC METABOLIC PNL TOTAL CA: CPT | Mod: IPSPLIT | Performed by: NURSE PRACTITIONER

## 2025-03-22 PROCEDURE — 2500000002 HC RX 250 W HCPCS SELF ADMINISTERED DRUGS (ALT 637 FOR MEDICARE OP, ALT 636 FOR OP/ED): Mod: IPSPLIT | Performed by: NURSE PRACTITIONER

## 2025-03-22 PROCEDURE — 2500000004 HC RX 250 GENERAL PHARMACY W/ HCPCS (ALT 636 FOR OP/ED): Mod: IPSPLIT | Performed by: NURSE PRACTITIONER

## 2025-03-22 PROCEDURE — 85027 COMPLETE CBC AUTOMATED: CPT | Mod: IPSPLIT | Performed by: NURSE PRACTITIONER

## 2025-03-22 PROCEDURE — 2500000004 HC RX 250 GENERAL PHARMACY W/ HCPCS (ALT 636 FOR OP/ED): Mod: IPSPLIT

## 2025-03-22 PROCEDURE — 2500000002 HC RX 250 W HCPCS SELF ADMINISTERED DRUGS (ALT 637 FOR MEDICARE OP, ALT 636 FOR OP/ED): Mod: IPSPLIT

## 2025-03-22 PROCEDURE — 94760 N-INVAS EAR/PLS OXIMETRY 1: CPT | Mod: IPSPLIT

## 2025-03-22 PROCEDURE — 74176 CT ABD & PELVIS W/O CONTRAST: CPT | Performed by: RADIOLOGY

## 2025-03-22 PROCEDURE — 2500000005 HC RX 250 GENERAL PHARMACY W/O HCPCS: Mod: IPSPLIT

## 2025-03-22 RX ORDER — SIMETHICONE 80 MG
80 TABLET,CHEWABLE ORAL 4 TIMES DAILY PRN
Status: DISCONTINUED | OUTPATIENT
Start: 2025-03-22 | End: 2025-03-22

## 2025-03-22 RX ORDER — OXYCODONE HYDROCHLORIDE 5 MG/1
5 TABLET ORAL EVERY 6 HOURS PRN
Status: DISCONTINUED | OUTPATIENT
Start: 2025-03-22 | End: 2025-03-29 | Stop reason: HOSPADM

## 2025-03-22 RX ORDER — POTASSIUM CHLORIDE 20 MEQ/1
40 TABLET, EXTENDED RELEASE ORAL ONCE
Status: COMPLETED | OUTPATIENT
Start: 2025-03-22 | End: 2025-03-22

## 2025-03-22 RX ORDER — LACTULOSE 10 G/15ML
20 SOLUTION ORAL 3 TIMES DAILY
Status: DISPENSED | OUTPATIENT
Start: 2025-03-22 | End: 2025-03-23

## 2025-03-22 RX ORDER — SENNOSIDES 8.6 MG/1
2 TABLET ORAL 2 TIMES DAILY
Status: DISCONTINUED | OUTPATIENT
Start: 2025-03-22 | End: 2025-03-24

## 2025-03-22 RX ORDER — BISACODYL 10 MG/1
10 SUPPOSITORY RECTAL DAILY
Status: DISCONTINUED | OUTPATIENT
Start: 2025-03-22 | End: 2025-03-24

## 2025-03-22 RX ADMIN — PRIMIDONE 50 MG: 50 TABLET ORAL at 21:29

## 2025-03-22 RX ADMIN — Medication 2 L/MIN: at 20:04

## 2025-03-22 RX ADMIN — OXYCODONE 5 MG: 5 TABLET ORAL at 10:01

## 2025-03-22 RX ADMIN — ENOXAPARIN SODIUM 40 MG: 40 INJECTION SUBCUTANEOUS at 13:17

## 2025-03-22 RX ADMIN — PANTOPRAZOLE SODIUM 40 MG: 40 TABLET, DELAYED RELEASE ORAL at 16:28

## 2025-03-22 RX ADMIN — IPRATROPIUM BROMIDE AND ALBUTEROL SULFATE 3 ML: .5; 3 SOLUTION RESPIRATORY (INHALATION) at 16:19

## 2025-03-22 RX ADMIN — Medication 2 L/MIN: at 08:58

## 2025-03-22 RX ADMIN — ACETAMINOPHEN 650 MG: 325 TABLET, FILM COATED ORAL at 13:17

## 2025-03-22 RX ADMIN — Medication 2 L/MIN: at 16:22

## 2025-03-22 RX ADMIN — Medication 2 L/MIN: at 21:11

## 2025-03-22 RX ADMIN — Medication 6 MG: at 21:37

## 2025-03-22 RX ADMIN — FOLIC ACID 1 MG: 1 TABLET ORAL at 08:18

## 2025-03-22 RX ADMIN — BENZONATATE 200 MG: 100 CAPSULE ORAL at 21:29

## 2025-03-22 RX ADMIN — POTASSIUM CHLORIDE 40 MEQ: 1500 TABLET, EXTENDED RELEASE ORAL at 17:35

## 2025-03-22 RX ADMIN — DICYCLOMINE HYDROCHLORIDE 10 MG: 10 CAPSULE ORAL at 17:35

## 2025-03-22 RX ADMIN — ACETAMINOPHEN 650 MG: 325 TABLET, FILM COATED ORAL at 21:43

## 2025-03-22 RX ADMIN — SENNOSIDES 17.2 MG: 8.6 TABLET, FILM COATED ORAL at 08:17

## 2025-03-22 RX ADMIN — LOSARTAN POTASSIUM 100 MG: 50 TABLET, FILM COATED ORAL at 08:18

## 2025-03-22 RX ADMIN — ONDANSETRON 4 MG: 4 TABLET, ORALLY DISINTEGRATING ORAL at 00:32

## 2025-03-22 RX ADMIN — LACTULOSE 20 G: 20 SOLUTION ORAL at 08:17

## 2025-03-22 RX ADMIN — SIMETHICONE 80 MG: 80 TABLET, CHEWABLE ORAL at 13:17

## 2025-03-22 RX ADMIN — DOCUSATE SODIUM 100 MG: 100 CAPSULE, LIQUID FILLED ORAL at 08:17

## 2025-03-22 RX ADMIN — DOCUSATE SODIUM 100 MG: 100 CAPSULE, LIQUID FILLED ORAL at 21:29

## 2025-03-22 RX ADMIN — IPRATROPIUM BROMIDE AND ALBUTEROL SULFATE 3 ML: .5; 3 SOLUTION RESPIRATORY (INHALATION) at 08:54

## 2025-03-22 RX ADMIN — ACETAMINOPHEN 650 MG: 325 TABLET, FILM COATED ORAL at 17:40

## 2025-03-22 RX ADMIN — ACETAMINOPHEN 650 MG: 325 TABLET, FILM COATED ORAL at 05:00

## 2025-03-22 RX ADMIN — HYDROMORPHONE HYDROCHLORIDE 0.5 MG: 1 INJECTION, SOLUTION INTRAMUSCULAR; INTRAVENOUS; SUBCUTANEOUS at 20:02

## 2025-03-22 RX ADMIN — SIMETHICONE 80 MG: 80 TABLET, CHEWABLE ORAL at 16:28

## 2025-03-22 RX ADMIN — PANTOPRAZOLE SODIUM 40 MG: 40 TABLET, DELAYED RELEASE ORAL at 06:23

## 2025-03-22 RX ADMIN — DICYCLOMINE HYDROCHLORIDE 10 MG: 10 CAPSULE ORAL at 21:29

## 2025-03-22 RX ADMIN — SIMETHICONE 80 MG: 80 TABLET, CHEWABLE ORAL at 21:29

## 2025-03-22 RX ADMIN — TRAZODONE HYDROCHLORIDE 50 MG: 50 TABLET ORAL at 21:29

## 2025-03-22 RX ADMIN — BISACODYL 10 MG: 10 SUPPOSITORY RECTAL at 20:02

## 2025-03-22 RX ADMIN — DICYCLOMINE HYDROCHLORIDE 10 MG: 10 CAPSULE ORAL at 11:14

## 2025-03-22 RX ADMIN — THIAMINE HCL TAB 100 MG 100 MG: 100 TAB at 08:18

## 2025-03-22 RX ADMIN — ACETAMINOPHEN 650 MG: 325 TABLET, FILM COATED ORAL at 00:31

## 2025-03-22 RX ADMIN — SIMETHICONE 80 MG: 80 TABLET, CHEWABLE ORAL at 06:29

## 2025-03-22 RX ADMIN — OXYCODONE 5 MG: 5 TABLET ORAL at 16:28

## 2025-03-22 RX ADMIN — PREDNISONE 10 MG: 5 TABLET ORAL at 08:17

## 2025-03-22 RX ADMIN — IPRATROPIUM BROMIDE AND ALBUTEROL SULFATE 3 ML: .5; 3 SOLUTION RESPIRATORY (INHALATION) at 21:11

## 2025-03-22 RX ADMIN — SENNOSIDES 17.2 MG: 8.6 TABLET, FILM COATED ORAL at 21:29

## 2025-03-22 RX ADMIN — BENZONATATE 200 MG: 100 CAPSULE ORAL at 08:17

## 2025-03-22 RX ADMIN — DICYCLOMINE HYDROCHLORIDE 10 MG: 10 CAPSULE ORAL at 06:23

## 2025-03-22 ASSESSMENT — PAIN SCALES - GENERAL
PAINLEVEL_OUTOF10: 7
PAINLEVEL_OUTOF10: 4
PAINLEVEL_OUTOF10: 3
PAINLEVEL_OUTOF10: 0 - NO PAIN
PAINLEVEL_OUTOF10: 8
PAINLEVEL_OUTOF10: 0 - NO PAIN
PAINLEVEL_OUTOF10: 3
PAINLEVEL_OUTOF10: 8
PAINLEVEL_OUTOF10: 6

## 2025-03-22 ASSESSMENT — COGNITIVE AND FUNCTIONAL STATUS - GENERAL
TOILETING: A LITTLE
DAILY ACTIVITIY SCORE: 18
DRESSING REGULAR LOWER BODY CLOTHING: A LITTLE
PERSONAL GROOMING: A LITTLE
DRESSING REGULAR UPPER BODY CLOTHING: A LITTLE
HELP NEEDED FOR BATHING: A LITTLE
EATING MEALS: A LITTLE

## 2025-03-22 ASSESSMENT — PAIN DESCRIPTION - LOCATION
LOCATION: ABDOMEN

## 2025-03-22 ASSESSMENT — PAIN DESCRIPTION - ORIENTATION: ORIENTATION: RIGHT;UPPER

## 2025-03-22 ASSESSMENT — PAIN SCALES - WONG BAKER: WONGBAKER_NUMERICALRESPONSE: NO HURT

## 2025-03-22 ASSESSMENT — PAIN DESCRIPTION - DESCRIPTORS: DESCRIPTORS: ACHING;SHARP;TENDER

## 2025-03-22 ASSESSMENT — PAIN - FUNCTIONAL ASSESSMENT
PAIN_FUNCTIONAL_ASSESSMENT: 0-10

## 2025-03-22 ASSESSMENT — ACTIVITIES OF DAILY LIVING (ADL)
BATHING_WHERE_ASSESSED: SHOWER
HOME_MANAGEMENT_TIME_ENTRY: 50

## 2025-03-22 NOTE — CARE PLAN
Problem: Pain  Goal: Takes deep breaths with improved pain control throughout the shift  Outcome: Progressing  Goal: Turns in bed with improved pain control throughout the shift  Outcome: Progressing     Problem: Chronic Conditions and Co-morbidities  Goal: Patient's chronic conditions and co-morbidity symptoms are monitored and maintained or improved  Outcome: Progressing     Problem: Nutrition  Goal: Nutrient intake appropriate for maintaining nutritional needs  Outcome: Progressing     Problem: Fall/Injury  Goal: Not fall by end of shift  Outcome: Met  Goal: Be free from injury by end of the shift  Outcome: Met   The patient's goals for the shift include      The clinical goals for the shift include Pt will have no falls this shift.    Over the shift, the patient did  make progress toward the following goals.

## 2025-03-22 NOTE — PROGRESS NOTES
Occupational Therapy    Occupational Therapy Treatment    Name: Julio Carranza  MRN: [id number]  Department: [facility]  Room: [address] [place] [address]  Date: 03/22/25  Time Calculation  Start Time: 1124  Stop Time: 1214  Time Calculation (min): 50 min    Assessment:  OT Assessment: Good participation despite the pt.'s report of increased abdominal pain at this time with progression of bathing to shower level.  Education provided on safety, energy conservation & use of therapeutic rest breaks as needed secondary to the pt. demonstrating shortness of breath with activity.  Pt. demonstrated bathing with Close SBA & cues when standing to perform perineal hygiene care with use of one UE for support(grab bar use) for safety.  Pt. requested to return to bed post bathing & dressing due to his abdominal pain at this time.  Prognosis: Good  Barriers to Discharge Home: No anticipated barriers  Evaluation/Treatment Tolerance: Patient limited by fatigue, Patient limited by pain  Medical Staff Made Aware: Yes  End of Session Communication: Bedside nurse  End of Session Patient Position: Bed, 2 rail up, Alarm off, not on at start of session  Plan:  Treatment Interventions: ADL retraining, UE strengthening/ROM, Endurance training, Patient/family training, Neuromuscular reeducation, Compensatory technique education  OT Frequency: 3 times per week  OT Discharge Recommendations: Low intensity level of continued care  OT Recommended Transfer Status: Close SBA/Close S  OT - OK to Discharge: Yes Based on completed evaluation and care plan recommendations, no barriers to discharge to next site of care      Subjective   Previous Visit Info:  OT Last Visit  OT Received On: 03/22/25  General:  General  Reason for Referral: Impaired self-care and functional mobility 2/2 hospitalization for influenza A.  Past Medical History Relevant to Rehab: CAD, BPH, TIA, PE, OA, Chronic B knee pain, prior L [facility] 4/24  Family/Caregiver Present: No  Prior to Session Communication:  Bedside nurse  Patient Position Received: Bed, 2 rail up, Alarm off, not on at start of session  Preferred Learning Style: verbal  General Comment: telemetry  Precautions:  Medical Precautions: Fall precautions, Oxygen therapy device and L/min  Precautions Comment: droplet precautions    Vital signs: Pre session: O2 saturation level was 94% & HR was 68 BPM    Pain Assessment:  Pain Assessment  0-10 (Numeric) Pain Score: 6  Pain Type: Acute pain  Pain Location: Abdomen  Pain Interventions: Heat applied, Repositioned  Response to Interventions: No change in pain    Objective   Cognition:  Arousal/Alertness: Appropriate responses to stimuli  Orientation Level: Oriented X4  Activities of Daily Living: UE Bathing  UE Bathing Level of Assistance:  S set up  UE Bathing Where Assessed: Shower    LE Bathing  LE Bathing Level of Assistance: Close SBA when standing to perform perineal hygiene care with cues for use of one UE for support for safety.  LE Bathing Where Assessed: Shower. Pt. used a shower bench while bathing at this time.    LE Dressing  LE Dressing: Yes  Shoe Level of Assistance: S set up  LE Dressing Comments: Pt. declined to wear underclothing at this time.  When the pt. was finished with bathing, pants were not available therefore the pt. returned to bed.  Post session, the pt. was provided with hospital pants & reported that he would don them later on since he was back in bed at this time.    Bed Mobility/Transfers:      Transfer 1  Technique 1: Sit to stand, Stand to sit  Transfer Level of Assistance 1: Supervision  Transfers 2  Transfer From 2: bed to wheelchair  Technique 2: Stand pivot  Transfer Level of Assistance 2: Close SBA    Shower Transfers  Shower Transfer Technique: Ambulating  Shower Transfers: Close SBA & cues with use of a grab bar to a shower bench    Functional Mobility:  Functional Mobility 1  Assistance 1: Close S functional mobility to the shower chair.  Utilized a wheelchair for mobility  from his room to the bathroom/shower room per nursing request due to the pt. recently given pain medications & also due to the pt.  experiencing increased abdominal pain at this time.  Standing Balance:  Static Standing Balance  Static Standing-Level of Assistance: Close SBA & cues for standing balance while bathing at shower level with use of a grab bar for support.    Outcome Measures:  [facility] Daily Activity  Putting on and taking off regular lower body clothing: A little  Bathing (including washing, rinsing, drying): A little  Putting on and taking off regular upper body clothing: A little  Toileting, which includes using toilet, bedpan or urinal: A little  Taking care of personal grooming such as brushing teeth: A little  Eating Meals: A little  Daily Activity - Total Score: 18        Education Documentation  Body Mechanics, taught by Pam Mcdonald OT at 3/22/2025  1:42 PM.  Learner: Patient  Readiness: Acceptance  Method: Explanation, Demonstration  Response: Demonstrated Understanding  Comment: Education on safety with standing balance during self care & energy conservation.    ADL Training, taught by Pam Mcdonald OT at 3/22/2025  1:42 PM.  Learner: Patient  Readiness: Acceptance  Method: Explanation, Demonstration  Response: Demonstrated Understanding  Comment: Education on safety with standing balance during self care & energy conservation.    Goals:  Encounter Problems       Encounter Problems (Active)       ADLs       Patient will perform UB and LB bathing standing at sink with modified independent level of assistance (Progressing)       Start:  03/17/25    Expected End:  03/31/25            Patient with complete upper body dressing with modified independent level of assistance donning and doffing all UE clothes while supported sitting and standing       Start:  03/17/25    Expected End:  03/31/25            Patient with complete lower body dressing with modified independent level of assistance  donning and doffing all LE clothes while supported sitting and standing       Start:  03/17/25    Expected End:  03/31/25            Patient will complete daily grooming tasks entire routine standing at sink with modified independent level of assistance       Start:  03/17/25    Expected End:  03/31/25               BALANCE       Pt will maintain dynamic standing balance during ADL task with modified independent level of assistance in order to demonstrate decreased risk of falling and improved postural control. (Progressing)       Start:  03/17/25    Expected End:  03/31/25               MOBILITY       Patient will perform Functional mobility max Household distances/Community Distances with modified independent level of assistance and least restrictive device in order to improve safety and functional mobility. (Progressing)       Start:  03/17/25    Expected End:  03/31/25               TRANSFERS       Patient will complete sit to stand transfer with modified independent level of assistance and least restrictive device in order to improve safety and prepare for out of bed mobility. (Progressing)       Start:  03/17/25    Expected End:  03/31/25

## 2025-03-22 NOTE — CARE PLAN
The patient's goals for the shift include  to have a decrease in pain    The clinical goals for the shift include Patient will have tolerable pain level throughout shift    Over the shift, the patient did not make progress toward the following goals. Barriers to progression include continued pain.    Problem: Pain  Goal: Takes deep breaths with improved pain control throughout the shift  Outcome: Not Progressing  Goal: Turns in bed with improved pain control throughout the shift  Outcome: Not Progressing  Goal: Walks with improved pain control throughout the shift  Outcome: Not Progressing  Goal: Performs ADL's with improved pain control throughout shift  Outcome: Not Progressing  Goal: Participates in PT with improved pain control throughout the shift  Outcome: Not Progressing     Problem: Chronic Conditions and Co-morbidities  Goal: Patient's chronic conditions and co-morbidity symptoms are monitored and maintained or improved  Outcome: Not Progressing     Problem: Nutrition  Goal: Nutrient intake appropriate for maintaining nutritional needs  Outcome: Not Progressing     Problem: Fall/Injury  Goal: Use assistive devices by end of the shift  Outcome: Not Progressing     Problem: Infection related to problem list condition  Goal: Infection will resolve through treatment  Outcome: Not Progressing     Problem: Respiratory  Goal: Clear secretions with interventions this shift  Outcome: Not Progressing  Goal: Verbalize decreased shortness of breath this shift  Outcome: Not Progressing  Goal: Wean oxygen to maintain O2 saturation per order/standard this shift  Outcome: Not Progressing  Goal: Increase self care and/or family involvement in next 24 hours  Outcome: Not Progressing

## 2025-03-22 NOTE — PROGRESS NOTES
Julio Carranza is a 64 y.o. male on day 6 of admission presenting with Influenza A.    Subjective   He is sitting up at the side of the bed.  He states his abdomen is still painful.  He feels like he is full of gas and wants to walk around to relieve it.  Only had small amount of stool after interventions yesterday.  We discussed more interventions for bowel movements today and moving around more.  We will repeat abdomen CT as well.  All questions answered, will continue to monitor.     Objective     Last Recorded Vitals  /80 (BP Location: Right arm, Patient Position: Lying)   Pulse 65   Temp 36.6 °C (97.9 °F) (Temporal)   Resp 16   Wt 104 kg (229 lb 11.5 oz)   SpO2 95%   Intake/Output last 3 Shifts:    Intake/Output Summary (Last 24 hours) at 3/22/2025 1115  Last data filed at 3/22/2025 0500  Gross per 24 hour   Intake 660 ml   Output 200 ml   Net 460 ml     Admission Weight  Weight: 104 kg (230 lb) (03/15/25 0855)    Daily Weight  03/22/25 : 104 kg (229 lb 11.5 oz)    Physical Exam  Vitals reviewed.   Constitutional:       Appearance: Normal appearance. He is obese.   HENT:      Head: Normocephalic and atraumatic.      Right Ear: External ear normal.      Left Ear: External ear normal.      Nose: Nose normal.      Mouth/Throat:      Mouth: Mucous membranes are moist.      Pharynx: Oropharynx is clear.   Eyes:      Conjunctiva/sclera: Conjunctivae normal.      Pupils: Pupils are equal, round, and reactive to light.   Cardiovascular:      Rate and Rhythm: Normal rate and regular rhythm.      Pulses: Normal pulses.      Heart sounds: Normal heart sounds.   Pulmonary:      Effort: Pulmonary effort is normal.      Breath sounds: Normal breath sounds.   Abdominal:      General: Bowel sounds are normal. There is distension.      Palpations: Abdomen is soft.      Tenderness: There is abdominal tenderness.   Musculoskeletal:         General: Normal range of motion.      Cervical back: Normal range of motion and  neck supple.   Skin:     General: Skin is warm and dry.   Neurological:      General: No focal deficit present.      Mental Status: He is alert and oriented to person, place, and time.   Psychiatric:         Mood and Affect: Mood normal.         Behavior: Behavior normal.     Scheduled medications  benzonatate, 200 mg, oral, TID  dicyclomine, 10 mg, oral, Before meals & nightly  docusate sodium, 100 mg, oral, BID  enoxaparin, 40 mg, subcutaneous, q24h  influenza, 0.5 mL, intramuscular, During hospitalization  folic acid, 1 mg, oral, Daily  ipratropium-albuteroL, 3 mL, nebulization, TID  lactulose, 20 g, oral, Daily  lidocaine, 1 patch, transdermal, Daily  losartan, 100 mg, oral, Daily  oxygen, , inhalation, Continuous - Inhalation  pantoprazole, 40 mg, oral, BID AC  polyethylene glycol, 17 g, oral, Daily  [START ON 3/23/2025] predniSONE, 15 mg, oral, Daily   Followed by  [START ON 3/24/2025] predniSONE, 5 mg, oral, Daily  primidone, 50 mg, oral, Nightly  sennosides, 2 tablet, oral, BID  simethicone, 80 mg, oral, 4x daily  thiamine, 100 mg, oral, Daily  traZODone, 50 mg, oral, Nightly       PRN medications  PRN medications: acetaminophen **OR** [DISCONTINUED] acetaminophen **OR** [DISCONTINUED] acetaminophen, acetaminophen **OR** [DISCONTINUED] acetaminophen **OR** [DISCONTINUED] acetaminophen, albuterol, albuterol, baclofen, benzocaine-menthol, codeine-guaifenesin, melatonin, ondansetron ODT **OR** ondansetron, oxyCODONE     Relevant Results  XR abdomen 1 view  Result Date: 3/21/2025  Gaseous distention of the transverse colon appearing slightly more pronounced when compared with yesterday's study.   Signed by: Dayana Dumont 3/21/2025 11:32 AM Dictation workstation:   [id number]    XR abdomen 1 view  Result Date: 3/20/2025  Gaseous distention of the transverse colon, decreased since yesterday.   Elevated left side of the diaphragm.   MACRO: None   Signed by: Lang Neely 3/20/2025 10:03 AM Dictation  workstation:   [id number]    CT abdomen pelvis wo IV contrast  Result Date: 3/19/2025  1.  Ascending and transverse colon are distended with liquid stool and gas, with the more distal descending colon appearing decompressed, likely representing some lakeshia. No evidence of inflammatory large bowel wall thickening or small bowel obstruction. 2. Fat containing umbilical and right inguinal hernias are similar in appearance to prior exam.     MACRO: None   Signed by: Camron Dorado 3/19/2025 7:23 PM Dictation workstation:   [id number]     Latest Reference Range & Units 03/21/25 06:10 03/21/25 06:11 03/22/25 06:31   GLUCOSE 74 - 99 mg/dL  93 92   SODIUM 136 - 145 mmol/L  139 141   POTASSIUM 3.5 - 5.3 mmol/L  3.4 (L) 3.4 (L)   CHLORIDE 98 - 107 mmol/L  107 108 (H)   Bicarbonate 21 - 32 mmol/L  26 27   Anion Gap 10 - 20 mmol/L  9 (L) 9 (L)   Blood Urea Nitrogen 6 - 23 mg/dL  31 (H) 27 (H)   Creatinine 0.50 - 1.30 mg/dL  0.95 0.85   EGFR >60 mL/min/1.73m*2  88 >90   Calcium 8.6 - 10.3 mg/dL  8.0 (L) 8.1 (L)   WBC 4.4 - 11.3 x10*3/uL 5.9  6.9   nRBC 0.0 - 0.0 /100 WBCs 0.0  0.0   RBC 4.50 - 5.90 x10*6/uL 4.70  4.65   HEMOGLOBIN 13.5 - 17.5 g/dL 13.5  13.5   HEMATOCRIT 41.0 - 52.0 % 41.8  40.4 (L)   MCV 80 - 100 fL 89  87   MCH 26.0 - 34.0 pg 28.7  29.0   MCHC 32.0 - 36.0 g/dL 32.3  33.4   RED CELL DISTRIBUTION WIDTH 11.5 - 14.5 % 12.0  12.3   Platelets 150 - 450 x10*3/uL 222  223     Assessment/Plan      Assessment & Plan  Influenza A    COPD exacerbation (Multi)    Tobacco use    Primary insomnia    Hiatal hernia    Benign essential HTN    Acute respiratory failure with hypoxia (Multi)    Constipation    Influenza A  Acute hypoxic respiratory failure   COPD, in acute exacerbation  Tobacco use  - Presented to ED with 5 days of cough, congestion, shortness of breath, poor appetite, diarrhea, fevers, chills  - Flu A positive  - CT chest: No evidence consolidating infiltrate or effusion. Elevated left hemidiaphragm with  left basilar subsegmental atelectasis.   - No leukocytosis   - D-dimer 1725  - BNP 96   - CTA chest 3/15: No acute pulmonary embolism, Unchanged bronchitis in both lower lobes and the middle lobe and the lingula   - Procal 0.05  - Pertussis PCR negative   - MRSA nares negative   - Sputum culture if able   - discontinued azithromycin 500 mg daily completed   - continue Solumedrol 40 mg IVP - decreased to q12h on 3/17, switch to po 3/21    - continue DuoNebs TID  - continue Tessalon perles 200 mg TID   - continue Lidocaine 4%  patch for pleuritic chest pain  - completed Tamiflu 75 mg BID; day 5/5  - continue Robitussin AC  mg q6h PRN for cough   - continue acetaminophen 650 mg q4h PRN for fever   - RT eval and treat protocol  - Bronchial hygiene  - Isolation protocol for flu A  - Patient declines nicotine patch at this time; encourage cessation on discharge   - SpO2 85% on RA during the night  - supplemental oxygen to keep SpO2 > 90%  - currently on 2lpm via NC  - No oxygen at home  - Ambulatory pulse ox/overnight O2 trend before discharge   - PT/OT evals- patient agreeable to [facility] on discharge   - CTA chest 3/19: No CT evidence of pulmonary embolism in the main pulmonary arteries out through each hilum. For technical reasons, the exam is nondiagnostic for pulmonary embolism distal to each hilum. Mild stable mediastinal and bilateral hilar adenopathy, perhaps reactive. Elevation of the left diaphragm with stable scarring at the base of the left lung. There is also mild new atelectasis in the posterolateral inferior left upper lobe.   - completed Levaquin for pneumonia coverage   - Sputum moderate saliva     Constipation  - Patient reports no BM since before admission on 3/15, has had poor intake while admitted   - Continue Colace 100 mg BID   - Miralax 17 g every day  - lactulose 20 g every day; patient takes PRN at home with good effect  - Mag citrate x 1 dose ordered 3/19   - KUB 3/19 clear  - kub 3/20 improved  bowel regimen + moderate stool, clear diet and full liquid in pm if better tomorrow start regular diet   - KUB 3/21: Gaseous distention of the transverse colon appearing slightly more pronounced when compared with yesterday's study.   - Monitor for BM  - encouraged pt to ambulate  - GI consult; recommended bentyl and dimethicone  - started dicyclomine 10 mg QID  - started simethicone 80 mg QID     Essential HTN  - continue losartan 100 mg daily  - monitor BP     Insomnia/Essential tremor  - continue primidone 50 mg at bedtime   - continue trazodone 50 mg at bedtime this admission; monitor response      Hiatal hernia  Chronic GERD  - continue pantoprazole 40 mg BID      DVT PPx: continue enoxaparin 40 mg daily   Code Status: Full    Disposition: Patient requires inpatient management at this time.      Irena Wyman, REBEKAH-CNP

## 2025-03-22 NOTE — CARE PLAN
The patient's goals for the shift include  I want my stomach to feel better    The clinical goals for the shift include Patient will have tolerable pain level throughout shift    Over the shift, the patient did not make progress toward the following goals.    0820 Assumed care of patient. Assessment completed as charted. Patient resting in bed. States he has had abdominal pain prior to admission, recent EGD/colonoscopy completed. Abdomen distended and firm. States he had a small liquid BM this morning with large amount of gas. AM medications given. Agrees to Lactulose, refuses Miralax. Will continue to monitor. Call light within reach.    0940 Down to CT scan.    1000 Patient back up from CT scan. Patient medicated with Oxycodone for 7/10 abdominal pain. Medicated with Oxycodone as ordered. Heat pad applied to abdomen.    1330 Patient showered with OT assistance. Tolerated well.     1520 Patient resting in bed. States abdominal pain is starting to come back. Discussed oxycodone and next scheduled dose, agrees to wait until due. Call light within reach.

## 2025-03-23 ENCOUNTER — APPOINTMENT (OUTPATIENT)
Dept: RADIOLOGY | Facility: HOSPITAL | Age: 65
DRG: 193 | End: 2025-03-23
Payer: MEDICARE

## 2025-03-23 VITALS
TEMPERATURE: 97.7 F | HEIGHT: 74 IN | HEART RATE: 65 BPM | BODY MASS INDEX: 27.25 KG/M2 | OXYGEN SATURATION: 95 % | WEIGHT: 212.3 LBS | DIASTOLIC BLOOD PRESSURE: 70 MMHG | SYSTOLIC BLOOD PRESSURE: 150 MMHG | RESPIRATION RATE: 16 BRPM

## 2025-03-23 LAB
ANION GAP SERPL CALC-SCNC: 9 MMOL/L (ref 10–20)
BUN SERPL-MCNC: 25 MG/DL (ref 6–23)
CALCIUM SERPL-MCNC: 8 MG/DL (ref 8.6–10.3)
CHLORIDE SERPL-SCNC: 109 MMOL/L (ref 98–107)
CO2 SERPL-SCNC: 25 MMOL/L (ref 21–32)
CREAT SERPL-MCNC: 0.93 MG/DL (ref 0.5–1.3)
EGFRCR SERPLBLD CKD-EPI 2021: >90 ML/MIN/1.73M*2
ERYTHROCYTE [DISTWIDTH] IN BLOOD BY AUTOMATED COUNT: 12.4 % (ref 11.5–14.5)
GLUCOSE SERPL-MCNC: 105 MG/DL (ref 74–99)
HCT VFR BLD AUTO: 40.5 % (ref 41–52)
HGB BLD-MCNC: 13.3 G/DL (ref 13.5–17.5)
MAGNESIUM SERPL-MCNC: 1.89 MG/DL (ref 1.6–2.4)
MCH RBC QN AUTO: 28.8 PG (ref 26–34)
MCHC RBC AUTO-ENTMCNC: 32.8 G/DL (ref 32–36)
MCV RBC AUTO: 88 FL (ref 80–100)
NRBC BLD-RTO: 0 /100 WBCS (ref 0–0)
PLATELET # BLD AUTO: 241 X10*3/UL (ref 150–450)
POTASSIUM SERPL-SCNC: 3.3 MMOL/L (ref 3.5–5.3)
RBC # BLD AUTO: 4.62 X10*6/UL (ref 4.5–5.9)
SODIUM SERPL-SCNC: 140 MMOL/L (ref 136–145)
WBC # BLD AUTO: 7.7 X10*3/UL (ref 4.4–11.3)

## 2025-03-23 PROCEDURE — 2500000001 HC RX 250 WO HCPCS SELF ADMINISTERED DRUGS (ALT 637 FOR MEDICARE OP): Mod: IPSPLIT | Performed by: NURSE PRACTITIONER

## 2025-03-23 PROCEDURE — 2500000004 HC RX 250 GENERAL PHARMACY W/ HCPCS (ALT 636 FOR OP/ED): Mod: IPSPLIT | Performed by: NURSE PRACTITIONER

## 2025-03-23 PROCEDURE — 2500000002 HC RX 250 W HCPCS SELF ADMINISTERED DRUGS (ALT 637 FOR MEDICARE OP, ALT 636 FOR OP/ED): Mod: IPSPLIT

## 2025-03-23 PROCEDURE — 83735 ASSAY OF MAGNESIUM: CPT | Mod: IPSPLIT | Performed by: NURSE PRACTITIONER

## 2025-03-23 PROCEDURE — 2500000004 HC RX 250 GENERAL PHARMACY W/ HCPCS (ALT 636 FOR OP/ED): Mod: IPSPLIT

## 2025-03-23 PROCEDURE — 2500000004 HC RX 250 GENERAL PHARMACY W/ HCPCS (ALT 636 FOR OP/ED): Mod: JZ,IPSPLIT | Performed by: STUDENT IN AN ORGANIZED HEALTH CARE EDUCATION/TRAINING PROGRAM

## 2025-03-23 PROCEDURE — 99232 SBSQ HOSP IP/OBS MODERATE 35: CPT | Performed by: NURSE PRACTITIONER

## 2025-03-23 PROCEDURE — 2550000001 HC RX 255 CONTRASTS: Mod: IPSPLIT | Performed by: NURSE PRACTITIONER

## 2025-03-23 PROCEDURE — 2500000005 HC RX 250 GENERAL PHARMACY W/O HCPCS: Mod: IPSPLIT | Performed by: NURSE PRACTITIONER

## 2025-03-23 PROCEDURE — 36415 COLL VENOUS BLD VENIPUNCTURE: CPT | Mod: IPSPLIT | Performed by: NURSE PRACTITIONER

## 2025-03-23 PROCEDURE — 94760 N-INVAS EAR/PLS OXIMETRY 1: CPT | Mod: IPSPLIT

## 2025-03-23 PROCEDURE — 2500000001 HC RX 250 WO HCPCS SELF ADMINISTERED DRUGS (ALT 637 FOR MEDICARE OP): Mod: IPSPLIT

## 2025-03-23 PROCEDURE — 2500000005 HC RX 250 GENERAL PHARMACY W/O HCPCS: Mod: IPSPLIT | Performed by: INTERNAL MEDICINE

## 2025-03-23 PROCEDURE — 2500000002 HC RX 250 W HCPCS SELF ADMINISTERED DRUGS (ALT 637 FOR MEDICARE OP, ALT 636 FOR OP/ED): Mod: IPSPLIT | Performed by: NURSE PRACTITIONER

## 2025-03-23 PROCEDURE — 74018 RADEX ABDOMEN 1 VIEW: CPT | Mod: IPSPLIT

## 2025-03-23 PROCEDURE — 74177 CT ABD & PELVIS W/CONTRAST: CPT | Performed by: RADIOLOGY

## 2025-03-23 PROCEDURE — 94762 N-INVAS EAR/PLS OXIMTRY CONT: CPT | Mod: IPSPLIT

## 2025-03-23 PROCEDURE — 2500000005 HC RX 250 GENERAL PHARMACY W/O HCPCS: Mod: IPSPLIT

## 2025-03-23 PROCEDURE — 85027 COMPLETE CBC AUTOMATED: CPT | Mod: IPSPLIT | Performed by: NURSE PRACTITIONER

## 2025-03-23 PROCEDURE — 74177 CT ABD & PELVIS W/CONTRAST: CPT | Mod: IPSPLIT

## 2025-03-23 PROCEDURE — 94640 AIRWAY INHALATION TREATMENT: CPT | Mod: IPSPLIT

## 2025-03-23 PROCEDURE — 82374 ASSAY BLOOD CARBON DIOXIDE: CPT | Mod: IPSPLIT | Performed by: NURSE PRACTITIONER

## 2025-03-23 PROCEDURE — 9420000001 HC RT PATIENT EDUCATION 5 MIN: Mod: IPSPLIT

## 2025-03-23 PROCEDURE — 1200000002 HC GENERAL ROOM WITH TELEMETRY DAILY: Mod: IPSPLIT

## 2025-03-23 PROCEDURE — 74018 RADEX ABDOMEN 1 VIEW: CPT | Performed by: RADIOLOGY

## 2025-03-23 RX ORDER — SODIUM CHLORIDE AND POTASSIUM CHLORIDE 150; 900 MG/100ML; MG/100ML
40 INJECTION, SOLUTION INTRAVENOUS CONTINUOUS
Status: DISCONTINUED | OUTPATIENT
Start: 2025-03-23 | End: 2025-03-28

## 2025-03-23 RX ORDER — POTASSIUM CHLORIDE 14.9 MG/ML
20 INJECTION INTRAVENOUS
Status: COMPLETED | OUTPATIENT
Start: 2025-03-23 | End: 2025-03-23

## 2025-03-23 RX ORDER — SODIUM CHLORIDE 9 MG/ML
INJECTION, SOLUTION INTRAVENOUS
Status: COMPLETED
Start: 2025-03-23 | End: 2025-03-23

## 2025-03-23 RX ADMIN — POTASSIUM CHLORIDE 20 MEQ: 14.9 INJECTION, SOLUTION INTRAVENOUS at 10:03

## 2025-03-23 RX ADMIN — Medication 21 PERCENT: at 22:27

## 2025-03-23 RX ADMIN — DOCUSATE SODIUM 100 MG: 100 CAPSULE, LIQUID FILLED ORAL at 20:20

## 2025-03-23 RX ADMIN — SIMETHICONE 80 MG: 80 TABLET, CHEWABLE ORAL at 12:35

## 2025-03-23 RX ADMIN — SODIUM CHLORIDE 1000 ML: 0.9 INJECTION, SOLUTION INTRAVENOUS at 10:03

## 2025-03-23 RX ADMIN — SODIUM PHOSPHATE, DIBASIC AND SODIUM PHOSPHATE, MONOBASIC 1 ENEMA: 7; 19 ENEMA RECTAL at 11:13

## 2025-03-23 RX ADMIN — IPRATROPIUM BROMIDE AND ALBUTEROL SULFATE 3 ML: .5; 3 SOLUTION RESPIRATORY (INHALATION) at 08:28

## 2025-03-23 RX ADMIN — HYDROMORPHONE HYDROCHLORIDE 0.5 MG: 1 INJECTION, SOLUTION INTRAMUSCULAR; INTRAVENOUS; SUBCUTANEOUS at 20:21

## 2025-03-23 RX ADMIN — Medication 21 PERCENT: at 15:30

## 2025-03-23 RX ADMIN — BISACODYL 10 MG: 10 SUPPOSITORY RECTAL at 08:06

## 2025-03-23 RX ADMIN — ACETAMINOPHEN 650 MG: 325 TABLET, FILM COATED ORAL at 08:09

## 2025-03-23 RX ADMIN — POTASSIUM CHLORIDE AND SODIUM CHLORIDE 100 ML/HR: 900; 150 INJECTION, SOLUTION INTRAVENOUS at 11:30

## 2025-03-23 RX ADMIN — PANTOPRAZOLE SODIUM 40 MG: 40 TABLET, DELAYED RELEASE ORAL at 06:20

## 2025-03-23 RX ADMIN — IPRATROPIUM BROMIDE AND ALBUTEROL SULFATE 3 ML: .5; 3 SOLUTION RESPIRATORY (INHALATION) at 22:27

## 2025-03-23 RX ADMIN — BENZONATATE 200 MG: 100 CAPSULE ORAL at 08:05

## 2025-03-23 RX ADMIN — HYDROMORPHONE HYDROCHLORIDE 0.5 MG: 1 INJECTION, SOLUTION INTRAMUSCULAR; INTRAVENOUS; SUBCUTANEOUS at 10:03

## 2025-03-23 RX ADMIN — SENNOSIDES 17.2 MG: 8.6 TABLET, FILM COATED ORAL at 20:20

## 2025-03-23 RX ADMIN — SENNOSIDES 17.2 MG: 8.6 TABLET, FILM COATED ORAL at 08:05

## 2025-03-23 RX ADMIN — POTASSIUM CHLORIDE AND SODIUM CHLORIDE 100 ML/HR: 900; 150 INJECTION, SOLUTION INTRAVENOUS at 20:16

## 2025-03-23 RX ADMIN — HYDROMORPHONE HYDROCHLORIDE 0.5 MG: 1 INJECTION, SOLUTION INTRAMUSCULAR; INTRAVENOUS; SUBCUTANEOUS at 13:14

## 2025-03-23 RX ADMIN — POLYETHYLENE GLYCOL 3350 17 G: 17 POWDER, FOR SOLUTION ORAL at 08:06

## 2025-03-23 RX ADMIN — SIMETHICONE 80 MG: 80 TABLET, CHEWABLE ORAL at 06:21

## 2025-03-23 RX ADMIN — FOLIC ACID 1 MG: 1 TABLET ORAL at 08:06

## 2025-03-23 RX ADMIN — THIAMINE HCL TAB 100 MG 100 MG: 100 TAB at 08:05

## 2025-03-23 RX ADMIN — Medication 6 MG: at 20:20

## 2025-03-23 RX ADMIN — IPRATROPIUM BROMIDE AND ALBUTEROL SULFATE 3 ML: .5; 3 SOLUTION RESPIRATORY (INHALATION) at 15:31

## 2025-03-23 RX ADMIN — PRIMIDONE 50 MG: 50 TABLET ORAL at 20:20

## 2025-03-23 RX ADMIN — DICYCLOMINE HYDROCHLORIDE 10 MG: 10 CAPSULE ORAL at 06:21

## 2025-03-23 RX ADMIN — LOSARTAN POTASSIUM 100 MG: 50 TABLET, FILM COATED ORAL at 08:05

## 2025-03-23 RX ADMIN — TRAZODONE HYDROCHLORIDE 50 MG: 50 TABLET ORAL at 20:22

## 2025-03-23 RX ADMIN — HYDROMORPHONE HYDROCHLORIDE 0.5 MG: 1 INJECTION, SOLUTION INTRAMUSCULAR; INTRAVENOUS; SUBCUTANEOUS at 17:41

## 2025-03-23 RX ADMIN — IOHEXOL 75 ML: 350 INJECTION, SOLUTION INTRAVENOUS at 11:30

## 2025-03-23 RX ADMIN — SIMETHICONE 80 MG: 80 TABLET, CHEWABLE ORAL at 20:20

## 2025-03-23 RX ADMIN — DICYCLOMINE HYDROCHLORIDE 10 MG: 10 CAPSULE ORAL at 20:20

## 2025-03-23 RX ADMIN — DOCUSATE SODIUM 100 MG: 100 CAPSULE, LIQUID FILLED ORAL at 08:06

## 2025-03-23 RX ADMIN — POTASSIUM CHLORIDE 20 MEQ: 14.9 INJECTION, SOLUTION INTRAVENOUS at 12:32

## 2025-03-23 RX ADMIN — BENZONATATE 200 MG: 100 CAPSULE ORAL at 20:20

## 2025-03-23 RX ADMIN — PREDNISONE 15 MG: 5 TABLET ORAL at 08:09

## 2025-03-23 ASSESSMENT — PAIN SCALES - GENERAL
PAINLEVEL_OUTOF10: 3
PAINLEVEL_OUTOF10: 7
PAINLEVEL_OUTOF10: 9
PAINLEVEL_OUTOF10: 10 - WORST POSSIBLE PAIN

## 2025-03-23 ASSESSMENT — COGNITIVE AND FUNCTIONAL STATUS - GENERAL
MOBILITY SCORE: 24
DAILY ACTIVITIY SCORE: 24

## 2025-03-23 ASSESSMENT — PAIN DESCRIPTION - LOCATION
LOCATION: ABDOMEN

## 2025-03-23 ASSESSMENT — PAIN - FUNCTIONAL ASSESSMENT
PAIN_FUNCTIONAL_ASSESSMENT: 0-10
PAIN_FUNCTIONAL_ASSESSMENT: 0-10

## 2025-03-23 ASSESSMENT — PAIN SCALES - WONG BAKER
WONGBAKER_NUMERICALRESPONSE: HURTS WHOLE LOT
WONGBAKER_NUMERICALRESPONSE: HURTS WORST
WONGBAKER_NUMERICALRESPONSE: HURTS LITTLE MORE

## 2025-03-23 ASSESSMENT — PAIN DESCRIPTION - ORIENTATION
ORIENTATION: RIGHT

## 2025-03-23 ASSESSMENT — PAIN DESCRIPTION - DESCRIPTORS: DESCRIPTORS: ACHING;CRAMPING;PRESSURE;SHARP

## 2025-03-23 NOTE — CARE PLAN
RT has completed the requested/ordered overnight trend on room air and patient does not qualify for home-going oxygen at  with his discharge.    Total Time Below: 1 min 32 sec  Longest Duration: 56 sec  Lowest SpO2: 78% at 03/23/25 @ 02:56:54 AM  Number of Events: 5

## 2025-03-23 NOTE — CARE PLAN
The patient's goals for the shift include      The clinical goals for the shift include abd pain rating < 5 by end of shift    Pt c/o abd pain >5 overnight. Abd firm and distended with hypoactive bowel sounds. Pt passing flatus and states he had BM during day yesterday.

## 2025-03-23 NOTE — PROGRESS NOTES
Julio Carranza is a 64 y.o. male on day 7 of admission presenting with Influenza A.    Subjective   Increased abdominal pain and distention overnight. Now nausea with sips of broth. Had suppository, but only produced air afterwards. No bowel movement. Rated 8/10. Now having difficulty with oxygenation, requiring 2L NC. No cough or congestion, afebrile.      Objective     Last Recorded Vitals  /74 (BP Location: Right arm, Patient Position: Lying)   Pulse 62   Temp 36.7 °C (98.1 °F) (Temporal)   Resp 16   Wt 96.7 kg (213 lb 3 oz)   SpO2 95%   Intake/Output last 3 Shifts:    Intake/Output Summary (Last 24 hours) at 3/23/2025 0930  Last data filed at 3/23/2025 0621  Gross per 24 hour   Intake 340 ml   Output 1 ml   Net 339 ml     Admission Weight  Weight: 104 kg (230 lb) (03/15/25 0855)    Daily Weight  03/23/25 : 96.7 kg (213 lb 3 oz)    Physical Exam  Vitals reviewed.   Constitutional:       General: He is in acute distress.      Appearance: Normal appearance. He is obese. He is ill-appearing.   HENT:      Head: Normocephalic and atraumatic.      Right Ear: External ear normal.      Left Ear: External ear normal.      Nose: Nose normal.      Mouth/Throat:      Mouth: Mucous membranes are moist.      Pharynx: Oropharynx is clear.   Eyes:      Conjunctiva/sclera: Conjunctivae normal.      Pupils: Pupils are equal, round, and reactive to light.   Cardiovascular:      Rate and Rhythm: Normal rate and regular rhythm.      Pulses: Normal pulses.      Heart sounds: Normal heart sounds.   Pulmonary:      Effort: Pulmonary effort is normal.      Breath sounds: Normal breath sounds.   Abdominal:      General: Bowel sounds are normal. There is distension.      Palpations: Abdomen is soft.      Tenderness: There is abdominal tenderness.      Comments: Hyperactive BS upper abdomen, tender with palpation over entire abdomen, distended, firm. Umbilical hernia, reducible.    Musculoskeletal:         General: Normal range of  motion.      Cervical back: Normal range of motion and neck supple.   Skin:     General: Skin is warm and dry.   Neurological:      General: No focal deficit present.      Mental Status: He is alert and oriented to person, place, and time.   Psychiatric:         Mood and Affect: Mood normal.         Behavior: Behavior normal.       Scheduled medications  benzonatate, 200 mg, oral, TID  bisacodyl, 10 mg, rectal, Daily  dicyclomine, 10 mg, oral, Before meals & nightly  docusate sodium, 100 mg, oral, BID  enoxaparin, 40 mg, subcutaneous, q24h  influenza, 0.5 mL, intramuscular, During hospitalization  folic acid, 1 mg, oral, Daily  ipratropium-albuteroL, 3 mL, nebulization, TID  lactulose, 20 g, oral, TID  lidocaine, 1 patch, transdermal, Daily  losartan, 100 mg, oral, Daily  oxygen, , inhalation, Continuous - Inhalation  pantoprazole, 40 mg, oral, BID AC  polyethylene glycol, 17 g, oral, Daily  [START ON 3/24/2025] predniSONE, 5 mg, oral, Daily  primidone, 50 mg, oral, Nightly  sennosides, 2 tablet, oral, BID  simethicone, 80 mg, oral, 4x daily  thiamine, 100 mg, oral, Daily  traZODone, 50 mg, oral, Nightly       PRN medications  PRN medications: acetaminophen **OR** [DISCONTINUED] acetaminophen **OR** [DISCONTINUED] acetaminophen, acetaminophen **OR** [DISCONTINUED] acetaminophen **OR** [DISCONTINUED] acetaminophen, albuterol, albuterol, baclofen, benzocaine-menthol, codeine-guaifenesin, HYDROmorphone, melatonin, ondansetron ODT **OR** ondansetron, oxyCODONE     Relevant Results  XR abdomen 1 view  Result Date: 3/21/2025  Gaseous distention of the transverse colon appearing slightly more pronounced when compared with yesterday's study.   Signed by: Dayana Dumont 3/21/2025 11:32 AM Dictation workstation:   [id number]    XR abdomen 1 view  Result Date: 3/20/2025  Gaseous distention of the transverse colon, decreased since yesterday.   Elevated left side of the diaphragm.   MACRO: None   Signed by: Lang Neely  3/20/2025 10:03 AM Dictation workstation:   [id number]    CT abdomen pelvis wo IV contrast  Result Date: 3/19/2025  1.  Ascending and transverse colon are distended with liquid stool and gas, with the more distal descending colon appearing decompressed, likely representing some lakeshia. No evidence of inflammatory large bowel wall thickening or small bowel obstruction. 2. Fat containing umbilical and right inguinal hernias are similar in appearance to prior exam.     MACRO: None   Signed by: Camron oDrado 3/19/2025 7:23 PM Dictation workstation:   [id number]     Latest Reference Range & Units 03/21/25 06:10 03/21/25 06:11 03/22/25 06:31   GLUCOSE 74 - 99 mg/dL  93 92   SODIUM 136 - 145 mmol/L  139 141   POTASSIUM 3.5 - 5.3 mmol/L  3.4 (L) 3.4 (L)   CHLORIDE 98 - 107 mmol/L  107 108 (H)   Bicarbonate 21 - 32 mmol/L  26 27   Anion Gap 10 - 20 mmol/L  9 (L) 9 (L)   Blood Urea Nitrogen 6 - 23 mg/dL  31 (H) 27 (H)   Creatinine 0.50 - 1.30 mg/dL  0.95 0.85   EGFR >60 mL/min/1.73m*2  88 >90   Calcium 8.6 - 10.3 mg/dL  8.0 (L) 8.1 (L)   WBC 4.4 - 11.3 x10*3/uL 5.9  6.9   nRBC 0.0 - 0.0 /100 WBCs 0.0  0.0   RBC 4.50 - 5.90 x10*6/uL 4.70  4.65   HEMOGLOBIN 13.5 - 17.5 g/dL 13.5  13.5   HEMATOCRIT 41.0 - 52.0 % 41.8  40.4 (L)   MCV 80 - 100 fL 89  87   MCH 26.0 - 34.0 pg 28.7  29.0   MCHC 32.0 - 36.0 g/dL 32.3  33.4   RED CELL DISTRIBUTION WIDTH 11.5 - 14.5 % 12.0  12.3   Platelets 150 - 450 x10*3/uL 222  223     Assessment/Plan      Assessment & Plan  Influenza A    COPD exacerbation (Multi)    Tobacco use    Primary insomnia    Hiatal hernia    Benign essential HTN    Acute respiratory failure with hypoxia (Multi)    Constipation    Influenza A  Acute hypoxic respiratory failure   COPD, in acute exacerbation  Tobacco use  - Presented to ED with 5 days of cough, congestion, shortness of breath, poor appetite, diarrhea, fevers, chills  - Flu A positive  - CT chest: No evidence consolidating infiltrate or effusion.  Elevated left hemidiaphragm with left basilar subsegmental atelectasis.   - No leukocytosis   - D-dimer 1725  - BNP 96   - CTA chest 3/15: No acute pulmonary embolism, Unchanged bronchitis in both lower lobes and the middle lobe and the lingula   - Procal 0.05  - Pertussis PCR negative   - MRSA nares negative   - Sputum culture if able   - discontinued azithromycin 500 mg daily completed   - continue Solumedrol 40 mg IVP - decreased to q12h on 3/17, switch to po 3/21    - continue DuoNebs TID  - continue Tessalon perles 200 mg TID   - continue Lidocaine 4%  patch for pleuritic chest pain  - completed Tamiflu 75 mg BID; day 5/5  - continue Robitussin AC  mg q6h PRN for cough   - continue acetaminophen 650 mg q4h PRN for fever   - RT eval and treat protocol  - Bronchial hygiene  - Isolation protocol for flu A  - Patient declines nicotine patch at this time; encourage cessation on discharge   - SpO2 85% on RA during the night  - supplemental oxygen to keep SpO2 > 90%  - currently on 2lpm via NC  - No oxygen at home  - Ambulatory pulse ox/overnight O2 trend before discharge   - PT/OT evals- patient agreeable to [facility] on discharge   - CTA chest 3/19: No CT evidence of pulmonary embolism in the main pulmonary arteries out through each hilum. For technical reasons, the exam is nondiagnostic for pulmonary embolism distal to each hilum. Mild stable mediastinal and bilateral hilar adenopathy, perhaps reactive. Elevation of the left diaphragm with stable scarring at the base of the left lung. There is also mild new atelectasis in the posterolateral inferior left upper lobe.   - completed Levaquin for pneumonia coverage   - Sputum moderate saliva     Constipation  - Patient reports no BM since before admission on 3/15, has had poor intake while admitted   - Mag citrate x 1 dose ordered 3/19   - KUB 3/19 clear  - kub 3/20 improved bowel regimen + moderate stool, clear diet and full liquid in pm if better tomorrow start  regular diet   - KUB 3/21: Gaseous distention of the transverse colon appearing slightly more pronounced when compared with yesterday's study.   -3/22 CT Abd/Pelvis with Nonspecific distention of the right and transverse colon similar   to 03/19/2025.   -Now with increased abd pain and nausea.  -Stat KUB ordered given worsened symptoms. Await results. NPO x ice for now.   - GI consult; recommended bentyl and dimethicone  - started dicyclomine 10 mg QID  - started simethicone 80 mg QID     Essential HTN  - continue losartan 100 mg daily  - monitor BP     Insomnia/Essential tremor  - continue primidone 50 mg at bedtime   - continue trazodone 50 mg at bedtime this admission; monitor response      Hiatal hernia  Chronic GERD  - continue pantoprazole 40 mg BID     Hypokalemia  -K+ today 3.3, Mg 1.89  -IV potassium today 40meq  -BMP in am.      DVT PPx: continue enoxaparin 40 mg daily   Code Status: Full    Disposition: Patient requires inpatient management at this time.      Cheryl Farooq, APRN-CNP

## 2025-03-24 ENCOUNTER — APPOINTMENT (OUTPATIENT)
Dept: RADIOLOGY | Facility: HOSPITAL | Age: 65
DRG: 193 | End: 2025-03-24
Payer: MEDICARE

## 2025-03-24 PROBLEM — E87.6 HYPOKALEMIA: Status: ACTIVE | Noted: 2025-03-24

## 2025-03-24 PROBLEM — G25.0 ESSENTIAL TREMOR: Status: ACTIVE | Noted: 2025-03-24

## 2025-03-24 PROBLEM — K56.7 ILEUS (MULTI): Status: ACTIVE | Noted: 2025-03-24

## 2025-03-24 LAB
ANION GAP SERPL CALC-SCNC: 11 MMOL/L (ref 10–20)
BUN SERPL-MCNC: 21 MG/DL (ref 6–23)
CALCIUM SERPL-MCNC: 7.8 MG/DL (ref 8.6–10.3)
CEA SERPL-MCNC: 2.3 UG/L
CHLORIDE SERPL-SCNC: 111 MMOL/L (ref 98–107)
CO2 SERPL-SCNC: 21 MMOL/L (ref 21–32)
CREAT SERPL-MCNC: 0.84 MG/DL (ref 0.5–1.3)
EGFRCR SERPLBLD CKD-EPI 2021: >90 ML/MIN/1.73M*2
ERYTHROCYTE [DISTWIDTH] IN BLOOD BY AUTOMATED COUNT: 12.5 % (ref 11.5–14.5)
GLUCOSE SERPL-MCNC: 78 MG/DL (ref 74–99)
HCT VFR BLD AUTO: 40.6 % (ref 41–52)
HGB BLD-MCNC: 13.1 G/DL (ref 13.5–17.5)
MAGNESIUM SERPL-MCNC: 2 MG/DL (ref 1.6–2.4)
MCH RBC QN AUTO: 28.7 PG (ref 26–34)
MCHC RBC AUTO-ENTMCNC: 32.3 G/DL (ref 32–36)
MCV RBC AUTO: 89 FL (ref 80–100)
NRBC BLD-RTO: 0 /100 WBCS (ref 0–0)
PLATELET # BLD AUTO: 221 X10*3/UL (ref 150–450)
POTASSIUM SERPL-SCNC: 3.9 MMOL/L (ref 3.5–5.3)
PREALB SERPL-MCNC: 27 MG/DL (ref 18–40)
RBC # BLD AUTO: 4.57 X10*6/UL (ref 4.5–5.9)
SODIUM SERPL-SCNC: 139 MMOL/L (ref 136–145)
WBC # BLD AUTO: 7.2 X10*3/UL (ref 4.4–11.3)

## 2025-03-24 PROCEDURE — 85027 COMPLETE CBC AUTOMATED: CPT | Mod: IPSPLIT | Performed by: NURSE PRACTITIONER

## 2025-03-24 PROCEDURE — 97116 GAIT TRAINING THERAPY: CPT | Mod: GP,CQ,IPSPLIT

## 2025-03-24 PROCEDURE — 1200000002 HC GENERAL ROOM WITH TELEMETRY DAILY: Mod: IPSPLIT

## 2025-03-24 PROCEDURE — 99222 1ST HOSP IP/OBS MODERATE 55: CPT | Performed by: SURGERY

## 2025-03-24 PROCEDURE — 97110 THERAPEUTIC EXERCISES: CPT | Mod: GP,CQ,IPSPLIT

## 2025-03-24 PROCEDURE — 2500000005 HC RX 250 GENERAL PHARMACY W/O HCPCS: Mod: IPSPLIT | Performed by: INTERNAL MEDICINE

## 2025-03-24 PROCEDURE — 80048 BASIC METABOLIC PNL TOTAL CA: CPT | Mod: IPSPLIT | Performed by: NURSE PRACTITIONER

## 2025-03-24 PROCEDURE — 2500000002 HC RX 250 W HCPCS SELF ADMINISTERED DRUGS (ALT 637 FOR MEDICARE OP, ALT 636 FOR OP/ED): Mod: IPSPLIT | Performed by: NURSE PRACTITIONER

## 2025-03-24 PROCEDURE — 2500000001 HC RX 250 WO HCPCS SELF ADMINISTERED DRUGS (ALT 637 FOR MEDICARE OP): Mod: IPSPLIT

## 2025-03-24 PROCEDURE — 2500000004 HC RX 250 GENERAL PHARMACY W/ HCPCS (ALT 636 FOR OP/ED): Mod: IPSPLIT | Performed by: NURSE PRACTITIONER

## 2025-03-24 PROCEDURE — 74018 RADEX ABDOMEN 1 VIEW: CPT | Performed by: RADIOLOGY

## 2025-03-24 PROCEDURE — 74018 RADEX ABDOMEN 1 VIEW: CPT | Mod: IPSPLIT

## 2025-03-24 PROCEDURE — 2500000004 HC RX 250 GENERAL PHARMACY W/ HCPCS (ALT 636 FOR OP/ED): Mod: IPSPLIT

## 2025-03-24 PROCEDURE — 99233 SBSQ HOSP IP/OBS HIGH 50: CPT

## 2025-03-24 PROCEDURE — 36415 COLL VENOUS BLD VENIPUNCTURE: CPT | Mod: IPSPLIT | Performed by: NURSE PRACTITIONER

## 2025-03-24 PROCEDURE — 94760 N-INVAS EAR/PLS OXIMETRY 1: CPT | Mod: IPSPLIT

## 2025-03-24 PROCEDURE — 83735 ASSAY OF MAGNESIUM: CPT | Mod: IPSPLIT | Performed by: NURSE PRACTITIONER

## 2025-03-24 PROCEDURE — 94668 MNPJ CHEST WALL SBSQ: CPT | Mod: IPSPLIT

## 2025-03-24 PROCEDURE — 94640 AIRWAY INHALATION TREATMENT: CPT | Mod: IPSPLIT

## 2025-03-24 PROCEDURE — 2500000001 HC RX 250 WO HCPCS SELF ADMINISTERED DRUGS (ALT 637 FOR MEDICARE OP): Mod: IPSPLIT | Performed by: NURSE PRACTITIONER

## 2025-03-24 PROCEDURE — 2500000005 HC RX 250 GENERAL PHARMACY W/O HCPCS: Mod: IPSPLIT

## 2025-03-24 PROCEDURE — 99232 SBSQ HOSP IP/OBS MODERATE 35: CPT

## 2025-03-24 PROCEDURE — 2500000004 HC RX 250 GENERAL PHARMACY W/ HCPCS (ALT 636 FOR OP/ED): Mod: JZ,IPSPLIT | Performed by: STUDENT IN AN ORGANIZED HEALTH CARE EDUCATION/TRAINING PROGRAM

## 2025-03-24 PROCEDURE — 2500000002 HC RX 250 W HCPCS SELF ADMINISTERED DRUGS (ALT 637 FOR MEDICARE OP, ALT 636 FOR OP/ED): Mod: IPSPLIT

## 2025-03-24 RX ORDER — CIPROFLOXACIN 2 MG/ML
400 INJECTION, SOLUTION INTRAVENOUS EVERY 12 HOURS
Status: DISCONTINUED | OUTPATIENT
Start: 2025-03-24 | End: 2025-03-27

## 2025-03-24 RX ORDER — METRONIDAZOLE 500 MG/100ML
500 INJECTION, SOLUTION INTRAVENOUS EVERY 12 HOURS
Status: DISCONTINUED | OUTPATIENT
Start: 2025-03-24 | End: 2025-03-27

## 2025-03-24 RX ADMIN — CIPROFLOXACIN 400 MG: 400 INJECTION, SOLUTION INTRAVENOUS at 10:43

## 2025-03-24 RX ADMIN — DOCUSATE SODIUM 100 MG: 100 CAPSULE, LIQUID FILLED ORAL at 09:10

## 2025-03-24 RX ADMIN — HYDROMORPHONE HYDROCHLORIDE 0.5 MG: 1 INJECTION, SOLUTION INTRAMUSCULAR; INTRAVENOUS; SUBCUTANEOUS at 17:36

## 2025-03-24 RX ADMIN — Medication 6 MG: at 21:23

## 2025-03-24 RX ADMIN — PREDNISONE 5 MG: 5 TABLET ORAL at 09:11

## 2025-03-24 RX ADMIN — Medication 21 PERCENT: at 20:33

## 2025-03-24 RX ADMIN — SIMETHICONE 80 MG: 80 TABLET, CHEWABLE ORAL at 12:40

## 2025-03-24 RX ADMIN — PRIMIDONE 50 MG: 50 TABLET ORAL at 20:21

## 2025-03-24 RX ADMIN — IPRATROPIUM BROMIDE AND ALBUTEROL SULFATE 3 ML: .5; 3 SOLUTION RESPIRATORY (INHALATION) at 20:33

## 2025-03-24 RX ADMIN — CIPROFLOXACIN 400 MG: 400 INJECTION, SOLUTION INTRAVENOUS at 20:21

## 2025-03-24 RX ADMIN — DICYCLOMINE HYDROCHLORIDE 10 MG: 10 CAPSULE ORAL at 06:23

## 2025-03-24 RX ADMIN — HYDROMORPHONE HYDROCHLORIDE 0.5 MG: 1 INJECTION, SOLUTION INTRAMUSCULAR; INTRAVENOUS; SUBCUTANEOUS at 05:37

## 2025-03-24 RX ADMIN — BENZONATATE 200 MG: 100 CAPSULE ORAL at 14:33

## 2025-03-24 RX ADMIN — IPRATROPIUM BROMIDE AND ALBUTEROL SULFATE 3 ML: .5; 3 SOLUTION RESPIRATORY (INHALATION) at 09:27

## 2025-03-24 RX ADMIN — POTASSIUM CHLORIDE AND SODIUM CHLORIDE 100 ML/HR: 900; 150 INJECTION, SOLUTION INTRAVENOUS at 17:41

## 2025-03-24 RX ADMIN — POTASSIUM CHLORIDE AND SODIUM CHLORIDE 100 ML/HR: 900; 150 INJECTION, SOLUTION INTRAVENOUS at 05:37

## 2025-03-24 RX ADMIN — TRAZODONE HYDROCHLORIDE 50 MG: 50 TABLET ORAL at 20:21

## 2025-03-24 RX ADMIN — SENNOSIDES 17.2 MG: 8.6 TABLET, FILM COATED ORAL at 09:11

## 2025-03-24 RX ADMIN — FOLIC ACID 1 MG: 1 TABLET ORAL at 09:10

## 2025-03-24 RX ADMIN — BENZONATATE 200 MG: 100 CAPSULE ORAL at 09:10

## 2025-03-24 RX ADMIN — HYDROMORPHONE HYDROCHLORIDE 0.5 MG: 1 INJECTION, SOLUTION INTRAMUSCULAR; INTRAVENOUS; SUBCUTANEOUS at 09:12

## 2025-03-24 RX ADMIN — PANTOPRAZOLE SODIUM 40 MG: 40 TABLET, DELAYED RELEASE ORAL at 17:34

## 2025-03-24 RX ADMIN — SIMETHICONE 80 MG: 80 TABLET, CHEWABLE ORAL at 17:34

## 2025-03-24 RX ADMIN — LOSARTAN POTASSIUM 100 MG: 50 TABLET, FILM COATED ORAL at 09:11

## 2025-03-24 RX ADMIN — THIAMINE HCL TAB 100 MG 100 MG: 100 TAB at 09:11

## 2025-03-24 RX ADMIN — SIMETHICONE 80 MG: 80 TABLET, CHEWABLE ORAL at 20:21

## 2025-03-24 RX ADMIN — DOCUSATE SODIUM 100 MG: 100 CAPSULE, LIQUID FILLED ORAL at 20:21

## 2025-03-24 RX ADMIN — HYDROMORPHONE HYDROCHLORIDE 0.5 MG: 1 INJECTION, SOLUTION INTRAMUSCULAR; INTRAVENOUS; SUBCUTANEOUS at 02:20

## 2025-03-24 RX ADMIN — BENZONATATE 200 MG: 100 CAPSULE ORAL at 20:21

## 2025-03-24 RX ADMIN — IPRATROPIUM BROMIDE AND ALBUTEROL SULFATE 3 ML: .5; 3 SOLUTION RESPIRATORY (INHALATION) at 15:32

## 2025-03-24 RX ADMIN — PANTOPRAZOLE SODIUM 40 MG: 40 TABLET, DELAYED RELEASE ORAL at 06:23

## 2025-03-24 RX ADMIN — METRONIDAZOLE 500 MG: 5 INJECTION, SOLUTION INTRAVENOUS at 21:22

## 2025-03-24 RX ADMIN — METRONIDAZOLE 500 MG: 5 INJECTION, SOLUTION INTRAVENOUS at 09:17

## 2025-03-24 RX ADMIN — HYDROMORPHONE HYDROCHLORIDE 0.5 MG: 1 INJECTION, SOLUTION INTRAMUSCULAR; INTRAVENOUS; SUBCUTANEOUS at 12:37

## 2025-03-24 RX ADMIN — ENOXAPARIN SODIUM 40 MG: 40 INJECTION SUBCUTANEOUS at 12:40

## 2025-03-24 RX ADMIN — SIMETHICONE 80 MG: 80 TABLET, CHEWABLE ORAL at 06:23

## 2025-03-24 RX ADMIN — HYDROMORPHONE HYDROCHLORIDE 0.5 MG: 1 INJECTION, SOLUTION INTRAMUSCULAR; INTRAVENOUS; SUBCUTANEOUS at 20:21

## 2025-03-24 ASSESSMENT — PAIN SCALES - GENERAL
PAINLEVEL_OUTOF10: 7
PAINLEVEL_OUTOF10: 4
PAINLEVEL_OUTOF10: 4
PAINLEVEL_OUTOF10: 7
PAINLEVEL_OUTOF10: 6
PAINLEVEL_OUTOF10: 7
PAINLEVEL_OUTOF10: 6
PAINLEVEL_OUTOF10: 5 - MODERATE PAIN
PAINLEVEL_OUTOF10: 7
PAINLEVEL_OUTOF10: 4

## 2025-03-24 ASSESSMENT — PAIN DESCRIPTION - LOCATION
LOCATION: ABDOMEN

## 2025-03-24 ASSESSMENT — PAIN - FUNCTIONAL ASSESSMENT
PAIN_FUNCTIONAL_ASSESSMENT: 0-10

## 2025-03-24 ASSESSMENT — PAIN DESCRIPTION - DESCRIPTORS
DESCRIPTORS: CRAMPING;PRESSURE;SHARP
DESCRIPTORS: CRAMPING;PRESSURE;SHARP

## 2025-03-24 ASSESSMENT — COGNITIVE AND FUNCTIONAL STATUS - GENERAL
MOVING TO AND FROM BED TO CHAIR: A LITTLE
MOBILITY SCORE: 21
WALKING IN HOSPITAL ROOM: A LITTLE
CLIMB 3 TO 5 STEPS WITH RAILING: A LITTLE

## 2025-03-24 ASSESSMENT — ENCOUNTER SYMPTOMS
ABDOMINAL DISTENTION: 1
FEVER: 1
COUGH: 1
ABDOMINAL PAIN: 1

## 2025-03-24 NOTE — CONSULTS
Cough  Associated symptoms include a fever.   Fever   Associated symptoms include abdominal pain and coughing.     This is a consult request for patient with prolonged colonic ileus.  This the patient was admitted approximately week ago with severe influenza.  He was noted to be flu a positive.  Approximately 4 to 5 days ago began to develop a distended abdomen.  He had serial abdominal x-rays which confirmed colonic ileus in the transverse and ascending colon.  He had a CT scan performed 9/3/2025 which confirmed no obvious obstructive abnormality but similar findings as above.  Repeat CT scan with IV contrast on 3/23/2025 confirmed similar findings.  He was noted to have a low potassium of 3.3.  The patient did have a colonoscopy 1/17/2025 where there is no noted mass or narrowing.  There is liquid stool in the colon was lavaged copiously.  He also had an EGD on 1/17/2025.  He feels very bloated.  He is distended.  He has been passing gas.  Has been tolerating his oral diet of liquids and protein shakes.  Past Medical History:   Diagnosis Date    Arthritis     BPH (benign prostatic hyperplasia)     CAD (coronary artery disease)     Emphysema lung (Multi)     Fall     tripped in shower  no injury    GERD (gastroesophageal reflux disease)     Hidradenitis     History of blood transfusion     after back surgery  no reaction    History of pulmonary embolism 04/2024    Post left total hip surgery    History of TIA (transient ischemic attack) 2018    HLD (hyperlipidemia)     HTN (hypertension)     Irritable bowel syndrome     Local infection of the skin and subcutaneous tissue, unspecified 12/01/2015    Skin infection, bacterial    Lung nodule     OA (osteoarthritis)     Other cervical disc displacement, unspecified cervical region 02/28/2019    Cervical disc herniation    Personal history of other diseases of the digestive system 03/25/2021    History of chronic constipation    Personal history of other diseases of the  respiratory system 12/03/2014    History of chronic obstructive lung disease    Personal history of other diseases of the respiratory system 07/15/2016    History of acute bronchitis    Psychophysiologic insomnia 03/25/2021    Chronic insomnia    Umbilical hernia     Unspecified cataract     Cataract of left eye    Use of cane as ambulatory aid     Wears dentures     full upper and lower    Wears glasses           Current Facility-Administered Medications:     acetaminophen (Tylenol) tablet 650 mg, 650 mg, oral, q4h PRN, 650 mg at 03/23/25 0809 **OR** [DISCONTINUED] acetaminophen (Tylenol) oral liquid 650 mg, 650 mg, nasogastric tube, q4h PRN **OR** [DISCONTINUED] acetaminophen (Tylenol) suppository 650 mg, 650 mg, rectal, q4h PRN, Susi Mejia PA-C    acetaminophen (Tylenol) tablet 650 mg, 650 mg, oral, q4h PRN, 650 mg at 03/22/25 2143 **OR** [DISCONTINUED] acetaminophen (Tylenol) oral liquid 650 mg, 650 mg, oral, q4h PRN **OR** [DISCONTINUED] acetaminophen (Tylenol) suppository 650 mg, 650 mg, rectal, q4h PRN, Susi Mejia PA-C    albuterol 2.5 mg /3 mL (0.083 %) nebulizer solution 2.5 mg, 2.5 mg, nebulization, q2h PRN, Deven Steinberg MD, 2.5 mg at 03/21/25 0610    albuterol 90 mcg/actuation inhaler 2 puff, 2 puff, inhalation, q2h PRN, DARYL Mak    baclofen (Lioresal) tablet 20 mg, 20 mg, oral, TID PRN, DARYL Mak, 20 mg at 03/17/25 2056    benzocaine-menthol (Cepastat Sore Throat) lozenge 1 lozenge, 1 lozenge, Mouth/Throat, q2h PRN, Susi Mejia PA-C, 1 lozenge at 03/15/25 2039    benzonatate (Tessalon) capsule 200 mg, 200 mg, oral, TID, DARYL Grant, 200 mg at 03/23/25 2020    bisacodyl (Dulcolax) suppository 10 mg, 10 mg, rectal, Daily, REBEKAH Mak-CNP, 10 mg at 03/23/25 0806    codeine-guaifenesin (Robitussin-AC)  mg/5 mL syrup 5 mL, 5 mL, oral, q6h PRN, Susi Meija PA-C, 5 mL at 03/19/25 0611    dicyclomine (Bentyl) capsule  10 mg, 10 mg, oral, Before meals & nightly, Tete Carballo, REBEKAH-CNP, 10 mg at 03/24/25 0623    docusate sodium (Colace) capsule 100 mg, 100 mg, oral, BID, REAL Houston-C, 100 mg at 03/23/25 2020    enoxaparin (Lovenox) syringe 40 mg, 40 mg, subcutaneous, q24h, MEREDITH HoustonC, 40 mg at 03/22/25 1317    flu vaccine, trivalent, preservative free, HIGH-DOSE, age 65y+ (Fluzone), 0.5 mL, intramuscular, During hospitalization, DARYL Mak    folic acid (Folvite) tablet 1 mg, 1 mg, oral, Daily, REBEKAH Mak-CNP, 1 mg at 03/23/25 0806    HYDROmorphone (Dilaudid) injection 0.5 mg, 0.5 mg, intravenous, q2h PRN, Sunshine Khoury MD, 0.5 mg at 03/24/25 0537    ipratropium-albuteroL (Duo-Neb) 0.5-2.5 mg/3 mL nebulizer solution 3 mL, 3 mL, nebulization, TID, Susi Mejia PA-C, 3 mL at 03/23/25 2227    lidocaine 4 % patch 1 patch, 1 patch, transdermal, Daily, REAL Houston-C, 1 patch at 03/20/25 0927    losartan (Cozaar) tablet 100 mg, 100 mg, oral, Daily, REBEKAH Mak-CNP, 100 mg at 03/23/25 0805    melatonin tablet 6 mg, 6 mg, oral, Nightly PRN, REAL Houston-C, 6 mg at 03/23/25 2020    ondansetron ODT (Zofran-ODT) disintegrating tablet 4 mg, 4 mg, oral, q8h PRN, 4 mg at 03/22/25 0032 **OR** ondansetron (Zofran) injection 4 mg, 4 mg, intravenous, q8h PRN, REAL Houston-C, 4 mg at 03/19/25 1746    oxyCODONE (Roxicodone) immediate release tablet 5 mg, 5 mg, oral, q6h PRN, REBEKAH Mak-CNP, 5 mg at 03/22/25 1628    oxygen (O2) therapy, , inhalation, Continuous - Inhalation, Deven Steinberg MD, Last Rate: 120,000 mL/hr at 03/22/25 2111, 21 percent at 03/23/25 2227    pantoprazole (ProtoNix) EC tablet 40 mg, 40 mg, oral, BID AC, 40 mg at 03/24/25 0623 **OR** [DISCONTINUED] pantoprazole (ProtoNix) injection 40 mg, 40 mg, intravenous, Daily before breakfast, Susi Mejia PA-C    polyethylene glycol (Glycolax, Miralax) packet 17 g, 17 g, oral,  Daily, Susi Mejia PA-C, 17 g at 03/23/25 0806    [COMPLETED] predniSONE (Deltasone) tablet 20 mg, 20 mg, oral, Daily, 20 mg at 03/21/25 0824 **FOLLOWED BY** [COMPLETED] predniSONE (Deltasone) tablet 10 mg, 10 mg, oral, Daily, 10 mg at 03/22/25 0817 **FOLLOWED BY** [COMPLETED] predniSONE (Deltasone) tablet 15 mg, 15 mg, oral, Daily, 15 mg at 03/23/25 0809 **FOLLOWED BY** predniSONE (Deltasone) tablet 5 mg, 5 mg, oral, Daily, Irena L Zamzam, APRN-CNP    primidone (Mysoline) tablet 50 mg, 50 mg, oral, Nightly, Irena L Zamzam, APRN-CNP, 50 mg at 03/23/25 2020    sennosides (Senokot) tablet 17.2 mg, 2 tablet, oral, BID, Irena L Zamzam, APRN-CNP, 17.2 mg at 03/23/25 2020    simethicone (Mylicon) chewable tablet 80 mg, 80 mg, oral, 4x daily, Tete Carballo, APRN-CNP, 80 mg at 03/24/25 0623    sodium chloride 0.9 % with KCl 20 mEq/L infusion, 100 mL/hr, intravenous, Continuous, REBEKAH Hair-CNP, Last Rate: 100 mL/hr at 03/24/25 0537, 100 mL/hr at 03/24/25 0537    thiamine (Vitamin B-1) tablet 100 mg, 100 mg, oral, Daily, Susi Mejia PA-C, 100 mg at 03/23/25 0805    traZODone (Desyrel) tablet 50 mg, 50 mg, oral, Nightly, Mallorie Medina, APRN-CNP, 50 mg at 03/23/25 2022     Allergies   Allergen Reactions    Etodolac Palpitations    Penicillins Unknown     since a child  Tolerated ceftriaxone (multiple admins) during October 2020 per [employer] review. (DAVI Gonzales, PharmD).     Escitalopram Oxalate Palpitations     long qt    Pollen Extracts Other        Review of Systems  Review of Systems   Constitutional:  Positive for fever.   Respiratory:  Positive for cough.    Gastrointestinal:  Positive for abdominal distention and abdominal pain.       Objective     Vital signs for last 24 hours:  Temp:  [36.5 °C (97.7 °F)-37 °C (98.6 °F)] 37 °C (98.6 °F)  Heart Rate:  [65-75] 69  Resp:  [16] 16  BP: (110-150)/(55-86) 134/86    Intake/Output this shift:  I/O this shift:  In: 1273.4 [P.O.:220;  I.V.:1053.4]  Out: -     Physical Exam  Physical Exam  Vitals reviewed.   Constitutional:       Appearance: Normal appearance.   HENT:      Head: Normocephalic.   Cardiovascular:      Rate and Rhythm: Normal rate and regular rhythm.      Heart sounds: Normal heart sounds.   Pulmonary:      Effort: Pulmonary effort is normal.      Breath sounds: Normal breath sounds.   Abdominal:      General: Abdomen is flat. There is distension.      Palpations: Abdomen is soft. There is no mass.      Tenderness: There is generalized abdominal tenderness. There is no guarding.      Hernia: A hernia is present. Hernia is present in the umbilical area.   Musculoskeletal:         General: Normal range of motion.   Skin:     General: Skin is warm.   Neurological:      General: No focal deficit present.   Psychiatric:         Mood and Affect: Mood normal.         Labs & Radiology    Reviewed   Flu A detected  CEA level 2.3  Potassium 3.3  Magnesium level normal  White blood cell count 7.7  CT abdomen pelvis w IV contrast 03/23/2025    Narrative  Interpreted By:  Zoltan Baez,  STUDY:  CT ABDOMEN PELVIS W IV CONTRAST;  3/23/2025 11:40 am    INDICATION:  63 y/o   M with  Signs/Symptoms:rule out obstructing lesion,  abdominal pain.    LIMITATIONS:  None.    ACCESSION NUMBER(S):  [id number]    ORDERING CLINICIAN:  MAITE SNIDER    TECHNIQUE:  After the administration of IV iodonated contrast, spiral axial  images were obtained from the xiphoid down through the symphysis  pubis. Sagittal and coronal reconstruction images were generated.  75 mL of Omnipaque 350.    COMPARISON:  03/22/2025    FINDINGS:  Lower Chest: Scattered atelectasis visualized lower lungs. Calcified  granulomas. Confluent atelectasis in the lingula. Trace left pleural  effusion. Small hiatal hernia.    Liver: The liver is unremarkable without focal lesion.    Gallbladder and Biliary: Unremarkable.    Pancreas: No abnormality identified in the pancreas.    Spleen:  No abnormality identified in the spleen.    Adrenals: No abnormality identified in either adrenal gland.    Urinary: No parenchymal abnormality identified in either kidney. No  hydronephrosis. Radiation seeds in the prostate gland.    Gastrointestinal/Peritoneum: Dilated colon measuring up to 9 cm in  the region of the transverse and ascending colon, transitioning to  normal caliber in the descending colon. Appendix not visualized.  Appendix not seen. No secondary signs of appendicitis in the  pericecal region. In the abdomen, there is no extraluminal air. No  significant free fluid. Sigmoid colon diverticulum.    Vascular: Abdominal aorta is normal in caliber. Mild atherosclerosis.    Lymphatics: No enlarged lymph nodes by size criteria.    MSK/Body Wall: No aggressive bony lesion identified. Posterior spinal  fusion hardware L4-S1 with laminectomy changes and bone graft,  similar in appearance when compared to prior exam. Small fat  containing right inguinal hernia. Small fat containing umbilical  hernia. Small foci of subcutaneous gas in the anterior abdominal wall  which may be related to medication injection.    Impression  Dilated gas-filled colon which may represent colonic ileus. No  transition point to indicate obstructive etiology.    Remainder of the examination is not significantly changed from prior  exam.    Signed by: Zoltan Baez 3/23/2025 1:42 PM  Dictation workstation:   [id number]      Impression  Prolonged colonic ileus likely related to influenza, and possibly hypokalemia.  Passing flatus.  Influenza can alter the intestinal microbiota and immune response is contributing to gastrointestinal symptoms such as diarrhea and colitis which can lead to colonic ileus.  Plan   Continue oral protein shakes, consider intravenous Cipro and Flagyl to prevent secondary bacterial infections.  Continue ambulation.  Continue serial abdominal examinations.  Replace potassium until greater than 4.  Will  follow.

## 2025-03-24 NOTE — CARE PLAN
The clinical goals for the shift include Pt will tolerate Full Liquid diet today    Pt was started on full liquids today. Taking only small amounts. Has slight nausea. Passing gas and had 1 small loose BM. Pt on room air and pulse ox>90%. Lungs more clear today--occasional faint wheeze. Abd tender and distended. Has IVF of NS+ 20KCl at 100cc/hr. Pt was started on Cipro and flagyl today. Up to toilet on his own. Worked with PT/OT today.

## 2025-03-24 NOTE — CARE PLAN
The patient's goals for the shift include      The clinical goals for the shift include pt will have no N&V this shift    Pt had no N&V overnight. NPO with ice chips only. Abdomen firmly distended and pt c/o pain /pressure overnight- pain meds given per order

## 2025-03-24 NOTE — ED PROVIDER NOTES
Chief Complaint: Flulike symptoms  HPI: This is a 64-year-old male, presenting to the emergency department for evaluation of flulike symptoms which began about 5 days ago.  Patient complains of a cough, congestion, body aches, as well as decreased appetite.  He states that his symptoms increase in severity today, and so he called EMS to bring him to the emergency department.    Past Medical History:   Diagnosis Date    Arthritis     BPH (benign prostatic hyperplasia)     CAD (coronary artery disease)     Emphysema lung (Multi)     Fall     tripped in shower  no injury    GERD (gastroesophageal reflux disease)     Hidradenitis     History of blood transfusion     after back surgery  no reaction    History of pulmonary embolism 04/2024    Post left total hip surgery    History of TIA (transient ischemic attack) 2018    HLD (hyperlipidemia)     HTN (hypertension)     Irritable bowel syndrome     Local infection of the skin and subcutaneous tissue, unspecified 12/01/2015    Skin infection, bacterial    Lung nodule     OA (osteoarthritis)     Other cervical disc displacement, unspecified cervical region 02/28/2019    Cervical disc herniation    Personal history of other diseases of the digestive system 03/25/2021    History of chronic constipation    Personal history of other diseases of the respiratory system 12/03/2014    History of chronic obstructive lung disease    Personal history of other diseases of the respiratory system 07/15/2016    History of acute bronchitis    Psychophysiologic insomnia 03/25/2021    Chronic insomnia    Umbilical hernia     Unspecified cataract     Cataract of left eye    Use of cane as ambulatory aid     Wears dentures     full upper and lower    Wears glasses       Past Surgical History:   Procedure Laterality Date    APPENDECTOMY      BACK SURGERY      Lower Back Surgery    CARDIAC CATHETERIZATION  2020    Mild coronary artery disease    CATARACT EXTRACTION Left     FL GUIDED ASPIRATION  OR INJECTION LARGE JOINT LEFT Left 10/18/2023    FL GUIDED ASPIRATION OR INJECTION LARGE JOINT LEFT 10/18/2023 [facility]    HIP ARTHROPLASTY Left 04/2024       Physical Exam  Vitals and nursing note reviewed.   Constitutional:       Appearance: Normal appearance.   HENT:      Head: Normocephalic and atraumatic.      Mouth/Throat:      Mouth: Mucous membranes are moist.   Eyes:      Extraocular Movements: Extraocular movements intact.   Cardiovascular:      Rate and Rhythm: Normal rate and regular rhythm.   Pulmonary:      Effort: Pulmonary effort is normal.   Abdominal:      General: Abdomen is flat.      Palpations: Abdomen is soft.   Skin:     General: Skin is warm.   Neurological:      General: No focal deficit present.      Mental Status: He is alert.   Psychiatric:         Mood and Affect: Mood normal.            ED Course/MDM  Diagnoses as of 03/24/25 0337   Influenza A     This is a 64 y.o. male presenting to the ED for evaluation of flulike symptoms which began about 5 days ago and increased in severity today.  On physical exam, patient does appear uncomfortable, however is nontoxic-appearing, no acute distress.  Heart is regular rate and rhythm, lungs are clear to auscultation bilaterally.  Patient did test positive for influenza A which is likely the cause of his symptoms. CT chest without contrast was obtained, and is grossly unremarkable for evidence of pneumonia.  Patient did have an episode of desaturation with ambulation, as such I do feel that he would benefit from observation.  He was accepted in admission by the hospitalist team and was admitted in stable condition.    Final Impression  1.  Influenza A  Disposition/Plan: Admit  Condition at disposition: Stable.     Nohemy Alas DO  Emergency Medicine Physician     Nohemy Alas DO  03/24/25 0339

## 2025-03-24 NOTE — PROGRESS NOTES
"Julio Carranza is a 64 y.o. male on day 8 of admission presenting with Influenza A.    Subjective     No overnight events reported.     Patient remains on room air this morning. He is tolerating a full liquid diet but still has abdominal distension and tenderness. He is passing gas and having small bowel movements. Encouraged patient to stay OOB today and try to ambulate in the halls with therapy, he is agreeable. Surgery saw the patient today and recommended Cipro/Flagyl for prevention of secondary bacterial infection. Plan of care discussed with patient, all questions answered.         Objective     Physical Exam  Constitutional:       General: He is not in acute distress.     Appearance: He is not toxic-appearing.   HENT:      Head: Normocephalic and atraumatic.      Mouth/Throat:      Mouth: Mucous membranes are moist.   Eyes:      Conjunctiva/sclera: Conjunctivae normal.   Cardiovascular:      Rate and Rhythm: Normal rate and regular rhythm.   Pulmonary:      Effort: No respiratory distress.      Breath sounds: No wheezing.      Comments: Diminished breath sounds, on room air with SpO2 93% at rest  Abdominal:      General: There is distension.      Palpations: Abdomen is soft.      Tenderness: There is abdominal tenderness. There is no guarding.      Hernia: A hernia (umbilical) is present.   Musculoskeletal:         General: No swelling.   Skin:     General: Skin is warm and dry.   Neurological:      Mental Status: He is alert and oriented to person, place, and time.   Psychiatric:         Mood and Affect: Mood normal.         Behavior: Behavior normal.         Last Recorded Vitals  Blood pressure 134/86, pulse 69, temperature 37 °C (98.6 °F), temperature source Temporal, resp. rate 16, height 1.88 m (6' 2\"), weight 96.9 kg (213 lb 10 oz), SpO2 95%.  Intake/Output last 3 Shifts:  I/O last 3 completed shifts:  In: 1613.4 (16.7 mL/kg) [P.O.:560; I.V.:1053.4 (10.9 mL/kg)]  Out: - (0 mL/kg)   Weight: 96.9 kg "     Relevant Results          Scheduled medications  benzonatate, 200 mg, oral, TID  ciprofloxacin, 400 mg, intravenous, q12h  docusate sodium, 100 mg, oral, BID  enoxaparin, 40 mg, subcutaneous, q24h  influenza, 0.5 mL, intramuscular, During hospitalization  folic acid, 1 mg, oral, Daily  ipratropium-albuteroL, 3 mL, nebulization, TID  lidocaine, 1 patch, transdermal, Daily  losartan, 100 mg, oral, Daily  metroNIDAZOLE, 500 mg, intravenous, q12h  oxygen, , inhalation, Continuous - Inhalation  pantoprazole, 40 mg, oral, BID AC  polyethylene glycol, 17 g, oral, Daily  primidone, 50 mg, oral, Nightly  simethicone, 80 mg, oral, 4x daily  thiamine, 100 mg, oral, Daily  traZODone, 50 mg, oral, Nightly      Continuous medications  potassium chloride in 0.9%NaCl, 100 mL/hr, Last Rate: 100 mL/hr (03/24/25 0537)      PRN medications  PRN medications: acetaminophen **OR** [DISCONTINUED] acetaminophen **OR** [DISCONTINUED] acetaminophen, acetaminophen **OR** [DISCONTINUED] acetaminophen **OR** [DISCONTINUED] acetaminophen, albuterol, albuterol, baclofen, benzocaine-menthol, codeine-guaifenesin, HYDROmorphone, melatonin, ondansetron ODT **OR** ondansetron, oxyCODONE    Results for orders placed or performed during the hospital encounter of 03/15/25 (from the past 24 hours)   Basic Metabolic Panel   Result Value Ref Range    Glucose 78 74 - 99 mg/dL    Sodium 139 136 - 145 mmol/L    Potassium 3.9 3.5 - 5.3 mmol/L    Chloride 111 (H) 98 - 107 mmol/L    Bicarbonate 21 21 - 32 mmol/L    Anion Gap 11 10 - 20 mmol/L    Urea Nitrogen 21 6 - 23 mg/dL    Creatinine 0.84 0.50 - 1.30 mg/dL    eGFR >90 >60 mL/min/1.73m*2    Calcium 7.8 (L) 8.6 - 10.3 mg/dL   Magnesium   Result Value Ref Range    Magnesium 2.00 1.60 - 2.40 mg/dL   CBC   Result Value Ref Range    WBC 7.2 4.4 - 11.3 x10*3/uL    nRBC 0.0 0.0 - 0.0 /100 WBCs    RBC 4.57 4.50 - 5.90 x10*6/uL    Hemoglobin 13.1 (L) 13.5 - 17.5 g/dL    Hematocrit 40.6 (L) 41.0 - 52.0 %    MCV 89  80 - 100 fL    MCH 28.7 26.0 - 34.0 pg    MCHC 32.3 32.0 - 36.0 g/dL    RDW 12.5 11.5 - 14.5 %    Platelets 221 150 - 450 x10*3/uL     *Note: Due to a large number of results and/or encounters for the requested time period, some results have not been displayed. A complete set of results can be found in Results Review.     CT abdomen pelvis w IV contrast    Result Date: 3/23/2025  Interpreted By:  Zoltan Baez, STUDY: CT ABDOMEN PELVIS W IV CONTRAST;  3/23/2025 11:40 am   INDICATION: 63 y/o   M with  Signs/Symptoms:rule out obstructing lesion, abdominal pain.   LIMITATIONS: None.   ACCESSION NUMBER(S): [id number]   ORDERING CLINICIAN: MAITE SNIDER   TECHNIQUE: After the administration of IV iodonated contrast, spiral axial images were obtained from the xiphoid down through the symphysis pubis. Sagittal and coronal reconstruction images were generated. 75 mL of Omnipaque 350.   COMPARISON: 03/22/2025   FINDINGS: Lower Chest: Scattered atelectasis visualized lower lungs. Calcified granulomas. Confluent atelectasis in the lingula. Trace left pleural effusion. Small hiatal hernia.   Liver: The liver is unremarkable without focal lesion.   Gallbladder and Biliary: Unremarkable.   Pancreas: No abnormality identified in the pancreas.   Spleen: No abnormality identified in the spleen.   Adrenals: No abnormality identified in either adrenal gland.   Urinary: No parenchymal abnormality identified in either kidney. No hydronephrosis. Radiation seeds in the prostate gland.   Gastrointestinal/Peritoneum: Dilated colon measuring up to 9 cm in the region of the transverse and ascending colon, transitioning to normal caliber in the descending colon. Appendix not visualized. Appendix not seen. No secondary signs of appendicitis in the pericecal region. In the abdomen, there is no extraluminal air. No significant free fluid. Sigmoid colon diverticulum.   Vascular: Abdominal aorta is normal in caliber. Mild atherosclerosis.    Lymphatics: No enlarged lymph nodes by size criteria.   MSK/Body Wall: No aggressive bony lesion identified. Posterior spinal fusion hardware L4-S1 with laminectomy changes and bone graft, similar in appearance when compared to prior exam. Small fat containing right inguinal hernia. Small fat containing umbilical hernia. Small foci of subcutaneous gas in the anterior abdominal wall which may be related to medication injection.       Dilated gas-filled colon which may represent colonic ileus. No transition point to indicate obstructive etiology.   Remainder of the examination is not significantly changed from prior exam.   Signed by: Zoltan Baez 3/23/2025 1:42 PM Dictation workstation:   [id number]    XR abdomen 1 view    Result Date: 3/23/2025  Interpreted By:  Lang Neely, STUDY: XR ABDOMEN 1 VIEW;  3/23/2025 8:54 am   INDICATION: Signs/Symptoms: rule out obstruction, severe pain.   COMPARISON: Yesterday's unenhanced abdominal and pelvic CT and the 03/21/2025 portable AP abdomen radiographs   ACCESSION NUMBER(S): [id number]   ORDERING CLINICIAN: MAITE SNIDER   FINDINGS: Gaseous distention of the right and transverse colon with the cecum measuring about 10 cm and the left transverse colon up to about 11 cm in diameter with magnification is similar to yesterday's CT, but has increased since 03/21/2025, most notably in the cecum. The descending colon through rectum are normal caliber to collapsed. Yesterday's CT did not show a left colon mass or other obvious obstruction to account for this. Limited evaluation of pneumoperitoneum on supine imaging, however no gross evidence of free air is noted.   The left side of the diaphragm remains considerably elevated, obscuring the left lung base.   Lumbosacral spine postoperative changes as previously described include an L2-5 posterior fusion with bilateral pedicle screws.       Right and transverse colon gaseous distention is similar to yesterday's CT,  but seems increased since 03/21/2025.   MACRO: None   Signed by: Lang Neely 3/23/2025 10:30 AM Dictation workstation:   [id number]    CT abdomen pelvis wo IV contrast    Result Date: 3/22/2025  Interpreted By:  Lang Neely, STUDY: CT ABDOMEN PELVIS WO IV CONTRAST;  3/22/2025 9:59 am   INDICATION: Signs/Symptoms: pain.   COMPARISON: 03/19/2025 unenhanced abdominal and pelvic CT.   ACCESSION NUMBER(S): [id number]   ORDERING CLINICIAN: MAU IVEY   TECHNIQUE: CT of the abdomen and pelvis was performed. Contiguous axial images were obtained at 3 mm slice thickness through the abdomen and pelvis. Coronal and sagittal reconstructions at 3 mm slice thickness were performed.  No intravenous or oral contrast agents were administered.   FINDINGS: Please note that the evaluation of vessels, lymph nodes and organs is limited without intravenous contrast.   LOWER CHEST: Elevated left side of the diaphragm with adjacent subsegmental atelectasis or possibly scarring, unchanged. Minimal medial right lower lobe scarring or persistent subsegmental atelectasis. Left main coronary artery bifurcation calcification.   ABDOMEN:   LIVER: The liver is normal in size without evidence of focal lesions.   BILE DUCTS: The intrahepatic and extrahepatic ducts are not dilated.   GALLBLADDER: No calcified gallstone or pericholecystic fat infiltration.   PANCREAS: Within normal limits.   SPLEEN: Normal size and homogeneous.   ADRENAL GLANDS: Within normal limits.   KIDNEYS AND URETERS: Normal size kidneys with mild bilateral perinephric scarring. A tiny left inferolateral renal calcification (image 72 series 201 and coronal image 79) is probably calyceal. No hydroureteronephrosis or calcified ureteral stone. Bilateral renal sinus lipomatosis.   PELVIS:   BLADDER: Collapsed. No calcified stone. A very thin linear density extending from the bladder to the umbilicus is consistent with a urachal remnant.   REPRODUCTIVE ORGANS:  Radiation seeds in a normal size prostate.   BOWEL: The stomach is unremarkable.  The transverse and right colon are distended with fluid and air, with the transverse colon measuring up to at least 10 cm in coronal diameter and the cecum at least 9 cm in axial diameter. The descending colon through rectum is collapsed. No mass or other abnormality is apparent at the splenic flexure transition point. Descending colon intramural fat is most often seen with obesity, but can be associated with inflammatory bowel disease and should be correlated clinically. Collapsed almost gasless small bowel. The appendix can not be identified.   VESSELS: No abdominal aortic aneurysm. Mild vascular calcification.   PERITONEUM/RETROPERITONEUM/LYMPH NODES: No ascites or free air, no fluid collection.  No abdominopelvic lymphadenopathy is present.   ABDOMINAL WALL: A 4 cm umbilical hernia and a smaller right inguinal hernia contain only fat.   BONES: Lumbosacral postoperative changes as described on prior studies including bilateral L2-S1 pedicle screws connected by vertical posterior metallic rods and a transverse connector, an L4-5 disc spacer, extensive L3, L4 and L5 laminectomies, multilevel spinal degenerative changes, osteoporosis, mild-to-moderate height loss of several lower thoracic vertebral bodies and a left total hip arthroplasty are noted.       1. Nonspecific distention of the right and transverse colon similar to 03/19/2025. 2. Tiny nonobstructing left renal calculus. 3. Small umbilical and right inguinal hernias contain only fat.   MACRO: None   Signed by: Lang Neely 3/22/2025 12:16 PM Dictation workstation:   [id number]    XR abdomen 1 view    Result Date: 3/21/2025  Interpreted By:  Dayana Dumont, STUDY: XR ABDOMEN 1 VIEW 3/21/2025 10:00 am   INDICATION: Signs/Symptoms:Abdominal pain and distention   COMPARISON: 03/20/2025   ACCESSION NUMBER(S): [id number]   ORDERING CLINICIAN: TYE BASS   TECHNIQUE:  Portable recumbent abdominal view   FINDINGS: Recumbent view of the abdomen shows mild gaseous distention of the transverse colon with the transverse colon measuring up to 9 cm in diameter. The gaseous distention of the transverse colon is slightly increased when compared with yesterday's study. Gas is noted within the rectum.   There is postoperative change from posterior spinal fusion in the lumbar region utilizing pedicle screws and stabilizing rods as well as crosslink bar with interbody disc replacement at L4-5 and with bilateral posterolateral fusion masses seen. There is additional postoperative change from bipolar left hip arthroplasty. There is also radioactive seed placement within the prostate gland.       Gaseous distention of the transverse colon appearing slightly more pronounced when compared with yesterday's study.   Signed by: Dayana Dumont 3/21/2025 11:32 AM Dictation workstation:   [id number]    XR abdomen 1 view    Result Date: 3/20/2025  Interpreted By:  Lang Neely, STUDY: XR ABDOMEN 1 VIEW;  3/20/2025 9:48 am   INDICATION: Signs/Symptoms:rule out ileus.   COMPARISON: Yesterday's AP abdomen radiographs and unenhanced abdominal and pelvic CT.   ACCESSION NUMBER(S): [id number]   ORDERING CLINICIAN: TYE HOUSTON   FINDINGS: Gaseous distention of most of the transverse colon which measures up to roughly 8-9 cm in diameter with magnification has decreased since yesterday. The ascending colon and cecum are normal caliber today. Yesterday's CT reportedly showed no evidence of obstruction. Very little if any small intestinal gas is identified. Limited evaluation of pneumoperitoneum on supine imaging, however no gross evidence of free air.   Considerably elevated left side of the diaphragm with subsegmental atelectasis or scarring in the lower left lung.   Osseous structures demonstrate no acute bony changes. Extensive lumbosacral postoperative changes as described on yesterday's studies, a  left total hip prosthesis, old rib fractures, at least mild spinal degenerative changes, osteoporosis and prostate radiation seeds are noted.       Gaseous distention of the transverse colon, decreased since yesterday.   Elevated left side of the diaphragm.   MACRO: None   Signed by: Lang Neely 3/20/2025 10:03 AM Dictation workstation:   [id number]    CT abdomen pelvis wo IV contrast    Result Date: 3/19/2025  Interpreted By:  Camron Dorado, STUDY: CT ABDOMEN PELVIS WO IV CONTRAST;  3/19/2025 5:41 pm   INDICATION: Signs/Symptoms:possible ileus on KUB.     COMPARISON: CT abdomen and pelvis dated 03/07/2025   ACCESSION NUMBER(S): [id number]   ORDERING CLINICIAN: TYE BASS   TECHNIQUE: CT of the abdomen and pelvis was performed. Contiguous axial images were obtained at 3 mm slice thickness through the abdomen and pelvis. Coronal and sagittal reconstructions at 3 mm slice thickness were performed.  No intravenous or oral contrast agents were administered.   FINDINGS: Please note that the evaluation of vessels, lymph nodes and organs is limited without intravenous contrast.   LOWER CHEST: Bandlike atelectasis is present in the lingula, with mild volume loss/atelectasis also present in the left lower lobe without evidence of new consolidation or sizable effusion.   Heart is normal in size without pericardial effusion.   Distal esophagus does not demonstrate any acute abnormalities.   ABDOMEN:   LIVER: Within limits of noncontrast exam no acute hepatic parenchymal abnormality is present.   BILE DUCTS: No intrahepatic or extrahepatic biliary dilatation is present.   GALLBLADDER: No gallbladder wall thickening or pericholecystic stranding is identified.   PANCREAS: No pancreatic ductal dilatation or peripancreatic stranding is present.   SPLEEN: No acute splenic abnormality is present.   ADRENAL GLANDS: Bilateral adrenal glands are unremarkable in appearance.   KIDNEYS AND URETERS: Kidneys are  symmetric in size without evidence of hydronephrosis or radiopaque nephrolithiasis. Visualized ureters are unremarkable in appearance.   PELVIS:   BLADDER: No bladder wall thickening is present. Bladder is opacified with the excreted contrast which was administered during previous contrast enhanced CT PE.   REPRODUCTIVE ORGANS: No acute prostatic abnormalities are present.   BOWEL: Stomach is decompressed and unremarkable in appearance. No abnormal small bowel dilatation is present. Appendix is not identified although no secondary signs of acute appendicitis are present in the its expected location in the right lower quadrant.   The cecum ascending and proximal transverse colon are distended with gas and fluid, with gradual caliber change of the more distal descending colon. No inflammatory large bowel wall thickening or evidence of obstruction is present.   Some liquid stool is present in the proximal colon.   VESSELS: Atherosclerotic plaques are present in the abdominal aorta without evidence of acute vascular abnormality.   PERITONEUM/RETROPERITONEUM/LYMPH NODES: No free air, free fluid or thick-walled collections are identified in the abdomen and pelvis. No enlarged lymphadenopathy is present.   ABDOMINAL WALL: Cutaneous tissues of the abdomen and pelvis do not demonstrate any acute abnormality. Fat containing umbilical and right inguinal hernia is present.   BONES: Postsurgical changes of posterior decompression and laminectomy with fusion are present at L2 through L1, similar in appearance to prior exam in March of 2025.   No acute osseous abnormality is identified in the pelvis or visualized spine. Patient is status post left hip replacement.       1.  Ascending and transverse colon are distended with liquid stool and gas, with the more distal descending colon appearing decompressed, likely representing some lakeshia. No evidence of inflammatory large bowel wall thickening or small bowel obstruction. 2. Fat  containing umbilical and right inguinal hernias are similar in appearance to prior exam.     MACRO: None   Signed by: Camron Dorado 3/19/2025 7:23 PM Dictation workstation:   [id number]    XR abdomen 1 view    Result Date: 3/19/2025  Interpreted By:  Lang Neely, STUDY: XR ABDOMEN 1 VIEW;  3/19/2025 11:35 am   INDICATION: Signs/Symptoms: constipation.   COMPARISON: 03/07/2025 enhanced abdominal and pelvic CT.   ACCESSION NUMBER(S): [id number]   ORDERING CLINICIAN: GUSTAVO BATES   FINDINGS: There is a good deal of gas in the left and right colon, some of which most notably the transverse colon is distended measuring over 10 cm in maximum diameter with magnification. This is new since the 03/07/2025 CT and most likely reflects an ileus primarily involving the colon or an idiopathic megacolon, but precludes exclusion of a distal (aboral) large intestinal obstruction and should be correlated clinically. A much lesser amount of gas is seen in normal caliber small bowel.   The left side of the diaphragm is moderately elevated, as on the recent CT, with left lateral costophrenic angle blunting.   Osseous structures demonstrate no acute bony changes. Bilateral L2 through L5 pedicle screws connected by vertical metallic rods and an L2 transverse connector, a metallic L4-5 disc spacer, multilevel spinal degenerative changes, osteoporosis, prostate radiation seeds and a left total hip arthroplasty are noted.       Large intestinal gaseous distention.   MACRO: None   Signed by: Lang Neely 3/19/2025 4:57 PM Dictation workstation:   [id number]    ECG 12 lead    Result Date: 3/19/2025  Normal sinus rhythm Normal ECG When compared with ECG of 30-SEP-2024 13:32, No significant change was found See ED provider note for full interpretation and clinical correlation Confirmed by Addie Rubio ([id number]) on 3/19/2025 12:37:38 PM    ECG 12 lead    Result Date: 3/19/2025  Normal sinus rhythm Normal ECG When  compared with ECG of 15-MAR-2025 09:05, (unconfirmed) No significant change was found    CT angio chest for pulmonary embolism    Result Date: 3/19/2025  Interpreted By:  Isiah Rose, STUDY: CT ANGIO CHEST FOR PULMONARY EMBOLISM;  3/19/2025 7:14 am   INDICATION: 63 y/o   M with  Signs/Symptoms:hypoxia, chest pain, elevated D-dimer.     LIMITATIONS: None.   ACCESSION NUMBER(S): [id number]   ORDERING CLINICIAN: CHRISTOPHER GUEVARA   TECHNIQUE: After the administration of intravenous nonionic contrast, thin-section arterial phase images were obtained  from the thoracic inlet down through the iliac crest. Sagittal and coronal reconstruction images were generated. Axial and coronal MIP vascular reconstruction images were also generated.   Mediastinal, lung, bone, and liver windows were reviewed. Omnipaque 350   75 ML     COMPARISON: Most recent prior is from 03/15/2025.   FINDINGS: CHEST WALL/BASE OF THE NECK: The chest wall was grossly intact. No thyromegaly or thyroid mass.   MEDIASTINUM/MAIA: There are multiple small stable nonspecific bilateral hilar and central mediastinal lymph nodes. No axillary adenopathy. Mild cardiomegaly. No pericardial effusion. No thoracic aortic aneurysm or dissection. Imaging occurred late in the levo phase of enhancement. Because of this, there was greater enhancement in the the thoracic aorta than in the pulmonary arteries. Taking this into account, there was no pulmonary artery filling defect in the main pulmonary arteries out to each hilum. Distal to each hilum, the exam is nondiagnostic for pulmonary embolism due to suboptimal vessel opacification.   LUNGS/ PLEURA/ AND TRACHEA: Elevation of the left diaphragm with mild associated pleural and parenchymal scarring at the base of the left chest just above the apex of the elevated left diaphragm. There is a new band of atelectasis posterolaterally at the base of the left upper lobe. There is scant atelectasis in the paraspinal right  lower lobe. No mass or focal pneumonia in either lung. No  pleural effusion or pneumothorax. The trachea was grossly intact.   BONES: No destructive lytic or blastic bone lesion.  There is mild-to-moderate disc space narrowing and endplate osteophytosis throughout the thoracic spine. No acute fracture of the osseous thorax in this exam.   UPPER ABDOMEN: There was splenomegaly with the spleen measuring 14.0 cm AP. Otherwise, the imaged upper abdomen was grossly intact.       No CT evidence of pulmonary embolism in the main pulmonary arteries out through each hilum. For technical reasons, the exam is nondiagnostic for pulmonary embolism distal to each hilum.   Mild stable mediastinal and bilateral hilar adenopathy, perhaps reactive.   Elevation of the left diaphragm with stable scarring at the base of the left lung. There is also mild new atelectasis in the posterolateral inferior left upper lobe.   Cardiomegaly.   Splenomegaly.   Thoracic spine DJD as described. No CT evidence of acute fracture the osseous thorax in this exam.   MACRO: None   Signed by: Isiah Rose 3/19/2025 8:36 AM Dictation workstation:   [id number]    XR chest 1 view    Result Date: 3/19/2025  Interpreted By:  Isiah Rose, STUDY: XR CHEST 1 VIEW;  3/19/2025 5:49 am   INDICATION: Signs/Symptoms:Chest pain.   COMPARISON: CT scan chest from 03/15/2025. Chest x-ray from 03/15/2025 and another from 09/30/2024.   ACCESSION NUMBER(S): [id number]   ORDERING CLINICIAN: CHRISTOPHER GUEVARA   TECHNIQUE: Single AP portable view of the chest was obtained.   FINDINGS: MEDIASTINUM/ LUNGS/ MAIA: Suboptimal level of inspiration. Mild elevation of the left diaphragm. Band of linear atelectasis at the lateral left lung base. Cardiomegaly. Pulmonary vasculature and interstitium are mildly prominent. No abnormal opacity in either lung worrisome for tumor or pneumonia. No pneumothorax. No tracheal deviation. No abnormal hilar fullness or gross mass on either side.    BONES: No lytic or blastic destructive bone lesion.   UPPER ABDOMEN: Grossly intact.       Hypoventilatory exam. Elevation of the left diaphragm.   Band of linear atelectasis at the lateral left lung base.   Findings suspicious for  mild CHF.   MACRO: None   Signed by: Isiah Rose 3/19/2025 8:27 AM Dictation workstation:   [id number]    CT angio chest for pulmonary embolism    Result Date: 3/15/2025  Interpreted By:  Adi Cedeño, STUDY: CT ANGIO CHEST FOR PULMONARY EMBOLISM;  3/15/2025 3:34 pm   INDICATION: Signs/Symptoms:hypoxia, elevated d-dimer.     COMPARISON: CT chest without contrast earlier same day 15 March 2025 at 1003 hours   ACCESSION NUMBER(S): [id number]   ORDERING CLINICIAN: GUSTAVO [facility]   TECHNIQUE: Pulmonary arterial phase CT chest after the uneventful administration of 75 mL IV contrast (Omnipaque 350).   Three dimensional maximum intensity projection (3-D MIPs) image/s were created on a separate dedicated workstation, reviewed and saved   FINDINGS: Compared to earlier same day, this time with the benefit of IV contrast, the following newly evident pertinent negatives are as follows:   No acute pulmonary embolism through the segmental branch level;   No aortic dissection;   No acute right heart strain;   No other newly evident pertinent negatives   Still no pleural or pericardial effusion, pneumothorax, ground-glass airspace disease or lung consolidation   Unchanged bronchitis in both lower lobes and the middle lobe and the lingula   For other details refer to prior report from earlier today       No acute pulmonary embolism   See above and/or refer to prior report from earlier today   MACRO: None   Signed by: Adi Cedeño 3/15/2025 4:05 PM Dictation workstation:   [id number]    CT chest wo IV contrast    Result Date: 3/15/2025  STUDY: CT Chest without IV Contrast; 3/15/2025 10:06 INDICATION: Evaluate for pneumonia. COMPARISON: 3/15/2025 XR Chest, 9/30/2024 CTA Chest, 9/3/2020 CT Chest.  ACCESSION NUMBER(S): [id number] ORDERING CLINICIAN: LORI HADDAD TECHNIQUE:  CT of the chest was performed without contrast.  Automated mA/kV exposure control was utilized and patient examination was performed in strict accordance with principles of ALARA. FINDINGS: MEDIASTINUM: The heart is normal in size without pericardial effusion.  Coronary artery calcifications are identified.  LUNGS/PLEURA: There is elevation of left hemidiaphragm. There is an infiltrate in the left lung base most consistent with atelectasis. LYMPH NODES: There are prominent mediastinal lymph nodes. UPPER ABDOMEN: Upper abdomen demonstrates no acute pathology. BONES: There are no acute fractures.  No suspicious bony lesions.    No evidence consolidating infiltrate or effusion. Elevated left hemidiaphragm with left basilar subsegmental atelectasis. Signed by Noé Ogden MD    XR chest 2 views    Result Date: 3/15/2025  STUDY: Chest Radiographs;  3/15/2025 9:20AM INDICATION: Cough. COMPARISON: XR Chest: 9/30/2024, 4/3/2024 CT Chest: 9/3/2020 ACCESSION NUMBER(S): [id number] ORDERING CLINICIAN: LORI HADDAD TECHNIQUE:  Frontal and lateral chest. FINDINGS: CARDIOMEDIASTINAL SILHOUETTE: Cardiomediastinal silhouette is normal in size and configuration.  LUNGS: There are linear infiltrates in the left lung base.  These were seen as September 2024 unchanged.  ABDOMEN: No remarkable upper abdominal findings.  BONES: No acute osseous changes.    Chronic changes to the left lung base. No evidence of acute infiltrate or effusion. Signed by Noé Ogden MD    CT abdomen pelvis w IV and oral contrast    Result Date: 3/8/2025  Interpreted By:  Sean Jeong, STUDY: CT ABDOMEN PELVIS W IV AND ORAL CONTRAST;  3/7/2025 10:45 am   INDICATION: Signs/Symptoms:CT ABD PEL W ORAL AND IV.   ,R10.9 Unspecified abdominal pain   COMPARISON: CT abdomen/pelvis dated 09/29/2022.   ACCESSION NUMBER(S): [id number]   ORDERING CLINICIAN: MOHIT MENJIVAR    TECHNIQUE: CT of the abdomen and pelvis was performed.  Standard contiguous axial images were obtained at 3 mm slice thickness through the abdomen and pelvis. Coronal and sagittal reconstructions at 3 mm slice thickness were performed.  75 ML of Omnipaque 350 was administered intravenously without immediate complication.   FINDINGS: LOWER CHEST: The partially visualized lower lungs are unremarkable. The heart is normal in size without significant pericardial effusion. The distal esophagus is unremarkable.   ABDOMEN:   LIVER: The liver is normal in size without evidence of focal liver lesions.   BILE DUCTS: The intrahepatic and extrahepatic ducts are not dilated.   GALLBLADDER: The gallbladder is nondistended and without evidence of radiopaque stones.   PANCREAS: The pancreas appears unremarkable without evidence of ductal dilatation or masses.   SPLEEN: The spleen is normal in size without focal lesions.   ADRENAL GLANDS: Bilateral adrenal glands appear normal.   KIDNEYS AND URETERS: The kidneys are normal in size and enhance symmetrically.  No hydroureteronephrosis or nephroureterolithiasis is identified.   PELVIS:   BLADDER: The urinary bladder appears normal without abnormal wall thickening.   REPRODUCTIVE ORGANS: The prostate is not enlarged. Postoperative changes of UroLift are suggested.   BOWEL: The stomach is unremarkable.   There is scattered colonic diverticulosis without evidence of diverticulitis. Otherwise, the small and large bowel are normal in caliber without evidence of inflammatory wall thickening. The appendix is not definitely visualized. There is however no pericecal stranding or fluid.   VESSELS: There is no aneurysmal dilatation of the abdominal aorta. The IVC appears normal. Mild atherosclerotic calcifications of the abdominal aorta and its branching vessels are noted.   PERITONEUM/RETROPERITONEUM/LYMPH NODES: There is no free or loculated fluid collection, no free intraperitoneal air. The  retroperitoneum appears normal.  No abdominopelvic lymphadenopathy by CT size criteria.   BONES AND ABDOMINAL WALL: Left total hip arthroplasty is noted without evidence of hardware complication. Posterior spinal fusion hardware of L2-S1 with intervertebral disc spacer at L4-L5 again noted. No acute osseous abnormalities or suspicious osseous lesions. Multilevel discogenic degenerative changes are again noted throughout the lower thoracic and lumbar spine with scattered intervertebral disc space narrowing and vertebral body osteophytosis. Unchanged appearance of a moderate-sized fat containing umbilical hernia and small fat containing right inguinal hernia.       1.  No acute process within the abdomen or pelvis. 2. Unchanged appearance of a moderate-sized fat containing umbilical hernia and small fat containing right inguinal hernia. 3. Colonic diverticulosis without evidence of diverticulitis.   MACRO: None   Signed by: Sean Jeong 3/8/2025 9:30 AM Dictation workstation:   [id number]                  Assessment/Plan   Assessment & Plan  Influenza A    COPD exacerbation (Multi)    Tobacco use    Primary insomnia    Hiatal hernia    Benign essential HTN    Acute respiratory failure with hypoxia (Multi)    Constipation    Ileus (Multi)    Hypokalemia    Essential tremor    Influenza A  Acute hypoxic respiratory failure, resolved   COPD, in acute exacerbation  Tobacco use  - Presented to ED with 5 days of cough, congestion, shortness of breath, poor appetite, diarrhea, fevers, chills  - Flu A positive  - No leukocytosis   - D-dimer 1725  - BNP 96   - CTA chest 3/15: No acute pulmonary embolism, Unchanged bronchitis in both lower lobes and the middle lobe and the lingula   - CTA chest 3/19: No CT evidence of pulmonary embolism in the main pulmonary arteries out through each hilum. For technical reasons, the exam is nondiagnostic for pulmonary embolism distal to each hilum. Mild stable mediastinal and bilateral  hilar adenopathy, perhaps reactive. Elevation of the left diaphragm with stable scarring at the base of the left lung. There is also mild new atelectasis in the posterolateral inferior left upper lobe.   - Procal 0.05  - Pertussis PCR negative   - MRSA nares negative   - Sputum culture contaminated    - azithromycin 500 mg daily completed   - completed Levaquin for pneumonia coverage   - Solumedrol 40 mg IV transitioned to PO prednisone taper   - continue DuoNebs TID  - continue Tessalon perles 200 mg TID   - continue Lidocaine 4%  patch for pleuritic chest pain  - completed Tamiflu 75 mg BID x 5 days   - continue Robitussin AC  mg q6h PRN for cough   - continue acetaminophen 650 mg q4h PRN for fever   - RT eval and treat protocol  - Bronchial hygiene  - Isolation protocol for flu A  - Patient declines nicotine patch at this time; encourage cessation on discharge   - supplemental oxygen to keep SpO2 > 90%  - currently on room air   - No oxygen at home  - Ambulatory pulse ox/overnight O2 trend before discharge   - PT/OT evals     Constipation, improving  Ileus, improving   - Patient reports no BM since before admission on 3/15, has had poor intake while admitted   - Mag citrate x 1 dose ordered 3/19   - KUB 3/19: Large intestinal gaseous distension   - CT abd/pelvis 3/19: Ascending and transverse colon are distended with liquid stool   and gas, with the more distal descending colon appearing decompressed, likely representing some lakeshia. No evidence of inflammatory large bowel wall thickening or small bowel obstruction.   - KUB 3/21: Gaseous distention of the transverse colon appearing slightly more pronounced when compared with yesterday's study.   - CT Abd/Pelvis 3/22: Nonspecific distention of the right and transverse colon similar   to 03/19/2025.   - CT abd/pelvis 3/23: Dilated gas-filled colon which may represent colonic ileus. No   transition point to indicate obstructive etiology.   - 3/24 KUB pending  read   - GI consulted, recommend avoiding aggressive bowel regimen at this time   - Continue simethicone 80 mg QID   - Oxycodone 5 mg q6h PRN for severe pain, Dilaudid 0.5 mg IV q2h PRN for breakthrough pain  - Continue NS with KCl @ 100 ml/hr while PO intake is poor   - surgery following; diet increased to full liquid today   - Start Ciprofloxacin/Flagyl per surgery recs- prevention of secondary bacterial infection  - OOB/ambulation as tolerated   - Serial abdominal exams     Hypokalemia, resolved   - K 3.3 > 3.9  - Continue NS With KCl @ 100 ml/hr   - Daily BMP/mag level     Essential HTN  - continue losartan 100 mg daily  - monitor BP     Essential tremor  - continue primidone 50 mg at bedtime     Insomnia  - continue trazodone 50 mg at bedtime     Hiatal hernia  Chronic GERD  - continue pantoprazole 40 mg BID      DVT PPx: Lovenox   GI PPx: Protonix   Diet: Full liquid   Code Status: Full     Disposition: Patient requires inpatient management at this time.            Susi Mejia PA-C

## 2025-03-24 NOTE — PROGRESS NOTES
"Occupational Therapy                 Therapy Communication Note    Patient Name: Julio Carranza  MRN: [id number]  [facility]  [facility]  Today's Date: 3/24/2025     Discipline: Occupational Therapy    OT Missed Visit: Yes     Missed Visit Reason: Missed Visit Reason: Patient refused    Missed Time: Attempt    Comment: Pt in chair, not wanting to participate at this time. \"I just walked\" and \"Waiting for pain meds to kick in\". Will re-attempt at later time when schedule allows. RN notified.  "

## 2025-03-24 NOTE — PROGRESS NOTES
03/24/25 1356   Discharge Planning   Living Arrangements Alone   Support Systems Friends/neighbors   Assistance Needed A&Ox3, independent with ADLs, utilizes a rollator or cane. Room air at baseline. Prior to admission pt placed himself on 2L supplemental oxygen using an oxygen concentrator that belonged to his girlfriend who passed away. Pt does not drive, uses [employer] or has a friend transport. Not active with [facility]. Pharmacy [facility] in [facility]. PCP Dr. Mendez Coronel.   Home or Post Acute Services None   Expected Discharge Disposition Home  (Continues to declined home care/rehab-)   Does the patient need discharge transport arranged? No   Intensity of Service   Intensity of Service 0-30 min     DC PLAN:  Home NN

## 2025-03-24 NOTE — PROGRESS NOTES
"Subjective   64 y.o. male on day 8 of admission presenting with Influenza A.  Developed nausea, constipation over the weekend. Repeat CT with concern for colonic ileus.       Objective   PHYSICAL EXAM:  Gen: NAD, alert and awake   Head: Normocephalic, atraumatic  Eyes: Sclera anicteric, conjunctiva pink   ENMT: MMM  Lungs: Easy, non-labored breathing, non productive cough  Abd: Bowel sounds present, soft, distended, non-tender, no organomegaly, no ascites  Musculoskeletal: MAEx4  Ext: No BLE edema  Neuro: A/O x3, no gross focal deficits  Psych: Congruent mood and affect, appropriate insight and judgement  Skin: No jaundice, no pallor    Last Recorded Vitals  Blood pressure 134/86, pulse 69, temperature 37 °C (98.6 °F), temperature source Temporal, resp. rate 16, height 1.88 m (6' 2\"), weight 96.9 kg (213 lb 10 oz), SpO2 95%.     Intake/Output last 3 Shifts:  I/O last 3 completed shifts:  In: 1613.4 (16.7 mL/kg) [P.O.:560; I.V.:1053.4 (10.9 mL/kg)]  Out: - (0 mL/kg)   Weight: 96.9 kg      Current medications during hospitalization  Scheduled medications  benzonatate, 200 mg, oral, TID  bisacodyl, 10 mg, rectal, Daily  ciprofloxacin, 400 mg, intravenous, q12h  dicyclomine, 10 mg, oral, Before meals & nightly  docusate sodium, 100 mg, oral, BID  enoxaparin, 40 mg, subcutaneous, q24h  influenza, 0.5 mL, intramuscular, During hospitalization  folic acid, 1 mg, oral, Daily  ipratropium-albuteroL, 3 mL, nebulization, TID  lidocaine, 1 patch, transdermal, Daily  losartan, 100 mg, oral, Daily  metroNIDAZOLE, 500 mg, intravenous, q12h  oxygen, , inhalation, Continuous - Inhalation  pantoprazole, 40 mg, oral, BID AC  polyethylene glycol, 17 g, oral, Daily  primidone, 50 mg, oral, Nightly  sennosides, 2 tablet, oral, BID  simethicone, 80 mg, oral, 4x daily  thiamine, 100 mg, oral, Daily  traZODone, 50 mg, oral, Nightly      Continuous medications  potassium chloride in 0.9%NaCl, 100 mL/hr, Last Rate: 100 mL/hr (03/24/25 " 0537)      PRN medications  PRN medications: acetaminophen **OR** [DISCONTINUED] acetaminophen **OR** [DISCONTINUED] acetaminophen, acetaminophen **OR** [DISCONTINUED] acetaminophen **OR** [DISCONTINUED] acetaminophen, albuterol, albuterol, baclofen, benzocaine-menthol, codeine-guaifenesin, HYDROmorphone, melatonin, ondansetron ODT **OR** ondansetron, oxyCODONE    Relevant Results  Results for orders placed or performed during the hospital encounter of 03/15/25 (from the past 24 hours)   Basic Metabolic Panel   Result Value Ref Range    Glucose 78 74 - 99 mg/dL    Sodium 139 136 - 145 mmol/L    Potassium 3.9 3.5 - 5.3 mmol/L    Chloride 111 (H) 98 - 107 mmol/L    Bicarbonate 21 21 - 32 mmol/L    Anion Gap 11 10 - 20 mmol/L    Urea Nitrogen 21 6 - 23 mg/dL    Creatinine 0.84 0.50 - 1.30 mg/dL    eGFR >90 >60 mL/min/1.73m*2    Calcium 7.8 (L) 8.6 - 10.3 mg/dL   Magnesium   Result Value Ref Range    Magnesium 2.00 1.60 - 2.40 mg/dL   CBC   Result Value Ref Range    WBC 7.2 4.4 - 11.3 x10*3/uL    nRBC 0.0 0.0 - 0.0 /100 WBCs    RBC 4.57 4.50 - 5.90 x10*6/uL    Hemoglobin 13.1 (L) 13.5 - 17.5 g/dL    Hematocrit 40.6 (L) 41.0 - 52.0 %    MCV 89 80 - 100 fL    MCH 28.7 26.0 - 34.0 pg    MCHC 32.3 32.0 - 36.0 g/dL    RDW 12.5 11.5 - 14.5 %    Platelets 221 150 - 450 x10*3/uL         Radiology:   CT abdomen pelvis w IV contrast   Final Result   Dilated gas-filled colon which may represent colonic ileus. No   transition point to indicate obstructive etiology.        Remainder of the examination is not significantly changed from prior   exam.        Signed by: Zoltan Baez 3/23/2025 1:42 PM   Dictation workstation:   [id number]      XR abdomen 1 view   Final Result   Right and transverse colon gaseous distention is similar to   yesterday's CT, but seems increased since 03/21/2025.        MACRO:   None        Signed by: Lang Neely 3/23/2025 10:30 AM   Dictation workstation:   [id number]      CT abdomen pelvis wo  IV contrast   Final Result   1. Nonspecific distention of the right and transverse colon similar   to 03/19/2025.   2. Tiny nonobstructing left renal calculus.   3. Small umbilical and right inguinal hernias contain only fat.        MACRO:   None        Signed by: Lang Neely 3/22/2025 12:16 PM   Dictation workstation:   [id number]      XR abdomen 1 view   Final Result   Gaseous distention of the transverse colon appearing slightly more   pronounced when compared with yesterday's study.        Signed by: Dayana Dumont 3/21/2025 11:32 AM   Dictation workstation:   [id number]      XR abdomen 1 view   Final Result   Gaseous distention of the transverse colon, decreased since yesterday.        Elevated left side of the diaphragm.        MACRO:   None        Signed by: Lang Neely 3/20/2025 10:03 AM   Dictation workstation:   [id number]      CT abdomen pelvis wo IV contrast   Final Result   1.  Ascending and transverse colon are distended with liquid stool   and gas, with the more distal descending colon appearing   decompressed, likely representing some lakeshia. No evidence of   inflammatory large bowel wall thickening or small bowel obstruction.   2. Fat containing umbilical and right inguinal hernias are similar in   appearance to prior exam.             MACRO:   None        Signed by: Camron Dorado 3/19/2025 7:23 PM   Dictation workstation:   [id number]      XR abdomen 1 view   Final Result   Large intestinal gaseous distention.        MACRO:   None        Signed by: Lang Neely 3/19/2025 4:57 PM   Dictation workstation:   [id number]      CT angio chest for pulmonary embolism   Final Result   No CT evidence of pulmonary embolism in the main pulmonary arteries   out through each hilum. For technical reasons, the exam is   nondiagnostic for pulmonary embolism distal to each hilum.        Mild stable mediastinal and bilateral hilar adenopathy, perhaps   reactive.        Elevation of the  left diaphragm with stable scarring at the base of   the left lung. There is also mild new atelectasis in the   posterolateral inferior left upper lobe.        Cardiomegaly.        Splenomegaly.        Thoracic spine DJD as described. No CT evidence of acute fracture the   osseous thorax in this exam.        MACRO:   None        Signed by: Isiah Rose 3/19/2025 8:36 AM   Dictation workstation:   [id number]      XR chest 1 view   Final Result   Hypoventilatory exam. Elevation of the left diaphragm.        Band of linear atelectasis at the lateral left lung base.        Findings suspicious for  mild CHF.        MACRO:   None        Signed by: Isiah Rose 3/19/2025 8:27 AM   Dictation workstation:   [id number]      CT angio chest for pulmonary embolism   Final Result   No acute pulmonary embolism        See above and/or refer to prior report from earlier today        MACRO:   None        Signed by: Adi Cedeño 3/15/2025 4:05 PM   Dictation workstation:   [id number]      CT chest wo IV contrast   Final Result   No evidence consolidating infiltrate or effusion.   Elevated left hemidiaphragm with left basilar subsegmental   atelectasis.   Signed by Noé Ogden MD      XR chest 2 views   Final Result   Chronic changes to the left lung base.   No evidence of acute infiltrate or effusion.   Signed by Noé Ogden MD      XR abdomen 1 view    (Results Pending)           ASSESSMENT/RECS  64 y.o. male on day 8 of admission presenting with Influenza A. No longer having diarrhea, developed nausea and constipation over the weekend, repeat CT concerning for prolonged colonic ileus. General surgery now consulted.    Stop dicyclomine  Can continue simethicone as it does not affect GI motility.   Check pre albumin level  Management of ileus per general surgery  Avoid aggressive bowel regimen at this time, dc'ed stimulant laxatives  Will sign off, further management of ileus per general surgery      Tete Carballo, APRN-CNP

## 2025-03-24 NOTE — PROGRESS NOTES
Occupational Therapy                 Therapy Communication Note    Patient Name: Julio Carranza  MRN: [id number]  [facility]  [facility]  Today's Date: 3/24/2025     Discipline: Occupational Therapy    OT Missed Visit: Yes     Missed Visit Reason: Missed Visit Reason: Patient refused    Missed Time: Attempt    Comment: Pt still seated in chair, still c/c of abdomen pain 6/10 with no improvement since last attempt. Pt was provided medication prior to first attempt however appearing to not be helping much. Requested to get back to bed- SUP level. Alarm on at end, RN notified.

## 2025-03-24 NOTE — PROGRESS NOTES
Physical Therapy    Physical Therapy Treatment    Patient Name: Julio Carranza  MRN: [id number]  Department: [facility]  Room: [address] [place], [place] [address]  Today's Date: 3/24/2025  Time Calculation  Start Time: 0841 (8:41-9:00 and 11:11-11:16)  Stop Time: 1116  Time Calculation (min): 155 min    The completion of this treatment, chart review, and documentation was done under the supervision of Shira Alonzo PTA.      This visit was supervised and reviewed by JACI for clinical competency and accuracy.  The supervising therapist was present for the entire session and made all clinical decisions and is in agreement.      Assessment/Plan   PT Assessment  PT Assessment Results: Decreased endurance, Decreased mobility, Pain, Decreased strength  Rehab Prognosis: Good  Barriers to Discharge Home: No anticipated barriers  Evaluation/Treatment Tolerance: Patient limited by pain  Medical Staff Made Aware: Yes  Strengths: Ability to acquire knowledge, Housing layout, Support of Caregivers  Barriers to Participation: Comorbidities  End of Session Communication: Bedside nurse  Assessment Comment: Pt demonstrated NBOS and slight path deviation during gait this date without FWW, IV pole use only for balance, with cues required for safer speeds during turns and wider jeff during turning tasks to reduce risk for falls. Pt was limited due to pain in the abdomen and fatigue requiring a break following ambulation trial, student PTA returned at a later time to complete a second trial of ambulation. Pt required 25% vc for erect posture and proper breathing technique. Pt continues to require skilled PT to imrove functional endurance and LE strength to reduce risk of falls and prevent further decline while here in hospital. Pt in bed 2 rails up with the alarm on, call button wuithin reach and all other needs addressed.  End of Session Patient Position: Alarm on, Bed, 2 rail up  PT Plan  Inpatient/Swing Bed or Outpatient: Inpatient  PT  Plan  Treatment/Interventions: Transfer training, Gait training, Therapeutic exercise  PT Plan: Ongoing PT  PT Frequency: 3 times per week  PT Discharge Recommendations: Low intensity level of continued care  PT Recommended Transfer Status: Stand by assist  PT - OK to Discharge: Yes (once medically cleared.)      General Visit Information:   PT  Visit  PT Received On: 03/24/25  Response to Previous Treatment: Patient with no complaints from previous session.  General  Reason for Referral: Impaired self-care and functional mobility 2/2 hospitalization for influenza A.  Referred By: Jackie JIMENEZ  Past Medical History Relevant to Rehab: CAD, BPH, TIA, PE, OA, Chronic B knee pain, prior L [facility] 4/24  Prior to Session Communication: Bedside nurse  Patient Position Received: Bed, 2 rail up, Alarm off, not on at start of session  Preferred Learning Style: verbal  General Comment: telemetry    Subjective   Pt reports feeling fatigued.  Precautions:  Precautions  Medical Precautions: Fall precautions, Infection precautions  Precautions Comment: droplet precautions            Objective   Pain:  Pain Assessment  Pain Assessment: 0-10  0-10 (Numeric) Pain Score: 6  Pain Type: Acute pain  Pain Location: Abdomen  Cognition:  Cognition  Overall Cognitive Status: Within Functional Limits  Coordination:  Movements are Fluid and Coordinated: Yes    Activity Tolerance:  Activity Tolerance  Endurance: Tolerates 10 - 20 min exercise with multiple rests  Treatments:  Therapeutic Exercise  Therapeutic Exercise Performed: Yes  Therapeutic Exercise Activity 1: ankle pumps x20  TE: 2x5 STS    Bed Mobility  Bed Mobility: Yes  Bed Mobility 1  Bed Mobility 1: Supine to sitting  Level of Assistance 1: Modified independent  Bed Mobility Comments 1: Pt able to manage trunk and LEs    Ambulation/Gait Training  Ambulation/Gait Training Performed: Yes  Ambulation/Gait Training 1  Surface 1: Level tile  Device 1: No device  Gait Support Devices: Gait  belt  Assistance 1: Close supervision  Quality of Gait 1: Inconsistent stride length, Decreased step length  Comments/Distance (ft) 1: 220-250ft, pt without O2, SPO2 levels stayed above 90%.  Ambulation/Gait Training 2  Surface 2: Level tile  Device 2: No device  Gait Support Devices: Gait belt  Assistance 2: Close supervision  Quality of Gait 2: Inconsistent stride length, Decreased step length  Comments/Distance (ft) 2: 120-150ft  Transfers  Transfer: Yes  Transfer 1  Transfer From 1: Bed to  Transfer to 1: Stand  Technique 1: Sit to stand, Stand to sit  Transfer Device 1: Gait belt  Transfer Level of Assistance 1: Modified independent  Trials/Comments 1: no AD, no struggle observed.    Outcome Measures:  [facility] Basic Mobility  Turning from your back to your side while in a flat bed without using bedrails: None  Moving from lying on your back to sitting on the side of a flat bed without using bedrails: None  Moving to and from bed to chair (including a wheelchair): A little  Standing up from a chair using your arms (e.g. wheelchair or bedside chair): None  To walk in hospital room: A little  Climbing 3-5 steps with railing: A little  Basic Mobility - Total Score: 21    Education Documentation  Mobility Training, taught by JORGE Ruelas at 3/24/2025 11:30 AM.  Learner: Patient  Readiness: Acceptance  Method: Explanation  Response: Verbalizes Understanding  Comment: Educated pt on erect posture during gait training to reduce risk of falls.    Education Comments  No comments found.        Encounter Problems       Encounter Problems (Active)       Mobility       STG - Patient will ambulate 120' x 2 with LRAD mod I with improved gait mechanics and O2 remaining above 90% (Progressing)       Start:  03/17/25    Expected End:  03/24/25            Pt with transfer sit to stand to sit and from bed to chair to bed with LRAD mod I  (Progressing)       Start:  03/17/25    Expected End:  03/24/25            Pt will  negotiate 5 stairs with B handrails Mod I  to simulate home entry and exit set up        Start:  03/17/25    Expected End:  03/24/25            Pt will improve B LE to WFL for improved overall functional mobility  (Progressing)       Start:  03/17/25    Expected End:  03/24/25               Pain - Adult            RORY BRISCOE, S-JACI Alonzo PTA

## 2025-03-25 ENCOUNTER — APPOINTMENT (OUTPATIENT)
Dept: RADIOLOGY | Facility: HOSPITAL | Age: 65
DRG: 193 | End: 2025-03-25
Payer: MEDICARE

## 2025-03-25 LAB
ANION GAP SERPL CALC-SCNC: 10 MMOL/L (ref 10–20)
BUN SERPL-MCNC: 14 MG/DL (ref 6–23)
CALCIUM SERPL-MCNC: 7.8 MG/DL (ref 8.6–10.3)
CHLORIDE SERPL-SCNC: 108 MMOL/L (ref 98–107)
CO2 SERPL-SCNC: 22 MMOL/L (ref 21–32)
CREAT SERPL-MCNC: 0.81 MG/DL (ref 0.5–1.3)
EGFRCR SERPLBLD CKD-EPI 2021: >90 ML/MIN/1.73M*2
ERYTHROCYTE [DISTWIDTH] IN BLOOD BY AUTOMATED COUNT: 12.4 % (ref 11.5–14.5)
GLUCOSE SERPL-MCNC: 90 MG/DL (ref 74–99)
HCT VFR BLD AUTO: 42.3 % (ref 41–52)
HGB BLD-MCNC: 13.3 G/DL (ref 13.5–17.5)
MAGNESIUM SERPL-MCNC: 1.83 MG/DL (ref 1.6–2.4)
MCH RBC QN AUTO: 28.9 PG (ref 26–34)
MCHC RBC AUTO-ENTMCNC: 31.4 G/DL (ref 32–36)
MCV RBC AUTO: 92 FL (ref 80–100)
NRBC BLD-RTO: 0 /100 WBCS (ref 0–0)
PLATELET # BLD AUTO: 209 X10*3/UL (ref 150–450)
POTASSIUM SERPL-SCNC: 3.6 MMOL/L (ref 3.5–5.3)
RBC # BLD AUTO: 4.61 X10*6/UL (ref 4.5–5.9)
SODIUM SERPL-SCNC: 136 MMOL/L (ref 136–145)
WBC # BLD AUTO: 6.9 X10*3/UL (ref 4.4–11.3)

## 2025-03-25 PROCEDURE — 2500000005 HC RX 250 GENERAL PHARMACY W/O HCPCS: Mod: IPSPLIT | Performed by: INTERNAL MEDICINE

## 2025-03-25 PROCEDURE — 2500000002 HC RX 250 W HCPCS SELF ADMINISTERED DRUGS (ALT 637 FOR MEDICARE OP, ALT 636 FOR OP/ED): Mod: IPSPLIT

## 2025-03-25 PROCEDURE — 94760 N-INVAS EAR/PLS OXIMETRY 1: CPT | Mod: IPSPLIT

## 2025-03-25 PROCEDURE — 99232 SBSQ HOSP IP/OBS MODERATE 35: CPT | Performed by: SURGERY

## 2025-03-25 PROCEDURE — 85027 COMPLETE CBC AUTOMATED: CPT | Mod: IPSPLIT | Performed by: NURSE PRACTITIONER

## 2025-03-25 PROCEDURE — 2500000001 HC RX 250 WO HCPCS SELF ADMINISTERED DRUGS (ALT 637 FOR MEDICARE OP): Mod: IPSPLIT

## 2025-03-25 PROCEDURE — 94668 MNPJ CHEST WALL SBSQ: CPT | Mod: IPSPLIT

## 2025-03-25 PROCEDURE — 99233 SBSQ HOSP IP/OBS HIGH 50: CPT

## 2025-03-25 PROCEDURE — 1200000002 HC GENERAL ROOM WITH TELEMETRY DAILY: Mod: IPSPLIT

## 2025-03-25 PROCEDURE — 2500000001 HC RX 250 WO HCPCS SELF ADMINISTERED DRUGS (ALT 637 FOR MEDICARE OP): Mod: IPSPLIT | Performed by: NURSE PRACTITIONER

## 2025-03-25 PROCEDURE — 94640 AIRWAY INHALATION TREATMENT: CPT | Mod: IPSPLIT

## 2025-03-25 PROCEDURE — 2500000005 HC RX 250 GENERAL PHARMACY W/O HCPCS: Mod: IPSPLIT

## 2025-03-25 PROCEDURE — 2500000004 HC RX 250 GENERAL PHARMACY W/ HCPCS (ALT 636 FOR OP/ED): Mod: IPSPLIT

## 2025-03-25 PROCEDURE — 2500000004 HC RX 250 GENERAL PHARMACY W/ HCPCS (ALT 636 FOR OP/ED): Mod: JZ,IPSPLIT | Performed by: STUDENT IN AN ORGANIZED HEALTH CARE EDUCATION/TRAINING PROGRAM

## 2025-03-25 PROCEDURE — 83735 ASSAY OF MAGNESIUM: CPT | Mod: IPSPLIT

## 2025-03-25 PROCEDURE — 80048 BASIC METABOLIC PNL TOTAL CA: CPT | Mod: IPSPLIT | Performed by: NURSE PRACTITIONER

## 2025-03-25 PROCEDURE — 74018 RADEX ABDOMEN 1 VIEW: CPT

## 2025-03-25 PROCEDURE — 74018 RADEX ABDOMEN 1 VIEW: CPT | Mod: IPSPLIT

## 2025-03-25 PROCEDURE — 36415 COLL VENOUS BLD VENIPUNCTURE: CPT | Mod: IPSPLIT | Performed by: NURSE PRACTITIONER

## 2025-03-25 PROCEDURE — 2500000002 HC RX 250 W HCPCS SELF ADMINISTERED DRUGS (ALT 637 FOR MEDICARE OP, ALT 636 FOR OP/ED): Mod: IPSPLIT | Performed by: NURSE PRACTITIONER

## 2025-03-25 PROCEDURE — 2500000004 HC RX 250 GENERAL PHARMACY W/ HCPCS (ALT 636 FOR OP/ED): Mod: IPSPLIT | Performed by: NURSE PRACTITIONER

## 2025-03-25 RX ORDER — BENZONATATE 100 MG/1
200 CAPSULE ORAL 3 TIMES DAILY PRN
Status: DISCONTINUED | OUTPATIENT
Start: 2025-03-25 | End: 2025-03-29 | Stop reason: HOSPADM

## 2025-03-25 RX ORDER — ACETAMINOPHEN 325 MG/1
975 TABLET ORAL EVERY 8 HOURS
Status: DISCONTINUED | OUTPATIENT
Start: 2025-03-25 | End: 2025-03-27

## 2025-03-25 RX ORDER — KETOROLAC TROMETHAMINE 15 MG/ML
15 INJECTION, SOLUTION INTRAMUSCULAR; INTRAVENOUS EVERY 6 HOURS PRN
Status: DISCONTINUED | OUTPATIENT
Start: 2025-03-25 | End: 2025-03-29 | Stop reason: HOSPADM

## 2025-03-25 RX ADMIN — ACETAMINOPHEN 975 MG: 325 TABLET, FILM COATED ORAL at 18:10

## 2025-03-25 RX ADMIN — IPRATROPIUM BROMIDE AND ALBUTEROL SULFATE 3 ML: .5; 3 SOLUTION RESPIRATORY (INHALATION) at 21:37

## 2025-03-25 RX ADMIN — PANTOPRAZOLE SODIUM 40 MG: 40 TABLET, DELAYED RELEASE ORAL at 06:22

## 2025-03-25 RX ADMIN — ENOXAPARIN SODIUM 40 MG: 40 INJECTION SUBCUTANEOUS at 13:50

## 2025-03-25 RX ADMIN — METRONIDAZOLE 500 MG: 5 INJECTION, SOLUTION INTRAVENOUS at 23:40

## 2025-03-25 RX ADMIN — DOCUSATE SODIUM 100 MG: 100 CAPSULE, LIQUID FILLED ORAL at 08:04

## 2025-03-25 RX ADMIN — CIPROFLOXACIN 400 MG: 400 INJECTION, SOLUTION INTRAVENOUS at 08:03

## 2025-03-25 RX ADMIN — KETOROLAC TROMETHAMINE 15 MG: 15 INJECTION, SOLUTION INTRAMUSCULAR; INTRAVENOUS at 18:13

## 2025-03-25 RX ADMIN — PANTOPRAZOLE SODIUM 40 MG: 40 TABLET, DELAYED RELEASE ORAL at 16:20

## 2025-03-25 RX ADMIN — IPRATROPIUM BROMIDE AND ALBUTEROL SULFATE 3 ML: .5; 3 SOLUTION RESPIRATORY (INHALATION) at 15:35

## 2025-03-25 RX ADMIN — SIMETHICONE 80 MG: 80 TABLET, CHEWABLE ORAL at 13:50

## 2025-03-25 RX ADMIN — Medication 6 MG: at 22:07

## 2025-03-25 RX ADMIN — SIMETHICONE 80 MG: 80 TABLET, CHEWABLE ORAL at 16:20

## 2025-03-25 RX ADMIN — ACETAMINOPHEN 975 MG: 325 TABLET, FILM COATED ORAL at 10:28

## 2025-03-25 RX ADMIN — POTASSIUM CHLORIDE AND SODIUM CHLORIDE 100 ML/HR: 900; 150 INJECTION, SOLUTION INTRAVENOUS at 06:23

## 2025-03-25 RX ADMIN — SIMETHICONE 80 MG: 80 TABLET, CHEWABLE ORAL at 06:22

## 2025-03-25 RX ADMIN — THIAMINE HCL TAB 100 MG 100 MG: 100 TAB at 08:04

## 2025-03-25 RX ADMIN — OXYCODONE 5 MG: 5 TABLET ORAL at 13:50

## 2025-03-25 RX ADMIN — HYDROMORPHONE HYDROCHLORIDE 0.5 MG: 1 INJECTION, SOLUTION INTRAMUSCULAR; INTRAVENOUS; SUBCUTANEOUS at 01:17

## 2025-03-25 RX ADMIN — KETOROLAC TROMETHAMINE 15 MG: 15 INJECTION, SOLUTION INTRAMUSCULAR; INTRAVENOUS at 09:13

## 2025-03-25 RX ADMIN — HYDROMORPHONE HYDROCHLORIDE 0.5 MG: 1 INJECTION, SOLUTION INTRAMUSCULAR; INTRAVENOUS; SUBCUTANEOUS at 21:42

## 2025-03-25 RX ADMIN — FOLIC ACID 1 MG: 1 TABLET ORAL at 08:04

## 2025-03-25 RX ADMIN — LOSARTAN POTASSIUM 100 MG: 50 TABLET, FILM COATED ORAL at 08:04

## 2025-03-25 RX ADMIN — DOCUSATE SODIUM 100 MG: 100 CAPSULE, LIQUID FILLED ORAL at 22:07

## 2025-03-25 RX ADMIN — PRIMIDONE 50 MG: 50 TABLET ORAL at 22:07

## 2025-03-25 RX ADMIN — IPRATROPIUM BROMIDE AND ALBUTEROL SULFATE 3 ML: .5; 3 SOLUTION RESPIRATORY (INHALATION) at 09:20

## 2025-03-25 RX ADMIN — Medication 21 PERCENT: at 21:37

## 2025-03-25 RX ADMIN — SIMETHICONE 80 MG: 80 TABLET, CHEWABLE ORAL at 22:07

## 2025-03-25 RX ADMIN — CIPROFLOXACIN 400 MG: 400 INJECTION, SOLUTION INTRAVENOUS at 21:43

## 2025-03-25 RX ADMIN — TRAZODONE HYDROCHLORIDE 50 MG: 50 TABLET ORAL at 22:07

## 2025-03-25 RX ADMIN — OXYCODONE 5 MG: 5 TABLET ORAL at 06:23

## 2025-03-25 RX ADMIN — Medication 21 PERCENT: at 09:20

## 2025-03-25 RX ADMIN — METRONIDAZOLE 500 MG: 5 INJECTION, SOLUTION INTRAVENOUS at 09:06

## 2025-03-25 ASSESSMENT — PAIN SCALES - GENERAL
PAINLEVEL_OUTOF10: 8
PAINLEVEL_OUTOF10: 5 - MODERATE PAIN
PAINLEVEL_OUTOF10: 0 - NO PAIN
PAINLEVEL_OUTOF10: 7
PAINLEVEL_OUTOF10: 8
PAINLEVEL_OUTOF10: 7
PAINLEVEL_OUTOF10: 5 - MODERATE PAIN
PAINLEVEL_OUTOF10: 7

## 2025-03-25 ASSESSMENT — PAIN - FUNCTIONAL ASSESSMENT
PAIN_FUNCTIONAL_ASSESSMENT: 0-10

## 2025-03-25 ASSESSMENT — PAIN DESCRIPTION - LOCATION
LOCATION: ABDOMEN

## 2025-03-25 ASSESSMENT — PAIN DESCRIPTION - ORIENTATION
ORIENTATION: RIGHT;LEFT;UPPER
ORIENTATION: RIGHT;LEFT;UPPER

## 2025-03-25 ASSESSMENT — PAIN SCALES - PAIN ASSESSMENT IN ADVANCED DEMENTIA (PAINAD): TOTALSCORE: MEDICATION (SEE MAR)

## 2025-03-25 NOTE — PROGRESS NOTES
"Julio Carranza is a 64 y.o. male on day 9 of admission presenting with Influenza A.    Subjective     No overnight events reported.     Patient is on room air this morning. He had some nausea and abdominal pain with full liquids this morning but no emesis. He is having small BM's and passing gas. Toradol PRN for moderate pain was added today, will monitor response. Plan of care discussed with patient, all questions answered.          Objective     Physical Exam  Constitutional:       General: He is not in acute distress.     Appearance: He is not toxic-appearing.   HENT:      Head: Normocephalic and atraumatic.      Mouth/Throat:      Mouth: Mucous membranes are moist.   Eyes:      Conjunctiva/sclera: Conjunctivae normal.   Cardiovascular:      Rate and Rhythm: Normal rate and regular rhythm.   Pulmonary:      Effort: No respiratory distress.      Breath sounds: Normal breath sounds. No wheezing.      Comments: On room air, SpO2 93% at rest  Abdominal:      General: Bowel sounds are normal. There is distension.      Tenderness: There is abdominal tenderness. There is no guarding.      Hernia: A hernia (umbilical) is present.   Musculoskeletal:         General: No swelling.   Skin:     General: Skin is warm and dry.   Neurological:      Mental Status: He is alert and oriented to person, place, and time.   Psychiatric:         Mood and Affect: Mood normal.         Behavior: Behavior normal.         Last Recorded Vitals  Blood pressure (!) 122/101, pulse 68, temperature 36.4 °C (97.5 °F), temperature source Temporal, resp. rate 18, height 1.88 m (6' 2\"), weight 96.9 kg (213 lb 10 oz), SpO2 94%.  Intake/Output last 3 Shifts:  I/O last 3 completed shifts:  In: 3760.1 (38.8 mL/kg) [P.O.:1100; I.V.:2260.1 (23.3 mL/kg); IV Piggyback:400]  Out: - (0 mL/kg)   Weight: 96.9 kg     Relevant Results        Scheduled medications  benzonatate, 200 mg, oral, TID  ciprofloxacin, 400 mg, intravenous, q12h  docusate sodium, 100 mg, " oral, BID  enoxaparin, 40 mg, subcutaneous, q24h  influenza, 0.5 mL, intramuscular, During hospitalization  folic acid, 1 mg, oral, Daily  ipratropium-albuteroL, 3 mL, nebulization, TID  lidocaine, 1 patch, transdermal, Daily  losartan, 100 mg, oral, Daily  metroNIDAZOLE, 500 mg, intravenous, q12h  oxygen, , inhalation, Continuous - Inhalation  pantoprazole, 40 mg, oral, BID AC  polyethylene glycol, 17 g, oral, Daily  primidone, 50 mg, oral, Nightly  simethicone, 80 mg, oral, 4x daily  thiamine, 100 mg, oral, Daily  traZODone, 50 mg, oral, Nightly      Continuous medications  potassium chloride in 0.9%NaCl, 40 mL/hr, Last Rate: 40 mL/hr (03/25/25 0946)      PRN medications  PRN medications: acetaminophen **OR** [DISCONTINUED] acetaminophen **OR** [DISCONTINUED] acetaminophen, acetaminophen **OR** [DISCONTINUED] acetaminophen **OR** [DISCONTINUED] acetaminophen, albuterol, albuterol, baclofen, benzocaine-menthol, codeine-guaifenesin, HYDROmorphone, ketorolac, melatonin, ondansetron ODT **OR** ondansetron, oxyCODONE    Results for orders placed or performed during the hospital encounter of 03/15/25 (from the past 24 hours)   Basic Metabolic Panel   Result Value Ref Range    Glucose 90 74 - 99 mg/dL    Sodium 136 136 - 145 mmol/L    Potassium 3.6 3.5 - 5.3 mmol/L    Chloride 108 (H) 98 - 107 mmol/L    Bicarbonate 22 21 - 32 mmol/L    Anion Gap 10 10 - 20 mmol/L    Urea Nitrogen 14 6 - 23 mg/dL    Creatinine 0.81 0.50 - 1.30 mg/dL    eGFR >90 >60 mL/min/1.73m*2    Calcium 7.8 (L) 8.6 - 10.3 mg/dL   CBC   Result Value Ref Range    WBC 6.9 4.4 - 11.3 x10*3/uL    nRBC 0.0 0.0 - 0.0 /100 WBCs    RBC 4.61 4.50 - 5.90 x10*6/uL    Hemoglobin 13.3 (L) 13.5 - 17.5 g/dL    Hematocrit 42.3 41.0 - 52.0 %    MCV 92 80 - 100 fL    MCH 28.9 26.0 - 34.0 pg    MCHC 31.4 (L) 32.0 - 36.0 g/dL    RDW 12.4 11.5 - 14.5 %    Platelets 209 150 - 450 x10*3/uL   Magnesium   Result Value Ref Range    Magnesium 1.83 1.60 - 2.40 mg/dL     *Note:  Due to a large number of results and/or encounters for the requested time period, some results have not been displayed. A complete set of results can be found in Results Review.     XR abdomen 1 view    Result Date: 3/24/2025  Interpreted By:  Lang Neely, STUDY: XR ABDOMEN 1 VIEW;  3/24/2025 5:24 am   INDICATION: Signs/Symptoms: ileu.   COMPARISON: Yesterday's portable AP supine abdomen radiographs and enhanced abdominal and pelvic CT.   ACCESSION NUMBER(S): [id number]   ORDERING CLINICIAN: HANNAH JOSEPH   FINDINGS: Gaseous distention of the transverse colon and hepatic greater than splenic flexures persists with the right transverse colon and hepatic flexure measuring up to at least 11 cm and the left transverse colon at least 9.8 cm in diameter with magnification. The cecum has decompressed since yesterday and contains little gas. No distention of the descending colon through rectum is apparent. Limited evaluation of pneumoperitoneum on supine imaging, however no definite free air is identified.   Moderate elevation of the left side of the diaphragm and left lateral costophrenic angle blunting persist.   Osseous structures demonstrate no acute bony changes. L4-5 postoperative changes, height loss of several lower thoracic vertebrae, a left total hip prosthesis, contrast material in the bladder and prostate radiation seeds are again noted. Refer to the recent CT reports for further details.       Gaseous distention of the transverse colon and flexures is similar to yesterday aside from cecal decompression.   MACRO: None   Signed by: Lang Neely 3/24/2025 4:30 PM Dictation workstation:   [id number]    CT abdomen pelvis w IV contrast    Result Date: 3/23/2025  Interpreted By:  Zoltan Baez, STUDY: CT ABDOMEN PELVIS W IV CONTRAST;  3/23/2025 11:40 am   INDICATION: 65 y/o   M with  Signs/Symptoms:rule out obstructing lesion, abdominal pain.   LIMITATIONS: None.   ACCESSION NUMBER(S):  [id number]   ORDERING CLINICIAN: MAITE SNIDER   TECHNIQUE: After the administration of IV iodonated contrast, spiral axial images were obtained from the xiphoid down through the symphysis pubis. Sagittal and coronal reconstruction images were generated. 75 mL of Omnipaque 350.   COMPARISON: 03/22/2025   FINDINGS: Lower Chest: Scattered atelectasis visualized lower lungs. Calcified granulomas. Confluent atelectasis in the lingula. Trace left pleural effusion. Small hiatal hernia.   Liver: The liver is unremarkable without focal lesion.   Gallbladder and Biliary: Unremarkable.   Pancreas: No abnormality identified in the pancreas.   Spleen: No abnormality identified in the spleen.   Adrenals: No abnormality identified in either adrenal gland.   Urinary: No parenchymal abnormality identified in either kidney. No hydronephrosis. Radiation seeds in the prostate gland.   Gastrointestinal/Peritoneum: Dilated colon measuring up to 9 cm in the region of the transverse and ascending colon, transitioning to normal caliber in the descending colon. Appendix not visualized. Appendix not seen. No secondary signs of appendicitis in the pericecal region. In the abdomen, there is no extraluminal air. No significant free fluid. Sigmoid colon diverticulum.   Vascular: Abdominal aorta is normal in caliber. Mild atherosclerosis.   Lymphatics: No enlarged lymph nodes by size criteria.   MSK/Body Wall: No aggressive bony lesion identified. Posterior spinal fusion hardware L4-S1 with laminectomy changes and bone graft, similar in appearance when compared to prior exam. Small fat containing right inguinal hernia. Small fat containing umbilical hernia. Small foci of subcutaneous gas in the anterior abdominal wall which may be related to medication injection.       Dilated gas-filled colon which may represent colonic ileus. No transition point to indicate obstructive etiology.   Remainder of the examination is not significantly changed  from prior exam.   Signed by: Zoltan Baez 3/23/2025 1:42 PM Dictation workstation:   [id number]    XR abdomen 1 view    Result Date: 3/23/2025  Interpreted By:  Lang Neely, STUDY: XR ABDOMEN 1 VIEW;  3/23/2025 8:54 am   INDICATION: Signs/Symptoms: rule out obstruction, severe pain.   COMPARISON: Yesterday's unenhanced abdominal and pelvic CT and the 03/21/2025 portable AP abdomen radiographs   ACCESSION NUMBER(S): [id number]   ORDERING CLINICIAN: MAITE SNIDER   FINDINGS: Gaseous distention of the right and transverse colon with the cecum measuring about 10 cm and the left transverse colon up to about 11 cm in diameter with magnification is similar to yesterday's CT, but has increased since 03/21/2025, most notably in the cecum. The descending colon through rectum are normal caliber to collapsed. Yesterday's CT did not show a left colon mass or other obvious obstruction to account for this. Limited evaluation of pneumoperitoneum on supine imaging, however no gross evidence of free air is noted.   The left side of the diaphragm remains considerably elevated, obscuring the left lung base.   Lumbosacral spine postoperative changes as previously described include an L2-5 posterior fusion with bilateral pedicle screws.       Right and transverse colon gaseous distention is similar to yesterday's CT, but seems increased since 03/21/2025.   MACRO: None   Signed by: Lang Neely 3/23/2025 10:30 AM Dictation workstation:   [id number]    CT abdomen pelvis wo IV contrast    Result Date: 3/22/2025  Interpreted By:  Lang Neely, STUDY: CT ABDOMEN PELVIS WO IV CONTRAST;  3/22/2025 9:59 am   INDICATION: Signs/Symptoms: pain.   COMPARISON: 03/19/2025 unenhanced abdominal and pelvic CT.   ACCESSION NUMBER(S): [id number]   ORDERING CLINICIAN: MAU IVEY   TECHNIQUE: CT of the abdomen and pelvis was performed. Contiguous axial images were obtained at 3 mm slice thickness through the abdomen  and pelvis. Coronal and sagittal reconstructions at 3 mm slice thickness were performed.  No intravenous or oral contrast agents were administered.   FINDINGS: Please note that the evaluation of vessels, lymph nodes and organs is limited without intravenous contrast.   LOWER CHEST: Elevated left side of the diaphragm with adjacent subsegmental atelectasis or possibly scarring, unchanged. Minimal medial right lower lobe scarring or persistent subsegmental atelectasis. Left main coronary artery bifurcation calcification.   ABDOMEN:   LIVER: The liver is normal in size without evidence of focal lesions.   BILE DUCTS: The intrahepatic and extrahepatic ducts are not dilated.   GALLBLADDER: No calcified gallstone or pericholecystic fat infiltration.   PANCREAS: Within normal limits.   SPLEEN: Normal size and homogeneous.   ADRENAL GLANDS: Within normal limits.   KIDNEYS AND URETERS: Normal size kidneys with mild bilateral perinephric scarring. A tiny left inferolateral renal calcification (image 72 series 201 and coronal image 79) is probably calyceal. No hydroureteronephrosis or calcified ureteral stone. Bilateral renal sinus lipomatosis.   PELVIS:   BLADDER: Collapsed. No calcified stone. A very thin linear density extending from the bladder to the umbilicus is consistent with a urachal remnant.   REPRODUCTIVE ORGANS: Radiation seeds in a normal size prostate.   BOWEL: The stomach is unremarkable.  The transverse and right colon are distended with fluid and air, with the transverse colon measuring up to at least 10 cm in coronal diameter and the cecum at least 9 cm in axial diameter. The descending colon through rectum is collapsed. No mass or other abnormality is apparent at the splenic flexure transition point. Descending colon intramural fat is most often seen with obesity, but can be associated with inflammatory bowel disease and should be correlated clinically. Collapsed almost gasless small bowel. The appendix can  not be identified.   VESSELS: No abdominal aortic aneurysm. Mild vascular calcification.   PERITONEUM/RETROPERITONEUM/LYMPH NODES: No ascites or free air, no fluid collection.  No abdominopelvic lymphadenopathy is present.   ABDOMINAL WALL: A 4 cm umbilical hernia and a smaller right inguinal hernia contain only fat.   BONES: Lumbosacral postoperative changes as described on prior studies including bilateral L2-S1 pedicle screws connected by vertical posterior metallic rods and a transverse connector, an L4-5 disc spacer, extensive L3, L4 and L5 laminectomies, multilevel spinal degenerative changes, osteoporosis, mild-to-moderate height loss of several lower thoracic vertebral bodies and a left total hip arthroplasty are noted.       1. Nonspecific distention of the right and transverse colon similar to 03/19/2025. 2. Tiny nonobstructing left renal calculus. 3. Small umbilical and right inguinal hernias contain only fat.   MACRO: None   Signed by: Lang Neely 3/22/2025 12:16 PM Dictation workstation:   [id number]    XR abdomen 1 view    Result Date: 3/21/2025  Interpreted By:  Dayana Dumont, STUDY: XR ABDOMEN 1 VIEW 3/21/2025 10:00 am   INDICATION: Signs/Symptoms:Abdominal pain and distention   COMPARISON: 03/20/2025   ACCESSION NUMBER(S): [id number]   ORDERING CLINICIAN: TYE BASS   TECHNIQUE: Portable recumbent abdominal view   FINDINGS: Recumbent view of the abdomen shows mild gaseous distention of the transverse colon with the transverse colon measuring up to 9 cm in diameter. The gaseous distention of the transverse colon is slightly increased when compared with yesterday's study. Gas is noted within the rectum.   There is postoperative change from posterior spinal fusion in the lumbar region utilizing pedicle screws and stabilizing rods as well as crosslink bar with interbody disc replacement at L4-5 and with bilateral posterolateral fusion masses seen. There is additional postoperative change  from bipolar left hip arthroplasty. There is also radioactive seed placement within the prostate gland.       Gaseous distention of the transverse colon appearing slightly more pronounced when compared with yesterday's study.   Signed by: Dayana Dumont 3/21/2025 11:32 AM Dictation workstation:   [id number]    XR abdomen 1 view    Result Date: 3/20/2025  Interpreted By:  Lang Neely, STUDY: XR ABDOMEN 1 VIEW;  3/20/2025 9:48 am   INDICATION: Signs/Symptoms:rule out ileus.   COMPARISON: Yesterday's AP abdomen radiographs and unenhanced abdominal and pelvic CT.   ACCESSION NUMBER(S): [id number]   ORDERING CLINICIAN: TYE HOUSTON   FINDINGS: Gaseous distention of most of the transverse colon which measures up to roughly 8-9 cm in diameter with magnification has decreased since yesterday. The ascending colon and cecum are normal caliber today. Yesterday's CT reportedly showed no evidence of obstruction. Very little if any small intestinal gas is identified. Limited evaluation of pneumoperitoneum on supine imaging, however no gross evidence of free air.   Considerably elevated left side of the diaphragm with subsegmental atelectasis or scarring in the lower left lung.   Osseous structures demonstrate no acute bony changes. Extensive lumbosacral postoperative changes as described on yesterday's studies, a left total hip prosthesis, old rib fractures, at least mild spinal degenerative changes, osteoporosis and prostate radiation seeds are noted.       Gaseous distention of the transverse colon, decreased since yesterday.   Elevated left side of the diaphragm.   MACRO: None   Signed by: Lang Neely 3/20/2025 10:03 AM Dictation workstation:   [id number]    CT abdomen pelvis wo IV contrast    Result Date: 3/19/2025  Interpreted By:  Camron Dorado, STUDY: CT ABDOMEN PELVIS WO IV CONTRAST;  3/19/2025 5:41 pm   INDICATION: Signs/Symptoms:possible ileus on KUB.     COMPARISON: CT abdomen and pelvis  dated 03/07/2025   ACCESSION NUMBER(S): [id number]   ORDERING CLINICIAN: TYE BASS   TECHNIQUE: CT of the abdomen and pelvis was performed. Contiguous axial images were obtained at 3 mm slice thickness through the abdomen and pelvis. Coronal and sagittal reconstructions at 3 mm slice thickness were performed.  No intravenous or oral contrast agents were administered.   FINDINGS: Please note that the evaluation of vessels, lymph nodes and organs is limited without intravenous contrast.   LOWER CHEST: Bandlike atelectasis is present in the lingula, with mild volume loss/atelectasis also present in the left lower lobe without evidence of new consolidation or sizable effusion.   Heart is normal in size without pericardial effusion.   Distal esophagus does not demonstrate any acute abnormalities.   ABDOMEN:   LIVER: Within limits of noncontrast exam no acute hepatic parenchymal abnormality is present.   BILE DUCTS: No intrahepatic or extrahepatic biliary dilatation is present.   GALLBLADDER: No gallbladder wall thickening or pericholecystic stranding is identified.   PANCREAS: No pancreatic ductal dilatation or peripancreatic stranding is present.   SPLEEN: No acute splenic abnormality is present.   ADRENAL GLANDS: Bilateral adrenal glands are unremarkable in appearance.   KIDNEYS AND URETERS: Kidneys are symmetric in size without evidence of hydronephrosis or radiopaque nephrolithiasis. Visualized ureters are unremarkable in appearance.   PELVIS:   BLADDER: No bladder wall thickening is present. Bladder is opacified with the excreted contrast which was administered during previous contrast enhanced CT PE.   REPRODUCTIVE ORGANS: No acute prostatic abnormalities are present.   BOWEL: Stomach is decompressed and unremarkable in appearance. No abnormal small bowel dilatation is present. Appendix is not identified although no secondary signs of acute appendicitis are present in the its expected location in the right  lower quadrant.   The cecum ascending and proximal transverse colon are distended with gas and fluid, with gradual caliber change of the more distal descending colon. No inflammatory large bowel wall thickening or evidence of obstruction is present.   Some liquid stool is present in the proximal colon.   VESSELS: Atherosclerotic plaques are present in the abdominal aorta without evidence of acute vascular abnormality.   PERITONEUM/RETROPERITONEUM/LYMPH NODES: No free air, free fluid or thick-walled collections are identified in the abdomen and pelvis. No enlarged lymphadenopathy is present.   ABDOMINAL WALL: Cutaneous tissues of the abdomen and pelvis do not demonstrate any acute abnormality. Fat containing umbilical and right inguinal hernia is present.   BONES: Postsurgical changes of posterior decompression and laminectomy with fusion are present at L2 through L1, similar in appearance to prior exam in March of 2025.   No acute osseous abnormality is identified in the pelvis or visualized spine. Patient is status post left hip replacement.       1.  Ascending and transverse colon are distended with liquid stool and gas, with the more distal descending colon appearing decompressed, likely representing some lakeshia. No evidence of inflammatory large bowel wall thickening or small bowel obstruction. 2. Fat containing umbilical and right inguinal hernias are similar in appearance to prior exam.     MACRO: None   Signed by: Camron Dorado 3/19/2025 7:23 PM Dictation workstation:   [id number]    XR abdomen 1 view    Result Date: 3/19/2025  Interpreted By:  Lang Neely, STUDY: XR ABDOMEN 1 VIEW;  3/19/2025 11:35 am   INDICATION: Signs/Symptoms: constipation.   COMPARISON: 03/07/2025 enhanced abdominal and pelvic CT.   ACCESSION NUMBER(S): [id number]   ORDERING CLINICIAN: GUSTAVO BATES   FINDINGS: There is a good deal of gas in the left and right colon, some of which most notably the transverse colon is  distended measuring over 10 cm in maximum diameter with magnification. This is new since the 03/07/2025 CT and most likely reflects an ileus primarily involving the colon or an idiopathic megacolon, but precludes exclusion of a distal (aboral) large intestinal obstruction and should be correlated clinically. A much lesser amount of gas is seen in normal caliber small bowel.   The left side of the diaphragm is moderately elevated, as on the recent CT, with left lateral costophrenic angle blunting.   Osseous structures demonstrate no acute bony changes. Bilateral L2 through L5 pedicle screws connected by vertical metallic rods and an L2 transverse connector, a metallic L4-5 disc spacer, multilevel spinal degenerative changes, osteoporosis, prostate radiation seeds and a left total hip arthroplasty are noted.       Large intestinal gaseous distention.   MACRO: None   Signed by: Lang Neely 3/19/2025 4:57 PM Dictation workstation:   [id number]    ECG 12 lead    Result Date: 3/19/2025  Normal sinus rhythm Normal ECG When compared with ECG of 30-SEP-2024 13:32, No significant change was found See ED provider note for full interpretation and clinical correlation Confirmed by Addie Rubio ([id number]) on 3/19/2025 12:37:38 PM    ECG 12 lead    Result Date: 3/19/2025  Normal sinus rhythm Normal ECG When compared with ECG of 15-MAR-2025 09:05, (unconfirmed) No significant change was found    CT angio chest for pulmonary embolism    Result Date: 3/19/2025  Interpreted By:  Isiah Rose, STUDY: CT ANGIO CHEST FOR PULMONARY EMBOLISM;  3/19/2025 7:14 am   INDICATION: 65 y/o   M with  Signs/Symptoms:hypoxia, chest pain, elevated D-dimer.     LIMITATIONS: None.   ACCESSION NUMBER(S): [id number]   ORDERING CLINICIAN: CHRISTOPHER GUEVARA   TECHNIQUE: After the administration of intravenous nonionic contrast, thin-section arterial phase images were obtained  from the thoracic inlet down through the iliac crest. Sagittal  and coronal reconstruction images were generated. Axial and coronal MIP vascular reconstruction images were also generated.   Mediastinal, lung, bone, and liver windows were reviewed. Omnipaque 350   75 ML     COMPARISON: Most recent prior is from 03/15/2025.   FINDINGS: CHEST WALL/BASE OF THE NECK: The chest wall was grossly intact. No thyromegaly or thyroid mass.   MEDIASTINUM/MAIA: There are multiple small stable nonspecific bilateral hilar and central mediastinal lymph nodes. No axillary adenopathy. Mild cardiomegaly. No pericardial effusion. No thoracic aortic aneurysm or dissection. Imaging occurred late in the levo phase of enhancement. Because of this, there was greater enhancement in the the thoracic aorta than in the pulmonary arteries. Taking this into account, there was no pulmonary artery filling defect in the main pulmonary arteries out to each hilum. Distal to each hilum, the exam is nondiagnostic for pulmonary embolism due to suboptimal vessel opacification.   LUNGS/ PLEURA/ AND TRACHEA: Elevation of the left diaphragm with mild associated pleural and parenchymal scarring at the base of the left chest just above the apex of the elevated left diaphragm. There is a new band of atelectasis posterolaterally at the base of the left upper lobe. There is scant atelectasis in the paraspinal right lower lobe. No mass or focal pneumonia in either lung. No  pleural effusion or pneumothorax. The trachea was grossly intact.   BONES: No destructive lytic or blastic bone lesion.  There is mild-to-moderate disc space narrowing and endplate osteophytosis throughout the thoracic spine. No acute fracture of the osseous thorax in this exam.   UPPER ABDOMEN: There was splenomegaly with the spleen measuring 14.0 cm AP. Otherwise, the imaged upper abdomen was grossly intact.       No CT evidence of pulmonary embolism in the main pulmonary arteries out through each hilum. For technical reasons, the exam is nondiagnostic for  pulmonary embolism distal to each hilum.   Mild stable mediastinal and bilateral hilar adenopathy, perhaps reactive.   Elevation of the left diaphragm with stable scarring at the base of the left lung. There is also mild new atelectasis in the posterolateral inferior left upper lobe.   Cardiomegaly.   Splenomegaly.   Thoracic spine DJD as described. No CT evidence of acute fracture the osseous thorax in this exam.   MACRO: None   Signed by: Isiah Rose 3/19/2025 8:36 AM Dictation workstation:   [id number]    XR chest 1 view    Result Date: 3/19/2025  Interpreted By:  Isiah Rose, STUDY: XR CHEST 1 VIEW;  3/19/2025 5:49 am   INDICATION: Signs/Symptoms:Chest pain.   COMPARISON: CT scan chest from 03/15/2025. Chest x-ray from 03/15/2025 and another from 09/30/2024.   ACCESSION NUMBER(S): [id number]   ORDERING CLINICIAN: CHRISTOPHER GUEVARA   TECHNIQUE: Single AP portable view of the chest was obtained.   FINDINGS: MEDIASTINUM/ LUNGS/ MAIA: Suboptimal level of inspiration. Mild elevation of the left diaphragm. Band of linear atelectasis at the lateral left lung base. Cardiomegaly. Pulmonary vasculature and interstitium are mildly prominent. No abnormal opacity in either lung worrisome for tumor or pneumonia. No pneumothorax. No tracheal deviation. No abnormal hilar fullness or gross mass on either side.   BONES: No lytic or blastic destructive bone lesion.   UPPER ABDOMEN: Grossly intact.       Hypoventilatory exam. Elevation of the left diaphragm.   Band of linear atelectasis at the lateral left lung base.   Findings suspicious for  mild CHF.   MACRO: None   Signed by: Isiah Rose 3/19/2025 8:27 AM Dictation workstation:   [id number]    CT angio chest for pulmonary embolism    Result Date: 3/15/2025  Interpreted By:  Adi Cedeño, STUDY: CT ANGIO CHEST FOR PULMONARY EMBOLISM;  3/15/2025 3:34 pm   INDICATION: Signs/Symptoms:hypoxia, elevated d-dimer.     COMPARISON: CT chest without contrast earlier same day 15  March 2025 at 1003 hours   ACCESSION NUMBER(S): [id number]   ORDERING CLINICIAN: GUSTAVO BATES   TECHNIQUE: Pulmonary arterial phase CT chest after the uneventful administration of 75 mL IV contrast (Omnipaque 350).   Three dimensional maximum intensity projection (3-D MIPs) image/s were created on a separate dedicated workstation, reviewed and saved   FINDINGS: Compared to earlier same day, this time with the benefit of IV contrast, the following newly evident pertinent negatives are as follows:   No acute pulmonary embolism through the segmental branch level;   No aortic dissection;   No acute right heart strain;   No other newly evident pertinent negatives   Still no pleural or pericardial effusion, pneumothorax, ground-glass airspace disease or lung consolidation   Unchanged bronchitis in both lower lobes and the middle lobe and the lingula   For other details refer to prior report from earlier today       No acute pulmonary embolism   See above and/or refer to prior report from earlier today   MACRO: None   Signed by: Adi Cedeño 3/15/2025 4:05 PM Dictation workstation:   [id number]    CT chest wo IV contrast    Result Date: 3/15/2025  STUDY: CT Chest without IV Contrast; 3/15/2025 10:06 INDICATION: Evaluate for pneumonia. COMPARISON: 3/15/2025 XR Chest, 9/30/2024 CTA Chest, 9/3/2020 CT Chest. ACCESSION NUMBER(S): [id number] ORDERING CLINICIAN: LORI HADDAD TECHNIQUE:  CT of the chest was performed without contrast.  Automated mA/kV exposure control was utilized and patient examination was performed in strict accordance with principles of ALARA. FINDINGS: MEDIASTINUM: The heart is normal in size without pericardial effusion.  Coronary artery calcifications are identified.  LUNGS/PLEURA: There is elevation of left hemidiaphragm. There is an infiltrate in the left lung base most consistent with atelectasis. LYMPH NODES: There are prominent mediastinal lymph nodes. UPPER ABDOMEN: Upper abdomen  demonstrates no acute pathology. BONES: There are no acute fractures.  No suspicious bony lesions.    No evidence consolidating infiltrate or effusion. Elevated left hemidiaphragm with left basilar subsegmental atelectasis. Signed by Noé Ogden MD    XR chest 2 views    Result Date: 3/15/2025  STUDY: Chest Radiographs;  3/15/2025 9:20AM INDICATION: Cough. COMPARISON: XR Chest: 9/30/2024, 4/3/2024 CT Chest: 9/3/2020 ACCESSION NUMBER(S): [id number] ORDERING CLINICIAN: LORI HADDAD TECHNIQUE:  Frontal and lateral chest. FINDINGS: CARDIOMEDIASTINAL SILHOUETTE: Cardiomediastinal silhouette is normal in size and configuration.  LUNGS: There are linear infiltrates in the left lung base.  These were seen as September 2024 unchanged.  ABDOMEN: No remarkable upper abdominal findings.  BONES: No acute osseous changes.    Chronic changes to the left lung base. No evidence of acute infiltrate or effusion. Signed by Noé Ogden MD    CT abdomen pelvis w IV and oral contrast    Result Date: 3/8/2025  Interpreted By:  Sean Jeong, STUDY: CT ABDOMEN PELVIS W IV AND ORAL CONTRAST;  3/7/2025 10:45 am   INDICATION: Signs/Symptoms:CT ABD PEL W ORAL AND IV.   ,R10.9 Unspecified abdominal pain   COMPARISON: CT abdomen/pelvis dated 09/29/2022.   ACCESSION NUMBER(S): [id number]   ORDERING CLINICIAN: MOHIT MENJIVAR   TECHNIQUE: CT of the abdomen and pelvis was performed.  Standard contiguous axial images were obtained at 3 mm slice thickness through the abdomen and pelvis. Coronal and sagittal reconstructions at 3 mm slice thickness were performed.  75 ML of Omnipaque 350 was administered intravenously without immediate complication.   FINDINGS: LOWER CHEST: The partially visualized lower lungs are unremarkable. The heart is normal in size without significant pericardial effusion. The distal esophagus is unremarkable.   ABDOMEN:   LIVER: The liver is normal in size without evidence of focal liver lesions.   BILE DUCTS: The  intrahepatic and extrahepatic ducts are not dilated.   GALLBLADDER: The gallbladder is nondistended and without evidence of radiopaque stones.   PANCREAS: The pancreas appears unremarkable without evidence of ductal dilatation or masses.   SPLEEN: The spleen is normal in size without focal lesions.   ADRENAL GLANDS: Bilateral adrenal glands appear normal.   KIDNEYS AND URETERS: The kidneys are normal in size and enhance symmetrically.  No hydroureteronephrosis or nephroureterolithiasis is identified.   PELVIS:   BLADDER: The urinary bladder appears normal without abnormal wall thickening.   REPRODUCTIVE ORGANS: The prostate is not enlarged. Postoperative changes of UroLift are suggested.   BOWEL: The stomach is unremarkable.   There is scattered colonic diverticulosis without evidence of diverticulitis. Otherwise, the small and large bowel are normal in caliber without evidence of inflammatory wall thickening. The appendix is not definitely visualized. There is however no pericecal stranding or fluid.   VESSELS: There is no aneurysmal dilatation of the abdominal aorta. The IVC appears normal. Mild atherosclerotic calcifications of the abdominal aorta and its branching vessels are noted.   PERITONEUM/RETROPERITONEUM/LYMPH NODES: There is no free or loculated fluid collection, no free intraperitoneal air. The retroperitoneum appears normal.  No abdominopelvic lymphadenopathy by CT size criteria.   BONES AND ABDOMINAL WALL: Left total hip arthroplasty is noted without evidence of hardware complication. Posterior spinal fusion hardware of L2-S1 with intervertebral disc spacer at L4-L5 again noted. No acute osseous abnormalities or suspicious osseous lesions. Multilevel discogenic degenerative changes are again noted throughout the lower thoracic and lumbar spine with scattered intervertebral disc space narrowing and vertebral body osteophytosis. Unchanged appearance of a moderate-sized fat containing umbilical hernia  and small fat containing right inguinal hernia.       1.  No acute process within the abdomen or pelvis. 2. Unchanged appearance of a moderate-sized fat containing umbilical hernia and small fat containing right inguinal hernia. 3. Colonic diverticulosis without evidence of diverticulitis.   MACRO: None   Signed by: Sean Jeong 3/8/2025 9:30 AM Dictation workstation:   [id number]                  Assessment/Plan   Assessment & Plan  Influenza A    COPD exacerbation (Multi)    Tobacco use    Primary insomnia    Hiatal hernia    Benign essential HTN    Acute respiratory failure with hypoxia (Multi)    Constipation    Ileus (Multi)    Hypokalemia    Essential tremor    Influenza A  Acute hypoxic respiratory failure, resolved   COPD, in acute exacerbation; resolved   Tobacco use  - Presented to ED with 5 days of cough, congestion, shortness of breath, poor appetite, diarrhea, fevers, chills  - Flu A positive 3/15/25  - No leukocytosis   - D-dimer 1725  - BNP 96   - CTA chest 3/15: No acute pulmonary embolism, Unchanged bronchitis in both lower lobes and the middle lobe and the lingula   - CTA chest 3/19: No CT evidence of pulmonary embolism in the main pulmonary arteries out through each hilum. For technical reasons, the exam is nondiagnostic for pulmonary embolism distal to each hilum. Mild stable mediastinal and bilateral hilar adenopathy, perhaps reactive. Elevation of the left diaphragm with stable scarring at the base of the left lung. There is also mild new atelectasis in the posterolateral inferior left upper lobe.   - Procal 0.05  - Pertussis PCR negative   - MRSA nares negative   - Sputum culture contaminated    - azithromycin 500 mg daily completed   - completed Levaquin for pneumonia coverage   - Solumedrol 40 mg IV transitioned to PO prednisone taper (completed)  - continue DuoNebs TID  - continue Tessalon perles 200 mg TID   - continue Lidocaine 4%  patch for pleuritic chest pain  - completed Tamiflu  75 mg BID x 5 days   - continue Robitussin AC  mg q6h PRN for cough   - continue acetaminophen 650 mg q4h PRN for fever   - RT eval and treat protocol  - Bronchial hygiene  - Isolation protocol for flu A discontinued 3/25   - Patient declines nicotine patch at this time; encourage cessation on discharge   - supplemental oxygen to keep SpO2 > 90%  - currently on room air   - No oxygen at home  - Ambulatory pulse ox/overnight O2 trend before discharge   - PT/OT evals     Constipation, improving  Ileus, improving   - Patient reported no BM since before admission on 3/15, has had poor intake while admitted   - Mag citrate x 1 dose ordered 3/19   - KUB 3/19: Large intestinal gaseous distension   - CT abd/pelvis 3/19: Ascending and transverse colon are distended with liquid stool   and gas, with the more distal descending colon appearing decompressed, likely representing some lakeshia. No evidence of inflammatory large bowel wall thickening or small bowel obstruction.   - KUB 3/21: Gaseous distention of the transverse colon appearing slightly more pronounced when compared with yesterday's study.   - CT Abd/Pelvis 3/22: Nonspecific distention of the right and transverse colon similar   to 03/19/2025.   - CT abd/pelvis 3/23: Dilated gas-filled colon which may represent colonic ileus. No   transition point to indicate obstructive etiology.   - KUB 3/24: Gaseous distention of the transverse colon and flexures is similar to   yesterday aside from cecal decompression.   - GI consulted, recommend avoiding aggressive bowel regimen at this time   - Continue simethicone 80 mg QID   - Oxycodone 5 mg q6h PRN for severe pain, Dilaudid 0.5 mg IV q2h PRN for breakthrough pain  - Toradol 15 mg IV q6h PRN for moderate pain added today   - Heating pad ordered for abdominal pain   - Continue NS with KCl -rate decreased to 40 ml/hr today per surgery   - Continue full liquid diet/nutritional supplements as tolerated   - Continue  Ciprofloxacin/Flagyl per surgery recs (day 2)- prevention of secondary bacterial infection  - OOB/ambulation as tolerated   - Serial abdominal exams  - Surgery following, no indication for gastrografin enema at this time, patient passing gas and having bowel movements      Hypokalemia, resolved   - K 3.3 > 3.9 > 3.6   - Mag 1.83   - Continue NS With KCl @ 40 ml/hr for today  - Daily BMP/mag level      Essential HTN  - continue losartan 100 mg daily  - monitor BP     Essential tremor  - continue primidone 50 mg at bedtime      Insomnia  - continue trazodone 50 mg at bedtime     Hiatal hernia  Chronic GERD  - continue pantoprazole 40 mg BID      DVT PPx: Lovenox   GI PPx: Protonix   Diet: Full liquid   Code Status: Full      Disposition: Patient requires inpatient management at this time.             Susi Mejia PA-C

## 2025-03-25 NOTE — NURSING NOTE
Pt up ambulating in [place]. He c/o his pain increasing from a 5 to a 7, but tolerating walk well. Pt continues to produce gas.

## 2025-03-25 NOTE — PROGRESS NOTES
"Occupational Therapy                 Therapy Communication Note    Patient Name: Julio Carranza  MRN: [id number]  Department: [facility]  Room: [address] [place], [place] [address]  Today's Date: 3/25/2025     Discipline: Occupational Therapy    OT Missed Visit: Yes     Missed Visit Reason: Attempted to see the pt. a second time for OT this AM, however the pt. declined despite encourgament provided for participation.  The pt. said, \"The more I move, the more I hurt.\"    Missed Time: Attempt at 10:37AM    "

## 2025-03-25 NOTE — CARE PLAN
The patient's goals for the shift include      The clinical goals for the shift include pt will tolerate full liquid diet.    Over the shift, the patient did make progress toward the following goals. Pt tolerated Full liquid diet, medicated for pain with regimen and effective.       Problem: Pain  Goal: Takes deep breaths with improved pain control throughout the shift  Outcome: Progressing  Goal: Turns in bed with improved pain control throughout the shift  Outcome: Progressing  Goal: Walks with improved pain control throughout the shift  Outcome: Progressing  Goal: Performs ADL's with improved pain control throughout shift  Outcome: Progressing  Goal: Participates in PT with improved pain control throughout the shift  Outcome: Progressing     Problem: Fall/Injury  Goal: Use assistive devices by end of the shift  Outcome: Met

## 2025-03-25 NOTE — NURSING NOTE
Medicated patient with oxy for 8/10 RUQ and LUQ pain-greater pain on left side. Pt is passing gas, bowel sounds are active in all 4 quadrants. Patient has refused to walk or work with therapy as of yet, and he is aware the importance of it as we have discussed multiple times, ie getting bowels moving, prevention of more or worsening blockages. Pt agreeable to walk in halls once the oxy relieves pain.

## 2025-03-25 NOTE — CARE PLAN
Problem: Pain  Goal: Takes deep breaths with improved pain control throughout the shift  Outcome: Progressing  Goal: Turns in bed with improved pain control throughout the shift  Outcome: Progressing  Goal: Walks with improved pain control throughout the shift  Outcome: Progressing  Goal: Performs ADL's with improved pain control throughout shift  Outcome: Progressing  Goal: Participates in PT with improved pain control throughout the shift  Outcome: Progressing     Problem: Chronic Conditions and Co-morbidities  Goal: Patient's chronic conditions and co-morbidity symptoms are monitored and maintained or improved  Outcome: Progressing     Problem: Nutrition  Goal: Nutrient intake appropriate for maintaining nutritional needs  Outcome: Progressing     Problem: Fall/Injury  Goal: Not fall by end of shift  Outcome: Progressing  Goal: Be free from injury by end of the shift  Outcome: Progressing  Goal: Verbalize understanding of personal risk factors for fall in the hospital  Outcome: Progressing  Goal: Verbalize understanding of risk factor reduction measures to prevent injury from fall in the home  Outcome: Progressing  Goal: Use assistive devices by end of the shift  Outcome: Progressing  Goal: Pace activities to prevent fatigue by end of the shift  Outcome: Progressing   The patient's goals for the shift include      The clinical goals for the shift include Pt will ambulate in [place] at least 3 times today.  Over the shift, patient walked in [place] two times.  This afternoon, his pain has eased up and continues to pass gas with small amounts of loose stool. VSS and as charted. Pt resting in bed comfortable at this time.

## 2025-03-25 NOTE — PROGRESS NOTES
"Julio Carranza is a 64 y.o. male on day 9 of admission presenting with Influenza A.  And colonic ileus    Subjective   Patient feels a little better today.  He is passing gas and has had some bowel movements.  He still distended.  He is tolerating his oral protein shakes on IV antibiotics       Objective     Physical Exam  Constitutional:       Appearance: Normal appearance.   Abdominal:      General: There is distension.      Palpations: Abdomen is soft. There is no mass.      Tenderness: There is abdominal tenderness. There is no guarding.         Last Recorded Vitals  Blood pressure 106/67, pulse 59, temperature 36.7 °C (98.1 °F), temperature source Temporal, resp. rate 18, height 1.88 m (6' 2\"), weight 96.9 kg (213 lb 10 oz), SpO2 92%.  Intake/Output last 3 Shifts:  I/O last 3 completed shifts:  In: 3460.1 (35.7 mL/kg) [P.O.:900; I.V.:2260.1 (23.3 mL/kg); IV Piggyback:300]  Out: - (0 mL/kg)   Weight: 96.9 kg     Relevant Results     Abdominal x-ray 3/24/2025    IMPRESSION:  Gaseous distention of the transverse colon and flexures is similar to  yesterday aside from cecal decompression.      Assessment/Plan   Assessment & Plan  Influenza A    COPD exacerbation (Multi)    Tobacco use    Primary insomnia    Hiatal hernia    Benign essential HTN    Acute respiratory failure with hypoxia (Multi)    Constipation    Ileus (Multi)    Hypokalemia    Essential tremor    Plan-continue supportive care.  Continue IV antibiotics.  Continue intravenous protein shakes.  Continue ambulation.  No indication for Gastrografin enema at this time as passing flatus.      Jonathan Jean Baptiste MD      "

## 2025-03-25 NOTE — NURSING NOTE
Assumed care of pt, pt in bed, reported feeling a little better, reports passing gas and had BM today. Call light with in reach.    0600- Dr. Jean Baptiste at bedside, encouraged to continue to drink ensure, pt reported passing gas and having liquid BM's.

## 2025-03-25 NOTE — PROGRESS NOTES
Occupational Therapy                 Therapy Communication Note    Patient Name: Julio Carranza  MRN: [id number]  Department: [facility]  Room: [address] [place] [address]  Today's Date: 3/25/2025     Discipline: Occupational Therapy    OT Missed Visit: Yes     Missed Visit Reason: Pt. declined OT participation at this time due to c/o pain. Pt.'s nurse reported that she provided the pt. with pain medication.    Missed Time: Attempt at 7:42AM

## 2025-03-26 LAB
ANION GAP SERPL CALC-SCNC: 9 MMOL/L (ref 10–20)
BUN SERPL-MCNC: 9 MG/DL (ref 6–23)
CALCIUM SERPL-MCNC: 7.7 MG/DL (ref 8.6–10.3)
CHLORIDE SERPL-SCNC: 111 MMOL/L (ref 98–107)
CO2 SERPL-SCNC: 23 MMOL/L (ref 21–32)
CREAT SERPL-MCNC: 0.91 MG/DL (ref 0.5–1.3)
EGFRCR SERPLBLD CKD-EPI 2021: >90 ML/MIN/1.73M*2
ERYTHROCYTE [DISTWIDTH] IN BLOOD BY AUTOMATED COUNT: 12.6 % (ref 11.5–14.5)
GLUCOSE SERPL-MCNC: 92 MG/DL (ref 74–99)
HCT VFR BLD AUTO: 37 % (ref 41–52)
HGB BLD-MCNC: 12.2 G/DL (ref 13.5–17.5)
MAGNESIUM SERPL-MCNC: 1.8 MG/DL (ref 1.6–2.4)
MCH RBC QN AUTO: 28.4 PG (ref 26–34)
MCHC RBC AUTO-ENTMCNC: 33 G/DL (ref 32–36)
MCV RBC AUTO: 86 FL (ref 80–100)
NRBC BLD-RTO: 0 /100 WBCS (ref 0–0)
PLATELET # BLD AUTO: 198 X10*3/UL (ref 150–450)
POTASSIUM SERPL-SCNC: 3.7 MMOL/L (ref 3.5–5.3)
RBC # BLD AUTO: 4.29 X10*6/UL (ref 4.5–5.9)
SODIUM SERPL-SCNC: 139 MMOL/L (ref 136–145)
WBC # BLD AUTO: 5.3 X10*3/UL (ref 4.4–11.3)

## 2025-03-26 PROCEDURE — 2500000002 HC RX 250 W HCPCS SELF ADMINISTERED DRUGS (ALT 637 FOR MEDICARE OP, ALT 636 FOR OP/ED): Mod: IPSPLIT

## 2025-03-26 PROCEDURE — 2500000001 HC RX 250 WO HCPCS SELF ADMINISTERED DRUGS (ALT 637 FOR MEDICARE OP): Mod: IPSPLIT | Performed by: NURSE PRACTITIONER

## 2025-03-26 PROCEDURE — 85027 COMPLETE CBC AUTOMATED: CPT | Mod: IPSPLIT

## 2025-03-26 PROCEDURE — 97116 GAIT TRAINING THERAPY: CPT | Mod: GP,CQ,IPSPLIT

## 2025-03-26 PROCEDURE — 99233 SBSQ HOSP IP/OBS HIGH 50: CPT | Performed by: NURSE PRACTITIONER

## 2025-03-26 PROCEDURE — 94668 MNPJ CHEST WALL SBSQ: CPT | Mod: IPSPLIT

## 2025-03-26 PROCEDURE — 94760 N-INVAS EAR/PLS OXIMETRY 1: CPT | Mod: IPSPLIT

## 2025-03-26 PROCEDURE — 2500000004 HC RX 250 GENERAL PHARMACY W/ HCPCS (ALT 636 FOR OP/ED): Mod: IPSPLIT

## 2025-03-26 PROCEDURE — 2500000001 HC RX 250 WO HCPCS SELF ADMINISTERED DRUGS (ALT 637 FOR MEDICARE OP): Mod: IPSPLIT

## 2025-03-26 PROCEDURE — 99232 SBSQ HOSP IP/OBS MODERATE 35: CPT | Performed by: SURGERY

## 2025-03-26 PROCEDURE — 80048 BASIC METABOLIC PNL TOTAL CA: CPT | Mod: IPSPLIT

## 2025-03-26 PROCEDURE — 36415 COLL VENOUS BLD VENIPUNCTURE: CPT | Mod: IPSPLIT

## 2025-03-26 PROCEDURE — 1100000001 HC PRIVATE ROOM DAILY: Mod: IPSPLIT

## 2025-03-26 PROCEDURE — 2500000005 HC RX 250 GENERAL PHARMACY W/O HCPCS: Mod: IPSPLIT | Performed by: INTERNAL MEDICINE

## 2025-03-26 PROCEDURE — 83735 ASSAY OF MAGNESIUM: CPT | Mod: IPSPLIT

## 2025-03-26 PROCEDURE — 2500000005 HC RX 250 GENERAL PHARMACY W/O HCPCS: Mod: IPSPLIT

## 2025-03-26 PROCEDURE — 2500000002 HC RX 250 W HCPCS SELF ADMINISTERED DRUGS (ALT 637 FOR MEDICARE OP, ALT 636 FOR OP/ED): Mod: IPSPLIT | Performed by: NURSE PRACTITIONER

## 2025-03-26 PROCEDURE — 94640 AIRWAY INHALATION TREATMENT: CPT | Mod: IPSPLIT

## 2025-03-26 RX ADMIN — ACETAMINOPHEN 975 MG: 325 TABLET, FILM COATED ORAL at 18:20

## 2025-03-26 RX ADMIN — IPRATROPIUM BROMIDE AND ALBUTEROL SULFATE 3 ML: .5; 3 SOLUTION RESPIRATORY (INHALATION) at 08:48

## 2025-03-26 RX ADMIN — KETOROLAC TROMETHAMINE 15 MG: 15 INJECTION, SOLUTION INTRAMUSCULAR; INTRAVENOUS at 12:52

## 2025-03-26 RX ADMIN — METRONIDAZOLE 500 MG: 5 INJECTION, SOLUTION INTRAVENOUS at 11:16

## 2025-03-26 RX ADMIN — SIMETHICONE 80 MG: 80 TABLET, CHEWABLE ORAL at 06:00

## 2025-03-26 RX ADMIN — ACETAMINOPHEN 975 MG: 325 TABLET, FILM COATED ORAL at 03:10

## 2025-03-26 RX ADMIN — THIAMINE HCL TAB 100 MG 100 MG: 100 TAB at 09:24

## 2025-03-26 RX ADMIN — POTASSIUM CHLORIDE AND SODIUM CHLORIDE 40 ML/HR: 900; 150 INJECTION, SOLUTION INTRAVENOUS at 06:43

## 2025-03-26 RX ADMIN — SIMETHICONE 80 MG: 80 TABLET, CHEWABLE ORAL at 20:37

## 2025-03-26 RX ADMIN — OXYCODONE 5 MG: 5 TABLET ORAL at 08:51

## 2025-03-26 RX ADMIN — ACETAMINOPHEN 975 MG: 325 TABLET, FILM COATED ORAL at 11:07

## 2025-03-26 RX ADMIN — DOCUSATE SODIUM 100 MG: 100 CAPSULE, LIQUID FILLED ORAL at 09:24

## 2025-03-26 RX ADMIN — IPRATROPIUM BROMIDE AND ALBUTEROL SULFATE 3 ML: .5; 3 SOLUTION RESPIRATORY (INHALATION) at 20:52

## 2025-03-26 RX ADMIN — KETOROLAC TROMETHAMINE 15 MG: 15 INJECTION, SOLUTION INTRAMUSCULAR; INTRAVENOUS at 05:52

## 2025-03-26 RX ADMIN — ENOXAPARIN SODIUM 40 MG: 40 INJECTION SUBCUTANEOUS at 12:52

## 2025-03-26 RX ADMIN — KETOROLAC TROMETHAMINE 15 MG: 15 INJECTION, SOLUTION INTRAMUSCULAR; INTRAVENOUS at 18:49

## 2025-03-26 RX ADMIN — TRAZODONE HYDROCHLORIDE 50 MG: 50 TABLET ORAL at 20:37

## 2025-03-26 RX ADMIN — PRIMIDONE 50 MG: 50 TABLET ORAL at 20:38

## 2025-03-26 RX ADMIN — PANTOPRAZOLE SODIUM 40 MG: 40 TABLET, DELAYED RELEASE ORAL at 06:00

## 2025-03-26 RX ADMIN — CIPROFLOXACIN 400 MG: 400 INJECTION, SOLUTION INTRAVENOUS at 20:37

## 2025-03-26 RX ADMIN — CIPROFLOXACIN 400 MG: 400 INJECTION, SOLUTION INTRAVENOUS at 09:28

## 2025-03-26 RX ADMIN — LOSARTAN POTASSIUM 100 MG: 50 TABLET, FILM COATED ORAL at 09:24

## 2025-03-26 RX ADMIN — SIMETHICONE 80 MG: 80 TABLET, CHEWABLE ORAL at 12:52

## 2025-03-26 RX ADMIN — METRONIDAZOLE 500 MG: 5 INJECTION, SOLUTION INTRAVENOUS at 22:16

## 2025-03-26 RX ADMIN — Medication 21 PERCENT: at 20:52

## 2025-03-26 RX ADMIN — Medication 21 PERCENT: at 08:48

## 2025-03-26 RX ADMIN — IPRATROPIUM BROMIDE AND ALBUTEROL SULFATE 3 ML: .5; 3 SOLUTION RESPIRATORY (INHALATION) at 16:17

## 2025-03-26 RX ADMIN — DOCUSATE SODIUM 100 MG: 100 CAPSULE, LIQUID FILLED ORAL at 20:38

## 2025-03-26 RX ADMIN — SIMETHICONE 80 MG: 80 TABLET, CHEWABLE ORAL at 16:42

## 2025-03-26 RX ADMIN — Medication 6 MG: at 21:20

## 2025-03-26 RX ADMIN — PANTOPRAZOLE SODIUM 40 MG: 40 TABLET, DELAYED RELEASE ORAL at 16:42

## 2025-03-26 RX ADMIN — FOLIC ACID 1 MG: 1 TABLET ORAL at 09:24

## 2025-03-26 ASSESSMENT — PAIN SCALES - GENERAL
PAINLEVEL_OUTOF10: 8
PAINLEVEL_OUTOF10: 5 - MODERATE PAIN
PAINLEVEL_OUTOF10: 7
PAINLEVEL_OUTOF10: 6
PAINLEVEL_OUTOF10: 6
PAINLEVEL_OUTOF10: 7
PAINLEVEL_OUTOF10: 0 - NO PAIN

## 2025-03-26 ASSESSMENT — COGNITIVE AND FUNCTIONAL STATUS - GENERAL
MOBILITY SCORE: 22
WALKING IN HOSPITAL ROOM: A LITTLE
CLIMB 3 TO 5 STEPS WITH RAILING: A LITTLE

## 2025-03-26 ASSESSMENT — PAIN DESCRIPTION - DESCRIPTORS: DESCRIPTORS: CRAMPING;SPASM;TIGHTNESS

## 2025-03-26 ASSESSMENT — PAIN DESCRIPTION - LOCATION
LOCATION: ABDOMEN

## 2025-03-26 ASSESSMENT — PAIN DESCRIPTION - ORIENTATION
ORIENTATION: RIGHT;LEFT;LOWER
ORIENTATION: RIGHT;LEFT;UPPER
ORIENTATION: RIGHT;LEFT;MID

## 2025-03-26 ASSESSMENT — PAIN - FUNCTIONAL ASSESSMENT
PAIN_FUNCTIONAL_ASSESSMENT: 0-10

## 2025-03-26 NOTE — PROGRESS NOTES
03/26/25 1306   Discharge Planning   Living Arrangements Alone   Support Systems Friends/neighbors   Assistance Needed Independent with ADLs, utilizes a rollator or cane. Room air at baseline. Prior to admission pt placed himself on 2L supplemental oxygen using an oxygen concentrator that belonged to his girlfriend who passed away. Pt does not drive, uses [employer] or has a friend transport. Not active with [facility]. Pharmacy [facility] in [facility]. PCP Dr. Mendez Coronel.   Type of Residence Private residence   Number of Stairs to Enter Residence 5   Number of Stairs Within Residence 0   Do you have animals or pets at home? No   Home or Post Acute Services None   Expected Discharge Disposition Home  (Continues to declined home care/rehab-)   Intensity of Service   Intensity of Service 0-30 min

## 2025-03-26 NOTE — PROGRESS NOTES
"Julio Carranza is a 64 y.o. male on day 10 of admission presenting with Influenza A.    Subjective     No overnight events reported.  Patient is on room air this morning. He is having small BM's and passing gas. Toradol PRN for moderate pain was added today, will monitor response. Plan of care discussed with patient, all questions answered.      Objective     Physical Exam  Constitutional:       General: He is not in acute distress.     Appearance: He is not toxic-appearing.   HENT:      Head: Normocephalic and atraumatic.      Mouth/Throat:      Mouth: Mucous membranes are moist.   Eyes:      Conjunctiva/sclera: Conjunctivae normal.   Cardiovascular:      Rate and Rhythm: Normal rate and regular rhythm.   Pulmonary:      Effort: No respiratory distress.      Breath sounds: Normal breath sounds. No wheezing.      Comments: On room air, SpO2 93% at rest  Abdominal:      General: Bowel sounds are normal. There is distension.      Tenderness: There is abdominal tenderness. There is no guarding.      Hernia: A hernia (umbilical) is present.   Musculoskeletal:         General: No swelling.   Skin:     General: Skin is warm and dry.   Neurological:      Mental Status: He is alert and oriented to person, place, and time.   Psychiatric:         Mood and Affect: Mood normal.         Behavior: Behavior normal.       Last Recorded Vitals  Blood pressure 130/87, pulse 70, temperature 36.2 °C (97.2 °F), temperature source Temporal, resp. rate 16, height 1.88 m (6' 2\"), weight 98.7 kg (217 lb 9.5 oz), SpO2 95%.  Intake/Output last 3 Shifts:  I/O last 3 completed shifts:  In: 2140.7 (21.7 mL/kg) [P.O.:990; I.V.:650.7 (6.6 mL/kg); IV Piggyback:500]  Out: - (0 mL/kg)   Weight: 98.7 kg       Scheduled medications  acetaminophen, 975 mg, oral, q8h  ciprofloxacin, 400 mg, intravenous, q12h  docusate sodium, 100 mg, oral, BID  enoxaparin, 40 mg, subcutaneous, q24h  influenza, 0.5 mL, intramuscular, During hospitalization  folic acid, 1 " mg, oral, Daily  ipratropium-albuteroL, 3 mL, nebulization, TID  lidocaine, 1 patch, transdermal, Daily  losartan, 100 mg, oral, Daily  metroNIDAZOLE, 500 mg, intravenous, q12h  oxygen, , inhalation, Continuous - Inhalation  pantoprazole, 40 mg, oral, BID AC  polyethylene glycol, 17 g, oral, Daily  primidone, 50 mg, oral, Nightly  simethicone, 80 mg, oral, 4x daily  thiamine, 100 mg, oral, Daily  traZODone, 50 mg, oral, Nightly    Continuous medications  potassium chloride in 0.9%NaCl, 40 mL/hr, Last Rate: 40 mL/hr (03/26/25 0643)    PRN medications  PRN medications: albuterol, albuterol, baclofen, benzocaine-menthol, benzonatate, HYDROmorphone, ketorolac, melatonin, ondansetron ODT **OR** ondansetron, oxyCODONE    Relevant Results  XR abdomen 1 view  Result Date: 3/24/2025  Gaseous distention of the transverse colon and flexures is similar to yesterday aside from cecal decompression.   MACRO: None   Signed by: Lang Neely 3/24/2025 4:30 PM Dictation workstation:   [id number]    CT abdomen pelvis w IV contrast  Result Date: 3/23/2025  Dilated gas-filled colon which may represent colonic ileus. No transition point to indicate obstructive etiology.   Remainder of the examination is not significantly changed from prior exam.   Signed by: Zoltan Baez 3/23/2025 1:42 PM Dictation workstation:   [id number]    XR abdomen 1 view  Result Date: 3/23/2025  Right and transverse colon gaseous distention is similar to yesterday's CT, but seems increased since 03/21/2025.   MACRO: None   Signed by: Lang Neely 3/23/2025 10:30 AM Dictation workstation:   [id number]    CT abdomen pelvis wo IV contrast  Result Date: 3/22/2025  1. Nonspecific distention of the right and transverse colon similar to 03/19/2025. 2. Tiny nonobstructing left renal calculus. 3. Small umbilical and right inguinal hernias contain only fat.   MACRO: None   Signed by: Lang Neely 3/22/2025 12:16 PM Dictation workstation:    [id number]     Latest Reference Range & Units 03/24/25 06:51 03/24/25 06:52 03/25/25 06:29 03/26/25 06:52   GLUCOSE 74 - 99 mg/dL 78  90 92   SODIUM 136 - 145 mmol/L 139  136 139   POTASSIUM 3.5 - 5.3 mmol/L 3.9  3.6 3.7   CHLORIDE 98 - 107 mmol/L 111 (H)  108 (H) 111 (H)   Bicarbonate 21 - 32 mmol/L 21  22 23   Anion Gap 10 - 20 mmol/L 11  10 9 (L)   Blood Urea Nitrogen 6 - 23 mg/dL 21  14 9   Creatinine 0.50 - 1.30 mg/dL 0.84  0.81 0.91   EGFR >60 mL/min/1.73m*2 >90  >90 >90   Calcium 8.6 - 10.3 mg/dL 7.8 (L)  7.8 (L) 7.7 (L)   MAGNESIUM 1.60 - 2.40 mg/dL 2.00  1.83 1.80   WBC 4.4 - 11.3 x10*3/uL  7.2 6.9 5.3   nRBC 0.0 - 0.0 /100 WBCs  0.0 0.0 0.0   RBC 4.50 - 5.90 x10*6/uL  4.57 4.61 4.29 (L)   HEMOGLOBIN 13.5 - 17.5 g/dL  13.1 (L) 13.3 (L) 12.2 (L)   HEMATOCRIT 41.0 - 52.0 %  40.6 (L) 42.3 37.0 (L)   MCV 80 - 100 fL  89 92 86   MCH 26.0 - 34.0 pg  28.7 28.9 28.4   MCHC 32.0 - 36.0 g/dL  32.3 31.4 (L) 33.0   RED CELL DISTRIBUTION WIDTH 11.5 - 14.5 %  12.5 12.4 12.6   Platelets 150 - 450 x10*3/uL  221 209 198     Assessment/Plan   Assessment & Plan  Influenza A    COPD exacerbation (Multi)    Tobacco use    Primary insomnia    Hiatal hernia    Benign essential HTN    Acute respiratory failure with hypoxia (Multi)    Constipation    Ileus (Multi)    Hypokalemia    Essential tremor    Influenza A  Acute hypoxic respiratory failure, resolved   COPD, in acute exacerbation; resolved   Tobacco use  - Presented to ED with 5 days of cough, congestion, shortness of breath, poor appetite, diarrhea, fevers, chills  - Flu A positive 3/15/25  - No leukocytosis   - D-dimer 1725  - BNP 96   - CTA chest 3/15: No acute pulmonary embolism, Unchanged bronchitis in both lower lobes and the middle lobe and the lingula   - CTA chest 3/19: No CT evidence of pulmonary embolism in the main pulmonary arteries out through each hilum. For technical reasons, the exam is nondiagnostic for pulmonary embolism distal to each hilum. Mild stable  mediastinal and bilateral hilar adenopathy, perhaps reactive. Elevation of the left diaphragm with stable scarring at the base of the left lung. There is also mild new atelectasis in the posterolateral inferior left upper lobe.   - Procal 0.05  - Pertussis PCR negative, MRSA nares negative, sputum culture contaminated    - azithromycin 500 mg daily completed, completed Levaquin for pneumonia coverage   - Solumedrol 40 mg IV transitioned to PO prednisone taper (completed)  - continue DuoNebs TID  - continue Tessalon perles 200 mg TID   - continue Lidocaine 4%  patch for pleuritic chest pain  - completed Tamiflu 75 mg BID x 5 days   - continue supportive care  - RT eval and treat protocol, bronchial hygiene  - Isolation protocol for flu A, discontinued 3/25   - Patient declines nicotine patch at this time; encourage cessation on discharge   - supplemental oxygen to keep SpO2 > 90%, currently on room air   - No oxygen at home  - Ambulatory pulse ox/overnight O2 trend before discharge   - PT/OT evals     Constipation, improving  Ileus, improving   - Patient reported no BM since before admission on 3/15, has had poor intake while admitted   - Mag citrate x 1 dose ordered 3/19   - KUB 3/19: Large intestinal gaseous distension   - CT abd/pelvis 3/19: Ascending and transverse colon are distended with liquid stool   and gas, with the more distal descending colon appearing decompressed, likely representing some lakeshia. No evidence of inflammatory large bowel wall thickening or small bowel obstruction.   - KUB 3/21: Gaseous distention of the transverse colon appearing slightly more pronounced when compared with yesterday's study.   - CT Abd/Pelvis 3/22: Nonspecific distention of the right and transverse colon similar   to 03/19/2025.   - CT abd/pelvis 3/23: Dilated gas-filled colon which may represent colonic ileus. No   transition point to indicate obstructive etiology.   - KUB 3/24: Gaseous distention of the transverse colon  and flexures is similar to   yesterday aside from cecal decompression.   - GI consulted, recommend avoiding aggressive bowel regimen at this time   - Continue simethicone 80 mg QID   - Oxycodone 5 mg q6h PRN for severe pain, Dilaudid 0.5 mg IV q2h PRN for breakthrough pain  - Toradol 15 mg IV q6h PRN for moderate pain added today   - Heating pad ordered for abdominal pain   - Continue NS with KCl -rate decreased to 40 ml/hr today per surgery   - Continue full liquid diet/nutritional supplements as tolerated   - Continue Ciprofloxacin/Flagyl per surgery recs (day 2)- prevention of secondary bacterial infection  - OOB/ambulation as tolerated   - Serial abdominal exams  - Surgery following, no indication for gastrografin enema at this time, patient passing gas and having bowel movements      Hypokalemia, resolved   - K 3.3 > 3.9 > 3.6   - Mag 1.83   - Continue NS With KCl @ 40 ml/hr for today  - Daily BMP/mag level      Essential HTN  - continue losartan 100 mg daily  - monitor BP     Essential tremor  - continue primidone 50 mg at bedtime      Insomnia  - continue trazodone 50 mg at bedtime     Hiatal hernia  Chronic GERD  - continue pantoprazole 40 mg BID      DVT PPx: Lovenox   GI PPx: Protonix   Diet: Full liquid   Code Status: Full    Disposition: Patient requires inpatient management at this time.      REBEKAH Mak-CNP

## 2025-03-26 NOTE — PROGRESS NOTES
Occupational Therapy   Therapy Communication Note    Patient Name: Julio Carranza  MRN: [id number]  Department: [facility]  Room: 230/[facility]  Today's Date: 3/26/2025     Discipline: Occupational Therapy    Missed Visit Reason: Missed Visit Reason: Other (Comment) (Worked with pt. for 6 minutes. Ambulated down the hallway with close sup, pt. up in room ambulating and reports he is completing ADLs IND. Declines OT intervention at this time as he feels he is close to baseline. Patient displayed independence donning socks during session. Patient requests DC from OT at this time. Pt. advised to continue with ambulation and self-care tasks with staff while hospitalized. Order discontinued.)    Missed Time: Attempt [id number]    Comment: Pt left at EOB with alarm off as patient reports he has been cleared by staff to ambulate in his room. RN outside of room and aware alarm is not on.

## 2025-03-26 NOTE — PROGRESS NOTES
"Julio Carranza is a 64 y.o. male on day 10 of admission presenting with Influenza A.  And a prolonged ileus    Subjective   Feels better today.  Has been having flatus all day yesterday.  Was due for his CT with rectal contrast today.  Do not feel that he needs it and patient is declining to have it done at this time as he is better.       Objective     Physical Exam  Constitutional:       Appearance: Normal appearance.   Abdominal:      General: There is distension.      Palpations: Abdomen is soft. There is no mass.      Tenderness: There is no abdominal tenderness. There is no guarding.      Comments: Less tender         Last Recorded Vitals  Blood pressure 113/73, pulse 57, temperature 36.3 °C (97.3 °F), temperature source Temporal, resp. rate 18, height 1.88 m (6' 2\"), weight 98.7 kg (217 lb 9.5 oz), SpO2 93%.  Intake/Output last 3 Shifts:  I/O last 3 completed shifts:  In: 3276.7 (33.8 mL/kg) [P.O.:1670; I.V.:1206.7 (12.5 mL/kg); IV Piggyback:400]  Out: - (0 mL/kg)   Weight: 96.9 kg     Assessment/Plan   Assessment & Plan  Influenza A    COPD exacerbation (Multi)    Tobacco use    Primary insomnia    Hiatal hernia    Benign essential HTN    Acute respiratory failure with hypoxia (Multi)    Constipation    Ileus (Multi)    Hypokalemia    Essential tremor    Plan-continue current care.  Ambulate in [place].  Slow improvement likely resolving ileus due to influenza.  Continue IV antibiotics for now.  Can Hep-Lock fluids.  Continue protein supplementation      Jonathan Jean Baptiste MD      "

## 2025-03-26 NOTE — NURSING NOTE
0640: Student nurse assumed care of patient. Patient awake, lying in bed. Ice water provided, no other needs at this time.

## 2025-03-26 NOTE — CARE PLAN
The patient's goals for the shift include  resting    The clinical goals for the shift include Patient will report managed pain this shift    Over the shift, the patient's pain has been managed with PRN pain medication and scheduled tylenol. Heat packs offered but declined. VSS. 20G left forearm removed for leakage, 20G right forearm IV placed with x2 attempts. Patient's VSS. Tolerated IV antibiotics. Patient resting in bed with call light within reach.

## 2025-03-26 NOTE — PROGRESS NOTES
Physical Therapy    Physical Therapy Treatment    Patient Name: Julio Carranza  MRN: [id number]  Department: [facility]  Room: [address] [place], [place] [address]  Today's Date: 3/26/2025  Time Calculation  Start Time: 0904  Stop Time: 0921  Time Calculation (min): 17 min         Assessment/Plan   PT Assessment  PT Assessment Results: Decreased endurance, Decreased mobility, Pain, Decreased strength  Rehab Prognosis: Good  Barriers to Discharge Home: No anticipated barriers  Evaluation/Treatment Tolerance: Patient tolerated treatment well, Patient limited by pain  Medical Staff Made Aware: Yes  Strengths: Ability to acquire knowledge  Barriers to Participation: Comorbidities, Other (Comment) (pain)  End of Session Communication: Bedside nurse  Assessment Comment: Pt cont to report pain in abdoment limiting walking and participation in therapy for longer periods of time.  Pt able to improve all functional transfers to MI with good ue sequeincing as well as gait improved to SUP with use of IV pole for balance but pain in abdoment limiting distance and frequency of walks.  Pt required two cues for posture in shoulders, improved swing phase ue during gait and wider jeff during turns. Pt cont to require skilled PT to improve endurnace and gait distance and safety up on feet to reduce risk for falls and prevent further decline while here in hospital. Pt left in room, up in bed, call bell within reach and nurses present.  End of Session Patient Position: Bed, 2 rail up, Alarm off, caregiver present (nurse sup and student nurse present upon leaving room)  PT Plan  Inpatient/Swing Bed or Outpatient: Inpatient  PT Plan  Treatment/Interventions: Transfer training, Gait training, Strengthening, Endurance training, Therapeutic activity, Therapeutic exercise  PT Plan: Ongoing PT  PT Frequency: 3 times per week  PT Discharge Recommendations: Low intensity level of continued care  PT Recommended Transfer Status: Assist x1  PT - OK to Discharge: Yes (once medically  cleared.)      General Visit Information:   PT  Visit  PT Received On: 03/26/25  Response to Previous Treatment: Patient with no complaints from previous session.  General  Reason for Referral: Impaired functional mobility 2/2 hospitalization for influenza A.  Referred By: Jackie JIMENEZ  Past Medical History Relevant to Rehab: CAD, BPH, TIA, PE, OA, Chronic B knee pain, prior L [facility] 4/24  Prior to Session Communication: Bedside nurse  Patient Position Received: Bed, 2 rail up, Alarm off, not on at start of session  Preferred Learning Style: verbal  General Comment: Pt pleasant and agreeable to therapy this morning. Cont to report 8/10 pain in abdomen with overall ability to breathe better but now he just feels weak.  Pt cleared by nursing prior to session.    Subjective I am just tired of this pain.  Precautions:  Precautions  Medical Precautions: Fall precautions, Infection precautions  Precautions Comment: doug, telemetry     Date/Time Vitals Session Patient Position Pulse Resp SpO2 BP MAP (mmHg)    03/26/25 0904 --  --  --  --  95 %  --  --                 Objective   Pain:  Pain Assessment  Pain Assessment: 0-10  0-10 (Numeric) Pain Score: 8  Pain Type: Acute pain  Pain Location: Abdomen  Pain Orientation: Upper, Mid  Pain Descriptors: Cramping, Spasm, Tightness  Pain Frequency: Constant/continuous  Pain Onset: Ongoing  Clinical Progression: Not changed  Patient's Stated Pain Goal: No pain  Pain Interventions: Therapeutic presence (walking and movement)  Response to Interventions: Increase in pain  Cognition:  Cognition  Overall Cognitive Status: Within Functional Limits  Arousal/Alertness: Appropriate responses to stimuli  Orientation Level: Oriented X4  Coordination:  Movements are Fluid and Coordinated: Yes    Activity Tolerance:  Activity Tolerance  Endurance: Tolerates 10 - 20 min exercise with multiple rests  Treatments:  Therapeutic Exercise  Therapeutic Exercise Performed: Yes  Therapeutic Exercise  Activity 1: ankle pumps x20  Therapeutic Exercise Activity 2: 3x5 STS       Ambulation/Gait Training  Ambulation/Gait Training Performed: Yes  Ambulation/Gait Training 1  Surface 1: Level tile  Device 1: No device (IV pole for balance)  Gait Support Devices: Gait belt  Assistance 1: Close supervision  Quality of Gait 1: Inconsistent stride length, Decreased step length  Comments/Distance (ft) 1: 200-250 ft gait x 1, 100-125 ft gait x 1 with SUP with use of IV pole for balance only with 2 safety cues for wider jeff during turns and posture cues for keeping shoulders down and relaxed with better erect posture during gait.  no lob and good pacing but easily fatigued.  Transfers  Transfer: Yes  Transfer 1  Transfer From 1: Bed to  Transfer to 1: Stand  Technique 1: Sit to stand, Stand to sit  Transfer Device 1: Gait belt  Transfer Level of Assistance 1: Modified independent  Trials/Comments 1: multiple trials with some use of UE and good jeff and concentric and eccentric control noted.         Outcome Measures:  [facility] Basic Mobility  Turning from your back to your side while in a flat bed without using bedrails: None  Moving from lying on your back to sitting on the side of a flat bed without using bedrails: None  Moving to and from bed to chair (including a wheelchair): None  Standing up from a chair using your arms (e.g. wheelchair or bedside chair): None  To walk in hospital room: A little  Climbing 3-5 steps with railing: A little  Basic Mobility - Total Score: 22    Education Documentation  Mobility Training, taught by Shira Alonzo PTA at 3/26/2025  9:43 AM.  Learner: Patient  Readiness: Acceptance  Method: Explanation  Response: Verbalizes Understanding  Comment: Education for posture and improved arm swing for coordination during gait to reduce risk for falls.    Education Comments  No comments found.           Encounter Problems       Encounter Problems (Active)       Mobility       STG - Patient will  ambulate 120' x 2 with LRAD mod I with improved gait mechanics and O2 remaining above 90% (Progressing)       Start:  03/17/25    Expected End:  03/24/25            Pt with transfer sit to stand to sit and from bed to chair to bed with LRAD mod I  (Progressing)       Start:  03/17/25    Expected End:  03/24/25            Pt will negotiate 5 stairs with B handrails Mod I  to simulate home entry and exit set up        Start:  03/17/25    Expected End:  03/24/25            Pt will improve B LE to WFL for improved overall functional mobility  (Progressing)       Start:  03/17/25    Expected End:  03/24/25               Pain - Adult

## 2025-03-27 LAB
ANION GAP SERPL CALC-SCNC: 12 MMOL/L (ref 10–20)
BUN SERPL-MCNC: 8 MG/DL (ref 6–23)
CALCIUM SERPL-MCNC: 7.8 MG/DL (ref 8.6–10.3)
CHLORIDE SERPL-SCNC: 109 MMOL/L (ref 98–107)
CO2 SERPL-SCNC: 21 MMOL/L (ref 21–32)
CREAT SERPL-MCNC: 0.89 MG/DL (ref 0.5–1.3)
EGFRCR SERPLBLD CKD-EPI 2021: >90 ML/MIN/1.73M*2
ERYTHROCYTE [DISTWIDTH] IN BLOOD BY AUTOMATED COUNT: 13 % (ref 11.5–14.5)
GLUCOSE SERPL-MCNC: 87 MG/DL (ref 74–99)
HCT VFR BLD AUTO: 37.3 % (ref 41–52)
HGB BLD-MCNC: 12.4 G/DL (ref 13.5–17.5)
MAGNESIUM SERPL-MCNC: 1.68 MG/DL (ref 1.6–2.4)
MCH RBC QN AUTO: 28.9 PG (ref 26–34)
MCHC RBC AUTO-ENTMCNC: 33.2 G/DL (ref 32–36)
MCV RBC AUTO: 87 FL (ref 80–100)
NRBC BLD-RTO: 0 /100 WBCS (ref 0–0)
PLATELET # BLD AUTO: 194 X10*3/UL (ref 150–450)
POTASSIUM SERPL-SCNC: 3.7 MMOL/L (ref 3.5–5.3)
RBC # BLD AUTO: 4.29 X10*6/UL (ref 4.5–5.9)
SODIUM SERPL-SCNC: 138 MMOL/L (ref 136–145)
WBC # BLD AUTO: 5.4 X10*3/UL (ref 4.4–11.3)

## 2025-03-27 PROCEDURE — 2500000004 HC RX 250 GENERAL PHARMACY W/ HCPCS (ALT 636 FOR OP/ED): Mod: IPSPLIT

## 2025-03-27 PROCEDURE — 36415 COLL VENOUS BLD VENIPUNCTURE: CPT | Mod: IPSPLIT

## 2025-03-27 PROCEDURE — 2500000001 HC RX 250 WO HCPCS SELF ADMINISTERED DRUGS (ALT 637 FOR MEDICARE OP): Mod: IPSPLIT

## 2025-03-27 PROCEDURE — 2500000001 HC RX 250 WO HCPCS SELF ADMINISTERED DRUGS (ALT 637 FOR MEDICARE OP): Mod: IPSPLIT | Performed by: NURSE PRACTITIONER

## 2025-03-27 PROCEDURE — 2500000002 HC RX 250 W HCPCS SELF ADMINISTERED DRUGS (ALT 637 FOR MEDICARE OP, ALT 636 FOR OP/ED): Mod: IPSPLIT | Performed by: INTERNAL MEDICINE

## 2025-03-27 PROCEDURE — 2500000002 HC RX 250 W HCPCS SELF ADMINISTERED DRUGS (ALT 637 FOR MEDICARE OP, ALT 636 FOR OP/ED): Mod: IPSPLIT

## 2025-03-27 PROCEDURE — 2500000005 HC RX 250 GENERAL PHARMACY W/O HCPCS: Mod: IPSPLIT

## 2025-03-27 PROCEDURE — 94640 AIRWAY INHALATION TREATMENT: CPT | Mod: IPSPLIT

## 2025-03-27 PROCEDURE — 2500000005 HC RX 250 GENERAL PHARMACY W/O HCPCS: Mod: IPSPLIT | Performed by: INTERNAL MEDICINE

## 2025-03-27 PROCEDURE — 94668 MNPJ CHEST WALL SBSQ: CPT | Mod: IPSPLIT

## 2025-03-27 PROCEDURE — 2500000002 HC RX 250 W HCPCS SELF ADMINISTERED DRUGS (ALT 637 FOR MEDICARE OP, ALT 636 FOR OP/ED): Mod: IPSPLIT | Performed by: NURSE PRACTITIONER

## 2025-03-27 PROCEDURE — 99233 SBSQ HOSP IP/OBS HIGH 50: CPT | Performed by: NURSE PRACTITIONER

## 2025-03-27 PROCEDURE — 1100000001 HC PRIVATE ROOM DAILY: Mod: IPSPLIT

## 2025-03-27 PROCEDURE — 85027 COMPLETE CBC AUTOMATED: CPT | Mod: IPSPLIT

## 2025-03-27 PROCEDURE — 83735 ASSAY OF MAGNESIUM: CPT | Mod: IPSPLIT

## 2025-03-27 PROCEDURE — 80048 BASIC METABOLIC PNL TOTAL CA: CPT | Mod: IPSPLIT

## 2025-03-27 PROCEDURE — 94760 N-INVAS EAR/PLS OXIMETRY 1: CPT | Mod: IPSPLIT

## 2025-03-27 PROCEDURE — 2500000004 HC RX 250 GENERAL PHARMACY W/ HCPCS (ALT 636 FOR OP/ED): Mod: JZ,IPSPLIT | Performed by: STUDENT IN AN ORGANIZED HEALTH CARE EDUCATION/TRAINING PROGRAM

## 2025-03-27 PROCEDURE — 99232 SBSQ HOSP IP/OBS MODERATE 35: CPT | Performed by: SURGERY

## 2025-03-27 RX ORDER — ACETAMINOPHEN 325 MG/1
975 TABLET ORAL EVERY 8 HOURS
Status: DISCONTINUED | OUTPATIENT
Start: 2025-03-27 | End: 2025-03-27

## 2025-03-27 RX ORDER — ACETAMINOPHEN 325 MG/1
975 TABLET ORAL EVERY 8 HOURS
Status: DISCONTINUED | OUTPATIENT
Start: 2025-03-27 | End: 2025-03-28

## 2025-03-27 RX ADMIN — ACETAMINOPHEN 975 MG: 325 TABLET, FILM COATED ORAL at 03:30

## 2025-03-27 RX ADMIN — ACETAMINOPHEN 975 MG: 325 TABLET, FILM COATED ORAL at 17:15

## 2025-03-27 RX ADMIN — ENOXAPARIN SODIUM 40 MG: 40 INJECTION SUBCUTANEOUS at 12:45

## 2025-03-27 RX ADMIN — KETOROLAC TROMETHAMINE 15 MG: 15 INJECTION, SOLUTION INTRAMUSCULAR; INTRAVENOUS at 10:05

## 2025-03-27 RX ADMIN — HYDROMORPHONE HYDROCHLORIDE 0.5 MG: 1 INJECTION, SOLUTION INTRAMUSCULAR; INTRAVENOUS; SUBCUTANEOUS at 15:32

## 2025-03-27 RX ADMIN — ALBUTEROL SULFATE 2.5 MG: 2.5 SOLUTION RESPIRATORY (INHALATION) at 03:46

## 2025-03-27 RX ADMIN — PRIMIDONE 50 MG: 50 TABLET ORAL at 20:45

## 2025-03-27 RX ADMIN — ONDANSETRON 4 MG: 2 INJECTION INTRAMUSCULAR; INTRAVENOUS at 12:50

## 2025-03-27 RX ADMIN — LOSARTAN POTASSIUM 100 MG: 50 TABLET, FILM COATED ORAL at 10:05

## 2025-03-27 RX ADMIN — SIMETHICONE 80 MG: 80 TABLET, CHEWABLE ORAL at 17:07

## 2025-03-27 RX ADMIN — FOLIC ACID 1 MG: 1 TABLET ORAL at 10:05

## 2025-03-27 RX ADMIN — IPRATROPIUM BROMIDE AND ALBUTEROL SULFATE 3 ML: .5; 3 SOLUTION RESPIRATORY (INHALATION) at 15:59

## 2025-03-27 RX ADMIN — HYDROMORPHONE HYDROCHLORIDE 0.5 MG: 1 INJECTION, SOLUTION INTRAMUSCULAR; INTRAVENOUS; SUBCUTANEOUS at 00:16

## 2025-03-27 RX ADMIN — Medication 21 PERCENT: at 20:07

## 2025-03-27 RX ADMIN — POTASSIUM CHLORIDE AND SODIUM CHLORIDE 40 ML/HR: 900; 150 INJECTION, SOLUTION INTRAVENOUS at 12:45

## 2025-03-27 RX ADMIN — METRONIDAZOLE 500 MG: 5 INJECTION, SOLUTION INTRAVENOUS at 10:06

## 2025-03-27 RX ADMIN — THIAMINE HCL TAB 100 MG 100 MG: 100 TAB at 10:05

## 2025-03-27 RX ADMIN — SIMETHICONE 80 MG: 80 TABLET, CHEWABLE ORAL at 20:45

## 2025-03-27 RX ADMIN — PANTOPRAZOLE SODIUM 40 MG: 40 TABLET, DELAYED RELEASE ORAL at 15:32

## 2025-03-27 RX ADMIN — Medication 6 MG: at 20:45

## 2025-03-27 RX ADMIN — KETOROLAC TROMETHAMINE 15 MG: 15 INJECTION, SOLUTION INTRAMUSCULAR; INTRAVENOUS at 20:46

## 2025-03-27 RX ADMIN — BACLOFEN 20 MG: 10 TABLET ORAL at 04:03

## 2025-03-27 RX ADMIN — IPRATROPIUM BROMIDE AND ALBUTEROL SULFATE 3 ML: .5; 3 SOLUTION RESPIRATORY (INHALATION) at 09:25

## 2025-03-27 RX ADMIN — SIMETHICONE 80 MG: 80 TABLET, CHEWABLE ORAL at 12:45

## 2025-03-27 RX ADMIN — IPRATROPIUM BROMIDE AND ALBUTEROL SULFATE 3 ML: .5; 3 SOLUTION RESPIRATORY (INHALATION) at 20:06

## 2025-03-27 RX ADMIN — SIMETHICONE 80 MG: 80 TABLET, CHEWABLE ORAL at 05:26

## 2025-03-27 RX ADMIN — DOCUSATE SODIUM 100 MG: 100 CAPSULE, LIQUID FILLED ORAL at 10:05

## 2025-03-27 RX ADMIN — PANTOPRAZOLE SODIUM 40 MG: 40 TABLET, DELAYED RELEASE ORAL at 05:26

## 2025-03-27 RX ADMIN — CIPROFLOXACIN 400 MG: 400 INJECTION, SOLUTION INTRAVENOUS at 11:11

## 2025-03-27 RX ADMIN — ONDANSETRON 4 MG: 2 INJECTION INTRAMUSCULAR; INTRAVENOUS at 03:28

## 2025-03-27 RX ADMIN — DOCUSATE SODIUM 100 MG: 100 CAPSULE, LIQUID FILLED ORAL at 20:45

## 2025-03-27 RX ADMIN — TRAZODONE HYDROCHLORIDE 50 MG: 50 TABLET ORAL at 20:45

## 2025-03-27 ASSESSMENT — COGNITIVE AND FUNCTIONAL STATUS - GENERAL
DAILY ACTIVITIY SCORE: 24
MOBILITY SCORE: 24

## 2025-03-27 ASSESSMENT — PAIN DESCRIPTION - LOCATION
LOCATION: ABDOMEN

## 2025-03-27 ASSESSMENT — PAIN SCALES - WONG BAKER
WONGBAKER_NUMERICALRESPONSE: HURTS WHOLE LOT

## 2025-03-27 ASSESSMENT — PAIN DESCRIPTION - ORIENTATION
ORIENTATION: RIGHT;LEFT;MID
ORIENTATION: RIGHT;LEFT;MID

## 2025-03-27 ASSESSMENT — PAIN SCALES - GENERAL
PAINLEVEL_OUTOF10: 7
PAINLEVEL_OUTOF10: 7
PAINLEVEL_OUTOF10: 8
PAINLEVEL_OUTOF10: 7

## 2025-03-27 NOTE — CARE PLAN
The patient's goals for the shift include  to have no nausea and/or vomiting    The clinical goals for the shift include Patient will remain free from falls/injury this shift.    Over the shift, the patient reported no vomiting /nausea and remained free from injury

## 2025-03-27 NOTE — CARE PLAN
The patient's goals for the shift include  to decrease pain in abdomen     The clinical goals for the shift include free from injury / falls      Problem: Pain  Goal: Takes deep breaths with improved pain control throughout the shift  Outcome: Progressing  Goal: Turns in bed with improved pain control throughout the shift  Outcome: Progressing  Goal: Walks with improved pain control throughout the shift  Outcome: Progressing  Goal: Performs ADL's with improved pain control throughout shift  Outcome: Progressing  Goal: Participates in PT with improved pain control throughout the shift  Outcome: Progressing     Problem: Chronic Conditions and Co-morbidities  Goal: Patient's chronic conditions and co-morbidity symptoms are monitored and maintained or improved  Outcome: Progressing     Problem: Nutrition  Goal: Nutrient intake appropriate for maintaining nutritional needs  Outcome: Progressing     Problem: Fall/Injury  Goal: Not fall by end of shift  Outcome: Progressing  Goal: Be free from injury by end of the shift  Outcome: Progressing  Goal: Verbalize understanding of personal risk factors for fall in the hospital  Outcome: Progressing  Goal: Verbalize understanding of risk factor reduction measures to prevent injury from fall in the home  Outcome: Progressing  Goal: Use assistive devices by end of the shift  Outcome: Progressing  Goal: Pace activities to prevent fatigue by end of the shift  Outcome: Progressing     Problem: Infection related to problem list condition  Goal: Infection will resolve through treatment  Outcome: Progressing     Problem: Respiratory  Goal: Clear secretions with interventions this shift  Outcome: Progressing  Goal: Verbalize decreased shortness of breath this shift  Outcome: Progressing  Goal: Wean oxygen to maintain O2 saturation per order/standard this shift  Outcome: Progressing  Goal: Increase self care and/or family involvement in next 24 hours  Outcome: Progressing   Pt has an  uneventful day. Vitals have been stable and pain has been controlled. Pt got up to walk in the hallways a few times today, pt has call light within reach and no new needs at this time

## 2025-03-27 NOTE — PROGRESS NOTES
"Julio Carranza is a 64 y.o. male on day 11 of admission presenting with Influenza A.  And persistent ileus    Subjective   Had more flatus overnight.  Feels less distended.  Less pain    Objective     Physical Exam  Constitutional:       Appearance: Normal appearance.   Abdominal:      Palpations: Abdomen is soft. There is no mass.      Tenderness: There is no abdominal tenderness. There is no guarding.      Comments: Less distended         Last Recorded Vitals  Blood pressure 123/77, pulse 63, temperature 36.5 °C (97.7 °F), resp. rate 18, height 1.88 m (6' 2\"), weight 99.8 kg (220 lb), SpO2 95%.  Intake/Output last 3 Shifts:  I/O last 3 completed shifts:  In: 2140.7 (21.7 mL/kg) [P.O.:790; I.V.:650.7 (6.6 mL/kg); IV Piggyback:700]  Out: - (0 mL/kg)   Weight: 98.7 kg         Assessment/Plan   Assessment & Plan  Influenza A    COPD exacerbation (Multi)    Tobacco use    Primary insomnia    Hiatal hernia    Benign essential HTN    Acute respiratory failure with hypoxia (Multi)    Constipation    Ileus (Multi)    Hypokalemia    Essential tremor    Plan-advance diet to soft.  Continue ambulate.  Slow progress    Jonathan Jean Baptiste MD      "

## 2025-03-27 NOTE — PROGRESS NOTES
"Julio Carranza is a 64 y.o. male on day 11 of admission presenting with Influenza A.    Subjective     No overnight events reported.  Patient is on room air this morning.  He is having some abdominal bloating after advancing his diet.  We discussed slowly advancing and increasing clear liquids in between.  Also we discussed the importance of ambulating frequently.  Plan of care discussed with patient, all questions answered.      Objective     Physical Exam  Constitutional:       General: He is not in acute distress.     Appearance: He is not toxic-appearing.   HENT:      Head: Normocephalic and atraumatic.      Mouth/Throat:      Mouth: Mucous membranes are moist.   Eyes:      Conjunctiva/sclera: Conjunctivae normal.   Cardiovascular:      Rate and Rhythm: Normal rate and regular rhythm.   Pulmonary:      Effort: No respiratory distress.      Breath sounds: Normal breath sounds. No wheezing.      Comments: On room air, SpO2 93% at rest  Abdominal:      General: Bowel sounds are normal. There is distension.      Tenderness: There is abdominal tenderness. There is no guarding.      Hernia: A hernia (umbilical) is present.   Musculoskeletal:         General: No swelling.   Skin:     General: Skin is warm and dry.   Neurological:      Mental Status: He is alert and oriented to person, place, and time.   Psychiatric:         Mood and Affect: Mood normal.         Behavior: Behavior normal.       Last Recorded Vitals  Blood pressure 113/73, pulse 61, temperature 36.3 °C (97.3 °F), temperature source Temporal, resp. rate 18, height 1.88 m (6' 2\"), weight 99.8 kg (220 lb), SpO2 96%.  Intake/Output last 3 Shifts:  I/O last 3 completed shifts:  In: 1750.7 (17.5 mL/kg) [P.O.:400; I.V.:650.7 (6.5 mL/kg); IV Piggyback:700]  Out: - (0 mL/kg)   Weight: 99.8 kg       Scheduled medications  acetaminophen, 975 mg, oral, q8h  ciprofloxacin, 400 mg, intravenous, q12h  docusate sodium, 100 mg, oral, BID  enoxaparin, 40 mg, subcutaneous, " q24h  influenza, 0.5 mL, intramuscular, During hospitalization  folic acid, 1 mg, oral, Daily  ipratropium-albuteroL, 3 mL, nebulization, TID  lidocaine, 1 patch, transdermal, Daily  losartan, 100 mg, oral, Daily  metroNIDAZOLE, 500 mg, intravenous, q12h  oxygen, , inhalation, Continuous - Inhalation  pantoprazole, 40 mg, oral, BID AC  polyethylene glycol, 17 g, oral, Daily  primidone, 50 mg, oral, Nightly  simethicone, 80 mg, oral, 4x daily  thiamine, 100 mg, oral, Daily  traZODone, 50 mg, oral, Nightly    Continuous medications  potassium chloride in 0.9%NaCl, 40 mL/hr, Last Rate: 40 mL/hr (03/27/25 1245)    PRN medications  PRN medications: albuterol, albuterol, baclofen, benzocaine-menthol, benzonatate, HYDROmorphone, ketorolac, melatonin, ondansetron ODT **OR** ondansetron, oxyCODONE    Relevant Results  XR abdomen 1 view  Result Date: 3/24/2025  Gaseous distention of the transverse colon and flexures is similar to yesterday aside from cecal decompression.   MACRO: None   Signed by: Lang Neely 3/24/2025 4:30 PM Dictation workstation:   [id number]    CT abdomen pelvis w IV contrast  Result Date: 3/23/2025  Dilated gas-filled colon which may represent colonic ileus. No transition point to indicate obstructive etiology.   Remainder of the examination is not significantly changed from prior exam.   Signed by: Zoltan Baez 3/23/2025 1:42 PM Dictation workstation:   [id number]    XR abdomen 1 view  Result Date: 3/23/2025  Right and transverse colon gaseous distention is similar to yesterday's CT, but seems increased since 03/21/2025.   MACRO: None   Signed by: Lang Neely 3/23/2025 10:30 AM Dictation workstation:   [id number]    CT abdomen pelvis wo IV contrast  Result Date: 3/22/2025  1. Nonspecific distention of the right and transverse colon similar to 03/19/2025. 2. Tiny nonobstructing left renal calculus. 3. Small umbilical and right inguinal hernias contain only fat.   MACRO: None    Signed by: Lang Neely 3/22/2025 12:16 PM Dictation workstation:   [id number]     Latest Reference Range & Units 03/25/25 06:29 03/26/25 06:52 03/27/25 06:57   GLUCOSE 74 - 99 mg/dL 90 92 87   SODIUM 136 - 145 mmol/L 136 139 138   POTASSIUM 3.5 - 5.3 mmol/L 3.6 3.7 3.7   CHLORIDE 98 - 107 mmol/L 108 (H) 111 (H) 109 (H)   Bicarbonate 21 - 32 mmol/L 22 23 21   Anion Gap 10 - 20 mmol/L 10 9 (L) 12   Blood Urea Nitrogen 6 - 23 mg/dL 14 9 8   Creatinine 0.50 - 1.30 mg/dL 0.81 0.91 0.89   EGFR >60 mL/min/1.73m*2 >90 >90 >90   Calcium 8.6 - 10.3 mg/dL 7.8 (L) 7.7 (L) 7.8 (L)   MAGNESIUM 1.60 - 2.40 mg/dL 1.83 1.80 1.68   WBC 4.4 - 11.3 x10*3/uL 6.9 5.3 5.4   nRBC 0.0 - 0.0 /100 WBCs 0.0 0.0 0.0   RBC 4.50 - 5.90 x10*6/uL 4.61 4.29 (L) 4.29 (L)   HEMOGLOBIN 13.5 - 17.5 g/dL 13.3 (L) 12.2 (L) 12.4 (L)   HEMATOCRIT 41.0 - 52.0 % 42.3 37.0 (L) 37.3 (L)   MCV 80 - 100 fL 92 86 87   MCH 26.0 - 34.0 pg 28.9 28.4 28.9   MCHC 32.0 - 36.0 g/dL 31.4 (L) 33.0 33.2   RED CELL DISTRIBUTION WIDTH 11.5 - 14.5 % 12.4 12.6 13.0   Platelets 150 - 450 x10*3/uL 209 198 194     Assessment/Plan   Assessment & Plan  Influenza A    COPD exacerbation (Multi)    Tobacco use    Primary insomnia    Hiatal hernia    Benign essential HTN    Acute respiratory failure with hypoxia (Multi)    Constipation    Ileus (Multi)    Hypokalemia    Essential tremor    Influenza A  Acute hypoxic respiratory failure, resolved   COPD, in acute exacerbation; resolved   Tobacco use  - Presented to ED with 5 days of cough, congestion, shortness of breath, poor appetite, diarrhea, fevers, chills  - Flu A positive 3/15/25  - No leukocytosis   - D-dimer 1725  - BNP 96   - CTA chest 3/15: No acute pulmonary embolism, Unchanged bronchitis in both lower lobes and the middle lobe and the lingula   - CTA chest 3/19: No CT evidence of pulmonary embolism in the main pulmonary arteries out through each hilum. For technical reasons, the exam is nondiagnostic for pulmonary  embolism distal to each hilum. Mild stable mediastinal and bilateral hilar adenopathy, perhaps reactive. Elevation of the left diaphragm with stable scarring at the base of the left lung. There is also mild new atelectasis in the posterolateral inferior left upper lobe.   - Procal 0.05  - Pertussis PCR negative, MRSA nares negative, sputum culture contaminated    - azithromycin 500 mg daily completed, completed Levaquin for pneumonia coverage   - Solumedrol 40 mg IV transitioned to PO prednisone taper (completed)  - continue DuoNebs,  Tessalon perles   - continue Lidocaine 4%  patch for pleuritic chest pain  - completed Tamiflu 75 mg BID x 5 days   - continue supportive care  - RT eval and treat protocol, bronchial hygiene  - Isolation protocol for flu A, discontinued 3/25   - Patient declines nicotine patch at this time; encourage cessation on discharge   - supplemental oxygen to keep SpO2 > 90%, currently on room air, no oxygen at home  - PT/OT evals, ambulating independently     Constipation, improving  Ileus, improving   - Patient reported no BM since before admission on 3/15, has had poor intake while admitted   - Mag citrate x 1 dose ordered 3/19   - KUB 3/19: Large intestinal gaseous distension   - CT abd/pelvis 3/19: Ascending and transverse colon are distended with liquid stool   and gas, with the more distal descending colon appearing decompressed, likely representing some lakeshia. No evidence of inflammatory large bowel wall thickening or small bowel obstruction.   - KUB 3/21: Gaseous distention of the transverse colon appearing slightly more pronounced when compared with yesterday's study.   - CT Abd/Pelvis 3/22: Nonspecific distention of the right and transverse colon similar   to 03/19/2025.   - CT abd/pelvis 3/23: Dilated gas-filled colon which may represent colonic ileus. No   transition point to indicate obstructive etiology.   - KUB 3/24: Gaseous distention of the transverse colon and flexures is  similar to   yesterday aside from cecal decompression.   - GI consulted, recommend avoiding aggressive bowel regimen at this time   - Continue simethicone 80 mg QID   - Oxycodone 5 mg q6h PRN for severe pain, Dilaudid 0.5 mg IV q2h PRN for breakthrough pain  - Toradol 15 mg IV q6h PRN for moderate pain added today   - Heating pad ordered for abdominal pain   - Continue full liquid diet/nutritional supplements as tolerated   - Continue Ciprofloxacin/Flagyl per surgery recs - prevention of secondary bacterial infection  - OOB/ambulation as tolerated   - Serial abdominal exams  - Surgery following, no indication for gastrografin enema at this time, patient passing gas and having bowel movements      Hypokalemia, resolved   - K 3.3 > 3.9 > 3.7   - Mag 1.68  - Continue NS With KCl @ 20 ml/hr for today  - Daily BMP/mag level      Essential HTN  - continue losartan 100 mg daily  - monitor BP     Essential tremor  - continue primidone 50 mg at bedtime      Insomnia  - continue trazodone 50 mg at bedtime     Hiatal hernia  Chronic GERD  - continue pantoprazole 40 mg BID      DVT PPx: Lovenox   GI PPx: Protonix   Diet: Full liquid   Code Status: Full    Disposition: Patient requires inpatient management at this time.      Irena Wyman, APRN-CNP

## 2025-03-28 ENCOUNTER — SPECIALTY PHARMACY (OUTPATIENT)
Dept: PHARMACY | Facility: CLINIC | Age: 65
End: 2025-03-28

## 2025-03-28 LAB
ANION GAP SERPL CALC-SCNC: 10 MMOL/L (ref 10–20)
BUN SERPL-MCNC: 9 MG/DL (ref 6–23)
CALCIUM SERPL-MCNC: 8 MG/DL (ref 8.6–10.3)
CHLORIDE SERPL-SCNC: 111 MMOL/L (ref 98–107)
CO2 SERPL-SCNC: 22 MMOL/L (ref 21–32)
CREAT SERPL-MCNC: 0.95 MG/DL (ref 0.5–1.3)
EGFRCR SERPLBLD CKD-EPI 2021: 88 ML/MIN/1.73M*2
ERYTHROCYTE [DISTWIDTH] IN BLOOD BY AUTOMATED COUNT: 13 % (ref 11.5–14.5)
GLUCOSE SERPL-MCNC: 102 MG/DL (ref 74–99)
HCT VFR BLD AUTO: 38.7 % (ref 41–52)
HGB BLD-MCNC: 12.4 G/DL (ref 13.5–17.5)
MCH RBC QN AUTO: 28.8 PG (ref 26–34)
MCHC RBC AUTO-ENTMCNC: 32 G/DL (ref 32–36)
MCV RBC AUTO: 90 FL (ref 80–100)
NRBC BLD-RTO: 0 /100 WBCS (ref 0–0)
PLATELET # BLD AUTO: 186 X10*3/UL (ref 150–450)
POTASSIUM SERPL-SCNC: 4 MMOL/L (ref 3.5–5.3)
RBC # BLD AUTO: 4.31 X10*6/UL (ref 4.5–5.9)
SODIUM SERPL-SCNC: 139 MMOL/L (ref 136–145)
WBC # BLD AUTO: 5.8 X10*3/UL (ref 4.4–11.3)

## 2025-03-28 PROCEDURE — 2500000004 HC RX 250 GENERAL PHARMACY W/ HCPCS (ALT 636 FOR OP/ED): Mod: JZ,IPSPLIT | Performed by: STUDENT IN AN ORGANIZED HEALTH CARE EDUCATION/TRAINING PROGRAM

## 2025-03-28 PROCEDURE — 2500000001 HC RX 250 WO HCPCS SELF ADMINISTERED DRUGS (ALT 637 FOR MEDICARE OP): Mod: IPSPLIT | Performed by: NURSE PRACTITIONER

## 2025-03-28 PROCEDURE — 36415 COLL VENOUS BLD VENIPUNCTURE: CPT | Mod: IPSPLIT | Performed by: NURSE PRACTITIONER

## 2025-03-28 PROCEDURE — 2500000002 HC RX 250 W HCPCS SELF ADMINISTERED DRUGS (ALT 637 FOR MEDICARE OP, ALT 636 FOR OP/ED): Mod: IPSPLIT | Performed by: NURSE PRACTITIONER

## 2025-03-28 PROCEDURE — 2500000004 HC RX 250 GENERAL PHARMACY W/ HCPCS (ALT 636 FOR OP/ED): Mod: IPSPLIT

## 2025-03-28 PROCEDURE — 2500000001 HC RX 250 WO HCPCS SELF ADMINISTERED DRUGS (ALT 637 FOR MEDICARE OP): Mod: IPSPLIT

## 2025-03-28 PROCEDURE — 80048 BASIC METABOLIC PNL TOTAL CA: CPT | Mod: IPSPLIT | Performed by: NURSE PRACTITIONER

## 2025-03-28 PROCEDURE — 94760 N-INVAS EAR/PLS OXIMETRY 1: CPT | Mod: IPSPLIT

## 2025-03-28 PROCEDURE — 1100000001 HC PRIVATE ROOM DAILY: Mod: IPSPLIT

## 2025-03-28 PROCEDURE — 2500000002 HC RX 250 W HCPCS SELF ADMINISTERED DRUGS (ALT 637 FOR MEDICARE OP, ALT 636 FOR OP/ED): Mod: IPSPLIT

## 2025-03-28 PROCEDURE — 94640 AIRWAY INHALATION TREATMENT: CPT | Mod: IPSPLIT

## 2025-03-28 PROCEDURE — 97116 GAIT TRAINING THERAPY: CPT | Mod: GP,CQ,IPSPLIT

## 2025-03-28 PROCEDURE — 99232 SBSQ HOSP IP/OBS MODERATE 35: CPT | Performed by: SURGERY

## 2025-03-28 PROCEDURE — 2500000005 HC RX 250 GENERAL PHARMACY W/O HCPCS: Mod: IPSPLIT

## 2025-03-28 PROCEDURE — 94668 MNPJ CHEST WALL SBSQ: CPT | Mod: IPSPLIT

## 2025-03-28 PROCEDURE — 85027 COMPLETE CBC AUTOMATED: CPT | Mod: IPSPLIT | Performed by: NURSE PRACTITIONER

## 2025-03-28 PROCEDURE — 2500000005 HC RX 250 GENERAL PHARMACY W/O HCPCS: Mod: IPSPLIT | Performed by: INTERNAL MEDICINE

## 2025-03-28 PROCEDURE — 99233 SBSQ HOSP IP/OBS HIGH 50: CPT | Performed by: NURSE PRACTITIONER

## 2025-03-28 RX ORDER — ACETAMINOPHEN 325 MG/1
975 TABLET ORAL EVERY 8 HOURS
Status: DISCONTINUED | OUTPATIENT
Start: 2025-03-28 | End: 2025-03-29 | Stop reason: HOSPADM

## 2025-03-28 RX ADMIN — ACETAMINOPHEN 975 MG: 325 TABLET, FILM COATED ORAL at 20:06

## 2025-03-28 RX ADMIN — OXYCODONE 5 MG: 5 TABLET ORAL at 18:50

## 2025-03-28 RX ADMIN — ACETAMINOPHEN 975 MG: 325 TABLET, FILM COATED ORAL at 12:15

## 2025-03-28 RX ADMIN — HYDROMORPHONE HYDROCHLORIDE 0.5 MG: 1 INJECTION, SOLUTION INTRAMUSCULAR; INTRAVENOUS; SUBCUTANEOUS at 04:01

## 2025-03-28 RX ADMIN — ONDANSETRON 4 MG: 2 INJECTION INTRAMUSCULAR; INTRAVENOUS at 05:29

## 2025-03-28 RX ADMIN — THIAMINE HCL TAB 100 MG 100 MG: 100 TAB at 09:13

## 2025-03-28 RX ADMIN — OXYCODONE 5 MG: 5 TABLET ORAL at 09:11

## 2025-03-28 RX ADMIN — FOLIC ACID 1 MG: 1 TABLET ORAL at 09:13

## 2025-03-28 RX ADMIN — ONDANSETRON 4 MG: 2 INJECTION INTRAMUSCULAR; INTRAVENOUS at 12:15

## 2025-03-28 RX ADMIN — Medication 21 PERCENT: at 20:00

## 2025-03-28 RX ADMIN — ACETAMINOPHEN 975 MG: 325 TABLET, FILM COATED ORAL at 00:07

## 2025-03-28 RX ADMIN — TRAZODONE HYDROCHLORIDE 50 MG: 50 TABLET ORAL at 20:06

## 2025-03-28 RX ADMIN — SIMETHICONE 80 MG: 80 TABLET, CHEWABLE ORAL at 16:17

## 2025-03-28 RX ADMIN — PRIMIDONE 50 MG: 50 TABLET ORAL at 20:06

## 2025-03-28 RX ADMIN — DOCUSATE SODIUM 100 MG: 100 CAPSULE, LIQUID FILLED ORAL at 20:06

## 2025-03-28 RX ADMIN — SIMETHICONE 80 MG: 80 TABLET, CHEWABLE ORAL at 20:06

## 2025-03-28 RX ADMIN — IPRATROPIUM BROMIDE AND ALBUTEROL SULFATE 3 ML: .5; 3 SOLUTION RESPIRATORY (INHALATION) at 15:07

## 2025-03-28 RX ADMIN — PANTOPRAZOLE SODIUM 40 MG: 40 TABLET, DELAYED RELEASE ORAL at 16:17

## 2025-03-28 RX ADMIN — SIMETHICONE 80 MG: 80 TABLET, CHEWABLE ORAL at 05:25

## 2025-03-28 RX ADMIN — IPRATROPIUM BROMIDE AND ALBUTEROL SULFATE 3 ML: .5; 3 SOLUTION RESPIRATORY (INHALATION) at 20:27

## 2025-03-28 RX ADMIN — LOSARTAN POTASSIUM 100 MG: 50 TABLET, FILM COATED ORAL at 09:12

## 2025-03-28 RX ADMIN — DOCUSATE SODIUM 100 MG: 100 CAPSULE, LIQUID FILLED ORAL at 09:12

## 2025-03-28 RX ADMIN — ENOXAPARIN SODIUM 40 MG: 40 INJECTION SUBCUTANEOUS at 12:15

## 2025-03-28 RX ADMIN — PANTOPRAZOLE SODIUM 40 MG: 40 TABLET, DELAYED RELEASE ORAL at 05:25

## 2025-03-28 RX ADMIN — IPRATROPIUM BROMIDE AND ALBUTEROL SULFATE 3 ML: .5; 3 SOLUTION RESPIRATORY (INHALATION) at 09:57

## 2025-03-28 RX ADMIN — Medication 6 MG: at 20:06

## 2025-03-28 RX ADMIN — SIMETHICONE 80 MG: 80 TABLET, CHEWABLE ORAL at 12:15

## 2025-03-28 ASSESSMENT — PAIN SCALES - WONG BAKER
WONGBAKER_NUMERICALRESPONSE: HURTS EVEN MORE
WONGBAKER_NUMERICALRESPONSE: HURTS WHOLE LOT
WONGBAKER_NUMERICALRESPONSE: HURTS LITTLE BIT
WONGBAKER_NUMERICALRESPONSE: HURTS EVEN MORE

## 2025-03-28 ASSESSMENT — COGNITIVE AND FUNCTIONAL STATUS - GENERAL
WALKING IN HOSPITAL ROOM: A LITTLE
MOBILITY SCORE: 24
MOBILITY SCORE: 22
DAILY ACTIVITIY SCORE: 24
CLIMB 3 TO 5 STEPS WITH RAILING: A LITTLE

## 2025-03-28 ASSESSMENT — PAIN SCALES - GENERAL
PAINLEVEL_OUTOF10: 5 - MODERATE PAIN
PAINLEVEL_OUTOF10: 6
PAINLEVEL_OUTOF10: 7
PAINLEVEL_OUTOF10: 5 - MODERATE PAIN
PAINLEVEL_OUTOF10: 7

## 2025-03-28 ASSESSMENT — PAIN DESCRIPTION - LOCATION
LOCATION: ABDOMEN
LOCATION: HEAD
LOCATION: ABDOMEN

## 2025-03-28 ASSESSMENT — PAIN DESCRIPTION - ORIENTATION
ORIENTATION: RIGHT;LEFT;MID
ORIENTATION: RIGHT;LEFT

## 2025-03-28 ASSESSMENT — PAIN - FUNCTIONAL ASSESSMENT: PAIN_FUNCTIONAL_ASSESSMENT: 0-10

## 2025-03-28 NOTE — PROGRESS NOTES
"Julio Carranza is a 64 y.o. male on day 12 of admission presenting with Influenza A.  And colonic ileus    Subjective   Still passing flatus.  Feels better less pain.  Tolerated oral diet.       Objective     Physical Exam  Constitutional:       Appearance: Normal appearance.   Abdominal:      General: There is distension.      Palpations: Abdomen is soft. There is no mass.      Tenderness: There is no abdominal tenderness. There is no guarding.         Last Recorded Vitals  Blood pressure 126/80, pulse 61, temperature 36.3 °C (97.3 °F), temperature source Temporal, resp. rate 16, height 1.88 m (6' 2\"), weight 99.3 kg (218 lb 14.7 oz), SpO2 95%.  Intake/Output last 3 Shifts:  I/O last 3 completed shifts:  In: 820 (8.2 mL/kg) [P.O.:520; IV Piggyback:300]  Out: - (0 mL/kg)   Weight: 99.8 kg         Assessment/Plan   Assessment & Plan  Influenza A    COPD exacerbation (Multi)    Tobacco use    Primary insomnia    Hiatal hernia    Benign essential HTN    Acute respiratory failure with hypoxia    Constipation    Ileus (Multi)    Hypokalemia    Essential tremor    Plan-continue ambulation.  Hep-Lock IV fluid,  slow progress.  Follow-up with gastroenterology on discharge.  Discharge planning in 24 hours      Jonathan Jean Baptiste MD      "

## 2025-03-28 NOTE — PROGRESS NOTES
Physical Therapy                 Therapy Communication Note    Patient Name: Julio Carranza  MRN: [id number]  Department: [facility]  [facility]  Today's Date: 3/28/2025     Discipline: Physical Therapy    PT Missed Visit: Yes     Missed Visit Reason: Missed Visit Reason: Patient refused (Pt asked PT to return during earlier session, declined second PT session at this time due to having just ate.)    Missed Time: Attempt    The completion of this chart review and documentation was done under the supervision of Shira Alonzo PTA.        Shira Alonzo PTA

## 2025-03-28 NOTE — PROGRESS NOTES
03/28/25 1117   Discharge Planning   Living Arrangements Alone   Support Systems Friends/neighbors   Assistance Needed Independent with ADLs, utilizes a rollator or cane. Room air at baseline. Prior to admission pt placed himself on 2L supplemental oxygen using an oxygen concentrator that belonged to his girlfriend who passed away. Pt does not drive, uses [facility] or has a friend transport. Not active with [facility]. Pharmacy [facility] in [facility]. PCP Dr. Mendez Coronel.   Type of Residence Private residence   Number of Stairs to Enter Residence 5   Number of Stairs Within Residence 0   Home or Post Acute Services None   Expected Discharge Disposition Home  (patient denies need for any further assistance on discharge and is comfortable returning home no needs.)   Does the patient need discharge transport arranged? No   Intensity of Service   Intensity of Service 0-30 min

## 2025-03-28 NOTE — PROGRESS NOTES
"Julio Carranza is a 64 y.o. male on day 12 of admission presenting with Influenza A.    Subjective     He is slowly starting to feel better, still passing gas and tolerating soft foods.  We discussed continued improvement and likely discharge tomorrow.  All questions answered.     Objective     Physical Exam  Constitutional:       General: He is not in acute distress.     Appearance: He is not toxic-appearing.   HENT:      Head: Normocephalic and atraumatic.      Mouth/Throat:      Mouth: Mucous membranes are moist.   Eyes:      Conjunctiva/sclera: Conjunctivae normal.   Cardiovascular:      Rate and Rhythm: Normal rate and regular rhythm.   Pulmonary:      Effort: No respiratory distress.      Breath sounds: Normal breath sounds. No wheezing.      Comments: On room air, SpO2 93% at rest  Abdominal:      General: Bowel sounds are normal. There is distension.      Tenderness: There is abdominal tenderness. There is no guarding.      Hernia: A hernia (umbilical) is present.   Musculoskeletal:         General: No swelling.   Skin:     General: Skin is warm and dry.   Neurological:      Mental Status: He is alert and oriented to person, place, and time.   Psychiatric:         Mood and Affect: Mood normal.         Behavior: Behavior normal.       Last Recorded Vitals  Blood pressure 126/80, pulse 61, temperature 36.3 °C (97.3 °F), temperature source Temporal, resp. rate 16, height 1.88 m (6' 2\"), weight 99.3 kg (218 lb 14.7 oz), SpO2 95%.  Intake/Output last 3 Shifts:  I/O last 3 completed shifts:  In: 2440 (24.6 mL/kg) [P.O.:520; I.V.:1920 (19.3 mL/kg)]  Out: - (0 mL/kg)   Weight: 99.3 kg       Scheduled medications  acetaminophen, 975 mg, oral, q8h  docusate sodium, 100 mg, oral, BID  enoxaparin, 40 mg, subcutaneous, q24h  influenza, 0.5 mL, intramuscular, During hospitalization  folic acid, 1 mg, oral, Daily  ipratropium-albuteroL, 3 mL, nebulization, TID  lidocaine, 1 patch, transdermal, Daily  losartan, 100 mg, oral, " Daily  oxygen, , inhalation, Continuous - Inhalation  pantoprazole, 40 mg, oral, BID AC  polyethylene glycol, 17 g, oral, Daily  primidone, 50 mg, oral, Nightly  simethicone, 80 mg, oral, 4x daily  thiamine, 100 mg, oral, Daily  traZODone, 50 mg, oral, Nightly     PRN medications  PRN medications: albuterol, albuterol, baclofen, benzocaine-menthol, benzonatate, HYDROmorphone, ketorolac, melatonin, ondansetron ODT **OR** ondansetron, oxyCODONE    Relevant Results  XR abdomen 1 view  Result Date: 3/24/2025  Gaseous distention of the transverse colon and flexures is similar to yesterday aside from cecal decompression.   MACRO: None   Signed by: Lang Neely 3/24/2025 4:30 PM Dictation workstation:   [id number]    CT abdomen pelvis w IV contrast  Result Date: 3/23/2025  Dilated gas-filled colon which may represent colonic ileus. No transition point to indicate obstructive etiology.   Remainder of the examination is not significantly changed from prior exam.   Signed by: Zoltan Baez 3/23/2025 1:42 PM Dictation workstation:   [id number]    XR abdomen 1 view  Result Date: 3/23/2025  Right and transverse colon gaseous distention is similar to yesterday's CT, but seems increased since 03/21/2025.   MACRO: None   Signed by: Lang Neely 3/23/2025 10:30 AM Dictation workstation:   [id number]    CT abdomen pelvis wo IV contrast  Result Date: 3/22/2025  1. Nonspecific distention of the right and transverse colon similar to 03/19/2025. 2. Tiny nonobstructing left renal calculus. 3. Small umbilical and right inguinal hernias contain only fat.   MACRO: None   Signed by: Lang Neely 3/22/2025 12:16 PM Dictation workstation:   [id number]     Latest Reference Range & Units 03/25/25 06:29 03/26/25 06:52 03/27/25 06:57 03/28/25 06:58   GLUCOSE 74 - 99 mg/dL 90 92 87 102 (H)   SODIUM 136 - 145 mmol/L 136 139 138 139   POTASSIUM 3.5 - 5.3 mmol/L 3.6 3.7 3.7 4.0   CHLORIDE 98 - 107 mmol/L 108 (H) 111 (H) 109  (H) 111 (H)   Bicarbonate 21 - 32 mmol/L 22 23 21 22   Anion Gap 10 - 20 mmol/L 10 9 (L) 12 10   Blood Urea Nitrogen 6 - 23 mg/dL 14 9 8 9   Creatinine 0.50 - 1.30 mg/dL 0.81 0.91 0.89 0.95   EGFR >60 mL/min/1.73m*2 >90 >90 >90 88   Calcium 8.6 - 10.3 mg/dL 7.8 (L) 7.7 (L) 7.8 (L) 8.0 (L)   MAGNESIUM 1.60 - 2.40 mg/dL 1.83 1.80 1.68    WBC 4.4 - 11.3 x10*3/uL 6.9 5.3 5.4 5.8   nRBC 0.0 - 0.0 /100 WBCs 0.0 0.0 0.0 0.0   RBC 4.50 - 5.90 x10*6/uL 4.61 4.29 (L) 4.29 (L) 4.31 (L)   HEMOGLOBIN 13.5 - 17.5 g/dL 13.3 (L) 12.2 (L) 12.4 (L) 12.4 (L)   HEMATOCRIT 41.0 - 52.0 % 42.3 37.0 (L) 37.3 (L) 38.7 (L)   MCV 80 - 100 fL 92 86 87 90   MCH 26.0 - 34.0 pg 28.9 28.4 28.9 28.8   MCHC 32.0 - 36.0 g/dL 31.4 (L) 33.0 33.2 32.0   RED CELL DISTRIBUTION WIDTH 11.5 - 14.5 % 12.4 12.6 13.0 13.0   Platelets 150 - 450 x10*3/uL 209 198 194 186     Assessment/Plan   Assessment & Plan  Influenza A    COPD exacerbation (Multi)    Tobacco use    Primary insomnia    Hiatal hernia    Benign essential HTN    Acute respiratory failure with hypoxia    Constipation    Ileus (Multi)    Hypokalemia    Essential tremor    Influenza A  Acute hypoxic respiratory failure, resolved   COPD, in acute exacerbation; resolved   Tobacco use  - Presented to ED with 5 days of cough, congestion, shortness of breath, poor appetite, diarrhea, fevers, chills  - Flu A positive 3/15/25  - No leukocytosis   - D-dimer 1725  - BNP 96   - CTA chest 3/15: No acute pulmonary embolism, Unchanged bronchitis in both lower lobes and the middle lobe and the lingula   - CTA chest 3/19: No CT evidence of pulmonary embolism in the main pulmonary arteries out through each hilum. For technical reasons, the exam is nondiagnostic for pulmonary embolism distal to each hilum. Mild stable mediastinal and bilateral hilar adenopathy, perhaps reactive. Elevation of the left diaphragm with stable scarring at the base of the left lung. There is also mild new atelectasis in the posterolateral  inferior left upper lobe.   - Procal 0.05  - Pertussis PCR negative, MRSA nares negative, sputum culture contaminated    - azithromycin 500 mg daily completed, completed Levaquin for pneumonia coverage   - Solumedrol 40 mg IV transitioned to PO prednisone taper (completed)  - continue DuoNebs,  Tessalon perles   - continue Lidocaine 4%  patch for pleuritic chest pain  - completed Tamiflu 75 mg BID x 5 days   - continue supportive care  - RT eval and treat protocol, bronchial hygiene  - Isolation protocol for flu A, discontinued 3/25   - Patient declines nicotine patch at this time; encourage cessation on discharge   - supplemental oxygen to keep SpO2 > 90%, currently on room air, no oxygen at home  - PT/OT evals, ambulating independently     Constipation, improving  Ileus, improving   - Patient reported no BM since before admission on 3/15, has had poor intake while admitted   - Mag citrate x 1 dose ordered 3/19   - KUB 3/19: Large intestinal gaseous distension   - CT abd/pelvis 3/19: Ascending and transverse colon are distended with liquid stool   and gas, with the more distal descending colon appearing decompressed, likely representing some lakeshia. No evidence of inflammatory large bowel wall thickening or small bowel obstruction.   - KUB 3/21: Gaseous distention of the transverse colon appearing slightly more pronounced when compared with yesterday's study.   - CT Abd/Pelvis 3/22: Nonspecific distention of the right and transverse colon similar   to 03/19/2025.   - CT abd/pelvis 3/23: Dilated gas-filled colon which may represent colonic ileus. No   transition point to indicate obstructive etiology.   - KUB 3/24: Gaseous distention of the transverse colon and flexures is similar to   yesterday aside from cecal decompression.   - GI consulted, recommend avoiding aggressive bowel regimen at this time   - Continue simethicone 80 mg QID   - Oxycodone 5 mg q6h PRN for severe pain, Dilaudid 0.5 mg IV q2h PRN for  breakthrough pain  - Toradol 15 mg IV q6h PRN for moderate pain added today   - Heating pad ordered for abdominal pain   - Continue full liquid diet/nutritional supplements as tolerated, advance as tolerated to soft foods  - Continue Ciprofloxacin/Flagyl per surgery recs, discontinued  - OOB/ambulation as tolerated   - Serial abdominal exams  - Surgery following, no indication for gastrografin enema at this time, patient passing gas and having bowel movements      Hypokalemia, resolved   - K 3.3 > 3.9 > 3.7 > 4.0  - Mag 1.68  - Continue NS With KCl @ 20 ml/hr for today  - Daily BMP/mag level      Essential HTN  - continue losartan 100 mg daily  - monitor BP     Essential tremor  - continue primidone 50 mg at bedtime      Insomnia  - continue trazodone 50 mg at bedtime     Hiatal hernia  Chronic GERD  - continue pantoprazole 40 mg BID      DVT PPx: Lovenox   GI PPx: Protonix   Diet: Soft  Code Status: Full    Disposition: Patient requires inpatient management at this time.      Irena Wyman, APRN-CNP

## 2025-03-28 NOTE — CARE PLAN
The patient's goals for the shift include  rest    The clinical goals for the shift include to have decreased abdominal pain and nausea    Over the shift, the patient slept and pain was controlled

## 2025-03-28 NOTE — CARE PLAN
The patient's goals for the shift include  to walk around more     The clinical goals for the shift include pt will have decreased pain and nausea throughout the shift      Problem: Pain  Goal: Takes deep breaths with improved pain control throughout the shift  Outcome: Progressing  Goal: Turns in bed with improved pain control throughout the shift  Outcome: Progressing  Goal: Walks with improved pain control throughout the shift  Outcome: Progressing  Goal: Performs ADL's with improved pain control throughout shift  Outcome: Progressing  Goal: Participates in PT with improved pain control throughout the shift  Outcome: Progressing     Problem: Chronic Conditions and Co-morbidities  Goal: Patient's chronic conditions and co-morbidity symptoms are monitored and maintained or improved  Outcome: Progressing     Problem: Nutrition  Goal: Nutrient intake appropriate for maintaining nutritional needs  Outcome: Progressing     Problem: Fall/Injury  Goal: Not fall by end of shift  Outcome: Progressing  Goal: Be free from injury by end of the shift  Outcome: Progressing  Goal: Verbalize understanding of personal risk factors for fall in the hospital  Outcome: Progressing  Goal: Verbalize understanding of risk factor reduction measures to prevent injury from fall in the home  Outcome: Progressing  Goal: Use assistive devices by end of the shift  Outcome: Progressing  Goal: Pace activities to prevent fatigue by end of the shift  Outcome: Progressing     Problem: Infection related to problem list condition  Goal: Infection will resolve through treatment  Outcome: Progressing     Problem: Respiratory  Goal: Clear secretions with interventions this shift  Outcome: Progressing  Goal: Verbalize decreased shortness of breath this shift  Outcome: Progressing  Goal: Wean oxygen to maintain O2 saturation per order/standard this shift  Outcome: Progressing  Goal: Increase self care and/or family involvement in next 24 hours  Outcome:  Progressing   Pt had stable vitals throughout the shift. Pt still has abdominal pain. Pt is going to be discharging tomorrow and was encouraged this morning to try and take only the oral medications at this time. Pt was okay with that. Spoke with NP about it and she had discontinued the IV pain medications at this time. Pt  has been ambulating throughout the hallways and passing gas. Call light is within reach and pt has no needs at this time.

## 2025-03-28 NOTE — PROGRESS NOTES
Physical Therapy    Physical Therapy Treatment    Patient Name: Julio Carranza  MRN: [id number]  Department: [facility]  Room: 230[facility]  Today's Date: 3/28/2025  Time Calculation  Start Time: 0723  Stop Time: 0737  Time Calculation (min): 14 min    The completion of this treatment, chart review, and documentation was done under the supervision of Shira Alonzo PTA.     This visit was supervised and reviewed by JACI for clinical competency and accuracy.  The supervising therapist was present for the entire session and made all clinical decisions and is in agreement.      Assessment/Plan   PT Assessment  PT Assessment Results: Decreased endurance, Decreased mobility, Pain, Decreased strength  Rehab Prognosis: Good  Barriers to Discharge Home: No anticipated barriers  Evaluation/Treatment Tolerance: Patient limited by pain  Medical Staff Made Aware: Yes  Strengths: Ability to acquire knowledge, Housing layout, Support of Caregivers  Barriers to Participation: Comorbidities  End of Session Communication: Bedside nurse  Assessment Comment: Pt continues to report pain in the abdomen limiting him from participating in therapy for longer periods of time.  Pt continues to demonstrate improved functional transfers with good UE sequencing as well as improved gait and ability to perform stair training at this date. Pt demonstrated the ability to perform stair training with reciprocol gait pattern up and down this date but required 25% vc for erect posture, pt reports difficulty maintaining erect posture due to pain in the abdomen. 10% vc for wider ELIZABETH during turning to reduce risk of falls. Pt continues to require skilled PT to improve endurance, gait, and safety up on feet to reduce risk of falls and prevent further decline while here at the hospital. Pt up in bed, call button within reach and all other needs addressed.  End of Session Patient Position: Bed, 2 rail up, Alarm off, not on at start of session  PT  Plan  Inpatient/Swing Bed or Outpatient: Inpatient  PT Plan  Treatment/Interventions: Bed mobility, Transfer training, Gait training, Balance training, Stair training  PT Plan: Ongoing PT  PT Frequency: 3 times per week  PT Discharge Recommendations: Low intensity level of continued care  PT Recommended Transfer Status: Stand by assist  PT - OK to Discharge:  (Once medically cleared.)      General Visit Information:   PT  Visit  PT Received On: 03/28/25  Response to Previous Treatment: Patient with no complaints from previous session.  General  Reason for Referral: Impaired functional mobility 2/2 hospitalization for influenza A.  Referred By: Jackie JIMENEZ  Past Medical History Relevant to Rehab: CAD, BPH, TIA, PE, OA, Chronic B knee pain, prior L [facility] 4/24  PT Missed Visit: Yes  Missed Visit Reason: Patient refused (Pt asked PT to return during earlier session, declined second PT session at this time due to having just ate.)  Prior to Session Communication: Bedside nurse  Patient Position Received: Bed, 2 rail up, Alarm off, not on at start of session  Preferred Learning Style: verbal  General Comment: Pt pleasant and agreeable to therapy this morning. Cont to report 7/10 pain in abdomen but overall feeling better.  Pt cleared by nursing prior to session.    Subjective I am feeling better but stomach still hurts when I walk or move a lot.  Precautions:  Precautions  Medical Precautions: Fall precautions  Precautions Comment: doug telemetry     Date/Time Vitals Session Patient Position Pulse Resp SpO2 BP MAP (mmHg)    03/28/25 0956 --  --  --  --  95 %  --  --                 Objective   Pain:  Pain Assessment  Pain Assessment: 0-10  0-10 (Numeric) Pain Score: 7  Pain Type: Acute pain  Pain Location: Abdomen  Cognition:  Cognition  Overall Cognitive Status: Within Functional Limits  Coordination:  Movements are Fluid and Coordinated: Yes    Activity Tolerance:  Activity Tolerance  Endurance: Tolerates 10 - 20 min  exercise with multiple rests  Treatments:  Balance/Neuromuscular Re-Education  Balance/Neuromuscular Re-Education Activity Performed: Yes  Balance/Neuromuscular Re-Education Activity 1: NBOS EO, tandem EO, NBOS EC, demonstrated good balance for all trials.    Bed Mobility  Bed Mobility: Yes  Bed Mobility 1  Bed Mobility 1: Supine to sitting  Level of Assistance 1: Modified independent    Ambulation/Gait Training  Ambulation/Gait Training Performed: Yes  Ambulation/Gait Training 1  Surface 1: Level tile  Device 1: No device  Gait Support Devices: Gait belt  Assistance 1: Close supervision  Quality of Gait 1: Inconsistent stride length, Decreased step length  Comments/Distance (ft) 1: 200-250ft, required cues for erect posture, pt reports difficulty due to pain in abdomen.  Transfers  Transfer: Yes  Transfer 1  Transfer From 1: Bed to  Transfer to 1: Stand  Technique 1: Sit to stand, Stand to sit  Transfer Device 1: Gait belt  Transfer Level of Assistance 1: Modified independent  Trials/Comments 1: 3 trials    Stairs  Stairs: Yes  Stairs  Rails 1: Bilateral  Curb Step 1: No  Device 1: No device  Support Devices 1: Gait belt  Assistance 1: Close supervision  Comment/Number of Steps 1: demonstrated reciprocal gait pattern.    Outcome Measures:  [facility] Basic Mobility  Turning from your back to your side while in a flat bed without using bedrails: None  Moving from lying on your back to sitting on the side of a flat bed without using bedrails: None  Moving to and from bed to chair (including a wheelchair): None  Standing up from a chair using your arms (e.g. wheelchair or bedside chair): None  To walk in hospital room: A little  Climbing 3-5 steps with railing: A little  Basic Mobility - Total Score: 22    Education Documentation  Mobility Training, taught by JORGE Ruelas at 3/28/2025  9:08 AM.  Learner: Patient  Readiness: Acceptance  Method: Explanation  Response: Verbalizes Understanding  Comment: Pt educated  on wider ELIZABETH during turning to reduce risk of falls.    Education Comments  No comments found.           Encounter Problems       Encounter Problems (Active)       Mobility       STG - Patient will ambulate 120' x 2 with LRAD mod I with improved gait mechanics and O2 remaining above 90% (Progressing)       Start:  03/17/25    Expected End:  03/24/25            Pt with transfer sit to stand to sit and from bed to chair to bed with LRAD mod I  (Progressing)       Start:  03/17/25    Expected End:  03/24/25            Pt will negotiate 5 stairs with B handrails Mod I  to simulate home entry and exit set up  (Progressing)       Start:  03/17/25    Expected End:  03/24/25            Pt will improve B LE to WFL for improved overall functional mobility  (Progressing)       Start:  03/17/25    Expected End:  03/24/25               Pain - Adult            RORY BRISCOE, S-JACI Alonzo PTA

## 2025-03-28 NOTE — PROGRESS NOTES
"Nutrition Follow Up Assessment:   Nutrition Assessment         Patient is a 64 y.o. male presenting with Influenza A. Ileus     PMH: Acute respiratory failure with hypoxia, COPD exacerbation, constipation, benign essential hypertension (HTN), hiatal hernia, tobacco use, and primary insomnia.    Nutrition History:  Energy Intake: Fair 50-75 %  Food and Nutrient History: Visited patient in his room. He reported that he is scheduled for discharge tomorrow. For lunch, he ordered a hamburger and macaroni and cheese and estimated he ate approximately 66% of the meal. He continues to experience some abdominal pain but states he is overall feeling better. He has been tolerating Ensure well and plans to continue using it at home.  At home, the patient typically consumes one large meal per day and snacks throughout the rest of the day, such as oranges or similar items. He acknowledged that he needs to make changes to his diet to avoid future hospitalizations.  Reviewed IBD-specific nutrition education, including the gradual reintroduction of fiber, the importance of limiting high-fat foods, and monitoring for lactose intolerance--particularly as the patient previously noted issues with whole milk. Suggested he discuss the potential use of a probiotic with his provider at follow-up.  Patient was engaged in the conversation, asked thoughtful questions, and expressed motivation to improve his dietary habits. Provided contact information for additional questions or support.       Anthropometrics:  Height: 188 cm (6' 2\")   Weight: 99.3 kg (218 lb 14.7 oz)   BMI (Calculated): 28.1  IBW/kg (Dietitian Calculated): 86 kg  Percent of IBW: 120 %       Weight History:   Wt Readings from Last 10 Encounters:   03/28/25 99.3 kg (218 lb 14.7 oz)   01/15/25 104 kg (230 lb)   10/21/24 103 kg (227 lb 6.4 oz)   09/30/24 102 kg (224 lb)   09/24/24 101 kg (223 lb 3.2 oz)   06/13/24 103 kg (228 lb)   05/13/24 99.2 kg (218 lb 12.8 oz)   04/01/24 103 " kg (227 lb 1.2 oz)   03/13/24 106 kg (234 lb 9.1 oz)   02/12/24 104 kg (229 lb)       Weight Change %:  Weight History / % Weight Change: 03/28/25 (99.3 kg) to 03/21/25 (104 kg), 4.5% loss in ~1 week.  Significant Weight Loss: Yes    Nutrition Focused Physical Exam Findings:  Defer - pt sleeping    Nutrition Significant Labs:  BMP Trend:   Results from last 7 days   Lab Units 03/28/25  0658 03/27/25  0657 03/26/25  0652 03/25/25  0629   GLUCOSE mg/dL 102* 87 92 90   CALCIUM mg/dL 8.0* 7.8* 7.7* 7.8*   SODIUM mmol/L 139 138 139 136   POTASSIUM mmol/L 4.0 3.7 3.7 3.6   CO2 mmol/L 22 21 23 22   CHLORIDE mmol/L 111* 109* 111* 108*   BUN mg/dL 9 8 9 14   CREATININE mg/dL 0.95 0.89 0.91 0.81     Nutrition Specific Medications:  Scheduled medications  acetaminophen, 975 mg, oral, q8h  docusate sodium, 100 mg, oral, BID  enoxaparin, 40 mg, subcutaneous, q24h  folic acid, 1 mg, oral, Daily  losartan, 100 mg, oral, Daily  pantoprazole, 40 mg, oral, BID AC  polyethylene glycol, 17 g, oral, Daily  primidone, 50 mg, oral, Nightly  simethicone, 80 mg, oral, 4x daily  thiamine, 100 mg, oral, Daily  traZODone, 50 mg, oral, Nightly    I/O:   Last BM Date: 03/25/25; Stool Appearance: Loose (03/26/25 0850)    Dietary Orders (From admission, onward)       Start     Ordered    03/27/25 0627  Adult diet Regular; Easy to chew  Diet effective now        Question Answer Comment   Diet type Regular    Texture Easy to chew        03/27/25 0627    03/17/25 1543  Oral nutritional supplements  Until discontinued        Question Answer Comment   Deliver with Breakfast    Deliver with Dinner    Deliver with Lunch    Select supplement: Ensure Plus High Protein        03/17/25 1542    03/15/25 1511  May Participate in Room Service With Assistance  ( ROOM SERVICE MAY PARTICIPATE WITH ASSISTANCE)  Once        Question:  .  Answer:  Yes    03/15/25 1510                Estimated Needs:   Total Energy Estimated Needs in 24 hours (kCal):  (3053-1712  kcal)  Method for Estimating Needs: 25-30 kcal/kg of IBW  Total Protein Estimated Needs in 24 Hours (g):  (103-112g)  Method for Estimating 24 Hour Protein Needs: 1.2-1.3 g/kg of IBW  Total Fluid Estimated Needs in 24 Hours (mL):  (4716-8334 mL)  Method for Estimating 24 Hour Fluid Needs: 1 mL/kcal or per medical team.  Patient on Order Fluid Restriction: No        Nutrition Diagnosis   Malnutrition Diagnosis  Patient has Malnutrition Diagnosis: Yes  Diagnosis Status: New  Malnutrition Diagnosis: Moderate malnutrition related to acute disease or injury  Related to: Ileus  As Evidenced by: 4.5% unintentional weight loss in 1 week and ,50% intake below estimated needs over past 2 weeks.    Nutrition Diagnosis  Patient has Nutrition Diagnosis: Yes  Diagnosis Status (1): Active  Nutrition Diagnosis 1: Inadequate protein energy intake  Related to (1): abdominal discomfort, early satiety, nausea, and possible functional limitation from tremors  As Evidenced by (1): eported intake of <50% of meals, refusal of breakfast, patient report of only tolerating small amounts of food, and nursing documentation of 0-33% meal intake and abdominal distention/tenderness. - imporving       Nutrition Interventions/Recommendations   Nutrition prescription for oral nutrition    Nutrition Recommendations:  Individualized Nutrition Prescription Provided for : Regular diet, easy to chew, and increase as tolerated at home, Ensure Plus High Protein once to twice a day)    Nutrition Interventions/Goals:   Interventions: Medical food supplement, Meals and snacks  Meals and Snacks: General healthful diet  Goal: encourage protein rich foods. Encourage softer, lower fat, and sodium foods that are tolerated  Medical Food Supplement: Commercial beverage medical food supplement therapy  Goal: Ensure Plus High Protein BID (350kcal and 20g protein per 8 fluid oz)  Coordination of Care with Providers:  (IDT rounds)      Education Documentation  Nutrition  Care Manual, taught by Ashley Burns RDN, LD at 3/28/2025  3:26 PM.  Learner: Patient  Readiness: Acceptance  Method: Explanation, Handout  Response: Verbalizes Understanding, Needs Reinforcement    Nutrition Related Education, taught by Ashley Burns RDN, LD at 3/19/2025 11:13 AM.  Learner: Patient  Readiness: Acceptance  Method: Explanation  Response: Needs Reinforcement, Verbalizes Understanding          Nutrition Monitoring and Evaluation   Food/Nutrient Related History Monitoring  Monitoring and Evaluation Plan: Intake / amount of food  Intake / Amount of food: Consumes at least 75% or more of meals/snacks/supplements, Consumes at least 50% or more of meals/snacks/supplements    Anthropometric Measurements  Monitoring and Evaluation Plan: Body weight  Body Weight: Body weight - Maintain stable weight       Physical Exam Findings  Monitoring and Evaluation Plan: Digestive System  Digestive System Finding: Abdominal bloating, Abdominal distension, Abdominal pain, Constipation, Nausea  Criteria: Improving  Other: Evaluate nutrition intervention as compared to nutrition goal(s) and estimated nutrient need criteria.    Goal Status: Some progress toward goal(s)    Time Spent (min): 30 minutes

## 2025-03-29 VITALS
OXYGEN SATURATION: 94 % | DIASTOLIC BLOOD PRESSURE: 83 MMHG | BODY MASS INDEX: 28.24 KG/M2 | HEART RATE: 60 BPM | TEMPERATURE: 97.5 F | RESPIRATION RATE: 17 BRPM | SYSTOLIC BLOOD PRESSURE: 130 MMHG | HEIGHT: 74 IN | WEIGHT: 220.02 LBS

## 2025-03-29 LAB
ANION GAP SERPL CALC-SCNC: 12 MMOL/L (ref 10–20)
BUN SERPL-MCNC: 11 MG/DL (ref 6–23)
CALCIUM SERPL-MCNC: 8.3 MG/DL (ref 8.6–10.3)
CHLORIDE SERPL-SCNC: 108 MMOL/L (ref 98–107)
CO2 SERPL-SCNC: 23 MMOL/L (ref 21–32)
CREAT SERPL-MCNC: 0.92 MG/DL (ref 0.5–1.3)
EGFRCR SERPLBLD CKD-EPI 2021: >90 ML/MIN/1.73M*2
ERYTHROCYTE [DISTWIDTH] IN BLOOD BY AUTOMATED COUNT: 13.2 % (ref 11.5–14.5)
GLUCOSE SERPL-MCNC: 84 MG/DL (ref 74–99)
HCT VFR BLD AUTO: 41 % (ref 41–52)
HGB BLD-MCNC: 13.1 G/DL (ref 13.5–17.5)
MCH RBC QN AUTO: 28.9 PG (ref 26–34)
MCHC RBC AUTO-ENTMCNC: 32 G/DL (ref 32–36)
MCV RBC AUTO: 90 FL (ref 80–100)
NRBC BLD-RTO: 0 /100 WBCS (ref 0–0)
PLATELET # BLD AUTO: 206 X10*3/UL (ref 150–450)
POTASSIUM SERPL-SCNC: 4.1 MMOL/L (ref 3.5–5.3)
RBC # BLD AUTO: 4.54 X10*6/UL (ref 4.5–5.9)
SODIUM SERPL-SCNC: 139 MMOL/L (ref 136–145)
WBC # BLD AUTO: 6.6 X10*3/UL (ref 4.4–11.3)

## 2025-03-29 PROCEDURE — 36415 COLL VENOUS BLD VENIPUNCTURE: CPT | Mod: IPSPLIT | Performed by: NURSE PRACTITIONER

## 2025-03-29 PROCEDURE — 94640 AIRWAY INHALATION TREATMENT: CPT | Mod: IPSPLIT

## 2025-03-29 PROCEDURE — 2500000004 HC RX 250 GENERAL PHARMACY W/ HCPCS (ALT 636 FOR OP/ED): Mod: IPSPLIT

## 2025-03-29 PROCEDURE — 94760 N-INVAS EAR/PLS OXIMETRY 1: CPT | Mod: IPSPLIT

## 2025-03-29 PROCEDURE — 2500000001 HC RX 250 WO HCPCS SELF ADMINISTERED DRUGS (ALT 637 FOR MEDICARE OP): Mod: IPSPLIT

## 2025-03-29 PROCEDURE — 2500000001 HC RX 250 WO HCPCS SELF ADMINISTERED DRUGS (ALT 637 FOR MEDICARE OP): Mod: IPSPLIT | Performed by: NURSE PRACTITIONER

## 2025-03-29 PROCEDURE — 99239 HOSP IP/OBS DSCHRG MGMT >30: CPT

## 2025-03-29 PROCEDURE — 85027 COMPLETE CBC AUTOMATED: CPT | Mod: IPSPLIT | Performed by: NURSE PRACTITIONER

## 2025-03-29 PROCEDURE — 2500000002 HC RX 250 W HCPCS SELF ADMINISTERED DRUGS (ALT 637 FOR MEDICARE OP, ALT 636 FOR OP/ED): Mod: IPSPLIT

## 2025-03-29 PROCEDURE — 99232 SBSQ HOSP IP/OBS MODERATE 35: CPT | Performed by: SURGERY

## 2025-03-29 PROCEDURE — 82435 ASSAY OF BLOOD CHLORIDE: CPT | Mod: IPSPLIT | Performed by: NURSE PRACTITIONER

## 2025-03-29 RX ORDER — OXYCODONE HYDROCHLORIDE 5 MG/1
5 TABLET ORAL EVERY 6 HOURS PRN
Qty: 12 TABLET | Refills: 0 | Status: SHIPPED | OUTPATIENT
Start: 2025-03-29 | End: 2025-04-01

## 2025-03-29 RX ORDER — TRAZODONE HYDROCHLORIDE 50 MG/1
50 TABLET ORAL NIGHTLY
Qty: 30 TABLET | Refills: 0 | Status: SHIPPED | OUTPATIENT
Start: 2025-03-29

## 2025-03-29 RX ORDER — DOCUSATE SODIUM 100 MG/1
100 CAPSULE, LIQUID FILLED ORAL 2 TIMES DAILY
Qty: 60 CAPSULE | Refills: 0 | Status: SHIPPED | OUTPATIENT
Start: 2025-03-29

## 2025-03-29 RX ORDER — PANTOPRAZOLE SODIUM 40 MG/1
40 TABLET, DELAYED RELEASE ORAL
Qty: 60 TABLET | Refills: 0 | Status: SHIPPED | OUTPATIENT
Start: 2025-03-29

## 2025-03-29 RX ORDER — SIMETHICONE 80 MG
80 TABLET,CHEWABLE ORAL 4 TIMES DAILY
Qty: 120 TABLET | Refills: 0 | Status: SHIPPED | OUTPATIENT
Start: 2025-03-29 | End: 2025-04-28

## 2025-03-29 RX ADMIN — IPRATROPIUM BROMIDE AND ALBUTEROL SULFATE 3 ML: .5; 3 SOLUTION RESPIRATORY (INHALATION) at 10:19

## 2025-03-29 RX ADMIN — LOSARTAN POTASSIUM 100 MG: 50 TABLET, FILM COATED ORAL at 08:27

## 2025-03-29 RX ADMIN — DOCUSATE SODIUM 100 MG: 100 CAPSULE, LIQUID FILLED ORAL at 08:28

## 2025-03-29 RX ADMIN — ONDANSETRON 4 MG: 4 TABLET, ORALLY DISINTEGRATING ORAL at 06:02

## 2025-03-29 RX ADMIN — THIAMINE HCL TAB 100 MG 100 MG: 100 TAB at 08:27

## 2025-03-29 RX ADMIN — ACETAMINOPHEN 975 MG: 325 TABLET, FILM COATED ORAL at 03:44

## 2025-03-29 RX ADMIN — PANTOPRAZOLE SODIUM 40 MG: 40 TABLET, DELAYED RELEASE ORAL at 06:01

## 2025-03-29 RX ADMIN — SIMETHICONE 80 MG: 80 TABLET, CHEWABLE ORAL at 06:01

## 2025-03-29 RX ADMIN — FOLIC ACID 1 MG: 1 TABLET ORAL at 08:28

## 2025-03-29 RX ADMIN — OXYCODONE 5 MG: 5 TABLET ORAL at 03:44

## 2025-03-29 ASSESSMENT — PAIN DESCRIPTION - LOCATION: LOCATION: ABDOMEN

## 2025-03-29 ASSESSMENT — PAIN SCALES - GENERAL
PAINLEVEL_OUTOF10: 7
PAINLEVEL_OUTOF10: 6
PAINLEVEL_OUTOF10: 0 - NO PAIN

## 2025-03-29 ASSESSMENT — PAIN - FUNCTIONAL ASSESSMENT
PAIN_FUNCTIONAL_ASSESSMENT: 0-10

## 2025-03-29 NOTE — PROGRESS NOTES
"Julio Carranza is a 64 y.o. male on day 13 of admission presenting with Influenza A.  And persistent colonic ileus.    Subjective   Ileus improving.  Passing flatus.  Less distended.  Less abdominal pain       Objective     Physical Exam  Constitutional:       Appearance: Normal appearance.   Abdominal:      Palpations: Abdomen is soft. There is no mass.      Tenderness: There is no abdominal tenderness. There is no guarding.      Hernia: A hernia is present. Hernia is present in the umbilical area.         Last Recorded Vitals  Blood pressure 130/83, pulse 60, temperature 36.4 °C (97.5 °F), temperature source Temporal, resp. rate 17, height 1.88 m (6' 2\"), weight 99.8 kg (220 lb 0.3 oz), SpO2 94%.  Intake/Output last 3 Shifts:  I/O last 3 completed shifts:  In: 2700 (27.1 mL/kg) [P.O.:780; I.V.:1920 (19.2 mL/kg)]  Out: - (0 mL/kg)   Weight: 99.8 kg             Malnutrition Diagnosis Status: New  Malnutrition Diagnosis: Moderate malnutrition related to acute disease or injury  Related to: Ileus  As Evidenced by: 4.5% unintentional weight loss in 1 week and ,50% intake below estimated needs over past 2 weeks.  I agree with the dietitian's malnutrition diagnosis.      Assessment/Plan   Assessment & Plan  Influenza A    COPD exacerbation (Multi)    Tobacco use    Primary insomnia    Hiatal hernia    Benign essential HTN    Acute respiratory failure with hypoxia    Constipation    Ileus (Multi)    Hypokalemia    Essential tremor    Plan-continue protein shakes.  Stable for discharge.  Follow-up in 8 weeks for umbilical hernia.  Follow-up with gastroenterology.    Jonathan Jean Baptiste MD      "

## 2025-03-29 NOTE — CARE PLAN
The patient's goals for the shift include  resting    The clinical goals for the shift include Patient will report decreased pain this shift.    Over the shift, the patient did report abdominal pain 5-7/10. PRN oxycodone and scheduled tylenol administered per MAR. Patient ambulating in [place] intermittently. PRN melatonin administered per request. VSS and uneventful shift. Patient resting in bed with call light within reach.

## 2025-03-29 NOTE — CARE PLAN
The patient's goals for the shift include      The clinical goals for the shift include patient will remain free from abdominal pain    Goal met. Patient ate breakfast and lunch. Patient is discharging  to home. Vss. No c/o pain. Reviewed discharge instructions with patient that includes; new medications, follow up appointments, and pharmacy where new prescriptions are ready for .

## 2025-03-29 NOTE — DISCHARGE SUMMARY
Discharge Diagnosis  Influenza A    Issues Requiring Follow-Up    Follow up with surgery (Dr. Jean Baptiste) and GI (Dr. Abdi)     Discharge Meds     Medication List      START taking these medications     docusate sodium 100 mg capsule; Commonly known as: Colace; Take 1   capsule (100 mg) by mouth 2 times a day.   oxyCODONE 5 mg immediate release tablet; Commonly known as: Roxicodone;   Take 1 tablet (5 mg) by mouth every 6 hours if needed for severe pain (7 -   10) for up to 3 days.   pantoprazole 40 mg EC tablet; Commonly known as: ProtoNix; Take 1 tablet   (40 mg) by mouth 2 times a day before meals. Do not crush, chew, or split.   simethicone 80 mg chewable tablet; Commonly known as: Mylicon; Chew 1   tablet (80 mg) 4 times a day.   traZODone 50 mg tablet; Commonly known as: Desyrel; Take 1 tablet (50   mg) by mouth once daily at bedtime.     CONTINUE taking these medications     acetaminophen 500 mg tablet; Commonly known as: Tylenol   albuterol 90 mcg/actuation inhaler; inhale 1 to 2 puffs by mouth every 4   to 6 hours as needed   baclofen 20 mg tablet; Commonly known as: Lioresal; Take 1 Tablet orally   3 times per day for muscle spasm.   fluticasone 50 mcg/actuation nasal spray; Commonly known as: Flonase;   Administer 2 sprays into each nostril 2 times a day. Shake gently. Before   first use, prime pump. After use, clean tip and replace cap.   folic acid 1 mg tablet; Commonly known as: Folvite; Take 1 tablet (1 mg)   by mouth once daily.   losartan 100 mg tablet; Commonly known as: Cozaar; Take 1 tablet (100   mg) by mouth once daily.   omeprazole 40 mg DR capsule; Commonly known as: PriLOSEC   primidone 50 mg tablet; Commonly known as: Mysoline; Take 1 tablet (50   mg) by mouth once daily at bedtime.   thiamine 100 mg tablet; Commonly known as: Vitamin B-1   topiramate 50 mg tablet; Commonly known as: Topamax     STOP taking these medications     fluocinonide 0.05 % cream; Commonly known as: Lidex       Test  Results Pending At Discharge  Pending Labs       No current pending labs.          History Of Present Illness  Julio Carranza is a 64 y.o. male presenting with shortness of breath. Patient reports he has been sick for about 5 days now. His symptoms started with fever and chills. He then developed a hacking dry cough and progressive shortness of breath with activity. He has been dizzy due to coughing fits. He has also had a poor appetite and had some diarrhea at home. He has been taking Nyquil cold and flu with no significant improvement in symptoms. He has an O2 concentrator at home that belonged to his girlfriend who passed away recently and actually put himself on 2L O2 at home due to shortness of breath. He does not normally wear O2 but is a 1/4 ppd smoker; he previously smoked up to 2.5 ppd. He came to the ED today for further evaluation of shortness of breath. SpO2 was 92% on room air. Labs were significant for D-dimer 1725. Flu A PCR was positive. CT chest without contrast did not show any infiltrate or effusion. Patient was given prednisone 40 mg PO x 1 dose, Zofran 4 mg IV x 1, Tylenol 975 mg PO x 1, and a 1L LR bolus. He is admitted to med/surg for further care.         Hospital Course   Patient was admitted to med/surg and treated for flu A and community acquired pneumonia with steroids, Tamiflu, and Levaquin. CTA was negative for PE. Patient required supplemental O2 initially but was successfully weaned to room air prior to discharge. He did not qualify for nocturnal O2 this admission. He has been ambulatory in the halls on room air without shortness of breath.     Patient had reported constipation on admission; he was started on a bowel regimen with no improvement. Imaging on 3/19 showed a likely ileus. Patient was seen by surgery and GI. He was made NPO and received IVF. Diet was gradually advanced to a regular/soft diet as tolerated. Patient received Cipro/Flagyl per surgery recs. Repeat imaging showed  improvement of ileus. Patient has been having bowel movements and ambulating in the halls. He is tolerating a regular soft diet. OARRS reviewed with no red flags; 3 day Rx for oxycodone q6h PRN for severe pain sent to pharmacy on discharge. Patient is discharged on Colace BID and a PPI. Patient met with [facility] while admitted and declined C or outpatient therapy. He will follow up with surgery (Dr. Jean Baptiste) and GI (Dr. Abdi). He has a PCP follow up scheduled for 4/7.     Disposition: Patient stable for discharge home with outpatient follow up.     Pertinent Physical Exam At Time of Discharge  Physical Exam  Constitutional:       General: He is not in acute distress.     Appearance: He is not toxic-appearing.   HENT:      Head: Normocephalic and atraumatic.      Mouth/Throat:      Mouth: Mucous membranes are moist.   Eyes:      Conjunctiva/sclera: Conjunctivae normal.   Cardiovascular:      Rate and Rhythm: Normal rate and regular rhythm.   Pulmonary:      Effort: No respiratory distress.      Breath sounds: Normal breath sounds.      Comments: On room air   Abdominal:      General: Bowel sounds are normal. There is distension.      Palpations: Abdomen is soft.      Tenderness: There is no abdominal tenderness.      Hernia: A hernia (umbilical) is present.   Musculoskeletal:         General: No swelling.   Skin:     General: Skin is warm and dry.   Neurological:      Mental Status: He is alert and oriented to person, place, and time.   Psychiatric:         Mood and Affect: Mood normal.         Behavior: Behavior normal.         Outpatient Follow-Up  Future Appointments   Date Time Provider Department Center   4/7/2025  1:45 PM Mendez Coronel MD [id number] Eric Mejia PA-C

## 2025-03-31 ENCOUNTER — SPECIALTY PHARMACY (OUTPATIENT)
Dept: PHARMACY | Facility: CLINIC | Age: 65
End: 2025-03-31

## 2025-03-31 ENCOUNTER — PATIENT OUTREACH (OUTPATIENT)
Dept: CARE COORDINATION | Facility: CLINIC | Age: 65
End: 2025-03-31
Payer: MEDICARE

## 2025-03-31 ENCOUNTER — TELEPHONE (OUTPATIENT)
Dept: PRIMARY CARE | Facility: CLINIC | Age: 65
End: 2025-03-31
Payer: MEDICARE

## 2025-03-31 NOTE — TELEPHONE ENCOUNTER
Transition of Care    Inpatient facility: [facility]  Discharge diagnosis: Influenza A,   Discharged to: Home  Discharge date: 3/29/25  Initial Call date: 3/31/25  Spoke with patient/caregiver: Patient                                                                     Do you need assistance  visits prior to your PCP visit: No  Home health care ordered: No  Have you been contacted by home care and have a start of care date: No  Are you taking medications as prescribed at discharge: Yes    Referral to [facility] Pharmacist: No  Patient advised to bring all medications to PCP follow-up appointment.  Patient advised to follow discharge instructions until provider follow-up.  TCM visit date: 4/3/25  TCM provider visit with: BONNIE Coronel MD

## 2025-03-31 NOTE — PROGRESS NOTES
[facility]  Admitted 3/15/2025  Discharged 3/29/2025  Dx: Acute Hypoxic Respiratory Failure, Influenza, Pneumonia, Dizzy, Ileus    Declined HHC or outpatient therapy    Ensure high protein drink    PCP 4/3/2025    Outreach call to patient to support a smooth transition of care from recent admission. Unable to leave a message.  Will continue to monitor through transition period.    Adia Selby RN/CM   [facility]  [phone]

## 2025-04-03 ENCOUNTER — OFFICE VISIT (OUTPATIENT)
Dept: PRIMARY CARE | Facility: CLINIC | Age: 65
End: 2025-04-03
Payer: MEDICARE

## 2025-04-03 VITALS
HEART RATE: 75 BPM | BODY MASS INDEX: 29.25 KG/M2 | OXYGEN SATURATION: 97 % | TEMPERATURE: 97.7 F | DIASTOLIC BLOOD PRESSURE: 98 MMHG | SYSTOLIC BLOOD PRESSURE: 140 MMHG | WEIGHT: 227.8 LBS

## 2025-04-03 DIAGNOSIS — I25.10 ASCVD (ARTERIOSCLEROTIC CARDIOVASCULAR DISEASE): ICD-10-CM

## 2025-04-03 DIAGNOSIS — I25.83 CORONARY ATHEROSCLEROSIS DUE TO LIPID RICH PLAQUE: ICD-10-CM

## 2025-04-03 DIAGNOSIS — N13.8 BPH WITH OBSTRUCTION/LOWER URINARY TRACT SYMPTOMS: ICD-10-CM

## 2025-04-03 DIAGNOSIS — N40.1 BPH WITH OBSTRUCTION/LOWER URINARY TRACT SYMPTOMS: ICD-10-CM

## 2025-04-03 DIAGNOSIS — J43.9 PULMONARY EMPHYSEMA, UNSPECIFIED EMPHYSEMA TYPE (MULTI): ICD-10-CM

## 2025-04-03 DIAGNOSIS — E78.5 DYSLIPIDEMIA: ICD-10-CM

## 2025-04-03 DIAGNOSIS — J10.1 INFLUENZA A: ICD-10-CM

## 2025-04-03 DIAGNOSIS — K42.0 UMBILICAL HERNIA WITH OBSTRUCTION, WITHOUT GANGRENE: ICD-10-CM

## 2025-04-03 DIAGNOSIS — F41.9 ANXIETY: ICD-10-CM

## 2025-04-03 DIAGNOSIS — G83.10 PARESIS OF SINGLE LOWER EXTREMITY: ICD-10-CM

## 2025-04-03 DIAGNOSIS — G95.9 DISEASE OF SPINAL CORD, UNSPECIFIED: ICD-10-CM

## 2025-04-03 DIAGNOSIS — K56.609 SMALL BOWEL OBSTRUCTION (MULTI): ICD-10-CM

## 2025-04-03 DIAGNOSIS — I10 HYPERTENSION, UNSPECIFIED TYPE: ICD-10-CM

## 2025-04-03 DIAGNOSIS — M62.838 MUSCLE SPASM: ICD-10-CM

## 2025-04-03 DIAGNOSIS — R19.7 DIARRHEA, UNSPECIFIED TYPE: Primary | ICD-10-CM

## 2025-04-03 PROCEDURE — 4004F PT TOBACCO SCREEN RCVD TLK: CPT | Performed by: INTERNAL MEDICINE

## 2025-04-03 PROCEDURE — 3077F SYST BP >= 140 MM HG: CPT | Performed by: INTERNAL MEDICINE

## 2025-04-03 PROCEDURE — 3080F DIAST BP >= 90 MM HG: CPT | Performed by: INTERNAL MEDICINE

## 2025-04-03 PROCEDURE — 1111F DSCHRG MED/CURRENT MED MERGE: CPT | Performed by: INTERNAL MEDICINE

## 2025-04-03 PROCEDURE — 1157F ADVNC CARE PLAN IN RCRD: CPT | Performed by: INTERNAL MEDICINE

## 2025-04-03 PROCEDURE — 1159F MED LIST DOCD IN RCRD: CPT | Performed by: INTERNAL MEDICINE

## 2025-04-03 PROCEDURE — 1160F RVW MEDS BY RX/DR IN RCRD: CPT | Performed by: INTERNAL MEDICINE

## 2025-04-03 PROCEDURE — 99496 TRANSJ CARE MGMT HIGH F2F 7D: CPT | Performed by: INTERNAL MEDICINE

## 2025-04-03 RX ORDER — TOPIRAMATE 50 MG/1
50 TABLET, FILM COATED ORAL 2 TIMES DAILY PRN
COMMUNITY
Start: 2025-04-03

## 2025-04-03 RX ORDER — FLUTICASONE PROPIONATE 50 MCG
2 SPRAY, SUSPENSION (ML) NASAL 2 TIMES DAILY PRN
COMMUNITY
Start: 2025-04-03

## 2025-04-03 RX ORDER — BUTYROSPERMUM PARKII(SHEA BUTTER), SIMMONDSIA CHINENSIS (JOJOBA) SEED OIL, ALOE BARBADENSIS LEAF EXTRACT .01; 1; 3.5 G/100G; G/100G; G/100G
250 LIQUID TOPICAL 2 TIMES DAILY
Qty: 180 CAPSULE | Refills: 0 | Status: SHIPPED | OUTPATIENT
Start: 2025-04-03 | End: 2025-07-02

## 2025-04-03 RX ORDER — BACLOFEN 20 MG/1
20 TABLET ORAL 3 TIMES DAILY PRN
COMMUNITY
Start: 2025-04-03

## 2025-04-03 NOTE — PROGRESS NOTES
Subjective   Patient ID: Julio Carranza is a 64 y.o. male who presents for Hospital Follow-up (TCM).    Transition of care  Patient admission history and physical, hospital course, medications, verified and reviewed  Patient contacted after discharge, medications verified comes today for follow-up    Inpatient facility: [facility]  Discharge diagnosis: Influenza A,   Discharged to: Home  Discharge date: 3/29/25  Initial Call date: 3/31/25  Spoke with patient/caregiver: Patient                                                                      Do you need assistance  visits prior to your PCP visit: No  Home health care ordered: No  Have you been contacted by home care and have a start of care date: No  Are you taking medications as prescribed at discharge: Yes     Referral to [facility] Pharmacist: No  Patient advised to bring all medications to PCP follow-up appointment.  Patient advised to follow discharge instructions until provider follow-up.  TCM visit date: 4/3/25  TCM provider visit with: BONNIE Coronel MD    Patient admitted to the hospital for flu type B found to have small bowel obstruction due to ileus treated conservatively IV fluids then added antibiotics Cipro and Flagyl subsequently DC'd patient's symptoms improved  Discharged home    Follow-up  - Status post influenza type VIII resolving doing well no complaints  -Status post small bowel obstruction secondary to ileus possible due to viral illness resolved with continue with clear liquids  - Now having diarrhea rapid transit syndrome comes about softer diet  Add probiotic twice a day  - Status post upper and lower endoscopy follow-up Dr. Abdi  - Umbilical hernia follow-up general surgery for further recommendation Dr. Jean Baptiste  -- Screening for colon cancer obtained 2016 repeat in 10 years 2026  - Screen for depression negative  - Advance of directive reviewed   -Status post left hip arthroplasty doing well  -Lung CT low-dose  "showed lung nodule repeat in 1 year repeat in September 2024 no change repeat in 1 year September 2025  - Nicotine dependency counseled about smoking cessation  \"I spent 3 minutes counseling patient about need for smoking/tobacco cessation and how I can support efforts when patient is ready to quit.  Discussed nicotine replacement therapy, Varenicline, Bupropion, hypnosis, support groups, and accupunture as potential options.  Patient currently has no signs or symptoms of tobacco related disease.\".  -Hypertension improving continue with current medication- Dyslipidemia continue low-carb diet  - Coronary artery disease compensated continue with aspirin daily counseled about nicotine cessation  Follow-up 4 weeks                     Review of Systems    Objective   Lab Results   Component Value Date    HGBA1C 5.2 06/18/2022      BP (!) 140/98   Pulse 75   Temp 36.5 °C (97.7 °F)   Wt 103 kg (227 lb 12.8 oz)   SpO2 97%   BMI 29.25 kg/m²     Physical Exam    Assessment/Plan   Julio was seen today for hospital follow-up.  Diagnoses and all orders for this visit:  Diarrhea, unspecified type (Primary)  -     saccharomyces boulardii (Florastor) 250 mg capsule; Take 1 capsule (250 mg) by mouth 2 times a day.  Pulmonary emphysema, unspecified emphysema type (Multi)  -     Referral to Primary Care  Anxiety  -     Referral to Primary Care  Umbilical hernia with obstruction, without gangrene  ASCVD (arteriosclerotic cardiovascular disease)  -     Referral to Primary Care  BPH with obstruction/lower urinary tract symptoms  -     Referral to Primary Care  Muscle spasm  Disease of spinal cord, unspecified  Paresis of single lower extremity  Influenza A  Small bowel obstruction (Multi)  Coronary atherosclerosis due to lipid rich plaque  Dyslipidemia  Hypertension, unspecified type  Other orders  -     Follow Up In Primary Care - Established; Future   Patient was identified as a fall risk. Risk prevention instructions " provided.  Transition of care  Patient admission history and physical, hospital course, medications, verified and reviewed  Patient contacted after discharge, medications verified comes today for follow-up    Inpatient facility: [facility]  Discharge diagnosis: Influenza A,   Discharged to: Home  Discharge date: 3/29/25  Initial Call date: 3/31/25  Spoke with patient/caregiver: Patient                                                                      Do you need assistance  visits prior to your PCP visit: No  Home health care ordered: No  Have you been contacted by home care and have a start of care date: No  Are you taking medications as prescribed at discharge: Yes     Referral to [facility] Pharmacist: No  Patient advised to bring all medications to PCP follow-up appointment.  Patient advised to follow discharge instructions until provider follow-up.  TCM visit date: 4/3/25  TCM provider visit with: BONNIE Coronel MD    Patient admitted to the hospital for flu type B found to have small bowel obstruction due to ileus treated conservatively IV fluids then added antibiotics Cipro and Flagyl subsequently DC'd patient's symptoms improved  Discharged home    Follow-up  - Status post influenza type VIII resolving doing well no complaints  -Status post small bowel obstruction secondary to ileus possible due to viral illness resolved with continue with clear liquids  - Now having diarrhea rapid transit syndrome comes about softer diet  Add probiotic twice a day  - Status post upper and lower endoscopy follow-up Dr. Abdi  - Umbilical hernia follow-up general surgery for further recommendation Dr. Jean Baptiste  -- Screening for colon cancer obtained 2016 repeat in 10 years 2026  - Screen for depression negative  - Advance of directive reviewed   -Status post left hip arthroplasty doing well  -Lung CT low-dose showed lung nodule repeat in 1 year repeat in September 2024 no change repeat in 1 year  "September 2025  - Nicotine dependency counseled about smoking cessation  \"I spent 3 minutes counseling patient about need for smoking/tobacco cessation and how I can support efforts when patient is ready to quit.  Discussed nicotine replacement therapy, Varenicline, Bupropion, hypnosis, support groups, and accupunture as potential options.  Patient currently has no signs or symptoms of tobacco related disease.\".  -Hypertension improving continue with current medication- Dyslipidemia continue low-carb diet  - Coronary artery disease compensated continue with aspirin daily counseled about nicotine cessation  Follow-up 4 weeks              "

## 2025-04-04 ENCOUNTER — PATIENT OUTREACH (OUTPATIENT)
Dept: CARE COORDINATION | Facility: CLINIC | Age: 65
End: 2025-04-04
Payer: MEDICARE

## 2025-04-04 ENCOUNTER — TELEPHONE (OUTPATIENT)
Dept: PRIMARY CARE | Facility: CLINIC | Age: 65
End: 2025-04-04
Payer: MEDICARE

## 2025-04-04 DIAGNOSIS — I10 HYPERTENSION, UNSPECIFIED TYPE: ICD-10-CM

## 2025-04-04 RX ORDER — LOSARTAN POTASSIUM 100 MG/1
100 TABLET ORAL DAILY
Qty: 90 TABLET | Refills: 0 | Status: SHIPPED | OUTPATIENT
Start: 2025-04-04

## 2025-04-04 NOTE — PROGRESS NOTES
Outreach call to patient following up on appointment with primary care provider.  Left voicemail message with CM name and contact number. Will continue to follow.      Adia Selby RN/BENJAMIN  [facility]  [phone]

## 2025-04-04 NOTE — TELEPHONE ENCOUNTER
Patient called-    RX: saccharomyces boulardii (Florastor) 250 mg capsule   Is $800 at pharmacy    Asking if there is another option.    Please advise.

## 2025-04-07 ENCOUNTER — APPOINTMENT (OUTPATIENT)
Dept: PRIMARY CARE | Facility: CLINIC | Age: 65
End: 2025-04-07
Payer: MEDICARE

## 2025-04-09 ENCOUNTER — TELEPHONE (OUTPATIENT)
Dept: ORTHOPEDIC SURGERY | Facility: CLINIC | Age: 65
End: 2025-04-09
Payer: MEDICARE

## 2025-04-09 NOTE — TELEPHONE ENCOUNTER
I spoke with Julio this morning, I can see his PCP has entered a Rx for Baclofen-I advised him to call their office and let them know that it was not received by the pharmacy (per Julio) He will do that and he is also coming in for a follow up with Dr. Ricci on the 23rd. He will discuss the Baclofen rx with him then also.       Patient called asking for a refill of Baclofen.      According to his chart he had a Rx for Baclofen ordered by Dr. Kari Coronel on 4/4/25.    Dr. Ricci last ordered this for him on 1/10/25.    Please call patient.

## 2025-04-10 ENCOUNTER — TELEPHONE (OUTPATIENT)
Dept: PRIMARY CARE | Facility: CLINIC | Age: 65
End: 2025-04-10
Payer: MEDICARE

## 2025-04-10 DIAGNOSIS — J43.9 PULMONARY EMPHYSEMA, UNSPECIFIED EMPHYSEMA TYPE (MULTI): ICD-10-CM

## 2025-04-10 DIAGNOSIS — M62.838 MUSCLE SPASM: ICD-10-CM

## 2025-04-10 RX ORDER — BACLOFEN 20 MG/1
20 TABLET ORAL 3 TIMES DAILY PRN
Qty: 20 TABLET | Refills: 0 | Status: SHIPPED | OUTPATIENT
Start: 2025-04-10

## 2025-04-11 RX ORDER — ALBUTEROL SULFATE 90 UG/1
INHALANT RESPIRATORY (INHALATION)
Qty: 18 G | Refills: 5 | Status: SHIPPED | OUTPATIENT
Start: 2025-04-11

## 2025-04-23 ENCOUNTER — APPOINTMENT (OUTPATIENT)
Dept: ORTHOPEDIC SURGERY | Facility: CLINIC | Age: 65
End: 2025-04-23
Payer: MEDICARE

## 2025-04-23 ENCOUNTER — HOSPITAL ENCOUNTER (OUTPATIENT)
Dept: RADIOLOGY | Facility: HOSPITAL | Age: 65
Discharge: HOME | End: 2025-04-23
Payer: MEDICARE

## 2025-04-23 DIAGNOSIS — M62.838 MUSCLE SPASM: ICD-10-CM

## 2025-04-23 DIAGNOSIS — M17.12 ARTHRITIS OF LEFT KNEE: Primary | ICD-10-CM

## 2025-04-23 DIAGNOSIS — K56.7 ILEUS, UNSPECIFIED: ICD-10-CM

## 2025-04-23 DIAGNOSIS — M17.0 ARTHRITIS OF BOTH KNEES: ICD-10-CM

## 2025-04-23 DIAGNOSIS — M17.11 ARTHRITIS OF RIGHT KNEE: ICD-10-CM

## 2025-04-23 PROCEDURE — 4004F PT TOBACCO SCREEN RCVD TLK: CPT | Performed by: ORTHOPAEDIC SURGERY

## 2025-04-23 PROCEDURE — 99214 OFFICE O/P EST MOD 30 MIN: CPT | Performed by: ORTHOPAEDIC SURGERY

## 2025-04-23 PROCEDURE — 1157F ADVNC CARE PLAN IN RCRD: CPT | Performed by: ORTHOPAEDIC SURGERY

## 2025-04-23 PROCEDURE — 1159F MED LIST DOCD IN RCRD: CPT | Performed by: ORTHOPAEDIC SURGERY

## 2025-04-23 PROCEDURE — 1125F AMNT PAIN NOTED PAIN PRSNT: CPT | Performed by: ORTHOPAEDIC SURGERY

## 2025-04-23 PROCEDURE — 74018 RADEX ABDOMEN 1 VIEW: CPT | Performed by: STUDENT IN AN ORGANIZED HEALTH CARE EDUCATION/TRAINING PROGRAM

## 2025-04-23 PROCEDURE — 20610 DRAIN/INJ JOINT/BURSA W/O US: CPT | Performed by: ORTHOPAEDIC SURGERY

## 2025-04-23 PROCEDURE — 1111F DSCHRG MED/CURRENT MED MERGE: CPT | Performed by: ORTHOPAEDIC SURGERY

## 2025-04-23 PROCEDURE — 1160F RVW MEDS BY RX/DR IN RCRD: CPT | Performed by: ORTHOPAEDIC SURGERY

## 2025-04-23 PROCEDURE — 74018 RADEX ABDOMEN 1 VIEW: CPT

## 2025-04-23 RX ORDER — HYALURONATE SODIUM, STABILIZED 60 MG/3 ML
SYRINGE (ML) INTRAARTICULAR
Qty: 6 ML | Refills: 0 | Status: SHIPPED | OUTPATIENT
Start: 2025-04-23

## 2025-04-23 RX ORDER — TRIAMCINOLONE ACETONIDE 40 MG/ML
2.5 INJECTION, SUSPENSION INTRA-ARTICULAR; INTRAMUSCULAR
Status: COMPLETED | OUTPATIENT
Start: 2025-04-23 | End: 2025-04-23

## 2025-04-23 RX ORDER — BACLOFEN 20 MG/1
20 TABLET ORAL 3 TIMES DAILY PRN
Qty: 20 TABLET | Refills: 0 | Status: SHIPPED | OUTPATIENT
Start: 2025-04-23

## 2025-04-23 RX ADMIN — TRIAMCINOLONE ACETONIDE 2.5 MG: 40 INJECTION, SUSPENSION INTRA-ARTICULAR; INTRAMUSCULAR at 12:12

## 2025-04-23 ASSESSMENT — PAIN SCALES - GENERAL: PAINLEVEL_OUTOF10: 7

## 2025-04-23 ASSESSMENT — PAIN - FUNCTIONAL ASSESSMENT: PAIN_FUNCTIONAL_ASSESSMENT: 0-10

## 2025-04-23 NOTE — PROGRESS NOTES
This is a consultation from  @PCP@ for   Chief Complaint   Patient presents with   • Left Knee - Pain   • Right Knee - Pain       This is a 65 y.o. male who presents for follow-up for his bilateral knees.  Patient had cortisone and gel injections back in the fall, the combination worked well for at least 4 to 5 months.  Since then the last couple of months has had return of symptoms aspirations pain, sharp pain over the medial knee worse with walking proving at rest he wears knee sleeves which are helpful.  He is still able to get around and do his normal activities.    Physical Exam    There has been no interval change in this patient's past medical, surgical, medications, allergies, family history or social history since the most recent visit to a provider within our department. 14 point review of systems was performed, reviewed, and negative except for pertinent positives documented in the history of present illness.     Constitutional: well developed, well nourished male in no acute distress  Psychiatric: normal mood, appropriate affect  Eyes: sclera anicteric  HENT: normocephalic/atraumatic  CV: regular rate and rhythm   Respiratory: non labored breathing  Integumentary: no rash  Neurological: moves all extremities    Bilateral knee exam: skin intact no lacerations or abrations. no effusion.  Tender medial 1+ joint line. negative log roll negative patellar grind. ROM 0-120. stable to varus and valgus stress at 0 and 30 degrees. negative lachman negative posterior drawer negative denny. 5/5 ehl/fhl/gs/ta. silt s/s/sp/dp/t. 2+ dp/pt        Imaging:  L Inj/Asp: bilateral knee on 4/23/2025 12:12 PM  Indications: pain and joint swelling  Details: 22 G needle, anterolateral approach  Medications (Right): 2.5 mg triamcinolone acetonide 40 mg/mL  Medications (Left): 2.5 mg triamcinolone acetonide 40 mg/mL    Discussion:  I discussed the conservative treatment options for knee osteoarthritis including but not  limited to physical therapy, oral NSAIDS, activity and lifestyle modification, and corticosteroid injections. Pt has elected to undergo a cortisone injection today. I have explained the risk and benefits of an injection including the possibility of joint infection, bleeding, damage to cartilage, allergic reaction. Patient verbalized understanding and gave verbal consent wishes to proceed with a intra-articular cortisone injection for their knee.    Procedure:  After discussing the risk and benefits of the procedure, we proceeded with an intra-articular bilateral knee injection. We discussed the risks and benefits and potential morbidity related to the treatment, and to the prescription medication administered in the injection    With the patient's informed verbal consent, the bilateral knees were prepped in standard sterile fashion with Chlorhexidine. The skin was then anesthetized with ethyl chloride spray and cleaned again with Chlorhexidine. The bilateral knees were then apirated/injected with a prefilled 20-gauge syringe of 40 mg Kenalog + 4 ml Lidocaine using the lateral approach without complications.  The patient tolerated this well and felt immediate initial relief of symptoms. A bandaid was applied and the patient ambulated out of the clinic on ther own accord without difficulty. Patient was instructed to avoid physical activity for 24-48 hours to prevent the knees from swelling and may ice the knees as tolerated. Patient should contact the office if any signs of of infection appear: redness, fever, chills, drainage, swelling or warmth to the knees.  Pt understands that the injections can be repeated no sooner than 3 months.      Procedure, treatment alternatives, risks and benefits explained, specific risks discussed. Consent was given by the patient. Immediately prior to procedure a time out was called to verify the correct patient, procedure, equipment, support staff and site/side marked as required.  Patient was prepped and draped in the usual sterile fashion.         Impression/Plan: This is a 65 y.o. male with bilateral knee arthritis.  I had an in depth discussion with the patient regarding treatment options for arthritis and their relative risks and benefits. We reviewed surgical and nonsurgical option for treatment. Treatments include anti inflammatory medications, physical therapy, weight loss, activity modification, use of assistive devices, injection therapies. We discussed current prescriptions and risks and benefits of continuation of prescription medication as apporpriate. We discussed that arthritis is often progressive over time, an in end stage arthritis surgical interventions can be considered, including arthroplasty. All questions were answered and the patient voiced their understanding.  Could consider new x-rays especially on the right side of the next few months.  I will see him back for gel injections when they are available    BMI Readings from Last 1 Encounters:   04/03/25 29.25 kg/m²      Lab Results   Component Value Date    CREATININE 0.92 03/29/2025     Tobacco Use: High Risk (4/23/2025)    Patient History    • Smoking Tobacco Use: Every Day    • Smokeless Tobacco Use: Never    • Passive Exposure: Not on file      MELD 3.0: 11 at 9/30/2024 11:50 AM  MELD-Na: 11 at 9/30/2024 11:50 AM  Calculated from:  Serum Creatinine: 1.14 mg/dL at 9/30/2024 11:49 AM  Serum Sodium: 139 mmol/L (Using max of 137 mmol/L) at 9/30/2024 11:49 AM  Total Bilirubin: 0.5 mg/dL (Using min of 1 mg/dL) at 9/30/2024 11:49 AM  Serum Albumin: 4.5 g/dL (Using max of 3.5 g/dL) at 9/30/2024 11:49 AM  INR(ratio): 1.4 at 9/30/2024 11:50 AM  Age at listing (hypothetical): 64 years  Sex: Male at 9/30/2024 11:50 AM       Lab Results   Component Value Date    HGBA1C 5.2 06/18/2022     Lab Results   Component Value Date    STAPHMRSASCR No Staphylococcus aureus isolated 03/13/2024

## 2025-04-28 PROCEDURE — RXMED WILLOW AMBULATORY MEDICATION CHARGE

## 2025-04-30 ENCOUNTER — PHARMACY VISIT (OUTPATIENT)
Dept: PHARMACY | Facility: CLINIC | Age: 65
End: 2025-04-30
Payer: MEDICARE

## 2025-04-30 ENCOUNTER — PATIENT OUTREACH (OUTPATIENT)
Dept: CARE COORDINATION | Facility: CLINIC | Age: 65
End: 2025-04-30
Payer: MEDICARE

## 2025-04-30 NOTE — PROGRESS NOTES
Outreach call to patient to check in 30 days after hospital discharge to support smooth transition of care. CM with three unsuccessful outreach calls. Patient with no additional needs noted. No additional outreach needed at this time.   CM will close case as unable to reach.    Adia Selby RN/CM  [facility] [place] [facility]  [phone]

## 2025-05-05 LAB
ATRIAL RATE: 67 BPM
P AXIS: 47 DEGREES
P OFFSET: 191 MS
P ONSET: 138 MS
PR INTERVAL: 166 MS
Q ONSET: 221 MS
QRS COUNT: 11 BEATS
QRS DURATION: 96 MS
QT INTERVAL: 416 MS
QTC CALCULATION(BAZETT): 439 MS
QTC FREDERICIA: 431 MS
R AXIS: 12 DEGREES
T AXIS: 27 DEGREES
T OFFSET: 429 MS
VENTRICULAR RATE: 67 BPM

## 2025-05-14 ENCOUNTER — APPOINTMENT (OUTPATIENT)
Dept: PRIMARY CARE | Facility: CLINIC | Age: 65
End: 2025-05-14
Payer: MEDICARE

## 2025-05-14 VITALS
SYSTOLIC BLOOD PRESSURE: 102 MMHG | HEART RATE: 89 BPM | DIASTOLIC BLOOD PRESSURE: 70 MMHG | TEMPERATURE: 97.7 F | WEIGHT: 222.8 LBS | OXYGEN SATURATION: 97 % | BODY MASS INDEX: 28.61 KG/M2

## 2025-05-14 DIAGNOSIS — F51.01 PRIMARY INSOMNIA: ICD-10-CM

## 2025-05-14 DIAGNOSIS — Z96.642 S/P TOTAL LEFT HIP ARTHROPLASTY: ICD-10-CM

## 2025-05-14 DIAGNOSIS — H26.9 CATARACT, UNSPECIFIED CATARACT TYPE, UNSPECIFIED LATERALITY: Primary | ICD-10-CM

## 2025-05-14 DIAGNOSIS — K42.0 UMBILICAL HERNIA WITH OBSTRUCTION, WITHOUT GANGRENE: ICD-10-CM

## 2025-05-14 DIAGNOSIS — K59.00 CONSTIPATION, UNSPECIFIED CONSTIPATION TYPE: ICD-10-CM

## 2025-05-14 DIAGNOSIS — I10 HYPERTENSION, UNSPECIFIED TYPE: ICD-10-CM

## 2025-05-14 DIAGNOSIS — G47.00 INSOMNIA, UNSPECIFIED TYPE: ICD-10-CM

## 2025-05-14 DIAGNOSIS — R25.1 TREMORS OF NERVOUS SYSTEM: ICD-10-CM

## 2025-05-14 DIAGNOSIS — F17.219 CIGARETTE NICOTINE DEPENDENCE WITH NICOTINE-INDUCED DISORDER: ICD-10-CM

## 2025-05-14 PROCEDURE — G2211 COMPLEX E/M VISIT ADD ON: HCPCS | Performed by: INTERNAL MEDICINE

## 2025-05-14 PROCEDURE — 99214 OFFICE O/P EST MOD 30 MIN: CPT | Performed by: INTERNAL MEDICINE

## 2025-05-14 PROCEDURE — 4004F PT TOBACCO SCREEN RCVD TLK: CPT | Performed by: INTERNAL MEDICINE

## 2025-05-14 PROCEDURE — 3074F SYST BP LT 130 MM HG: CPT | Performed by: INTERNAL MEDICINE

## 2025-05-14 PROCEDURE — 1160F RVW MEDS BY RX/DR IN RCRD: CPT | Performed by: INTERNAL MEDICINE

## 2025-05-14 PROCEDURE — 3078F DIAST BP <80 MM HG: CPT | Performed by: INTERNAL MEDICINE

## 2025-05-14 PROCEDURE — 1159F MED LIST DOCD IN RCRD: CPT | Performed by: INTERNAL MEDICINE

## 2025-05-14 RX ORDER — PRIMIDONE 50 MG/1
50 TABLET ORAL NIGHTLY
Qty: 90 TABLET | Refills: 1 | Status: SHIPPED | OUTPATIENT
Start: 2025-05-14 | End: 2025-05-14

## 2025-05-14 RX ORDER — LOSARTAN POTASSIUM 100 MG/1
100 TABLET ORAL DAILY
Qty: 90 TABLET | Refills: 0 | Status: SHIPPED | OUTPATIENT
Start: 2025-05-14

## 2025-05-14 RX ORDER — PRIMIDONE 50 MG/1
50 TABLET ORAL NIGHTLY
Qty: 90 TABLET | Refills: 1 | Status: SHIPPED | OUTPATIENT
Start: 2025-05-14

## 2025-05-14 RX ORDER — TRAZODONE HYDROCHLORIDE 50 MG/1
50 TABLET ORAL NIGHTLY
Qty: 30 TABLET | Refills: 0 | Status: SHIPPED | OUTPATIENT
Start: 2025-05-14

## 2025-05-14 RX ORDER — RIFAXIMIN 550 MG/1
TABLET ORAL
COMMUNITY
Start: 2025-04-23 | End: 2025-05-14 | Stop reason: ALTCHOICE

## 2025-05-14 ASSESSMENT — ENCOUNTER SYMPTOMS
FEVER: 0
FATIGUE: 0
ABDOMINAL PAIN: 0
DIFFICULTY URINATING: 0
ARTHRALGIAS: 0
SORE THROAT: 0
BLOOD IN STOOL: 0
DIARRHEA: 0
BRUISES/BLEEDS EASILY: 0
UNEXPECTED WEIGHT CHANGE: 0
HEADACHES: 0
SINUS PAIN: 0
PALPITATIONS: 0
COUGH: 0
WHEEZING: 0
DIZZINESS: 0

## 2025-05-14 NOTE — PROGRESS NOTES
Subjective   Patient ID: Julio Carranza is a 65 y.o. male who presents for Medical management  (1 month follow up ), Hand Pain (Right hand), Cataract (Referral for cataract surgery ), and Medication Problem (Patient stopped all meds except those checked due to stomach issues, now feeling better, given trazadone at hospital and would like filled ).    - Status post influenza type A r resolved with no complications  - Chronic insomnia controlled continue trazodone new prescription provided  -Patient discontinued all current medication for bowel constipation diarrhea did not respond well patient seems to be doing very well now  -Abdominal hernia patient scheduled for surgical repair need to quit smoking  - Nicotine dependency patient smokes only 3 cigarettes a day now  -Lung nodule low-dose CT scan need to repeat in September 2025 low-dose CT scan arranged next appointment  --- Screening for colon cancer obtained 2016 repeat in 10 years 2026  - -Status post left hip arthroplasty doing well  -Essential tremors controlled continue with primidone  - Hypertension controlled continue losartan  - Coronary atherosclerosis stable continues to aspirin daily as recommended  - Underlying history of cataract referred patient to ophthalmology for eval and recommendation  Follow-up 3 months    Hand Pain   Pertinent negatives include no chest pain.          Review of Systems   Constitutional:  Negative for fatigue, fever and unexpected weight change.   HENT:  Negative for congestion, ear discharge, ear pain, mouth sores, sinus pain and sore throat.    Eyes:  Negative for visual disturbance.   Respiratory:  Negative for cough and wheezing.    Cardiovascular:  Negative for chest pain, palpitations and leg swelling.   Gastrointestinal:  Negative for abdominal pain, blood in stool and diarrhea.   Genitourinary:  Negative for difficulty urinating.   Musculoskeletal:  Negative for arthralgias.   Skin:  Negative for rash.   Neurological:   Negative for dizziness and headaches.   Hematological:  Does not bruise/bleed easily.   Psychiatric/Behavioral:  Negative for behavioral problems.    All other systems reviewed and are negative.      Objective   Lab Results   Component Value Date    HGBA1C 5.2 06/18/2022      /70   Pulse 89   Temp 36.5 °C (97.7 °F)   Wt 101 kg (222 lb 12.8 oz)   SpO2 97%   BMI 28.61 kg/m²   Lab Results   Component Value Date    WBC 6.6 03/29/2025    HGB 13.1 (L) 03/29/2025    HCT 41.0 03/29/2025     03/29/2025    CHOL 158 08/16/2023    TRIG 97 08/16/2023    HDL 39.2 (A) 08/16/2023    ALT 23 03/15/2025    AST 31 03/15/2025     03/29/2025    K 4.1 03/29/2025     (H) 03/29/2025    CREATININE 0.92 03/29/2025    BUN 11 03/29/2025    CO2 23 03/29/2025    TSH 0.73 08/16/2023    PSA 2.1 12/09/2022    INR 1.10 (A) 10/21/2024    HGBA1C 5.2 06/18/2022     par   Physical Exam  Vitals and nursing note reviewed.   Constitutional:       Appearance: Normal appearance.   HENT:      Head: Normocephalic.      Nose: Nose normal.   Eyes:      Conjunctiva/sclera: Conjunctivae normal.      Pupils: Pupils are equal, round, and reactive to light.   Cardiovascular:      Rate and Rhythm: Regular rhythm.   Pulmonary:      Effort: Pulmonary effort is normal.      Breath sounds: Normal breath sounds.   Abdominal:      General: Abdomen is flat.      Palpations: Abdomen is soft.   Musculoskeletal:      Cervical back: Neck supple.   Skin:     General: Skin is warm.   Neurological:      General: No focal deficit present.      Mental Status: He is oriented to person, place, and time.   Psychiatric:         Mood and Affect: Mood normal.         Assessment/Plan   Julio was seen today for medical management , hand pain, cataract and medication problem.  Diagnoses and all orders for this visit:  Cataract, unspecified cataract type, unspecified laterality (Primary)  -     Referral to Ophthalmology; Future  Hypertension, unspecified type  -      losartan (Cozaar) 100 mg tablet; Take 1 tablet (100 mg) by mouth once daily.  Constipation, unspecified constipation type  Primary insomnia  -     traZODone (Desyrel) 50 mg tablet; Take 1 tablet (50 mg) by mouth once daily at bedtime.  Insomnia, unspecified type  -     Discontinue: primidone (Mysoline) 50 mg tablet; Take 1 tablet (50 mg) by mouth once daily at bedtime.  Umbilical hernia with obstruction, without gangrene  S/P total left hip arthroplasty  Cigarette nicotine dependence with nicotine-induced disorder  Tremors of nervous system  -     primidone (Mysoline) 50 mg tablet; Take 1 tablet (50 mg) by mouth once daily at bedtime.    Status post influenza type A r resolved with no complications  - Chronic insomnia controlled continue trazodone new prescription provided  -Patient discontinued all current medication for bowel constipation diarrhea did not respond well patient seems to be doing very well now  -Abdominal hernia patient scheduled for surgical repair need to quit smoking  - Nicotine dependency patient smokes only 3 cigarettes a day now  -Lung nodule low-dose CT scan need to repeat in September 2025 low-dose CT scan arranged next appointment  --- Screening for colon cancer obtained 2016 repeat in 10 years 2026  - -Status post left hip arthroplasty doing well  -Essential tremors controlled continue with primidone  - Hypertension controlled continue losartan  - Coronary atherosclerosis stable continues to aspirin daily as recommended  - Underlying history of cataract referred patient to ophthalmology for eval and recommendation  Follow-up 3 months

## 2025-05-15 ENCOUNTER — APPOINTMENT (OUTPATIENT)
Dept: ORTHOPEDIC SURGERY | Facility: CLINIC | Age: 65
End: 2025-05-15
Payer: MEDICARE

## 2025-05-15 DIAGNOSIS — M25.561 PAIN IN BOTH KNEES, UNSPECIFIED CHRONICITY: ICD-10-CM

## 2025-05-15 DIAGNOSIS — M25.562 PAIN IN BOTH KNEES, UNSPECIFIED CHRONICITY: ICD-10-CM

## 2025-05-15 ASSESSMENT — PAIN - FUNCTIONAL ASSESSMENT: PAIN_FUNCTIONAL_ASSESSMENT: 0-10

## 2025-05-15 ASSESSMENT — PAIN SCALES - GENERAL: PAINLEVEL_OUTOF10: 6

## 2025-05-15 NOTE — PROGRESS NOTES
Julio Carranza is a 65 y.o. male here for gel injection  Chief Complaint   Patient presents with    Right Knee - Arthritis     bilat durolane uh spec pharm    Left Knee - Arthritis         L Inj/Asp: bilateral knee on 5/15/2025 11:10 AM  Indications: pain and joint swelling  Details: 22 G needle, anterolateral approach  Medications (Right): 60 mg sodium hyaluronate 60 mg/3 mL  Medications (Left): 60 mg sodium hyaluronate 60 mg/3 mL    Discussion:  I discussed the conservative treatment options for knee osteoarthritis including but not limited to physical therapy, oral NSAIDS, activity and lifestyle modification, hyaluronic acid injections and corticosteroid injections. Pt has elected to undergo a hyaluronic acid injection today. I have explained the risk and benefits of an injection including the possibility of joint infection, bleeding, damage to cartilage, allergic reaction. Patient verbalized understanding and gave verbal consent wishes to proceed with a intra-articular hyaluronic acid injection for their knee.    Procedure:  After discussing the risk and benefits of the procedure, we proceeded with an intra-articular bilateral knee injection. We discussed the risks and benefits and potential morbidity related to the treatment, and to the prescription medication administered in the injection    With the patient's informed verbal consent, the bilateral knees were prepped in standard sterile fashion with Chlorhexidine. The skin was then anesthetized with ethyl chloride spray and cleaned again with Chlorhexidine. The bilateral knees were then apirated/injected with a prefilled 20-gauge syringe of 3ml/60mg Durolane in each knee using the lateral approach without complications.  The patient tolerated this well.  A bandaid was applied and the patient ambulated out of the clinic on ther own accord without difficulty. Patient was instructed to avoid physical activity for 24-48 hours to prevent the knees from swelling and  may ice the knees as tolerated. Patient should contact the office if any signs of of infection appear: redness, fever, chills, drainage, swelling or warmth to the knees.        Procedure, treatment alternatives, risks and benefits explained, specific risks discussed. Consent was given by the patient. Immediately prior to procedure a time out was called to verify the correct patient, procedure, equipment, support staff and site/side marked as required. Patient was prepped and draped in the usual sterile fashion.

## 2025-05-19 ENCOUNTER — HOSPITAL ENCOUNTER (OUTPATIENT)
Dept: RADIOLOGY | Facility: CLINIC | Age: 65
Discharge: HOME | End: 2025-05-19
Payer: MEDICARE

## 2025-05-19 ENCOUNTER — OFFICE VISIT (OUTPATIENT)
Dept: PAIN MEDICINE | Facility: CLINIC | Age: 65
End: 2025-05-19
Payer: MEDICARE

## 2025-05-19 VITALS — OXYGEN SATURATION: 99 % | SYSTOLIC BLOOD PRESSURE: 124 MMHG | DIASTOLIC BLOOD PRESSURE: 70 MMHG | HEART RATE: 64 BPM

## 2025-05-19 DIAGNOSIS — M25.561 PAIN IN BOTH KNEES, UNSPECIFIED CHRONICITY: ICD-10-CM

## 2025-05-19 DIAGNOSIS — M17.0 BILATERAL PRIMARY OSTEOARTHRITIS OF KNEE: Primary | ICD-10-CM

## 2025-05-19 DIAGNOSIS — M25.562 BILATERAL CHRONIC KNEE PAIN: ICD-10-CM

## 2025-05-19 DIAGNOSIS — M17.12 ARTHRITIS OF LEFT KNEE: ICD-10-CM

## 2025-05-19 DIAGNOSIS — G89.29 BILATERAL CHRONIC KNEE PAIN: ICD-10-CM

## 2025-05-19 DIAGNOSIS — Z72.0 TOBACCO USE: ICD-10-CM

## 2025-05-19 DIAGNOSIS — M25.562 PAIN IN BOTH KNEES, UNSPECIFIED CHRONICITY: ICD-10-CM

## 2025-05-19 DIAGNOSIS — M25.561 BILATERAL CHRONIC KNEE PAIN: ICD-10-CM

## 2025-05-19 DIAGNOSIS — M17.11 ARTHRITIS OF RIGHT KNEE: ICD-10-CM

## 2025-05-19 PROCEDURE — 99214 OFFICE O/P EST MOD 30 MIN: CPT | Mod: 25 | Performed by: ANESTHESIOLOGY

## 2025-05-19 PROCEDURE — 99406 BEHAV CHNG SMOKING 3-10 MIN: CPT | Performed by: ANESTHESIOLOGY

## 2025-05-19 PROCEDURE — 73564 X-RAY EXAM KNEE 4 OR MORE: CPT | Mod: 50

## 2025-05-19 RX ORDER — ACETAMINOPHEN 325 MG/1
325 TABLET ORAL EVERY 6 HOURS PRN
COMMUNITY

## 2025-05-19 ASSESSMENT — PAIN SCALES - GENERAL
PAINLEVEL_OUTOF10: 7
PAINLEVEL_OUTOF10: 7

## 2025-05-19 ASSESSMENT — ENCOUNTER SYMPTOMS
EYES NEGATIVE: 1
LOSS OF SENSATION IN FEET: 0
RESPIRATORY NEGATIVE: 1
GASTROINTESTINAL NEGATIVE: 1
DEPRESSION: 0
ARTHRALGIAS: 1
OCCASIONAL FEELINGS OF UNSTEADINESS: 0
NEUROLOGICAL NEGATIVE: 1
ENDOCRINE NEGATIVE: 1
PSYCHIATRIC NEGATIVE: 1
CONSTITUTIONAL NEGATIVE: 1
CARDIOVASCULAR NEGATIVE: 1
HEMATOLOGIC/LYMPHATIC NEGATIVE: 1

## 2025-05-19 ASSESSMENT — PATIENT HEALTH QUESTIONNAIRE - PHQ9
2. FEELING DOWN, DEPRESSED OR HOPELESS: NOT AT ALL
1. LITTLE INTEREST OR PLEASURE IN DOING THINGS: NOT AT ALL
SUM OF ALL RESPONSES TO PHQ9 QUESTIONS 1 AND 2: 0

## 2025-05-19 ASSESSMENT — LIFESTYLE VARIABLES: TOTAL SCORE: 0

## 2025-05-19 ASSESSMENT — PAIN DESCRIPTION - DESCRIPTORS: DESCRIPTORS: ACHING

## 2025-05-19 ASSESSMENT — PAIN - FUNCTIONAL ASSESSMENT: PAIN_FUNCTIONAL_ASSESSMENT: 0-10

## 2025-05-19 NOTE — LETTER
May 21, 2025     Danny Ricci MD  [id number] [facility] [address]    Patient: Julio Carranza   YOB: 1960   Date of Visit: 5/19/2025       Dear Dr. Danny Ricci MD:    Thank you for referring Julio Carranza to me for evaluation. Below are my notes for this consultation.  If you have questions, please do not hesitate to call me. I look forward to following your patient along with you.       Sincerely,     Joseluis Barrera MD      CC: No Recipients  ______________________________________________________________________________________    PAIN MANAGEMENT NEW PATIENT OFFICE NOTE    Date of Service: 5/19/2025    SUBJECTIVE    CHIEF COMPLAINT: BL knee pain    HISTORY OF PRESENT ILLNESS    Julio Carranza is a 65 y.o. male with PMH L2-S1 fusion, COPD, GERD, HTN, OA s/p L TRINITY, smoking, marijuana use who presents as new patient referred by ortho Dr Ricci with BL knee pain.    Pt describes R>L knee pain since ~2022. Pain is worse with standing, walking, and flexing knees. Pain assoc with crepitus. Pt has tried Tylenol, NSAIDs, rest, CSI, MYKE, m relaxants without sustained relief.     Pt denies new-onset numbness, weakness, bowel/bladder incontinence.  Pt denies recent infection, allergy to Latex/iodine/contrast. Patient is currently taking the following blood thinner(s): N/A    REVIEW OF SYSTEMS  Review of Systems   Constitutional: Negative.    HENT: Negative.     Eyes: Negative.    Respiratory: Negative.     Cardiovascular: Negative.    Gastrointestinal: Negative.    Endocrine: Negative.    Musculoskeletal:  Positive for arthralgias.   Skin: Negative.    Neurological: Negative.    Hematological: Negative.    Psychiatric/Behavioral: Negative.         PAST MEDICAL HISTORY  Medical History[1]  Surgical History[2]  Family History[3]    CURRENT MEDICATIONS  Current Medications[4]    ALLERGIES AND DRUG REACTIONS  Allergies[5]       OBJECTIVE  Visit Vitals  /70   Pulse 64   SpO2 99%   Smoking Status  Former       Last Recorded Pain Score (if available):           Pain Score:   7     Physical Exam  Vitals and nursing note reviewed.       General: Sitting in chair, NAD  Head: NCAT  Eyes: Sclera/conjunctiva clear, EOMI, PERRL  Nose/mouth: MMM  CV: Good distal pulses  Lungs: Good/equal chest excursion  Abdomen: Soft, ND  Ext: No cyanosis/edema  MSK: BL knees: mild edema without rubor/calor, TTP. Pain on extension/flexion BL. No laxity    Neuro: AAOx3, CN grossly nl    Psych: affect nl  Skin: no rash/lesions      REVIEW OF LABORATORY DATA  I have reviewed the following lab results:  WBC   Date Value Ref Range Status   03/29/2025 6.6 4.4 - 11.3 x10*3/uL Final     RBC   Date Value Ref Range Status   03/29/2025 4.54 4.50 - 5.90 x10*6/uL Final     Hemoglobin   Date Value Ref Range Status   03/29/2025 13.1 (L) 13.5 - 17.5 g/dL Final     Hematocrit   Date Value Ref Range Status   03/29/2025 41.0 41.0 - 52.0 % Final     MCV   Date Value Ref Range Status   03/29/2025 90 80 - 100 fL Final     MCH   Date Value Ref Range Status   03/29/2025 28.9 26.0 - 34.0 pg Final     MCHC   Date Value Ref Range Status   03/29/2025 32.0 32.0 - 36.0 g/dL Final     RDW   Date Value Ref Range Status   03/29/2025 13.2 11.5 - 14.5 % Final     Platelets   Date Value Ref Range Status   03/29/2025 206 150 - 450 x10*3/uL Final     MPV   Date Value Ref Range Status   10/18/2023 10.3 7.5 - 11.5 fL Final     Sodium   Date Value Ref Range Status   03/29/2025 139 136 - 145 mmol/L Final     Potassium   Date Value Ref Range Status   03/29/2025 4.1 3.5 - 5.3 mmol/L Final     Bicarbonate   Date Value Ref Range Status   03/29/2025 23 21 - 32 mmol/L Final     Urea Nitrogen   Date Value Ref Range Status   03/29/2025 11 6 - 23 mg/dL Final     Calcium   Date Value Ref Range Status   03/29/2025 8.3 (L) 8.6 - 10.3 mg/dL Final     Protime   Date Value Ref Range Status   09/30/2024 16.0 (H) 9.8 - 12.8 seconds Final     POC INR   Date Value Ref Range Status   10/21/2024  1.10 (A) 2.00 - 3.00 Final         REVIEW OF RADIOLOGY   I have reviewed the following:  Radiology Studies           XR BL knees 5/19/25:  No fracture or dislocation is evident.  No knee effusion is evident.  There is a trace left and small right knee joint effusion. There is  mild tricompartmental degenerative change of the left knee. There is  moderate medial femorotibial mild patellofemoral and lateral  femorotibial degenerative change of the right knee. There is a region  of mottled increased density in the distal femoral metaphysis of the  right femur likely representing a bone infarction. No soft tissue gas  or radiopaque foreign body is evident.      IMPRESSION:  As above without osseous injury evident.       ASSESSMENT & PLAN  Julio Carranza is a 65 y.o. male with PMH L2-S1 fusion, COPD, GERD, HTN, OA s/p L TRINITY, smoking, marijuana use who presents as new patient referred by ortho Dr Ricci with BL knee pain.    1) BL knees  -R>L knee pain since ~2022, mechanical, likely 2/2 OA  -Refractive to >2 y conservative tx including Tylenol, NSAIDs, rest, CSI, MYKE, m relaxants  -s/f XR BL knee- not yet complete. Reminded to obtain  -Voltaren gel QID PRN  -Schedule BL GNB w/ US to assess candidacy for RFA    2) Smoking  -Patient smokes 1/4 ppd from 2 ppd in past- applauded progress. Spent 3 min encouraging/counseling on smoking cessation as this may increase systemic inflammatory factors which may contribute to patient's chronic pain in addition to smoking as generalized health detriments.      Pt describes R>L knee pain since ~2022. Pain is worse with standing, walking, and flexing knees. Pain assoc with crepitus. Pt has tried Tylenol, NSAIDs, rest, CSI, MYKE, m relaxants without sustained relief.         Discussed procedure risks/benefits in detail with patient. Pt meets medical necessity for procedure due to failure of conservative measures. Reviewed procedural risks including bleeding, infection, nerve damage,  paralysis. Also reviewed mitigating factors such as screening for infection/blood thinner use, sterile precautions, and image-guidance when applicable. All questions answered. Pt/guardian expressed understanding and choose to proceed    Today's visit involved establishment of care and a complete examination with review of records. In the context of the complexity of this patient's chronic pain diagnosis, long-term expectations and care planning discussed. Imaging studies ordered are placed do elucidate the patient's diagnosis, but also to evaluate the patient's candidacy for procedural and surgical interventions. The risks and benefits of these potential interventions are detailed as above.         Joseluis Barrera MD  Anesthesiologist & Interventional Pain Physician   [facility] [place]  O: [phone]  F: [phone]  11:45 AM  05/19/25         [1]  Past Medical History:  Diagnosis Date   • Arthritis    • BPH (benign prostatic hyperplasia)    • CAD (coronary artery disease)    • Emphysema lung (Multi)    • Fall     tripped in shower  no injury   • GERD (gastroesophageal reflux disease)    • Hidradenitis    • History of blood transfusion     after back surgery  no reaction   • History of pulmonary embolism 04/2024    Post left total hip surgery   • History of TIA (transient ischemic attack) 2018   • HLD (hyperlipidemia)    • HTN (hypertension)    • Irritable bowel syndrome    • Local infection of the skin and subcutaneous tissue, unspecified 12/01/2015    Skin infection, bacterial   • Lung nodule    • OA (osteoarthritis)    • Other cervical disc displacement, unspecified cervical region 02/28/2019    Cervical disc herniation   • Personal history of other diseases of the digestive system 03/25/2021    History of chronic constipation   • Personal history of other diseases of the respiratory system 12/03/2014    History of chronic obstructive lung disease   • Personal history of other diseases of the  respiratory system 07/15/2016    History of acute bronchitis   • Psychophysiologic insomnia 03/25/2021    Chronic insomnia   • Umbilical hernia    • Unspecified cataract     Cataract of left eye   • Use of cane as ambulatory aid    • Wears dentures     full upper and lower   • Wears glasses    [2]  Past Surgical History:  Procedure Laterality Date   • APPENDECTOMY     • BACK SURGERY      Lower Back Surgery   • CARDIAC CATHETERIZATION  2020    Mild coronary artery disease   • CATARACT EXTRACTION Left    • FL GUIDED ASPIRATION OR INJECTION LARGE JOINT LEFT Left 10/18/2023    FL GUIDED ASPIRATION OR INJECTION LARGE JOINT LEFT 10/18/2023 [facility]   • HIP ARTHROPLASTY Left 04/2024   [3]  No family history on file.  [4]  Current Outpatient Medications   Medication Sig Dispense Refill   • acetaminophen (Tylenol) 325 mg tablet Take 1 tablet (325 mg) by mouth every 6 hours if needed for mild pain (1 - 3).     • losartan (Cozaar) 100 mg tablet Take 1 tablet (100 mg) by mouth once daily. 90 tablet 0   • primidone (Mysoline) 50 mg tablet Take 1 tablet (50 mg) by mouth once daily at bedtime. 90 tablet 1   • traZODone (Desyrel) 50 mg tablet Take 1 tablet (50 mg) by mouth once daily at bedtime. 30 tablet 0     No current facility-administered medications for this visit.   [5]  Allergies  Allergen Reactions   • Etodolac Palpitations   • Penicillins Unknown     since a child  Tolerated ceftriaxone (multiple admins) during October 2020 per [employer] review. (DAVI Gonzales, PharmD).    • Escitalopram Oxalate Palpitations     long qt   • Pollen Extracts Other        [1]  Past Medical History:  Diagnosis Date   • Arthritis    • BPH (benign prostatic hyperplasia)    • CAD (coronary artery disease)    • Emphysema lung (Multi)    • Fall     tripped in shower  no injury   • GERD (gastroesophageal reflux disease)    • Hidradenitis    • History of blood transfusion     after back surgery  no reaction   • History of pulmonary embolism 04/2024     Post left total hip surgery   • History of TIA (transient ischemic attack) 2018   • HLD (hyperlipidemia)    • HTN (hypertension)    • Irritable bowel syndrome    • Local infection of the skin and subcutaneous tissue, unspecified 12/01/2015    Skin infection, bacterial   • Lung nodule    • OA (osteoarthritis)    • Other cervical disc displacement, unspecified cervical region 02/28/2019    Cervical disc herniation   • Personal history of other diseases of the digestive system 03/25/2021    History of chronic constipation   • Personal history of other diseases of the respiratory system 12/03/2014    History of chronic obstructive lung disease   • Personal history of other diseases of the respiratory system 07/15/2016    History of acute bronchitis   • Psychophysiologic insomnia 03/25/2021    Chronic insomnia   • Umbilical hernia    • Unspecified cataract     Cataract of left eye   • Use of cane as ambulatory aid    • Wears dentures     full upper and lower   • Wears glasses    [2]  Past Surgical History:  Procedure Laterality Date   • APPENDECTOMY     • BACK SURGERY      Lower Back Surgery   • CARDIAC CATHETERIZATION  2020    Mild coronary artery disease   • CATARACT EXTRACTION Left    • FL GUIDED ASPIRATION OR INJECTION LARGE JOINT LEFT Left 10/18/2023    FL GUIDED ASPIRATION OR INJECTION LARGE JOINT LEFT 10/18/2023 [facility]   • HIP ARTHROPLASTY Left 04/2024   [3]  No family history on file.  [4]  Current Outpatient Medications   Medication Sig Dispense Refill   • acetaminophen (Tylenol) 325 mg tablet Take 1 tablet (325 mg) by mouth every 6 hours if needed for mild pain (1 - 3).     • losartan (Cozaar) 100 mg tablet Take 1 tablet (100 mg) by mouth once daily. 90 tablet 0   • primidone (Mysoline) 50 mg tablet Take 1 tablet (50 mg) by mouth once daily at bedtime. 90 tablet 1   • traZODone (Desyrel) 50 mg tablet Take 1 tablet (50 mg) by mouth once daily at bedtime. 30 tablet 0     No current facility-administered  medications for this visit.   [5]  Allergies  Allergen Reactions   • Etodolac Palpitations   • Penicillins Unknown     since a child  Tolerated ceftriaxone (multiple admins) during October 2020 per [employer] review. (DAVI Gonzales, PharmD).    • Escitalopram Oxalate Palpitations     long qt   • Pollen Extracts Other

## 2025-05-19 NOTE — PROGRESS NOTES
PAIN MANAGEMENT NEW PATIENT OFFICE NOTE    Date of Service: 5/19/2025    SUBJECTIVE    CHIEF COMPLAINT: BL knee pain    HISTORY OF PRESENT ILLNESS    Julio Carranza is a 65 y.o. male with PMH L2-S1 fusion, COPD, GERD, HTN, OA s/p L TRINITY, smoking, marijuana use who presents as new patient referred by ortho Dr iRcci with BL knee pain.    Pt describes R>L knee pain since ~2022. Pain is worse with standing, walking, and flexing knees. Pain assoc with crepitus. Pt has tried Tylenol, NSAIDs, rest, CSI, MYKE, m relaxants without sustained relief.     Pt denies new-onset numbness, weakness, bowel/bladder incontinence.  Pt denies recent infection, allergy to Latex/iodine/contrast. Patient is currently taking the following blood thinner(s): N/A    REVIEW OF SYSTEMS  Review of Systems   Constitutional: Negative.    HENT: Negative.     Eyes: Negative.    Respiratory: Negative.     Cardiovascular: Negative.    Gastrointestinal: Negative.    Endocrine: Negative.    Musculoskeletal:  Positive for arthralgias.   Skin: Negative.    Neurological: Negative.    Hematological: Negative.    Psychiatric/Behavioral: Negative.         PAST MEDICAL HISTORY  Medical History[1]  Surgical History[2]  Family History[3]    CURRENT MEDICATIONS  Current Medications[4]    ALLERGIES AND DRUG REACTIONS  Allergies[5]       OBJECTIVE  Visit Vitals  /70   Pulse 64   SpO2 99%   Smoking Status Former       Last Recorded Pain Score (if available):           Pain Score:   7     Physical Exam  Vitals and nursing note reviewed.       General: Sitting in chair, NAD  Head: NCAT  Eyes: Sclera/conjunctiva clear, EOMI, PERRL  Nose/mouth: MMM  CV: Good distal pulses  Lungs: Good/equal chest excursion  Abdomen: Soft, ND  Ext: No cyanosis/edema  MSK: BL knees: mild edema without rubor/calor, TTP. Pain on extension/flexion BL. No laxity    Neuro: AAOx3, CN grossly nl    Psych: affect nl  Skin: no rash/lesions      REVIEW OF LABORATORY DATA  I have reviewed the  following lab results:  WBC   Date Value Ref Range Status   03/29/2025 6.6 4.4 - 11.3 x10*3/uL Final     RBC   Date Value Ref Range Status   03/29/2025 4.54 4.50 - 5.90 x10*6/uL Final     Hemoglobin   Date Value Ref Range Status   03/29/2025 13.1 (L) 13.5 - 17.5 g/dL Final     Hematocrit   Date Value Ref Range Status   03/29/2025 41.0 41.0 - 52.0 % Final     MCV   Date Value Ref Range Status   03/29/2025 90 80 - 100 fL Final     MCH   Date Value Ref Range Status   03/29/2025 28.9 26.0 - 34.0 pg Final     MCHC   Date Value Ref Range Status   03/29/2025 32.0 32.0 - 36.0 g/dL Final     RDW   Date Value Ref Range Status   03/29/2025 13.2 11.5 - 14.5 % Final     Platelets   Date Value Ref Range Status   03/29/2025 206 150 - 450 x10*3/uL Final     MPV   Date Value Ref Range Status   10/18/2023 10.3 7.5 - 11.5 fL Final     Sodium   Date Value Ref Range Status   03/29/2025 139 136 - 145 mmol/L Final     Potassium   Date Value Ref Range Status   03/29/2025 4.1 3.5 - 5.3 mmol/L Final     Bicarbonate   Date Value Ref Range Status   03/29/2025 23 21 - 32 mmol/L Final     Urea Nitrogen   Date Value Ref Range Status   03/29/2025 11 6 - 23 mg/dL Final     Calcium   Date Value Ref Range Status   03/29/2025 8.3 (L) 8.6 - 10.3 mg/dL Final     Protime   Date Value Ref Range Status   09/30/2024 16.0 (H) 9.8 - 12.8 seconds Final     POC INR   Date Value Ref Range Status   10/21/2024 1.10 (A) 2.00 - 3.00 Final         REVIEW OF RADIOLOGY   I have reviewed the following:  Radiology Studies           XR BL knees 5/19/25:  No fracture or dislocation is evident.  No knee effusion is evident.  There is a trace left and small right knee joint effusion. There is  mild tricompartmental degenerative change of the left knee. There is  moderate medial femorotibial mild patellofemoral and lateral  femorotibial degenerative change of the right knee. There is a region  of mottled increased density in the distal femoral metaphysis of the  right femur  likely representing a bone infarction. No soft tissue gas  or radiopaque foreign body is evident.      IMPRESSION:  As above without osseous injury evident.       ASSESSMENT & PLAN  Julio Carranza is a 65 y.o. male with PMH L2-S1 fusion, COPD, GERD, HTN, OA s/p L TRINITY, smoking, marijuana use who presents as new patient referred by ortho Dr Ricci with BL knee pain.    1) BL knees  -R>L knee pain since ~2022, mechanical, likely 2/2 OA  -Refractive to >2 y conservative tx including Tylenol, NSAIDs, rest, CSI, MYKE, m relaxants  -s/f XR BL knee- not yet complete. Reminded to obtain  -Voltaren gel QID PRN  -Schedule BL GNB w/ US to assess candidacy for RFA    2) Smoking  -Patient smokes 1/4 ppd from 2 ppd in past- applauded progress. Spent 3 min encouraging/counseling on smoking cessation as this may increase systemic inflammatory factors which may contribute to patient's chronic pain in addition to smoking as generalized health detriments.      Pt describes R>L knee pain since ~2022. Pain is worse with standing, walking, and flexing knees. Pain assoc with crepitus. Pt has tried Tylenol, NSAIDs, rest, CSI, MYKE, m relaxants without sustained relief.         Discussed procedure risks/benefits in detail with patient. Pt meets medical necessity for procedure due to failure of conservative measures. Reviewed procedural risks including bleeding, infection, nerve damage, paralysis. Also reviewed mitigating factors such as screening for infection/blood thinner use, sterile precautions, and image-guidance when applicable. All questions answered. Pt/guardian expressed understanding and choose to proceed    Today's visit involved establishment of care and a complete examination with review of records. In the context of the complexity of this patient's chronic pain diagnosis, long-term expectations and care planning discussed. Imaging studies ordered are placed do elucidate the patient's diagnosis, but also to evaluate the patient's  candidacy for procedural and surgical interventions. The risks and benefits of these potential interventions are detailed as above.         Joseluis Barrera MD  Anesthesiologist & Interventional Pain Physician   [facility] [place]  O: [phone]  F: [phone]  11:45 AM  05/19/25         [1]   Past Medical History:  Diagnosis Date    Arthritis     BPH (benign prostatic hyperplasia)     CAD (coronary artery disease)     Emphysema lung (Multi)     Fall     tripped in shower  no injury    GERD (gastroesophageal reflux disease)     Hidradenitis     History of blood transfusion     after back surgery  no reaction    History of pulmonary embolism 04/2024    Post left total hip surgery    History of TIA (transient ischemic attack) 2018    HLD (hyperlipidemia)     HTN (hypertension)     Irritable bowel syndrome     Local infection of the skin and subcutaneous tissue, unspecified 12/01/2015    Skin infection, bacterial    Lung nodule     OA (osteoarthritis)     Other cervical disc displacement, unspecified cervical region 02/28/2019    Cervical disc herniation    Personal history of other diseases of the digestive system 03/25/2021    History of chronic constipation    Personal history of other diseases of the respiratory system 12/03/2014    History of chronic obstructive lung disease    Personal history of other diseases of the respiratory system 07/15/2016    History of acute bronchitis    Psychophysiologic insomnia 03/25/2021    Chronic insomnia    Umbilical hernia     Unspecified cataract     Cataract of left eye    Use of cane as ambulatory aid     Wears dentures     full upper and lower    Wears glasses    [2]   Past Surgical History:  Procedure Laterality Date    APPENDECTOMY      BACK SURGERY      Lower Back Surgery    CARDIAC CATHETERIZATION  2020    Mild coronary artery disease    CATARACT EXTRACTION Left     FL GUIDED ASPIRATION OR INJECTION LARGE JOINT LEFT Left 10/18/2023    FL GUIDED ASPIRATION OR  INJECTION LARGE JOINT LEFT 10/18/2023 [facility]    HIP ARTHROPLASTY Left 04/2024   [3] No family history on file.  [4]   Current Outpatient Medications   Medication Sig Dispense Refill    acetaminophen (Tylenol) 325 mg tablet Take 1 tablet (325 mg) by mouth every 6 hours if needed for mild pain (1 - 3).      losartan (Cozaar) 100 mg tablet Take 1 tablet (100 mg) by mouth once daily. 90 tablet 0    primidone (Mysoline) 50 mg tablet Take 1 tablet (50 mg) by mouth once daily at bedtime. 90 tablet 1    traZODone (Desyrel) 50 mg tablet Take 1 tablet (50 mg) by mouth once daily at bedtime. 30 tablet 0     No current facility-administered medications for this visit.   [5]   Allergies  Allergen Reactions    Etodolac Palpitations    Penicillins Unknown     since a child  Tolerated ceftriaxone (multiple admins) during October 2020 per [employer] review. (DAVI Gonzales, PharmD).     Escitalopram Oxalate Palpitations     long qt    Pollen Extracts Other

## 2025-05-19 NOTE — H&P (VIEW-ONLY)
PAIN MANAGEMENT NEW PATIENT OFFICE NOTE    Date of Service: 5/19/2025    SUBJECTIVE    CHIEF COMPLAINT: BL knee pain    HISTORY OF PRESENT ILLNESS    Julio Carranza is a 65 y.o. male with PMH L2-S1 fusion, COPD, GERD, HTN, OA s/p L TRINITY, smoking, marijuana use who presents as new patient referred by ortho Dr Ricci with BL knee pain.    Pt describes R>L knee pain since ~2022. Pain is worse with standing, walking, and flexing knees. Pain assoc with crepitus. Pt has tried Tylenol, NSAIDs, rest, CSI, MYKE, m relaxants without sustained relief.     Pt denies new-onset numbness, weakness, bowel/bladder incontinence.  Pt denies recent infection, allergy to Latex/iodine/contrast. Patient is currently taking the following blood thinner(s): N/A    REVIEW OF SYSTEMS  Review of Systems   Constitutional: Negative.    HENT: Negative.     Eyes: Negative.    Respiratory: Negative.     Cardiovascular: Negative.    Gastrointestinal: Negative.    Endocrine: Negative.    Musculoskeletal:  Positive for arthralgias.   Skin: Negative.    Neurological: Negative.    Hematological: Negative.    Psychiatric/Behavioral: Negative.         PAST MEDICAL HISTORY  Medical History[1]  Surgical History[2]  Family History[3]    CURRENT MEDICATIONS  Current Medications[4]    ALLERGIES AND DRUG REACTIONS  Allergies[5]       OBJECTIVE  Visit Vitals  /70   Pulse 64   SpO2 99%   Smoking Status Former       Last Recorded Pain Score (if available):           Pain Score:   7     Physical Exam  Vitals and nursing note reviewed.       General: Sitting in chair, NAD  Head: NCAT  Eyes: Sclera/conjunctiva clear, EOMI, PERRL  Nose/mouth: MMM  CV: Good distal pulses  Lungs: Good/equal chest excursion  Abdomen: Soft, ND  Ext: No cyanosis/edema  MSK: BL knees: mild edema without rubor/calor, TTP. Pain on extension/flexion BL. No laxity    Neuro: AAOx3, CN grossly nl    Psych: affect nl  Skin: no rash/lesions      REVIEW OF LABORATORY DATA  I have reviewed the  following lab results:  WBC   Date Value Ref Range Status   03/29/2025 6.6 4.4 - 11.3 x10*3/uL Final     RBC   Date Value Ref Range Status   03/29/2025 4.54 4.50 - 5.90 x10*6/uL Final     Hemoglobin   Date Value Ref Range Status   03/29/2025 13.1 (L) 13.5 - 17.5 g/dL Final     Hematocrit   Date Value Ref Range Status   03/29/2025 41.0 41.0 - 52.0 % Final     MCV   Date Value Ref Range Status   03/29/2025 90 80 - 100 fL Final     MCH   Date Value Ref Range Status   03/29/2025 28.9 26.0 - 34.0 pg Final     MCHC   Date Value Ref Range Status   03/29/2025 32.0 32.0 - 36.0 g/dL Final     RDW   Date Value Ref Range Status   03/29/2025 13.2 11.5 - 14.5 % Final     Platelets   Date Value Ref Range Status   03/29/2025 206 150 - 450 x10*3/uL Final     MPV   Date Value Ref Range Status   10/18/2023 10.3 7.5 - 11.5 fL Final     Sodium   Date Value Ref Range Status   03/29/2025 139 136 - 145 mmol/L Final     Potassium   Date Value Ref Range Status   03/29/2025 4.1 3.5 - 5.3 mmol/L Final     Bicarbonate   Date Value Ref Range Status   03/29/2025 23 21 - 32 mmol/L Final     Urea Nitrogen   Date Value Ref Range Status   03/29/2025 11 6 - 23 mg/dL Final     Calcium   Date Value Ref Range Status   03/29/2025 8.3 (L) 8.6 - 10.3 mg/dL Final     Protime   Date Value Ref Range Status   09/30/2024 16.0 (H) 9.8 - 12.8 seconds Final     POC INR   Date Value Ref Range Status   10/21/2024 1.10 (A) 2.00 - 3.00 Final         REVIEW OF RADIOLOGY   I have reviewed the following:  Radiology Studies           XR BL knees 5/19/25:  No fracture or dislocation is evident.  No knee effusion is evident.  There is a trace left and small right knee joint effusion. There is  mild tricompartmental degenerative change of the left knee. There is  moderate medial femorotibial mild patellofemoral and lateral  femorotibial degenerative change of the right knee. There is a region  of mottled increased density in the distal femoral metaphysis of the  right femur  likely representing a bone infarction. No soft tissue gas  or radiopaque foreign body is evident.      IMPRESSION:  As above without osseous injury evident.       ASSESSMENT & PLAN  Julio Carranza is a 65 y.o. male with PMH L2-S1 fusion, COPD, GERD, HTN, OA s/p L TRINITY, smoking, marijuana use who presents as new patient referred by ortho Dr Ricci with BL knee pain.    1) BL knees  -R>L knee pain since ~2022, mechanical, likely 2/2 OA  -Refractive to >2 y conservative tx including Tylenol, NSAIDs, rest, CSI, MYKE, m relaxants  -s/f XR BL knee- not yet complete. Reminded to obtain  -Voltaren gel QID PRN  -Schedule BL GNB w/ US to assess candidacy for RFA    2) Smoking  -Patient smokes 1/4 ppd from 2 ppd in past- applauded progress. Spent 3 min encouraging/counseling on smoking cessation as this may increase systemic inflammatory factors which may contribute to patient's chronic pain in addition to smoking as generalized health detriments.      Pt describes R>L knee pain since ~2022. Pain is worse with standing, walking, and flexing knees. Pain assoc with crepitus. Pt has tried Tylenol, NSAIDs, rest, CSI, MYKE, m relaxants without sustained relief.         Discussed procedure risks/benefits in detail with patient. Pt meets medical necessity for procedure due to failure of conservative measures. Reviewed procedural risks including bleeding, infection, nerve damage, paralysis. Also reviewed mitigating factors such as screening for infection/blood thinner use, sterile precautions, and image-guidance when applicable. All questions answered. Pt/guardian expressed understanding and choose to proceed    Today's visit involved establishment of care and a complete examination with review of records. In the context of the complexity of this patient's chronic pain diagnosis, long-term expectations and care planning discussed. Imaging studies ordered are placed do elucidate the patient's diagnosis, but also to evaluate the patient's  candidacy for procedural and surgical interventions. The risks and benefits of these potential interventions are detailed as above.         Joseluis Barrera MD  Anesthesiologist & Interventional Pain Physician   [facility]  O: [phone]  F: [phone]  11:45 AM  05/19/25         [1]   Past Medical History:  Diagnosis Date    Arthritis     BPH (benign prostatic hyperplasia)     CAD (coronary artery disease)     Emphysema lung (Multi)     Fall     tripped in shower  no injury    GERD (gastroesophageal reflux disease)     Hidradenitis     History of blood transfusion     after back surgery  no reaction    History of pulmonary embolism 04/2024    Post left total hip surgery    History of TIA (transient ischemic attack) 2018    HLD (hyperlipidemia)     HTN (hypertension)     Irritable bowel syndrome     Local infection of the skin and subcutaneous tissue, unspecified 12/01/2015    Skin infection, bacterial    Lung nodule     OA (osteoarthritis)     Other cervical disc displacement, unspecified cervical region 02/28/2019    Cervical disc herniation    Personal history of other diseases of the digestive system 03/25/2021    History of chronic constipation    Personal history of other diseases of the respiratory system 12/03/2014    History of chronic obstructive lung disease    Personal history of other diseases of the respiratory system 07/15/2016    History of acute bronchitis    Psychophysiologic insomnia 03/25/2021    Chronic insomnia    Umbilical hernia     Unspecified cataract     Cataract of left eye    Use of cane as ambulatory aid     Wears dentures     full upper and lower    Wears glasses    [2]   Past Surgical History:  Procedure Laterality Date    APPENDECTOMY      BACK SURGERY      Lower Back Surgery    CARDIAC CATHETERIZATION  2020    Mild coronary artery disease    CATARACT EXTRACTION Left     FL GUIDED ASPIRATION OR INJECTION LARGE JOINT LEFT Left 10/18/2023    FL GUIDED ASPIRATION OR  INJECTION LARGE JOINT LEFT 10/18/2023 [facility]    HIP ARTHROPLASTY Left 04/2024   [3] No family history on file.  [4]   Current Outpatient Medications   Medication Sig Dispense Refill    acetaminophen (Tylenol) 325 mg tablet Take 1 tablet (325 mg) by mouth every 6 hours if needed for mild pain (1 - 3).      losartan (Cozaar) 100 mg tablet Take 1 tablet (100 mg) by mouth once daily. 90 tablet 0    primidone (Mysoline) 50 mg tablet Take 1 tablet (50 mg) by mouth once daily at bedtime. 90 tablet 1    traZODone (Desyrel) 50 mg tablet Take 1 tablet (50 mg) by mouth once daily at bedtime. 30 tablet 0     No current facility-administered medications for this visit.   [5]   Allergies  Allergen Reactions    Etodolac Palpitations    Penicillins Unknown     since a child  Tolerated ceftriaxone (multiple admins) during October 2020 per [employer] review. (DAVI Gonzales, PharmD).     Escitalopram Oxalate Palpitations     long qt    Pollen Extracts Other

## 2025-05-20 ENCOUNTER — TELEPHONE (OUTPATIENT)
Dept: ORTHOPEDIC SURGERY | Facility: CLINIC | Age: 65
End: 2025-05-20
Payer: MEDICARE

## 2025-05-20 DIAGNOSIS — M17.11 ARTHRITIS OF RIGHT KNEE: Primary | ICD-10-CM

## 2025-05-21 PROBLEM — G89.29 BILATERAL CHRONIC KNEE PAIN: Status: ACTIVE | Noted: 2025-05-21

## 2025-05-21 PROBLEM — M17.0 BILATERAL PRIMARY OSTEOARTHRITIS OF KNEE: Status: ACTIVE | Noted: 2025-05-21

## 2025-05-21 PROBLEM — M25.561 BILATERAL CHRONIC KNEE PAIN: Status: ACTIVE | Noted: 2025-05-21

## 2025-05-21 PROBLEM — M25.562 BILATERAL CHRONIC KNEE PAIN: Status: ACTIVE | Noted: 2025-05-21

## 2025-05-21 RX ORDER — BACLOFEN 20 MG/1
TABLET ORAL
Qty: 90 TABLET | Refills: 0 | Status: SHIPPED | OUTPATIENT
Start: 2025-05-21

## 2025-05-22 DIAGNOSIS — R21 RASH: ICD-10-CM

## 2025-05-22 RX ORDER — FLUOCINONIDE 0.5 MG/G
CREAM TOPICAL 2 TIMES DAILY
Qty: 60 G | Refills: 0 | Status: SHIPPED | OUTPATIENT
Start: 2025-05-22 | End: 2026-05-22

## 2025-05-22 NOTE — TELEPHONE ENCOUNTER
Patient would like to know if he can get fluocinonide .05% cream refilled.    It has been a awhile since it has been refilled, says he is running low.     Ok to refill ?

## 2025-05-28 ENCOUNTER — APPOINTMENT (OUTPATIENT)
Dept: SURGERY | Facility: CLINIC | Age: 65
End: 2025-05-28
Payer: MEDICARE

## 2025-05-28 VITALS
HEART RATE: 80 BPM | WEIGHT: 223 LBS | HEIGHT: 74 IN | TEMPERATURE: 98.2 F | BODY MASS INDEX: 28.62 KG/M2 | DIASTOLIC BLOOD PRESSURE: 83 MMHG | SYSTOLIC BLOOD PRESSURE: 139 MMHG

## 2025-05-28 DIAGNOSIS — K42.9 UMBILICAL HERNIA WITHOUT OBSTRUCTION AND WITHOUT GANGRENE: Primary | ICD-10-CM

## 2025-05-28 PROCEDURE — 99213 OFFICE O/P EST LOW 20 MIN: CPT | Performed by: SURGERY

## 2025-05-28 PROCEDURE — 1159F MED LIST DOCD IN RCRD: CPT | Performed by: SURGERY

## 2025-05-28 PROCEDURE — 3008F BODY MASS INDEX DOCD: CPT | Performed by: SURGERY

## 2025-05-28 PROCEDURE — 4004F PT TOBACCO SCREEN RCVD TLK: CPT | Performed by: SURGERY

## 2025-05-28 PROCEDURE — 3075F SYST BP GE 130 - 139MM HG: CPT | Performed by: SURGERY

## 2025-05-28 PROCEDURE — 3079F DIAST BP 80-89 MM HG: CPT | Performed by: SURGERY

## 2025-05-28 ASSESSMENT — ENCOUNTER SYMPTOMS: ABDOMINAL PAIN: 1

## 2025-05-28 NOTE — PATIENT INSTRUCTIONS
We will follow-up with you after your knee surgery.  I will see you back in 8 weeks.  In the meantime would like you to wear a binder every day to help with your umbilical hernia

## 2025-05-28 NOTE — PROGRESS NOTES
Subjective   Patient ID: Julio Carranza is a 65 y.o. male who presents for follow-up of his umbilical hernia after being discharged in the hospital.    HPI   This the patient was admitted to the hospital with abdominal pain and colonic distention.  This all eventually resolved with time.  He was noted to have a 4 cm umbilical hernia which has been increasing in size.  The patient is on oral steroids by his account.  The patient is due to have surgery on his knees for continuing discomfort.  Medical History[1]     Medications Ordered Prior to Encounter[2]     Review of Systems   Gastrointestinal:  Positive for abdominal pain.       Vitals:    05/28/25 1356   BP: 139/83   Pulse: 80   Temp: 36.8 °C (98.2 °F)        Objective     Physical Exam  Constitutional:       Appearance: Normal appearance.   Cardiovascular:      Heart sounds: Normal heart sounds.   Pulmonary:      Breath sounds: Decreased air movement present.   Abdominal:      Palpations: Abdomen is soft. There is no mass.      Tenderness: There is no abdominal tenderness. There is no guarding.      Hernia: A hernia is present. Hernia is present in the umbilical area.      Comments: Incarcerated umbilical hernia         Problem List Items Addressed This Visit       Umbilical hernia - Primary        Assessment/Plan   Recommend binder daily.  Follow-up in 8 weeks after surgery on your knees to reassess and schedule you for hernia repair.    Jonathan Jean Baptiste MD        [1]   Past Medical History:  Diagnosis Date    Arthritis     BPH (benign prostatic hyperplasia)     CAD (coronary artery disease)     Emphysema lung (Multi)     Fall     tripped in shower  no injury    GERD (gastroesophageal reflux disease)     Hidradenitis     History of blood transfusion     after back surgery  no reaction    History of pulmonary embolism 04/2024    Post left total hip surgery    History of TIA (transient ischemic attack) 2018    HLD (hyperlipidemia)     HTN (hypertension)      Irritable bowel syndrome     Local infection of the skin and subcutaneous tissue, unspecified 12/01/2015    Skin infection, bacterial    Lung nodule     OA (osteoarthritis)     Other cervical disc displacement, unspecified cervical region 02/28/2019    Cervical disc herniation    Personal history of other diseases of the digestive system 03/25/2021    History of chronic constipation    Personal history of other diseases of the respiratory system 12/03/2014    History of chronic obstructive lung disease    Personal history of other diseases of the respiratory system 07/15/2016    History of acute bronchitis    Psychophysiologic insomnia 03/25/2021    Chronic insomnia    Umbilical hernia     Unspecified cataract     Cataract of left eye    Use of cane as ambulatory aid     Wears dentures     full upper and lower    Wears glasses    [2]   Current Outpatient Medications on File Prior to Visit   Medication Sig Dispense Refill    acetaminophen (Tylenol) 325 mg tablet Take 1 tablet (325 mg) by mouth every 6 hours if needed for mild pain (1 - 3).      baclofen (Lioresal) 20 mg tablet Take 1 Tablet orally 3 times per day for muscle spasm. 90 tablet 0    fluocinonide 0.05 % cream Apply topically 2 times a day. Apply to affected area 60 g 0    losartan (Cozaar) 100 mg tablet Take 1 tablet (100 mg) by mouth once daily. 90 tablet 0    primidone (Mysoline) 50 mg tablet Take 1 tablet (50 mg) by mouth once daily at bedtime. 90 tablet 1    traZODone (Desyrel) 50 mg tablet Take 1 tablet (50 mg) by mouth once daily at bedtime. 30 tablet 0     No current facility-administered medications on file prior to visit.

## 2025-06-03 DIAGNOSIS — J43.9 PULMONARY EMPHYSEMA, UNSPECIFIED EMPHYSEMA TYPE (MULTI): ICD-10-CM

## 2025-06-03 RX ORDER — ALBUTEROL SULFATE 90 UG/1
2 INHALANT RESPIRATORY (INHALATION) EVERY 4 HOURS PRN
Qty: 18 G | Refills: 0 | Status: SHIPPED | OUTPATIENT
Start: 2025-06-03 | End: 2026-06-03

## 2025-06-10 DIAGNOSIS — F51.01 PRIMARY INSOMNIA: ICD-10-CM

## 2025-06-10 RX ORDER — TRAZODONE HYDROCHLORIDE 50 MG/1
50 TABLET ORAL NIGHTLY
Qty: 90 TABLET | Refills: 0 | Status: SHIPPED | OUTPATIENT
Start: 2025-06-10

## 2025-06-12 ENCOUNTER — HOSPITAL ENCOUNTER (OUTPATIENT)
Dept: GASTROENTEROLOGY | Facility: HOSPITAL | Age: 65
Discharge: HOME | End: 2025-06-12
Payer: MEDICARE

## 2025-06-12 VITALS
SYSTOLIC BLOOD PRESSURE: 121 MMHG | BODY MASS INDEX: 28.62 KG/M2 | RESPIRATION RATE: 16 BRPM | TEMPERATURE: 97.7 F | OXYGEN SATURATION: 97 % | HEIGHT: 74 IN | WEIGHT: 223 LBS | DIASTOLIC BLOOD PRESSURE: 69 MMHG | HEART RATE: 65 BPM

## 2025-06-12 DIAGNOSIS — M25.561 BILATERAL CHRONIC KNEE PAIN: ICD-10-CM

## 2025-06-12 DIAGNOSIS — M25.562 BILATERAL CHRONIC KNEE PAIN: ICD-10-CM

## 2025-06-12 DIAGNOSIS — M17.0 BILATERAL PRIMARY OSTEOARTHRITIS OF KNEE: ICD-10-CM

## 2025-06-12 DIAGNOSIS — G89.29 BILATERAL CHRONIC KNEE PAIN: ICD-10-CM

## 2025-06-12 PROCEDURE — 64454 NJX AA&/STRD GNCLR NRV BRNCH: CPT | Mod: 50 | Performed by: ANESTHESIOLOGY

## 2025-06-12 PROCEDURE — 2500000004 HC RX 250 GENERAL PHARMACY W/ HCPCS (ALT 636 FOR OP/ED): Performed by: ANESTHESIOLOGY

## 2025-06-12 RX ORDER — BUPIVACAINE HYDROCHLORIDE 5 MG/ML
INJECTION, SOLUTION EPIDURAL; INTRACAUDAL; PERINEURAL AS NEEDED
Status: COMPLETED | OUTPATIENT
Start: 2025-06-12 | End: 2025-06-12

## 2025-06-12 RX ADMIN — BUPIVACAINE HYDROCHLORIDE 6 ML: 5 INJECTION, SOLUTION EPIDURAL; INTRACAUDAL; PERINEURAL at 10:33

## 2025-06-12 ASSESSMENT — COLUMBIA-SUICIDE SEVERITY RATING SCALE - C-SSRS
1. IN THE PAST MONTH, HAVE YOU WISHED YOU WERE DEAD OR WISHED YOU COULD GO TO SLEEP AND NOT WAKE UP?: NO
6. HAVE YOU EVER DONE ANYTHING, STARTED TO DO ANYTHING, OR PREPARED TO DO ANYTHING TO END YOUR LIFE?: NO
2. HAVE YOU ACTUALLY HAD ANY THOUGHTS OF KILLING YOURSELF?: NO

## 2025-06-12 ASSESSMENT — PAIN SCALES - GENERAL
PAINLEVEL_OUTOF10: 2
PAINLEVEL_OUTOF10: 7

## 2025-06-12 ASSESSMENT — PAIN DESCRIPTION - DESCRIPTORS: DESCRIPTORS: BURNING;THROBBING

## 2025-06-12 ASSESSMENT — PAIN - FUNCTIONAL ASSESSMENT
PAIN_FUNCTIONAL_ASSESSMENT: 0-10
PAIN_FUNCTIONAL_ASSESSMENT: 0-10

## 2025-06-12 NOTE — DISCHARGE INSTRUCTIONS
DISCHARGE INSTRUCTIONS FOR INJECTIONS     You underwent bilateral genicular nerve blocks today    Aftermost injections, it is recommended that you relax and limit your activity for the remainder of the day unless you have been told otherwise by your pain physician.  You should not drive a car, operate machinery, or make important legal decisions unless otherwise directed by your pain physician.  You may resume your normal activity, including exercise, tomorrow.      Keep a written pain diary of how much pain relief you experienced following the injection procedure and the length of time of pain relief you experienced pain relief. Following diagnostic injections like medial branch nerve blocks, sacroiliac joint blocks, stellate ganglion injections and other blocks, it is very important you record the specific amount of pain relief you experienced immediately after the injectionand how long it lasted. Your doctor will ask you for this information at your follow up visit.     For all injections, please keep the injection site dry and inspect the site for a couple of days. You may remove the Band-Aid the day of the injection at any time.     Some discomfort, bruising or slight swelling may occur at the injection site. This is not abnormal if it occurs.  If needed you may:    -Take over the counter medication such as Tylenol or Motrin.   -Apply an ice pack for 30 minutes, 2 to 3 times a day for the first 24 hours.     You may shower today; no soaking baths, hot tubs, whirlpools or swimming pools for two days.      If you are given steroids in your injection, it may take 3-5 days for the steroid medication to take effect. You may notice a worsening of your symptoms for 1-2 days after the injection. This is not abnormal.  You may use acetaminophen, ibuprofen, or prescription medication that your doctor may have prescribed for you if you need to do so.     A few common side effects of steroids include facial flushing,  sweating, restlessness, irritability,difficulty sleeping, increase in blood sugar, and increased blood pressure. If you have diabetes, please monitor your blood sugar at least once a day for at least 5 days. If you have poorly controlled high blood pressure, monitoryour blood pressure for at least 2 days and contact your primary care physician if these numbers are unusually high for you.      If you take aspirin or non-steroidal anti-inflammatory drugs (examples are Motrin, Advil, ibuprofen, Naprosyn, Voltaren, Relafen, etc.) you may restart these this evening, but stop taking it 3 days before your next appointment, unless instructed otherwiseby your physician.      You do not need to discontinue non-aspirin-containing pain medications prior to an injection (examples: Celebrex, tramadol, hydrocodone and acetaminophen).      If you take a blood thinning medication (Coumadin, Lovenox, Fragmin,Ticlid, Plavix, Pradaxa, etc.), please discuss this with your primary care physician/cardiologist and your pain physician. These medications MUST be discontinued before you can have an injection safely, without the risk of uncontrolled bleeding. If these medications are not discontinued for an appropriate period of time, you will not be able to receivean injection.      If you are taking Coumadin, please have your INR checked the morning of your procedure and bringthe result to your appointment unless otherwise instructed. If your INR is over 1.2, your injection may need to be rescheduled to avoid uncontrolled bleeding from the needle placement.     Call On license of [facility] at [phone] between 8am-4pm Monday - Friday if you are experiencing the following:    If you received an epidural or spinal injection:    -Headache that doesnot go away with medicine, is worse when sitting or standing up, and is greatly relieved upon lying down.   -Severe pain worse than or different than your baseline pain.   -Chills or fever  (101º F or greater).   -Drainage or signs of infection at the injection site     Go directly to the Emergency Department if you are experiencing the following and received an epidural or spinal injection:   -Abrupt weakness or progressive weakness in your legs that starts after you leave the clinic.   -Abrupt severe or worsening numbness in your legs.   -Inability to urinate after the injection or loss of bowel or bladder control without the urge to defecate or urinate.     If you have a clinical question that cannot wait until your next appointment, please call [phone] between 8am-4pm Monday - Friday or send a "[employer]" message. We do our best to return all non-emergency messages within 24 hours, Monday - Friday. A nurse or physician will return your message.      If you need to cancel an appointment, please call the scheduling staff at [phone] during normal business hours or leave a message at least 24 hours in advance.     If you are going to be sedated for your next procedure, you MUST have responsible adult who can legally drive accompany you home. You cannot eat or drink for eight hours prior to the planned procedure if you are going to receive sedation. You may take your non-blood thinning medications with a small sip of water.

## 2025-06-12 NOTE — INTERVAL H&P NOTE
H&P reviewed. The patient was examined and there are no changes to the H&P.  Julio Carranza is a 65 y.o. male with PMH L2-S1 fusion, COPD, GERD, HTN, OA s/p L TRINITY, smoking, marijuana use who presents for  GNB w/ US to assess candidacy for RFA. Patient's pain stable and persistent from last visit.  Denies allergies to Latex, steroids, local anesthetics, or iodine/contrast. Denies being on blood thinners. Not diabetic.  Denies fever, chills, NS, CP, SOB, cough, N/V.    Discussed procedure risks/benefits in detail with patient. Pt meets medical necessity for procedure due to failure of conservative measures. Reviewed procedural risks including bleeding, infection, nerve damage, paralysis. Also reviewed mitigating factors such as screening for infection/blood thinner use, sterile precautions, and image-guidance when applicable. All questions answered. Pt/guardian expressed understanding and choose to proceed      Joseluis Barrera MD  Anesthesiologist & Interventional Pain Physician   [facility]  O: [phone]  F: [phone]  9:49 AM  06/12/25

## 2025-06-12 NOTE — POST-PROCEDURE NOTE
Ambulatory discharge per patient request. Accompanied by RN to waiting room. All belongings with patient.

## 2025-06-17 DIAGNOSIS — J43.9 PULMONARY EMPHYSEMA, UNSPECIFIED EMPHYSEMA TYPE (MULTI): ICD-10-CM

## 2025-06-17 RX ORDER — ALBUTEROL SULFATE 90 UG/1
2 INHALANT RESPIRATORY (INHALATION) EVERY 4 HOURS PRN
Qty: 18 G | Refills: 0 | Status: SHIPPED | OUTPATIENT
Start: 2025-06-17 | End: 2026-06-17

## 2025-06-23 ENCOUNTER — OFFICE VISIT (OUTPATIENT)
Dept: PAIN MEDICINE | Facility: CLINIC | Age: 65
End: 2025-06-23
Payer: MEDICARE

## 2025-06-23 VITALS — SYSTOLIC BLOOD PRESSURE: 128 MMHG | OXYGEN SATURATION: 98 % | DIASTOLIC BLOOD PRESSURE: 60 MMHG | HEART RATE: 61 BPM

## 2025-06-23 DIAGNOSIS — G89.29 BILATERAL CHRONIC KNEE PAIN: ICD-10-CM

## 2025-06-23 DIAGNOSIS — G89.29 CHRONIC PAIN OF RIGHT KNEE: ICD-10-CM

## 2025-06-23 DIAGNOSIS — M17.11 PRIMARY OSTEOARTHRITIS OF RIGHT KNEE: Primary | ICD-10-CM

## 2025-06-23 DIAGNOSIS — M25.562 BILATERAL CHRONIC KNEE PAIN: ICD-10-CM

## 2025-06-23 DIAGNOSIS — M25.561 BILATERAL CHRONIC KNEE PAIN: ICD-10-CM

## 2025-06-23 DIAGNOSIS — M25.561 CHRONIC PAIN OF RIGHT KNEE: ICD-10-CM

## 2025-06-23 PROCEDURE — 1159F MED LIST DOCD IN RCRD: CPT | Performed by: ANESTHESIOLOGY

## 2025-06-23 PROCEDURE — 99214 OFFICE O/P EST MOD 30 MIN: CPT | Performed by: ANESTHESIOLOGY

## 2025-06-23 PROCEDURE — 3078F DIAST BP <80 MM HG: CPT | Performed by: ANESTHESIOLOGY

## 2025-06-23 PROCEDURE — 1160F RVW MEDS BY RX/DR IN RCRD: CPT | Performed by: ANESTHESIOLOGY

## 2025-06-23 PROCEDURE — G2211 COMPLEX E/M VISIT ADD ON: HCPCS | Performed by: ANESTHESIOLOGY

## 2025-06-23 PROCEDURE — 3074F SYST BP LT 130 MM HG: CPT | Performed by: ANESTHESIOLOGY

## 2025-06-23 PROCEDURE — 1125F AMNT PAIN NOTED PAIN PRSNT: CPT | Performed by: ANESTHESIOLOGY

## 2025-06-23 ASSESSMENT — PAIN DESCRIPTION - DESCRIPTORS: DESCRIPTORS: BURNING;ACHING;OTHER (COMMENT)

## 2025-06-23 ASSESSMENT — PATIENT HEALTH QUESTIONNAIRE - PHQ9
1. LITTLE INTEREST OR PLEASURE IN DOING THINGS: NOT AT ALL
2. FEELING DOWN, DEPRESSED OR HOPELESS: NOT AT ALL
SUM OF ALL RESPONSES TO PHQ9 QUESTIONS 1 AND 2: 0

## 2025-06-23 ASSESSMENT — ENCOUNTER SYMPTOMS
NEUROLOGICAL NEGATIVE: 1
CARDIOVASCULAR NEGATIVE: 1
ENDOCRINE NEGATIVE: 1
GASTROINTESTINAL NEGATIVE: 1
PSYCHIATRIC NEGATIVE: 1
RESPIRATORY NEGATIVE: 1
HEMATOLOGIC/LYMPHATIC NEGATIVE: 1
OCCASIONAL FEELINGS OF UNSTEADINESS: 0
LOSS OF SENSATION IN FEET: 0
EYES NEGATIVE: 1
ARTHRALGIAS: 1
DEPRESSION: 0
CONSTITUTIONAL NEGATIVE: 1

## 2025-06-23 ASSESSMENT — PAIN SCALES - GENERAL
PAINLEVEL_OUTOF10: 7
PAINLEVEL_OUTOF10: 7

## 2025-06-23 ASSESSMENT — PAIN - FUNCTIONAL ASSESSMENT: PAIN_FUNCTIONAL_ASSESSMENT: 0-10

## 2025-06-23 NOTE — PROGRESS NOTES
PAIN MANAGEMENT FOLLOW-UP OFFICE NOTE    Date of Service: 6/23/2025    SUBJECTIVE    CHIEF COMPLAINT: BL knee pain    HISTORY OF PRESENT ILLNESS    Julio Carranza is a 65 y.o. male pt of Dr Ricci with PMH L2-S1 fusion, COPD, GERD, HTN, OA s/p L TRINITY, smoking, marijuana use who presents for f/u    On 6/12, pt underwent BL GNB with 70% relief for 5 d. Denies SE or issues. R knee continues to be worse than L    Pt denies new-onset numbness, weakness, bowel/bladder incontinence.  Pt denies recent infection, allergy to Latex/iodine/contrast. Patient is currently taking the following blood thinner(s): N/A    Procedure log:  -BL GNB 6/12/25: 70% relief    REVIEW OF SYSTEMS  Review of Systems   Constitutional: Negative.    HENT: Negative.     Eyes: Negative.    Respiratory: Negative.     Cardiovascular: Negative.    Gastrointestinal: Negative.    Endocrine: Negative.    Musculoskeletal:  Positive for arthralgias.   Skin: Negative.    Neurological: Negative.    Hematological: Negative.    Psychiatric/Behavioral: Negative.         PAST MEDICAL HISTORY  Medical History[1]  Surgical History[2]  Family History[3]    CURRENT MEDICATIONS  Current Medications[4]    ALLERGIES AND DRUG REACTIONS  Allergies[5]       OBJECTIVE  Visit Vitals  /60   Pulse 61   SpO2 98%   Smoking Status Every Day       Last Recorded Pain Score (if available):           Pain Score:   7     Physical Exam  Vitals and nursing note reviewed.       General: Sitting in chair, NAD  Head: NCAT  Eyes: Sclera/conjunctiva clear, EOMI, PERRL  Nose/mouth: MMM  CV: Good distal pulses  Lungs: Good/equal chest excursion  Abdomen: Soft, ND  Ext: No cyanosis/edema  MSK: BL knees: mild edema without rubor/calor, TTP. Pain on extension/flexion BL. No laxity    Neuro: AAOx3, CN grossly nl    Psych: affect nl  Skin: no rash/lesions      REVIEW OF LABORATORY DATA  I have reviewed the following lab results:  WBC   Date Value Ref Range Status   03/29/2025 6.6 4.4 - 11.3  x10*3/uL Final     RBC   Date Value Ref Range Status   03/29/2025 4.54 4.50 - 5.90 x10*6/uL Final     Hemoglobin   Date Value Ref Range Status   03/29/2025 13.1 (L) 13.5 - 17.5 g/dL Final     Hematocrit   Date Value Ref Range Status   03/29/2025 41.0 41.0 - 52.0 % Final     MCV   Date Value Ref Range Status   03/29/2025 90 80 - 100 fL Final     MCH   Date Value Ref Range Status   03/29/2025 28.9 26.0 - 34.0 pg Final     MCHC   Date Value Ref Range Status   03/29/2025 32.0 32.0 - 36.0 g/dL Final     RDW   Date Value Ref Range Status   03/29/2025 13.2 11.5 - 14.5 % Final     Platelets   Date Value Ref Range Status   03/29/2025 206 150 - 450 x10*3/uL Final     MPV   Date Value Ref Range Status   10/18/2023 10.3 7.5 - 11.5 fL Final     Sodium   Date Value Ref Range Status   03/29/2025 139 136 - 145 mmol/L Final     Potassium   Date Value Ref Range Status   03/29/2025 4.1 3.5 - 5.3 mmol/L Final     Bicarbonate   Date Value Ref Range Status   03/29/2025 23 21 - 32 mmol/L Final     Urea Nitrogen   Date Value Ref Range Status   03/29/2025 11 6 - 23 mg/dL Final     Calcium   Date Value Ref Range Status   03/29/2025 8.3 (L) 8.6 - 10.3 mg/dL Final     Protime   Date Value Ref Range Status   09/30/2024 16.0 (H) 9.8 - 12.8 seconds Final     POC INR   Date Value Ref Range Status   10/21/2024 1.10 (A) 2.00 - 3.00 Final         REVIEW OF RADIOLOGY   I have reviewed the following:  Radiology Studies           XR BL knees 5/19/25:  No fracture or dislocation is evident.  No knee effusion is evident.  There is a trace left and small right knee joint effusion. There is  mild tricompartmental degenerative change of the left knee. There is  moderate medial femorotibial mild patellofemoral and lateral  femorotibial degenerative change of the right knee. There is a region  of mottled increased density in the distal femoral metaphysis of the  right femur likely representing a bone infarction. No soft tissue gas  or radiopaque foreign body  is evident.      IMPRESSION:  As above without osseous injury evident.       ASSESSMENT & PLAN  Julio Carranza is a 65 y.o. male with PMH L2-S1 fusion, COPD, GERD, HTN, OA s/p L TRINITY, smoking, marijuana use who presents as new patient referred by ortho Dr Ricci with BL knee pain.    1) BL knees  -R>L knee pain since ~2022, mechanical, likely 2/2 OA  -Refractive to >2 y conservative tx including Tylenol, NSAIDs, rest, CSI, MYKE, m relaxants. Non-adherent to Voltare  -Reviewed/discussed XR BL knee 5/19/25: Mild L knee OA, mod R knee OA, R distal femoral metaphysis infarct  -BL GNB 6/12/25: 70% relief  -Schedule R GRFA w/ IV sed to target pain generator as seen on imaging and minimize risk/likelihood of chronic opioid use and/or surgery. Consider L GRFA afterwards    2) Smoking  -Patient smokes 1/4 ppd from 2 ppd in past- applauded progress. Spent 3 min encouraging/counseling on smoking cessation 5/19/25 as this may increase systemic inflammatory factors which may contribute to patient's chronic pain in addition to smoking as generalized health detriments.              Discussed procedure risks/benefits in detail with patient. Pt meets medical necessity for procedure due to failure of conservative measures. Reviewed procedural risks including bleeding, infection, nerve damage, paralysis. Also reviewed mitigating factors such as screening for infection/blood thinner use, sterile precautions, and image-guidance when applicable. All questions answered. Pt/guardian expressed understanding and choose to proceed    Today's visit involved continuation of chronic pain care. In the context of the complexity of this patient's chronic pain diagnosis, long-term expectations and care planning discussed. Adequate time taken to ensure patient understanding and answer questions. Imaging studies ordered are placed do elucidate the patient's diagnosis, but also to evaluate the patient's candidacy for procedural and surgical interventions.  The risks and benefits of these potential interventions are detailed as above.           Joseluis Barrera MD  Anesthesiologist & Interventional Pain Physician   [facility] [place]  O: [phone]  F: [phone]  9:56 AM  06/23/25         [1]   Past Medical History:  Diagnosis Date    Arthritis     BPH (benign prostatic hyperplasia)     CAD (coronary artery disease)     Emphysema lung (Multi)     Fall     tripped in shower  no injury    GERD (gastroesophageal reflux disease)     Hidradenitis     History of blood transfusion     after back surgery  no reaction    History of pulmonary embolism 04/2024    Post left total hip surgery    History of TIA (transient ischemic attack) 2018    HLD (hyperlipidemia)     HTN (hypertension)     Irritable bowel syndrome     Local infection of the skin and subcutaneous tissue, unspecified 12/01/2015    Skin infection, bacterial    Lung nodule     OA (osteoarthritis)     Other cervical disc displacement, unspecified cervical region 02/28/2019    Cervical disc herniation    Personal history of other diseases of the digestive system 03/25/2021    History of chronic constipation    Personal history of other diseases of the respiratory system 12/03/2014    History of chronic obstructive lung disease    Personal history of other diseases of the respiratory system 07/15/2016    History of acute bronchitis    Psychophysiologic insomnia 03/25/2021    Chronic insomnia    Umbilical hernia     Unspecified cataract     Cataract of left eye    Use of cane as ambulatory aid     Wears dentures     full upper and lower    Wears glasses    [2]   Past Surgical History:  Procedure Laterality Date    APPENDECTOMY      BACK SURGERY      Lower Back Surgery    CARDIAC CATHETERIZATION  2020    Mild coronary artery disease    CATARACT EXTRACTION Left     FL GUIDED ASPIRATION OR INJECTION LARGE JOINT LEFT Left 10/18/2023    FL GUIDED ASPIRATION OR INJECTION LARGE JOINT LEFT 10/18/2023 [facility]     HIP ARTHROPLASTY Left 04/2024   [3] No family history on file.  [4]   Current Outpatient Medications   Medication Sig Dispense Refill    acetaminophen (Tylenol) 325 mg tablet Take 1 tablet (325 mg) by mouth every 6 hours if needed for mild pain (1 - 3).      albuterol (ProAir HFA) 90 mcg/actuation inhaler Inhale 2 puffs every 4 hours if needed for wheezing or shortness of breath. 18 g 0    baclofen (Lioresal) 20 mg tablet Take 1 Tablet orally 3 times per day for muscle spasm. 90 tablet 0    fluocinonide 0.05 % cream Apply topically 2 times a day. Apply to affected area 60 g 0    losartan (Cozaar) 100 mg tablet Take 1 tablet (100 mg) by mouth once daily. 90 tablet 0    primidone (Mysoline) 50 mg tablet Take 1 tablet (50 mg) by mouth once daily at bedtime. 90 tablet 1    traZODone (Desyrel) 50 mg tablet Take 1 tablet (50 mg) by mouth once daily at bedtime. 90 tablet 0     No current facility-administered medications for this visit.   [5]   Allergies  Allergen Reactions    Etodolac Palpitations    Penicillins Unknown     since a child  Tolerated ceftriaxone (multiple admins) during October 2020 per [employer] review. (DAVI Gonzales, PharmD).     Escitalopram Oxalate Palpitations     long qt    Pollen Extracts Other

## 2025-06-30 DIAGNOSIS — J43.9 PULMONARY EMPHYSEMA, UNSPECIFIED EMPHYSEMA TYPE (MULTI): ICD-10-CM

## 2025-06-30 RX ORDER — ALBUTEROL SULFATE 90 UG/1
2 INHALANT RESPIRATORY (INHALATION) EVERY 4 HOURS PRN
Qty: 18 G | Refills: 0 | Status: SHIPPED | OUTPATIENT
Start: 2025-06-30 | End: 2026-06-30

## 2025-07-14 DIAGNOSIS — J43.9 PULMONARY EMPHYSEMA, UNSPECIFIED EMPHYSEMA TYPE (MULTI): ICD-10-CM

## 2025-07-14 RX ORDER — ALBUTEROL SULFATE 90 UG/1
2 INHALANT RESPIRATORY (INHALATION) EVERY 4 HOURS PRN
Qty: 18 G | Refills: 0 | Status: SHIPPED | OUTPATIENT
Start: 2025-07-14 | End: 2026-07-14

## 2025-07-21 ENCOUNTER — TELEPHONE (OUTPATIENT)
Dept: ORTHOPEDIC SURGERY | Facility: CLINIC | Age: 65
End: 2025-07-21
Payer: MEDICARE

## 2025-07-21 DIAGNOSIS — M17.11 ARTHRITIS OF RIGHT KNEE: ICD-10-CM

## 2025-07-21 NOTE — TELEPHONE ENCOUNTER
We received a refill request for this patients Baclofen 20mg-is this something he will remain on for good?

## 2025-07-22 RX ORDER — BACLOFEN 20 MG/1
TABLET ORAL
Qty: 90 TABLET | Refills: 0 | Status: SHIPPED | OUTPATIENT
Start: 2025-07-22

## 2025-07-24 ENCOUNTER — HOSPITAL ENCOUNTER (OUTPATIENT)
Dept: GASTROENTEROLOGY | Facility: HOSPITAL | Age: 65
Discharge: HOME | End: 2025-07-24
Payer: MEDICARE

## 2025-07-24 ENCOUNTER — APPOINTMENT (OUTPATIENT)
Dept: GASTROENTEROLOGY | Facility: HOSPITAL | Age: 65
End: 2025-07-24
Payer: MEDICARE

## 2025-07-24 VITALS
DIASTOLIC BLOOD PRESSURE: 88 MMHG | BODY MASS INDEX: 28.23 KG/M2 | TEMPERATURE: 97.5 F | HEART RATE: 71 BPM | OXYGEN SATURATION: 97 % | WEIGHT: 220 LBS | RESPIRATION RATE: 18 BRPM | HEIGHT: 74 IN | SYSTOLIC BLOOD PRESSURE: 130 MMHG

## 2025-07-24 DIAGNOSIS — M17.11 PRIMARY OSTEOARTHRITIS OF RIGHT KNEE: ICD-10-CM

## 2025-07-24 DIAGNOSIS — G89.29 CHRONIC PAIN OF RIGHT KNEE: ICD-10-CM

## 2025-07-24 DIAGNOSIS — M25.561 CHRONIC PAIN OF RIGHT KNEE: ICD-10-CM

## 2025-07-24 PROCEDURE — 7100000010 HC PHASE TWO TIME - EACH INCREMENTAL 1 MINUTE

## 2025-07-24 PROCEDURE — 2500000004 HC RX 250 GENERAL PHARMACY W/ HCPCS (ALT 636 FOR OP/ED): Performed by: ANESTHESIOLOGY

## 2025-07-24 PROCEDURE — 7100000009 HC PHASE TWO TIME - INITIAL BASE CHARGE

## 2025-07-24 PROCEDURE — 99152 MOD SED SAME PHYS/QHP 5/>YRS: CPT | Performed by: ANESTHESIOLOGY

## 2025-07-24 PROCEDURE — 3700000012 HC SEDATION LEVEL 5+ TIME - INITIAL 15 MINUTES 5/> YEARS

## 2025-07-24 PROCEDURE — 64624 DSTRJ NULYT AGT GNCLR NRV: CPT | Performed by: ANESTHESIOLOGY

## 2025-07-24 RX ORDER — MIDAZOLAM HYDROCHLORIDE 1 MG/ML
INJECTION, SOLUTION INTRAMUSCULAR; INTRAVENOUS AS NEEDED
Status: COMPLETED | OUTPATIENT
Start: 2025-07-24 | End: 2025-07-24

## 2025-07-24 RX ORDER — LIDOCAINE HYDROCHLORIDE 10 MG/ML
INJECTION, SOLUTION EPIDURAL; INFILTRATION; INTRACAUDAL; PERINEURAL AS NEEDED
Status: COMPLETED | OUTPATIENT
Start: 2025-07-24 | End: 2025-07-24

## 2025-07-24 RX ORDER — LIDOCAINE HYDROCHLORIDE 20 MG/ML
INJECTION, SOLUTION EPIDURAL; INFILTRATION; INTRACAUDAL; PERINEURAL AS NEEDED
Status: COMPLETED | OUTPATIENT
Start: 2025-07-24 | End: 2025-07-24

## 2025-07-24 RX ORDER — FENTANYL CITRATE 50 UG/ML
INJECTION, SOLUTION INTRAMUSCULAR; INTRAVENOUS AS NEEDED
Status: COMPLETED | OUTPATIENT
Start: 2025-07-24 | End: 2025-07-24

## 2025-07-24 RX ADMIN — FENTANYL CITRATE 50 MCG: 0.05 INJECTION, SOLUTION INTRAMUSCULAR; INTRAVENOUS at 08:09

## 2025-07-24 RX ADMIN — LIDOCAINE HYDROCHLORIDE 6 ML: 10 INJECTION, SOLUTION EPIDURAL; INFILTRATION; INTRACAUDAL; PERINEURAL at 08:04

## 2025-07-24 RX ADMIN — LIDOCAINE HYDROCHLORIDE 6 ML: 20 INJECTION, SOLUTION EPIDURAL; INFILTRATION; INTRACAUDAL; PERINEURAL at 08:04

## 2025-07-24 RX ADMIN — FENTANYL CITRATE 100 MCG: 50 INJECTION, SOLUTION INTRAMUSCULAR; INTRAVENOUS at 08:02

## 2025-07-24 RX ADMIN — MIDAZOLAM 2 MG: 1 INJECTION INTRAMUSCULAR; INTRAVENOUS at 08:02

## 2025-07-24 ASSESSMENT — COLUMBIA-SUICIDE SEVERITY RATING SCALE - C-SSRS
2. HAVE YOU ACTUALLY HAD ANY THOUGHTS OF KILLING YOURSELF?: NO
1. IN THE PAST MONTH, HAVE YOU WISHED YOU WERE DEAD OR WISHED YOU COULD GO TO SLEEP AND NOT WAKE UP?: NO
6. HAVE YOU EVER DONE ANYTHING, STARTED TO DO ANYTHING, OR PREPARED TO DO ANYTHING TO END YOUR LIFE?: NO

## 2025-07-24 ASSESSMENT — ENCOUNTER SYMPTOMS
RESPIRATORY NEGATIVE: 1
ENDOCRINE NEGATIVE: 1
CONSTITUTIONAL NEGATIVE: 1
NEUROLOGICAL NEGATIVE: 1
CARDIOVASCULAR NEGATIVE: 1
ARTHRALGIAS: 1
GASTROINTESTINAL NEGATIVE: 1
PSYCHIATRIC NEGATIVE: 1
EYES NEGATIVE: 1
HEMATOLOGIC/LYMPHATIC NEGATIVE: 1

## 2025-07-24 ASSESSMENT — PAIN DESCRIPTION - DESCRIPTORS
DESCRIPTORS: SORE
DESCRIPTORS: SORE
DESCRIPTORS: BURNING

## 2025-07-24 ASSESSMENT — PAIN SCALES - GENERAL
PAINLEVEL_OUTOF10: 6
PAINLEVEL_OUTOF10: 5 - MODERATE PAIN
PAINLEVEL_OUTOF10: 5 - MODERATE PAIN

## 2025-07-24 ASSESSMENT — PAIN - FUNCTIONAL ASSESSMENT
PAIN_FUNCTIONAL_ASSESSMENT: 0-10

## 2025-07-24 NOTE — H&P
"PAIN MANAGEMENT H&P    Date of Service: 7/24/2025  SUBJECTIVE    CHIEF COMPLAINT: BL knee pain    HISTORY OF PRESENT ILLNESS    Julio Carranza is a 65 y.o. male with PMH L2-S1 fusion, COPD, GERD, HTN, OA s/p L TRINITY, smoking, marijuana use  who presents for R GRFA    Pain and overall medical condition unchanged from previous visit. ASA 2. Pt is appropriately NPO. Pt denies new-onset numbness, weakness, bowel/bladder incontinence.  Pt denies recent infection/abx use, allergy to Latex/iodine/contrast. Patient is currently taking the following blood thinner(s): N/A    REVIEW OF SYSTEMS  Review of Systems   Constitutional: Negative.    HENT: Negative.     Eyes: Negative.    Respiratory: Negative.     Cardiovascular: Negative.    Gastrointestinal: Negative.    Endocrine: Negative.    Musculoskeletal:  Positive for arthralgias.   Skin: Negative.    Neurological: Negative.    Hematological: Negative.    Psychiatric/Behavioral: Negative.         PAST MEDICAL HISTORY  Medical History[1]  Surgical History[2]  Family History[3]    CURRENT MEDICATIONS  Current Medications[4]    ALLERGIES AND DRUG REACTIONS  Allergies[5]       OBJECTIVE  Visit Vitals  BP (!) 133/91   Pulse 63   Temp 36.4 °C (97.5 °F) (Temporal)   Resp 17   Ht 1.88 m (6' 2\")   Wt 99.8 kg (220 lb)   SpO2 98%   BMI 28.25 kg/m²   Smoking Status Every Day   BSA 2.28 m²     General: Lying comfortably in bed, NAD  Head: NCAT  Eyes: Sclera/conjunctiva clear, EOMI, PERRL  Nose/mouth: MMM  CV: Good distal pulses  Lungs: Good/equal chest excursion  Abdomen: Soft, ND  Ext: No cyanosis/edema  MSK: Able to move extremities  Neuro: AAOx3, grossly normal  Psych: affect nl      REVIEW OF LABORATORY DATA  I have reviewed the following lab results:  No results found for: \"WBC\", \"RBC\", \"HGB\", \"HCT\", \"MCV\", \"MCH\", \"MCHC\", \"RDW\", \"PLT\", \"MPV\"  No results found for: \"NA\", \"K\", \"CO2\", \"BUN\", \"CALCIUM\"  No results found for: \"PROTIME\", \"PTT\", \"INR\", \"FIBRINOGEN\"      REVIEW OF RADIOLOGY " DATA  I have reviewed the following:  Radiology Studies           XR BL knees 5/19/25:  No fracture or dislocation is evident.  No knee effusion is evident.  There is a trace left and small right knee joint effusion. There is  mild tricompartmental degenerative change of the left knee. There is  moderate medial femorotibial mild patellofemoral and lateral  femorotibial degenerative change of the right knee. There is a region  of mottled increased density in the distal femoral metaphysis of the  right femur likely representing a bone infarction. No soft tissue gas  or radiopaque foreign body is evident.      IMPRESSION:  As above without osseous injury evident       ASSESSMENT & PLAN  Julio Carranza is a 65 y.o. male with PMH L2-S1 fusion, COPD, GERD, HTN, OA s/p L TRINITY, smoking, marijuana use  who presents for R GRFA         1) BL knees  -R>L knee pain since ~2022, mechanical, likely 2/2 OA  -Refractive to >2 y conservative tx including Tylenol, NSAIDs, rest, CSI, MYKE, m relaxants. Non-adherent to Voltare  -XR BL knee 5/19/25: Mild L knee OA, mod R knee OA, R distal femoral metaphysis infarct  -BL GNB 6/12/25: 70% relief  -R GRFA w/ IV sed today to target pain generator as seen on imaging and minimize risk/likelihood of chronic opioid use and/or surgery.   -Schedule L GRFA w/ IV sed to target pain generator as seen on imaging and minimize risk/likelihood of chronic opioid use and/or surgery          Discussed procedure risks/benefits in detail with patient. Pt meets medical necessity for procedure due to failure of conservative measures. Reviewed procedural risks including bleeding, infection, nerve damage, paralysis. Also reviewed mitigating factors such as screening for infection/blood thinner use, sterile precautions, and image-guidance when applicable. All questions answered. Pt/guardian expressed understanding and choose to proceed            Joseluis Barrera MD  Anesthesiologist & Interventional Pain  Physician   [facility] [place]  O: [phone]  F: [phone]  7:52 AM  07/24/25         [1]   Past Medical History:  Diagnosis Date    Arthritis     BPH (benign prostatic hyperplasia)     CAD (coronary artery disease)     Emphysema lung (Multi)     Fall     tripped in shower  no injury    GERD (gastroesophageal reflux disease)     Hidradenitis     History of blood transfusion     after back surgery  no reaction    History of pulmonary embolism 04/2024    Post left total hip surgery    History of TIA (transient ischemic attack) 2018    HLD (hyperlipidemia)     HTN (hypertension)     Irritable bowel syndrome     Local infection of the skin and subcutaneous tissue, unspecified 12/01/2015    Skin infection, bacterial    Lung nodule     OA (osteoarthritis)     Other cervical disc displacement, unspecified cervical region 02/28/2019    Cervical disc herniation    Personal history of other diseases of the digestive system 03/25/2021    History of chronic constipation    Personal history of other diseases of the respiratory system 12/03/2014    History of chronic obstructive lung disease    Personal history of other diseases of the respiratory system 07/15/2016    History of acute bronchitis    Psychophysiologic insomnia 03/25/2021    Chronic insomnia    Umbilical hernia     Unspecified cataract     Cataract of left eye    Use of cane as ambulatory aid     Wears dentures     full upper and lower    Wears glasses    [2]   Past Surgical History:  Procedure Laterality Date    APPENDECTOMY      BACK SURGERY      Lower Back Surgery    CARDIAC CATHETERIZATION  2020    Mild coronary artery disease    CATARACT EXTRACTION Left     FL GUIDED ASPIRATION OR INJECTION LARGE JOINT LEFT Left 10/18/2023    FL GUIDED ASPIRATION OR INJECTION LARGE JOINT LEFT 10/18/2023 [facility]    HIP ARTHROPLASTY Left 04/2024   [3] No family history on file.  [4]   Current Outpatient Medications   Medication Sig Dispense Refill    acetaminophen  (Tylenol) 325 mg tablet Take 1 tablet (325 mg) by mouth every 6 hours if needed for mild pain (1 - 3).      albuterol (ProAir HFA) 90 mcg/actuation inhaler Inhale 2 puffs every 4 hours if needed for wheezing or shortness of breath. 18 g 0    baclofen (Lioresal) 20 mg tablet Take 1 Tablet orally 3 times per day for muscle spasm. 90 tablet 0    fluocinonide 0.05 % cream Apply topically 2 times a day. Apply to affected area 60 g 0    losartan (Cozaar) 100 mg tablet Take 1 tablet (100 mg) by mouth once daily. 90 tablet 0    primidone (Mysoline) 50 mg tablet Take 1 tablet (50 mg) by mouth once daily at bedtime. 90 tablet 1    traZODone (Desyrel) 50 mg tablet Take 1 tablet (50 mg) by mouth once daily at bedtime. 90 tablet 0     No current facility-administered medications for this encounter.   [5]   Allergies  Allergen Reactions    Etodolac Palpitations    Penicillins Unknown     since a child  Tolerated ceftriaxone (multiple admins) during October 2020 per [employer] review. (DAVI Gonzales, PharmD).     Escitalopram Oxalate Palpitations     long qt    Pollen Extracts Other

## 2025-07-24 NOTE — DISCHARGE INSTRUCTIONS
DISCHARGE INSTRUCTIONS FOR INJECTIONS     You underwent a right genicular nerve radiofrequency ablation today    Aftermost injections, it is recommended that you relax and limit your activity for the remainder of the day unless you have been told otherwise by your pain physician.  You should not drive a car, operate machinery, or make important legal decisions unless otherwise directed by your pain physician.  You may resume your normal activity, including exercise, tomorrow.      Keep a written pain diary of how much pain relief you experienced following the injection procedure and the length of time of pain relief you experienced pain relief. Following diagnostic injections like medial branch nerve blocks, sacroiliac joint blocks, stellate ganglion injections and other blocks, it is very important you record the specific amount of pain relief you experienced immediately after the injectionand how long it lasted. Your doctor will ask you for this information at your follow up visit.     For all injections, please keep the injection site dry and inspect the site for a couple of days. You may remove the Band-Aid the day of the injection at any time.     Some discomfort, bruising or slight swelling may occur at the injection site. This is not abnormal if it occurs.  If needed you may:    -Take over the counter medication such as Tylenol or Motrin.   -Apply an ice pack for 30 minutes, 2 to 3 times a day for the first 24 hours.     You may shower today; no soaking baths, hot tubs, whirlpools or swimming pools for two days.      If you are given steroids in your injection, it may take 3-5 days for the steroid medication to take effect. You may notice a worsening of your symptoms for 1-2 days after the injection. This is not abnormal.  You may use acetaminophen, ibuprofen, or prescription medication that your doctor may have prescribed for you if you need to do so.     A few common side effects of steroids include facial  flushing, sweating, restlessness, irritability,difficulty sleeping, increase in blood sugar, and increased blood pressure. If you have diabetes, please monitor your blood sugar at least once a day for at least 5 days. If you have poorly controlled high blood pressure, monitoryour blood pressure for at least 2 days and contact your primary care physician if these numbers are unusually high for you.      If you take aspirin or non-steroidal anti-inflammatory drugs (examples are Motrin, Advil, ibuprofen, Naprosyn, Voltaren, Relafen, etc.) you may restart these this evening, but stop taking it 3 days before your next appointment, unless instructed otherwiseby your physician.      You do not need to discontinue non-aspirin-containing pain medications prior to an injection (examples: Celebrex, tramadol, hydrocodone and acetaminophen).      If you take a blood thinning medication (Coumadin, Lovenox, Fragmin,Ticlid, Plavix, Pradaxa, etc.), please discuss this with your primary care physician/cardiologist and your pain physician. These medications MUST be discontinued before you can have an injection safely, without the risk of uncontrolled bleeding. If these medications are not discontinued for an appropriate period of time, you will not be able to receivean injection.      If you are taking Coumadin, please have your INR checked the morning of your procedure and bringthe result to your appointment unless otherwise instructed. If your INR is over 1.2, your injection may need to be rescheduled to avoid uncontrolled bleeding from the needle placement.     Call [facility] at [phone] between 8am-4pm Monday - Friday if you are experiencing the following:    If you received an epidural or spinal injection:    -Headache that doesnot go away with medicine, is worse when sitting or standing up, and is greatly relieved upon lying down.   -Severe pain worse than or different than your baseline pain.   -Chills  or fever (101º F or greater).   -Drainage or signs of infection at the injection site     Go directly to the Emergency Department if you are experiencing the following and received an epidural or spinal injection:   -Abrupt weakness or progressive weakness in your legs that starts after you leave the clinic.   -Abrupt severe or worsening numbness in your legs.   -Inability to urinate after the injection or loss of bowel or bladder control without the urge to defecate or urinate.     If you have a clinical question that cannot wait until your next appointment, please call [phone] between 8am-4pm Monday - Friday or send a [employer] message. We do our best to return all non-emergency messages within 24 hours, Monday - Friday. A nurse or physician will return your message.      If you need to cancel an appointment, please call the scheduling staff at [phone] during normal business hours or leave a message at least 24 hours in advance.     If you are going to be sedated for your next procedure, you MUST have responsible adult who can legally drive accompany you home. You cannot eat or drink for eight hours prior to the planned procedure if you are going to receive sedation. You may take your non-blood thinning medications with a small sip of water.

## 2025-07-24 NOTE — POST-PROCEDURE NOTE
0815-Patient brought back from procedure room. Patient awake and alert. Bandaids to right knee dry and intact. Patient states right knee pain rated 5/10, denies any weakness or decrease in sensation. Gingerale and cookies provided. Discharge instructions explained to patient and copy provided. Patient denies any questions. Patient to follow up as scheduled.    0824-Vital signs stable. Patient tolerating snack well. IV site removed, tip intact, pressure applied, site bandaged. Patient getting ready for discharge.     0826-Patient discharged via wheelchair accompanied by girlfriend and nurse.

## 2025-07-25 ENCOUNTER — APPOINTMENT (OUTPATIENT)
Dept: SURGERY | Facility: CLINIC | Age: 65
End: 2025-07-25
Payer: MEDICARE

## 2025-07-25 VITALS
SYSTOLIC BLOOD PRESSURE: 133 MMHG | HEART RATE: 73 BPM | HEIGHT: 74 IN | DIASTOLIC BLOOD PRESSURE: 84 MMHG | TEMPERATURE: 97.3 F | BODY MASS INDEX: 27.85 KG/M2 | WEIGHT: 217 LBS

## 2025-07-25 DIAGNOSIS — K42.9 UMBILICAL HERNIA WITHOUT OBSTRUCTION AND WITHOUT GANGRENE: Primary | ICD-10-CM

## 2025-07-25 DIAGNOSIS — Z79.52 ON PREDNISONE THERAPY: ICD-10-CM

## 2025-07-25 PROCEDURE — 1160F RVW MEDS BY RX/DR IN RCRD: CPT | Performed by: SURGERY

## 2025-07-25 PROCEDURE — 3079F DIAST BP 80-89 MM HG: CPT | Performed by: SURGERY

## 2025-07-25 PROCEDURE — 1159F MED LIST DOCD IN RCRD: CPT | Performed by: SURGERY

## 2025-07-25 PROCEDURE — 99213 OFFICE O/P EST LOW 20 MIN: CPT | Performed by: SURGERY

## 2025-07-25 PROCEDURE — 3075F SYST BP GE 130 - 139MM HG: CPT | Performed by: SURGERY

## 2025-07-25 PROCEDURE — 3008F BODY MASS INDEX DOCD: CPT | Performed by: SURGERY

## 2025-07-25 NOTE — PROGRESS NOTES
Subjective   Patient ID: Julio Carranza is a 65 y.o. male who presents for follow-up of umbilical hernia.    HPI   Patient doing well with binder.  She has had some right knee pain injections.  Having minimal pain.  Still on prednisone  Medical History[1]     Medications Ordered Prior to Encounter[2]     Review of Systems   All other systems reviewed and are negative.      Vitals:    07/25/25 1141   BP: 133/84   Pulse: 73   Temp: 36.3 °C (97.3 °F)        Objective     Physical Exam  Constitutional:       Appearance: Normal appearance.   Abdominal:      Palpations: Abdomen is soft. There is no mass.      Tenderness: There is no abdominal tenderness. There is no guarding.      Hernia: A hernia is present. Hernia is present in the umbilical area.      Comments: Reducible umbilical hernia         Problem List Items Addressed This Visit       On prednisone therapy    Umbilical hernia - Primary        Assessment/Plan   Plan-continue binder.  Consider weaning steroids.  Follow-up in 3 months    Jonathan Jean Baptiste MD        [1]   Past Medical History:  Diagnosis Date    Arthritis     BPH (benign prostatic hyperplasia)     CAD (coronary artery disease)     Emphysema lung (Multi)     Fall     tripped in shower  no injury    GERD (gastroesophageal reflux disease)     Hidradenitis     History of blood transfusion     after back surgery  no reaction    History of pulmonary embolism 04/2024    Post left total hip surgery    History of TIA (transient ischemic attack) 2018    HLD (hyperlipidemia)     HTN (hypertension)     Irritable bowel syndrome     Local infection of the skin and subcutaneous tissue, unspecified 12/01/2015    Skin infection, bacterial    Lung nodule     OA (osteoarthritis)     Other cervical disc displacement, unspecified cervical region 02/28/2019    Cervical disc herniation    Personal history of other diseases of the digestive system 03/25/2021    History of chronic constipation    Personal history of other  diseases of the respiratory system 12/03/2014    History of chronic obstructive lung disease    Personal history of other diseases of the respiratory system 07/15/2016    History of acute bronchitis    Psychophysiologic insomnia 03/25/2021    Chronic insomnia    Umbilical hernia     Unspecified cataract     Cataract of left eye    Use of cane as ambulatory aid     Wears dentures     full upper and lower    Wears glasses    [2]   Current Outpatient Medications on File Prior to Visit   Medication Sig Dispense Refill    acetaminophen (Tylenol) 325 mg tablet Take 1 tablet (325 mg) by mouth every 6 hours if needed for mild pain (1 - 3).      albuterol (ProAir HFA) 90 mcg/actuation inhaler Inhale 2 puffs every 4 hours if needed for wheezing or shortness of breath. 18 g 0    baclofen (Lioresal) 20 mg tablet Take 1 Tablet orally 3 times per day for muscle spasm. 90 tablet 0    fluocinonide 0.05 % cream Apply topically 2 times a day. Apply to affected area 60 g 0    losartan (Cozaar) 100 mg tablet Take 1 tablet (100 mg) by mouth once daily. 90 tablet 0    primidone (Mysoline) 50 mg tablet Take 1 tablet (50 mg) by mouth once daily at bedtime. 90 tablet 1    traZODone (Desyrel) 50 mg tablet Take 1 tablet (50 mg) by mouth once daily at bedtime. 90 tablet 0    [DISCONTINUED] baclofen (Lioresal) 20 mg tablet Take 1 Tablet orally 3 times per day for muscle spasm. 90 tablet 0     No current facility-administered medications on file prior to visit.

## 2025-07-25 NOTE — PATIENT INSTRUCTIONS
You been doing well with the binder.  He was to follow conservatively for now.  I will see back in 3 months.  It would be appropriate to try to wean your steroids prior to surgery.

## 2025-07-28 DIAGNOSIS — J43.9 PULMONARY EMPHYSEMA, UNSPECIFIED EMPHYSEMA TYPE (MULTI): ICD-10-CM

## 2025-07-28 RX ORDER — ALBUTEROL SULFATE 90 UG/1
2 INHALANT RESPIRATORY (INHALATION) EVERY 4 HOURS PRN
Qty: 18 G | Refills: 0 | Status: SHIPPED | OUTPATIENT
Start: 2025-07-28 | End: 2026-07-28

## 2025-07-29 ENCOUNTER — PREP FOR PROCEDURE (OUTPATIENT)
Dept: RADIOLOGY | Facility: EXTERNAL LOCATION | Age: 65
End: 2025-07-29
Payer: MEDICARE

## 2025-07-29 DIAGNOSIS — M25.562 BILATERAL CHRONIC KNEE PAIN: Primary | ICD-10-CM

## 2025-07-29 DIAGNOSIS — M25.561 BILATERAL CHRONIC KNEE PAIN: Primary | ICD-10-CM

## 2025-07-29 DIAGNOSIS — G89.29 BILATERAL CHRONIC KNEE PAIN: Primary | ICD-10-CM

## 2025-08-11 DIAGNOSIS — J43.9 PULMONARY EMPHYSEMA, UNSPECIFIED EMPHYSEMA TYPE (MULTI): ICD-10-CM

## 2025-08-11 RX ORDER — ALBUTEROL SULFATE 90 UG/1
2 INHALANT RESPIRATORY (INHALATION) EVERY 4 HOURS PRN
Qty: 18 G | Refills: 0 | Status: SHIPPED | OUTPATIENT
Start: 2025-08-11 | End: 2026-08-11

## 2025-08-14 ENCOUNTER — APPOINTMENT (OUTPATIENT)
Dept: PRIMARY CARE | Facility: CLINIC | Age: 65
End: 2025-08-14
Payer: MEDICARE

## 2025-08-14 VITALS
HEART RATE: 74 BPM | WEIGHT: 207.4 LBS | DIASTOLIC BLOOD PRESSURE: 100 MMHG | TEMPERATURE: 97.1 F | BODY MASS INDEX: 26.63 KG/M2 | SYSTOLIC BLOOD PRESSURE: 160 MMHG | OXYGEN SATURATION: 98 %

## 2025-08-14 DIAGNOSIS — Z79.899 HIGH RISK MEDICATION USE: ICD-10-CM

## 2025-08-14 DIAGNOSIS — I10 HYPERTENSION, UNSPECIFIED TYPE: ICD-10-CM

## 2025-08-14 DIAGNOSIS — E53.8 VITAMIN B12 DEFICIENCY: ICD-10-CM

## 2025-08-14 DIAGNOSIS — Z00.00 ROUTINE GENERAL MEDICAL EXAMINATION AT HEALTH CARE FACILITY: Primary | ICD-10-CM

## 2025-08-14 DIAGNOSIS — R53.83 OTHER FATIGUE: ICD-10-CM

## 2025-08-14 DIAGNOSIS — F17.211 NICOTINE DEPENDENCE, CIGARETTES, IN REMISSION: ICD-10-CM

## 2025-08-14 DIAGNOSIS — Z12.5 SCREENING FOR PROSTATE CANCER: ICD-10-CM

## 2025-08-14 DIAGNOSIS — E78.00 HYPERCHOLESTEREMIA: ICD-10-CM

## 2025-08-14 DIAGNOSIS — R73.09 ABNORMAL BLOOD SUGAR: ICD-10-CM

## 2025-08-14 DIAGNOSIS — R25.1 TREMORS OF NERVOUS SYSTEM: ICD-10-CM

## 2025-08-14 DIAGNOSIS — E55.9 VITAMIN D DEFICIENCY, UNSPECIFIED: ICD-10-CM

## 2025-08-14 DIAGNOSIS — Z12.2 ENCOUNTER FOR SCREENING FOR LUNG CANCER: ICD-10-CM

## 2025-08-14 PROCEDURE — 99406 BEHAV CHNG SMOKING 3-10 MIN: CPT | Performed by: INTERNAL MEDICINE

## 2025-08-14 PROCEDURE — 3077F SYST BP >= 140 MM HG: CPT | Performed by: INTERNAL MEDICINE

## 2025-08-14 PROCEDURE — 99214 OFFICE O/P EST MOD 30 MIN: CPT | Performed by: INTERNAL MEDICINE

## 2025-08-14 PROCEDURE — 1170F FXNL STATUS ASSESSED: CPT | Performed by: INTERNAL MEDICINE

## 2025-08-14 PROCEDURE — G0439 PPPS, SUBSEQ VISIT: HCPCS | Performed by: INTERNAL MEDICINE

## 2025-08-14 PROCEDURE — 99397 PER PM REEVAL EST PAT 65+ YR: CPT | Performed by: INTERNAL MEDICINE

## 2025-08-14 PROCEDURE — 1124F ACP DISCUSS-NO DSCNMKR DOCD: CPT | Performed by: INTERNAL MEDICINE

## 2025-08-14 PROCEDURE — 3080F DIAST BP >= 90 MM HG: CPT | Performed by: INTERNAL MEDICINE

## 2025-08-14 PROCEDURE — 1160F RVW MEDS BY RX/DR IN RCRD: CPT | Performed by: INTERNAL MEDICINE

## 2025-08-14 PROCEDURE — 1159F MED LIST DOCD IN RCRD: CPT | Performed by: INTERNAL MEDICINE

## 2025-08-14 RX ORDER — PRIMIDONE 50 MG/1
50 TABLET ORAL NIGHTLY
Qty: 90 TABLET | Refills: 1 | Status: SHIPPED | OUTPATIENT
Start: 2025-08-14

## 2025-08-14 RX ORDER — LOSARTAN POTASSIUM 100 MG/1
100 TABLET ORAL DAILY
Qty: 90 TABLET | Refills: 1 | Status: SHIPPED | OUTPATIENT
Start: 2025-08-14

## 2025-08-14 ASSESSMENT — ENCOUNTER SYMPTOMS
DIARRHEA: 0
COUGH: 0
UNEXPECTED WEIGHT CHANGE: 0
WHEEZING: 0
HEADACHES: 0
ABDOMINAL DISTENTION: 1
PALPITATIONS: 0
DIFFICULTY URINATING: 0
ARTHRALGIAS: 0
ABDOMINAL PAIN: 0
SORE THROAT: 0
BRUISES/BLEEDS EASILY: 0
FEVER: 0
DIZZINESS: 0
FATIGUE: 0
SINUS PAIN: 0
BLOOD IN STOOL: 0

## 2025-08-14 ASSESSMENT — ACTIVITIES OF DAILY LIVING (ADL)
DRESSING: INDEPENDENT
TAKING_MEDICATION: INDEPENDENT
MANAGING_FINANCES: INDEPENDENT
GROCERY_SHOPPING: INDEPENDENT
DOING_HOUSEWORK: INDEPENDENT
BATHING: INDEPENDENT

## 2025-08-14 ASSESSMENT — PATIENT HEALTH QUESTIONNAIRE - PHQ9
SUM OF ALL RESPONSES TO PHQ9 QUESTIONS 1 AND 2: 0
1. LITTLE INTEREST OR PLEASURE IN DOING THINGS: NOT AT ALL
2. FEELING DOWN, DEPRESSED OR HOPELESS: NOT AT ALL

## 2025-08-20 ENCOUNTER — APPOINTMENT (OUTPATIENT)
Dept: PAIN MEDICINE | Facility: CLINIC | Age: 65
End: 2025-08-20
Payer: MEDICARE

## 2025-08-21 ENCOUNTER — HOSPITAL ENCOUNTER (OUTPATIENT)
Dept: GASTROENTEROLOGY | Facility: HOSPITAL | Age: 65
Discharge: HOME | End: 2025-08-21
Payer: MEDICARE

## 2025-08-21 VITALS
BODY MASS INDEX: 26.56 KG/M2 | DIASTOLIC BLOOD PRESSURE: 98 MMHG | SYSTOLIC BLOOD PRESSURE: 147 MMHG | TEMPERATURE: 97.9 F | WEIGHT: 207 LBS | OXYGEN SATURATION: 97 % | RESPIRATION RATE: 17 BRPM | HEART RATE: 73 BPM | HEIGHT: 74 IN

## 2025-08-21 DIAGNOSIS — M25.561 BILATERAL CHRONIC KNEE PAIN: ICD-10-CM

## 2025-08-21 DIAGNOSIS — M25.562 BILATERAL CHRONIC KNEE PAIN: ICD-10-CM

## 2025-08-21 DIAGNOSIS — G89.29 BILATERAL CHRONIC KNEE PAIN: ICD-10-CM

## 2025-08-21 PROCEDURE — 3700000012 HC SEDATION LEVEL 5+ TIME - INITIAL 15 MINUTES 5/> YEARS

## 2025-08-21 PROCEDURE — 7100000009 HC PHASE TWO TIME - INITIAL BASE CHARGE

## 2025-08-21 PROCEDURE — 99152 MOD SED SAME PHYS/QHP 5/>YRS: CPT | Performed by: ANESTHESIOLOGY

## 2025-08-21 PROCEDURE — 7100000010 HC PHASE TWO TIME - EACH INCREMENTAL 1 MINUTE

## 2025-08-21 PROCEDURE — 64624 DSTRJ NULYT AGT GNCLR NRV: CPT | Performed by: ANESTHESIOLOGY

## 2025-08-21 PROCEDURE — 2500000004 HC RX 250 GENERAL PHARMACY W/ HCPCS (ALT 636 FOR OP/ED): Performed by: ANESTHESIOLOGY

## 2025-08-21 RX ORDER — FENTANYL CITRATE 50 UG/ML
INJECTION, SOLUTION INTRAMUSCULAR; INTRAVENOUS AS NEEDED
Status: COMPLETED | OUTPATIENT
Start: 2025-08-21 | End: 2025-08-21

## 2025-08-21 RX ORDER — LIDOCAINE HYDROCHLORIDE 10 MG/ML
INJECTION, SOLUTION EPIDURAL; INFILTRATION; INTRACAUDAL; PERINEURAL AS NEEDED
Status: COMPLETED | OUTPATIENT
Start: 2025-08-21 | End: 2025-08-21

## 2025-08-21 RX ORDER — LIDOCAINE HYDROCHLORIDE 20 MG/ML
INJECTION, SOLUTION EPIDURAL; INFILTRATION; INTRACAUDAL; PERINEURAL AS NEEDED
Status: COMPLETED | OUTPATIENT
Start: 2025-08-21 | End: 2025-08-21

## 2025-08-21 RX ORDER — MIDAZOLAM HYDROCHLORIDE 1 MG/ML
INJECTION, SOLUTION INTRAMUSCULAR; INTRAVENOUS AS NEEDED
Status: COMPLETED | OUTPATIENT
Start: 2025-08-21 | End: 2025-08-21

## 2025-08-21 RX ADMIN — LIDOCAINE HYDROCHLORIDE 6 ML: 20 INJECTION, SOLUTION EPIDURAL; INFILTRATION; INTRACAUDAL; PERINEURAL at 12:38

## 2025-08-21 RX ADMIN — MIDAZOLAM 2 MG: 1 INJECTION INTRAMUSCULAR; INTRAVENOUS at 12:37

## 2025-08-21 RX ADMIN — FENTANYL CITRATE 25 MCG: 0.05 INJECTION, SOLUTION INTRAMUSCULAR; INTRAVENOUS at 12:37

## 2025-08-21 RX ADMIN — FENTANYL CITRATE 50 MCG: 0.05 INJECTION, SOLUTION INTRAMUSCULAR; INTRAVENOUS at 12:45

## 2025-08-21 RX ADMIN — FENTANYL CITRATE 50 MCG: 0.05 INJECTION, SOLUTION INTRAMUSCULAR; INTRAVENOUS at 12:38

## 2025-08-21 RX ADMIN — LIDOCAINE HYDROCHLORIDE 6 ML: 10 INJECTION, SOLUTION EPIDURAL; INFILTRATION; INTRACAUDAL; PERINEURAL at 12:38

## 2025-08-21 RX ADMIN — FENTANYL CITRATE 25 MCG: 0.05 INJECTION, SOLUTION INTRAMUSCULAR; INTRAVENOUS at 12:39

## 2025-08-21 ASSESSMENT — PAIN - FUNCTIONAL ASSESSMENT
PAIN_FUNCTIONAL_ASSESSMENT: 0-10

## 2025-08-21 ASSESSMENT — PAIN SCALES - GENERAL
PAINLEVEL_OUTOF10: 6
PAINLEVEL_OUTOF10: 0 - NO PAIN
PAINLEVEL_OUTOF10: 0 - NO PAIN

## 2025-08-21 ASSESSMENT — PAIN DESCRIPTION - DESCRIPTORS: DESCRIPTORS: BURNING

## 2025-08-25 DIAGNOSIS — J43.9 PULMONARY EMPHYSEMA, UNSPECIFIED EMPHYSEMA TYPE (MULTI): ICD-10-CM

## 2025-08-25 DIAGNOSIS — F51.01 PRIMARY INSOMNIA: ICD-10-CM

## 2025-08-25 RX ORDER — ALBUTEROL SULFATE 90 UG/1
2 INHALANT RESPIRATORY (INHALATION) EVERY 4 HOURS PRN
Qty: 18 G | Refills: 0 | Status: SHIPPED | OUTPATIENT
Start: 2025-08-25 | End: 2026-08-25

## 2025-08-25 RX ORDER — TRAZODONE HYDROCHLORIDE 50 MG/1
50 TABLET ORAL NIGHTLY
Qty: 90 TABLET | Refills: 0 | Status: SHIPPED | OUTPATIENT
Start: 2025-08-25

## 2025-09-02 ENCOUNTER — APPOINTMENT (OUTPATIENT)
Dept: OPHTHALMOLOGY | Facility: CLINIC | Age: 65
End: 2025-09-02
Payer: MEDICARE

## 2025-09-02 ASSESSMENT — REFRACTION_WEARINGRX
OS_SPHERE: -2.50
OD_AXIS: 091
OD_CYLINDER: -2.00
OD_ADD: +2.50
OS_AXIS: 120
OS_ADD: +2.50
OS_CYLINDER: -1.50
OD_SPHERE: -1.00

## 2025-09-02 ASSESSMENT — ENCOUNTER SYMPTOMS
CONSTITUTIONAL NEGATIVE: 0
HEMATOLOGIC/LYMPHATIC NEGATIVE: 0
RESPIRATORY NEGATIVE: 0
ENDOCRINE NEGATIVE: 0
NEUROLOGICAL NEGATIVE: 0
MUSCULOSKELETAL NEGATIVE: 0
PSYCHIATRIC NEGATIVE: 0
CARDIOVASCULAR NEGATIVE: 0
GASTROINTESTINAL NEGATIVE: 0
ALLERGIC/IMMUNOLOGIC NEGATIVE: 0
EYES NEGATIVE: 0

## 2025-09-02 ASSESSMENT — CONF VISUAL FIELD
METHOD: COUNTING FINGERS
OS_INFERIOR_NASAL_RESTRICTION: 3
OD_INFERIOR_TEMPORAL_RESTRICTION: 3
OS_INFERIOR_TEMPORAL_RESTRICTION: 0
OD_SUPERIOR_NASAL_RESTRICTION: 3
OD_SUPERIOR_TEMPORAL_RESTRICTION: 3
OD_INFERIOR_NASAL_RESTRICTION: 3

## 2025-09-02 ASSESSMENT — REFRACTION_MANIFEST
OS_AXIS: 100
OD_ADD: +2.50
OS_ADD: +2.50
OS_CYLINDER: -0.50
OS_SPHERE: -1.75
OD_CYLINDER: -2.00
OD_SPHERE: -3.00
OD_AXIS: 090

## 2025-09-02 ASSESSMENT — VISUAL ACUITY
METHOD: SNELLEN - LINEAR
OD_PH_CC: 20/200
OS_CC: 20/40
CORRECTION_TYPE: GLASSES
OD_CC: 20/400
OS_CC+: -1

## 2025-09-02 ASSESSMENT — EXTERNAL EXAM - LEFT EYE: OS_EXAM: NORMAL

## 2025-09-02 ASSESSMENT — SLIT LAMP EXAM - LIDS: COMMENTS: GOOD POSITION

## 2025-09-02 ASSESSMENT — TONOMETRY
OS_IOP_MMHG: 16
IOP_METHOD: GOLDMANN APPLANATION
OD_IOP_MMHG: 16

## 2025-09-02 ASSESSMENT — EXTERNAL EXAM - RIGHT EYE: OD_EXAM: NORMAL

## 2025-09-02 ASSESSMENT — CUP TO DISC RATIO
OS_RATIO: .3
OD_RATIO: 0.3

## 2025-09-15 ENCOUNTER — APPOINTMENT (OUTPATIENT)
Dept: OPHTHALMOLOGY | Facility: CLINIC | Age: 65
End: 2025-09-15
Payer: MEDICARE

## 2025-10-15 ENCOUNTER — APPOINTMENT (OUTPATIENT)
Dept: SURGERY | Facility: CLINIC | Age: 65
End: 2025-10-15
Payer: MEDICARE

## 2025-11-12 ENCOUNTER — APPOINTMENT (OUTPATIENT)
Dept: PRIMARY CARE | Facility: CLINIC | Age: 65
End: 2025-11-12
Payer: MEDICARE

## (undated) DEVICE — DRAPE, SHEET, UTILITY, NON ABSORBENT, 18 X 26 IN, LF

## (undated) DEVICE — GLOVE, SURGICAL, PROTEXIS PI BLUE W/NEUTHERA, 8.0, PF, LF

## (undated) DEVICE — IRRIGATION SYSTEM, WOUND, SURGIPHOR, 450ML, STERILE

## (undated) DEVICE — COVER, TABLE, 44 X 75 IN, DISPOSABLE, LF, STERILE

## (undated) DEVICE — HOOD, SURGICAL, FLYTE HYBRID

## (undated) DEVICE — DRAPE, ISOLATION, ANTIMICROBIAL, IOBAN, W/FILM & POUCH, LARGE, 32 X 70 CM

## (undated) DEVICE — SUTURE, QUILL, BARBED, PDO, 2, 24 X 24CM, T8 36MM TAPER POINT, 1/2 CIRCLE

## (undated) DEVICE — KIT, MINOR, DOUBLE BASIN

## (undated) DEVICE — NEEDLE, SPINAL, QUINCKE, 18 G X 3.5 IN, PINK HUB

## (undated) DEVICE — SYRINGE, 30 CC, LUER LOCK

## (undated) DEVICE — CATHETER TRAY, SURESTEP, 14FR, PRECONNECTED DRAIN BAG

## (undated) DEVICE — GLOVE, SURGICAL, PROTEXIS PI ORTHO, 8.0, PF, LF

## (undated) DEVICE — APPLICATOR, CHLORAPREP, W/ORANGE TINT, 26ML

## (undated) DEVICE — SUTURE, VICRYL, 1, 24 IN, CTD, UNDYED

## (undated) DEVICE — SUTURE, V-LOC, 3-0, 23IN, P-12, 90 ABS

## (undated) DEVICE — DRAPE, C-ARM IMAGE

## (undated) DEVICE — SYRINGE, 50 CC, LUER LOCK

## (undated) DEVICE — SKIN CLOSURE SYS, PREMIERPRO EXOFIN, 1-4CM X 22CM, 1.75G TUBE

## (undated) DEVICE — SUTURE, CTD, VICRYL, 2-0, UND, BR, CT-2

## (undated) DEVICE — DRAPE PACK, TOTAL HIP, CUSTOM, GEAUGA

## (undated) DEVICE — NEEDLE, BLUNT FILL, 18GA X 1 IN

## (undated) DEVICE — DRESSING, MEPILEX BORDER, POST-OP AG, 4 X 10 IN

## (undated) DEVICE — SYRINGE, TUBERCULIN, LUER SLIP, 1 ML, W/NEEDLE, PRECISIONGLIDE, INTRADERMAL BEVEL, REGULAR WALL, 27 G X 0.5 IN

## (undated) DEVICE — TIP, SUCTION, YANKAUER, FLEXIBLE

## (undated) DEVICE — SEALER, BIPOLAR, AQUA MANTYS 6.0

## (undated) DEVICE — BLADE, OSCILLATOR 19.5 X 86 X 1.27MM